# Patient Record
Sex: MALE | Race: WHITE | NOT HISPANIC OR LATINO | Employment: OTHER | URBAN - METROPOLITAN AREA
[De-identification: names, ages, dates, MRNs, and addresses within clinical notes are randomized per-mention and may not be internally consistent; named-entity substitution may affect disease eponyms.]

---

## 2019-01-15 ENCOUNTER — TELEPHONE (OUTPATIENT)
Dept: INTERNAL MEDICINE | Facility: CLINIC | Age: 79
End: 2019-01-15

## 2019-01-15 RX ORDER — FENOFIBRATE 145 MG/1
145 TABLET, COATED ORAL DAILY
Qty: 90 TABLET | Refills: 1 | Status: SHIPPED | OUTPATIENT
Start: 2019-01-15 | End: 2019-01-16 | Stop reason: SDUPTHER

## 2019-01-15 RX ORDER — CARVEDILOL 6.25 MG/1
6.25 TABLET ORAL 2 TIMES DAILY WITH MEALS
Qty: 90 TABLET | Refills: 1 | Status: SHIPPED | OUTPATIENT
Start: 2019-01-15 | End: 2019-01-16 | Stop reason: SDUPTHER

## 2019-01-15 RX ORDER — ATORVASTATIN CALCIUM 40 MG/1
40 TABLET, FILM COATED ORAL DAILY
Qty: 90 TABLET | Refills: 1 | Status: SHIPPED | OUTPATIENT
Start: 2019-01-15 | End: 2019-01-16 | Stop reason: SDUPTHER

## 2019-01-15 NOTE — TELEPHONE ENCOUNTER
[facility] only sent 1 of his medications.Dia 160/12 tablet. Only information that was in the email.  (Will need new Rx for others.

## 2019-01-16 RX ORDER — CARVEDILOL 6.25 MG/1
6.25 TABLET ORAL 2 TIMES DAILY WITH MEALS
Qty: 90 TABLET | Refills: 1 | Status: SHIPPED | OUTPATIENT
Start: 2019-01-16 | End: 2019-05-08 | Stop reason: SDUPTHER

## 2019-01-16 RX ORDER — ATORVASTATIN CALCIUM 40 MG/1
TABLET, FILM COATED ORAL
Qty: 90 TABLET | Refills: 1 | OUTPATIENT
Start: 2019-01-16

## 2019-01-16 RX ORDER — FENOFIBRATE 145 MG/1
TABLET, COATED ORAL
Qty: 90 TABLET | Refills: 1 | OUTPATIENT
Start: 2019-01-16

## 2019-01-16 RX ORDER — FENOFIBRATE 145 MG/1
145 TABLET, COATED ORAL DAILY
Qty: 90 TABLET | Refills: 1 | Status: SHIPPED | OUTPATIENT
Start: 2019-01-16 | End: 2019-07-07 | Stop reason: SDUPTHER

## 2019-01-16 RX ORDER — ATORVASTATIN CALCIUM 40 MG/1
40 TABLET, FILM COATED ORAL DAILY
Qty: 90 TABLET | Refills: 1 | Status: SHIPPED | OUTPATIENT
Start: 2019-01-16 | End: 2019-07-07 | Stop reason: SDUPTHER

## 2019-01-16 RX ORDER — CARVEDILOL 6.25 MG/1
TABLET ORAL
Qty: 180 TABLET | Refills: 1 | OUTPATIENT
Start: 2019-01-16

## 2019-01-16 NOTE — TELEPHONE ENCOUNTER
Called and spoke to Mallory, daughter. She said he has had [facility] for a year. Re-submitted new rx's to [facility]. Advised daughter to check with them tomorrow and call me if there are any problems

## 2019-01-16 NOTE — TELEPHONE ENCOUNTER
PTS DAUGHTER REQUESTS ANOTHER RX FOR ATORVASTATIN, CARVEDILOL, AND FENOFIBRATE. SHE STATES THAT THEY NEED TO BE SENT TO [facility] AND NOT [facility]. DAUGHTER WOULD LIKE A CALL BACK WHEN THIS IS TAKEN CARE OF. [phone].    AFTER LOOKING INTO THE CHART, [facility] DENIED THEM DUE TO BEING PICKED UP BY ANOTHER PHARMACY, WHICH WAS [facility].

## 2019-05-01 PROBLEM — R73.09 ABNORMAL GLUCOSE LEVEL: Status: ACTIVE | Noted: 2019-05-01

## 2019-05-01 PROBLEM — E66.01 MORBID OBESITY: Status: ACTIVE | Noted: 2019-05-01

## 2019-05-01 PROBLEM — G47.33 OBSTRUCTIVE SLEEP APNEA: Status: ACTIVE | Noted: 2019-05-01

## 2019-05-01 PROBLEM — E78.2 MIXED HYPERLIPIDEMIA: Status: ACTIVE | Noted: 2019-05-01

## 2019-05-01 PROBLEM — I25.10 ASHD (ARTERIOSCLEROTIC HEART DISEASE): Status: ACTIVE | Noted: 2019-05-01

## 2019-05-01 PROBLEM — T14.8XXA ABRASION: Status: ACTIVE | Noted: 2019-05-01

## 2019-05-01 PROBLEM — I25.2 OLD MYOCARDIAL INFARCTION: Status: ACTIVE | Noted: 2019-05-01

## 2019-05-01 PROBLEM — Z00.00 ANNUAL PHYSICAL EXAM: Status: ACTIVE | Noted: 2019-05-01

## 2019-05-01 PROBLEM — Z00.00 MEDICARE ANNUAL WELLNESS VISIT, SUBSEQUENT: Status: ACTIVE | Noted: 2019-05-01

## 2019-05-01 PROBLEM — N18.30 CHRONIC KIDNEY DISEASE, STAGE 3 (HCC): Status: ACTIVE | Noted: 2019-05-01

## 2019-05-01 PROBLEM — I10 BENIGN ESSENTIAL HYPERTENSION: Status: ACTIVE | Noted: 2019-05-01

## 2019-05-08 RX ORDER — CARVEDILOL 6.25 MG/1
TABLET ORAL
Qty: 90 TABLET | Refills: 0 | Status: SHIPPED | OUTPATIENT
Start: 2019-05-08 | End: 2019-06-11 | Stop reason: SDUPTHER

## 2019-05-17 ENCOUNTER — LAB (OUTPATIENT)
Dept: LAB | Facility: HOSPITAL | Age: 79
End: 2019-05-17

## 2019-05-17 ENCOUNTER — OFFICE VISIT (OUTPATIENT)
Dept: INTERNAL MEDICINE | Facility: CLINIC | Age: 79
End: 2019-05-17

## 2019-05-17 VITALS
SYSTOLIC BLOOD PRESSURE: 140 MMHG | HEART RATE: 76 BPM | WEIGHT: 236 LBS | DIASTOLIC BLOOD PRESSURE: 80 MMHG | HEIGHT: 66 IN | BODY MASS INDEX: 37.93 KG/M2

## 2019-05-17 DIAGNOSIS — N18.30 CHRONIC KIDNEY DISEASE, STAGE 3 (HCC): ICD-10-CM

## 2019-05-17 DIAGNOSIS — R73.09 ABNORMAL GLUCOSE LEVEL: ICD-10-CM

## 2019-05-17 DIAGNOSIS — R35.0 URINARY FREQUENCY: ICD-10-CM

## 2019-05-17 DIAGNOSIS — E78.2 MIXED HYPERLIPIDEMIA: ICD-10-CM

## 2019-05-17 DIAGNOSIS — E66.01 MORBID OBESITY (HCC): ICD-10-CM

## 2019-05-17 DIAGNOSIS — Z00.00 MEDICARE ANNUAL WELLNESS VISIT, SUBSEQUENT: Primary | ICD-10-CM

## 2019-05-17 LAB
25(OH)D3 SERPL-MCNC: 11.5 NG/ML (ref 30–100)
ALBUMIN SERPL-MCNC: 3.3 G/DL (ref 3.5–5.2)
ALBUMIN UR-MCNC: <1.2 MG/L
ALBUMIN/GLOB SERPL: 1 G/DL
ALP SERPL-CCNC: 45 U/L (ref 39–117)
ALT SERPL W P-5'-P-CCNC: 39 U/L (ref 1–41)
ANION GAP SERPL CALCULATED.3IONS-SCNC: 13.2 MMOL/L
AST SERPL-CCNC: 42 U/L (ref 1–40)
BASOPHILS # BLD AUTO: 0.03 10*3/MM3 (ref 0–0.2)
BASOPHILS NFR BLD AUTO: 0.4 % (ref 0–1.5)
BILIRUB SERPL-MCNC: 0.6 MG/DL (ref 0.2–1.2)
BILIRUB UR QL STRIP: NEGATIVE
BUN BLD-MCNC: 25 MG/DL (ref 8–23)
BUN/CREAT SERPL: 21.6 (ref 7–25)
CALCIUM SPEC-SCNC: 9.3 MG/DL (ref 8.6–10.5)
CHLORIDE SERPL-SCNC: 103 MMOL/L (ref 98–107)
CHOLEST SERPL-MCNC: 108 MG/DL (ref 0–200)
CLARITY UR: CLEAR
CO2 SERPL-SCNC: 22.8 MMOL/L (ref 22–29)
COLOR UR: YELLOW
CREAT BLD-MCNC: 1.16 MG/DL (ref 0.76–1.27)
CREAT UR-MCNC: 123.3 MG/DL
DEPRECATED RDW RBC AUTO: 55.2 FL (ref 37–54)
EOSINOPHIL # BLD AUTO: 0.17 10*3/MM3 (ref 0–0.4)
EOSINOPHIL NFR BLD AUTO: 2.2 % (ref 0.3–6.2)
ERYTHROCYTE [DISTWIDTH] IN BLOOD BY AUTOMATED COUNT: 14.7 % (ref 12.3–15.4)
GFR SERPL CREATININE-BSD FRML MDRD: 61 ML/MIN/1.73
GLOBULIN UR ELPH-MCNC: 3.2 GM/DL
GLUCOSE BLD-MCNC: 100 MG/DL (ref 65–99)
GLUCOSE UR STRIP-MCNC: NEGATIVE MG/DL
HBA1C MFR BLD: 5.78 % (ref 4.8–5.6)
HCT VFR BLD AUTO: 45 % (ref 37.5–51)
HDLC SERPL-MCNC: 26 MG/DL (ref 40–60)
HGB BLD-MCNC: 14 G/DL (ref 13–17.7)
HGB UR QL STRIP.AUTO: NEGATIVE
IMM GRANULOCYTES # BLD AUTO: 0.04 10*3/MM3 (ref 0–0.05)
IMM GRANULOCYTES NFR BLD AUTO: 0.5 % (ref 0–0.5)
KETONES UR QL STRIP: NEGATIVE
LDLC SERPL CALC-MCNC: 59 MG/DL (ref 0–100)
LDLC/HDLC SERPL: 2.28 {RATIO}
LEUKOCYTE ESTERASE UR QL STRIP.AUTO: NEGATIVE
LYMPHOCYTES # BLD AUTO: 1.22 10*3/MM3 (ref 0.7–3.1)
LYMPHOCYTES NFR BLD AUTO: 15.7 % (ref 19.6–45.3)
MCH RBC QN AUTO: 31.5 PG (ref 26.6–33)
MCHC RBC AUTO-ENTMCNC: 31.1 G/DL (ref 31.5–35.7)
MCV RBC AUTO: 101.1 FL (ref 79–97)
MICROALBUMIN/CREAT UR: NORMAL MG/G
MONOCYTES # BLD AUTO: 0.93 10*3/MM3 (ref 0.1–0.9)
MONOCYTES NFR BLD AUTO: 11.9 % (ref 5–12)
NEUTROPHILS # BLD AUTO: 5.4 10*3/MM3 (ref 1.7–7)
NEUTROPHILS NFR BLD AUTO: 69.3 % (ref 42.7–76)
NITRITE UR QL STRIP: NEGATIVE
NRBC BLD AUTO-RTO: 0 /100 WBC (ref 0–0.2)
PH UR STRIP.AUTO: 6 [PH] (ref 5–8)
PLATELET # BLD AUTO: 186 10*3/MM3 (ref 140–450)
PMV BLD AUTO: 12.4 FL (ref 6–12)
POTASSIUM BLD-SCNC: 4.3 MMOL/L (ref 3.5–5.2)
PROT SERPL-MCNC: 6.5 G/DL (ref 6–8.5)
PROT UR QL STRIP: NEGATIVE
RBC # BLD AUTO: 4.45 10*6/MM3 (ref 4.14–5.8)
SODIUM BLD-SCNC: 139 MMOL/L (ref 136–145)
SP GR UR STRIP: 1.02 (ref 1–1.03)
TRIGL SERPL-MCNC: 114 MG/DL (ref 0–150)
TSH SERPL DL<=0.05 MIU/L-ACNC: 1.59 MIU/ML (ref 0.27–4.2)
UROBILINOGEN UR QL STRIP: NORMAL
VLDLC SERPL-MCNC: 22.8 MG/DL (ref 5–40)
WBC NRBC COR # BLD: 7.79 10*3/MM3 (ref 3.4–10.8)

## 2019-05-17 PROCEDURE — 84443 ASSAY THYROID STIM HORMONE: CPT

## 2019-05-17 PROCEDURE — 36415 COLL VENOUS BLD VENIPUNCTURE: CPT

## 2019-05-17 PROCEDURE — 82570 ASSAY OF URINE CREATININE: CPT

## 2019-05-17 PROCEDURE — 83036 HEMOGLOBIN GLYCOSYLATED A1C: CPT

## 2019-05-17 PROCEDURE — 99213 OFFICE O/P EST LOW 20 MIN: CPT | Performed by: NURSE PRACTITIONER

## 2019-05-17 PROCEDURE — 82043 UR ALBUMIN QUANTITATIVE: CPT

## 2019-05-17 PROCEDURE — 81003 URINALYSIS AUTO W/O SCOPE: CPT

## 2019-05-17 PROCEDURE — 85025 COMPLETE CBC W/AUTO DIFF WBC: CPT

## 2019-05-17 PROCEDURE — 80061 LIPID PANEL: CPT

## 2019-05-17 PROCEDURE — 80053 COMPREHEN METABOLIC PANEL: CPT

## 2019-05-17 PROCEDURE — 82306 VITAMIN D 25 HYDROXY: CPT

## 2019-05-17 RX ORDER — PNV NO.95/FERROUS FUM/FOLIC AC 28MG-0.8MG
1 TABLET ORAL DAILY
COMMUNITY

## 2019-05-17 RX ORDER — VALSARTAN AND HYDROCHLOROTHIAZIDE 160; 12.5 MG/1; MG/1
1 TABLET, FILM COATED ORAL DAILY
COMMUNITY
Start: 2018-11-09 | End: 2019-07-07 | Stop reason: SDUPTHER

## 2019-05-17 RX ORDER — FAMOTIDINE 20 MG/1
1 TABLET, FILM COATED ORAL EVERY 12 HOURS SCHEDULED
COMMUNITY
Start: 2016-11-03

## 2019-05-17 NOTE — PROGRESS NOTES
"Tho Vasquez  1940  [id number]  Patient Care Team:  Ja Baldwin MD as PCP - General (Internal Medicine)  Danny Rdz Jr., MD as Consulting Physician (Ophthalmology)    Tho Vasquez is a pleasant 79 y.o. male who presents for evaluation of Annual Exam      Chief Complaint   Patient presents with   • Annual Exam       HPI:   Urinary frequency for several months.  No dysuria, abd pain, fever, n/v.  Will add UA to labs.    Past Medical History:   Diagnosis Date   • Myocardial infarction (CMS/HCC)    • Positive PPD    • Respiratory failure (CMS/HCC) 2005    requiring tracheostomy       No past surgical history on file.    Family History   Problem Relation Age of Onset   • Arthritis Mother    • Heart attack Father    • Stroke Father    • Diabetes Father    • Heart attack Brother    • Cancer Paternal Uncle    • Obesity Other        Social History     Tobacco Use   Smoking Status Former Smoker   • Types: Cigars       Allergies   Allergen Reactions   • Prednisone Rash       Review of Systems    Vitals:    05/17/19 0747   BP: 140/80   BP Location: Left arm   Patient Position: Sitting   Cuff Size: Adult   Pulse: 76   Weight: 107 kg (236 lb)   Height: 167.6 cm (66\")         Current Outpatient Medications:   •  aspirin 81 MG tablet, Take 1 tablet by mouth Daily., Disp: , Rfl:   •  atorvastatin (LIPITOR) 40 MG tablet, Take 1 tablet by mouth Daily., Disp: 90 tablet, Rfl: 1  •  carvedilol (COREG) 6.25 MG tablet, TAKE 1 TABLET TWICE DAILY  WITH MEALS, Disp: 90 tablet, Rfl: 0  •  famotidine (PEPCID) 20 MG tablet, Take 1 tablet by mouth Every 12 (Twelve) Hours., Disp: , Rfl:   •  fenofibrate (TRICOR) 145 MG tablet, Take 1 tablet by mouth Daily., Disp: 90 tablet, Rfl: 1  •  Ferrous Sulfate (IRON) 325 (65 Fe) MG tablet, Take 1 tablet by mouth Daily., Disp: , Rfl:   •  valsartan-hydrochlorothiazide (DIOVAN-HCT) 160-12.5 MG per tablet, Take 1 tablet by mouth Daily., Disp: , Rfl:     Physical Exam "   Constitutional: He appears well-developed and well-nourished.   Pulmonary/Chest: Effort normal.   Skin: Skin is warm and dry.   Psychiatric: He has a normal mood and affect. His behavior is normal.   Vitals reviewed.      Results Review:    None    Assessment/Plan:    Problem List Items Addressed This Visit        Cardiovascular and Mediastinum    Mixed hyperlipidemia    Relevant Medications    atorvastatin (LIPITOR) 40 MG tablet    fenofibrate (TRICOR) 145 MG tablet    Other Relevant Orders    Lipid Panel    TSH Rfx On Abnormal To Free T4       Digestive    Morbid obesity (CMS/HCC)       Genitourinary    Chronic kidney disease, stage 3 (CMS/HCC)    Relevant Orders    Comprehensive Metabolic Panel    CBC & Differential    Microalbumin / Creatinine Urine Ratio - Urine, Clean Catch    Vitamin D 25 Hydroxy       Other    Abnormal glucose level    Relevant Orders    Hemoglobin A1c    Medicare annual wellness visit, subsequent - Primary      Other Visit Diagnoses     Urinary frequency        Relevant Orders    Urinalysis With Culture If Indicated - Urine, Clean Catch          Plan of care reviewed with patient at the conclusion of today's visit. Education was provided regarding diagnosis, management and any prescribed or recommended OTC medications.  Patient verbalizes understanding of and agreement with management plan.    No Follow-up on file.    *Note that portions of this note were completed with a voice recognition program.  Efforts were made to edit the dictation but occasionally words are transcribed.    ADALBERTO August

## 2019-05-17 NOTE — PATIENT INSTRUCTIONS
Medicare Wellness  Personal Prevention Plan of Service     Date of Office Visit:  2019  Encounter Provider:  ADALBERTO August  Place of Service:  [facility]  Patient Name: Tho Vasquez  :  1940    As part of the Medicare Wellness portion of your visit today, we are providing you with this personalized preventive plan of services (PPPS). This plan is based upon recommendations of the [place] Preventive Services Task Force (USPSTF) and the Advisory Committee on Immunization Practices (ACIP).    This lists the preventive care services that should be considered, and provides dates of when you are due. Items listed as completed are up-to-date and do not require any further intervention.    Health Maintenance   Topic Date Due   • ZOSTER VACCINE (2 of 2) 2018   • MEDICARE ANNUAL WELLNESS  01/15/2019   • LIPID PANEL  2019   • INFLUENZA VACCINE  2019   • TDAP/TD VACCINES (2 - Td) 2027   • PNEUMOCOCCAL VACCINES (65+ LOW/MEDIUM RISK)  Completed       Orders Placed This Encounter   Procedures   • Comprehensive Metabolic Panel     Standing Status:   Future     Standing Expiration Date:   2020   • Lipid Panel     Standing Status:   Future     Standing Expiration Date:   2020   • TSH Rfx On Abnormal To Free T4     Standing Status:   Future     Standing Expiration Date:   2020   • Microalbumin / Creatinine Urine Ratio - Urine, Clean Catch     Standing Status:   Future     Standing Expiration Date:   2020   • Vitamin D 25 Hydroxy     Standing Status:   Future     Standing Expiration Date:   2020   • Hemoglobin A1c     Standing Status:   Future     Standing Expiration Date:   2020   • Urinalysis With Culture If Indicated - Urine, Clean Catch     Standing Status:   Future     Standing Expiration Date:   2020   • CBC & Differential     Standing Status:   Future     Standing Expiration Date:   2020      Order Specific Question:   Manual Differential     Answer:   No       Return for Next scheduled follow up, Labs this visit, Annual physical.

## 2019-05-17 NOTE — PROGRESS NOTES
QUICK REFERENCE INFORMATION:  The ABCs of the Annual Wellness Visit    Subsequent Medicare Wellness Visit     HEALTH RISK ASSESSMENT    : 1940    Recent Hospitalizations:  No hospitalization(s) within the last year..        Current Medical Providers:  Patient Care Team:  Ja Baldwin MD as PCP - General (Internal Medicine)  Danny Rdz Jr., MD as Consulting Physician (Ophthalmology)        Smoking Status:  Social History     Tobacco Use   Smoking Status Former Smoker   • Types: Cigars       Alcohol Consumption:  Social History     Substance and Sexual Activity   Alcohol Use Not on file       Depression Screen:   PHQ-2/PHQ-9 Depression Screening 2019   Little interest or pleasure in doing things 0   Feeling down, depressed, or hopeless 0   Total Score 0       Health Habits and Functional and Cognitive Screening:  No flowsheet data found.        Does the patient have evidence of cognitive impairment? Yes    Asiprin use counseling: Taking ASA appropriately as indicated      Recent Lab Results:                   Age-appropriate Screening Schedule:  Refer to the list below for future screening recommendations based on patient's age, sex and/or medical conditions. Orders for these recommended tests are listed in the plan section. The patient has been provided with a written plan.    Health Maintenance   Topic Date Due   • ZOSTER VACCINE (2 of 2) 2018   • LIPID PANEL  2019   • INFLUENZA VACCINE  2019   • TDAP/TD VACCINES (2 - Td) 2027   • PNEUMOCOCCAL VACCINES (65+ LOW/MEDIUM RISK)  Completed        Subjective   History of Present Illness    Tho Vasquez is a 79 y.o. male who presents for an Annual Wellness Visit.    The following portions of the patient's history were reviewed and updated as appropriate: allergies, current medications, past family history, past medical history, past social history, past surgical history and problem list.    Outpatient Medications Prior  to Visit   Medication Sig Dispense Refill   • aspirin 81 MG tablet Take 1 tablet by mouth Daily.     • atorvastatin (LIPITOR) 40 MG tablet Take 1 tablet by mouth Daily. 90 tablet 1   • carvedilol (COREG) 6.25 MG tablet TAKE 1 TABLET TWICE DAILY  WITH MEALS 90 tablet 0   • famotidine (PEPCID) 20 MG tablet Take 1 tablet by mouth Every 12 (Twelve) Hours.     • fenofibrate (TRICOR) 145 MG tablet Take 1 tablet by mouth Daily. 90 tablet 1   • Ferrous Sulfate (IRON) 325 (65 Fe) MG tablet Take 1 tablet by mouth Daily.     • valsartan-hydrochlorothiazide (DIOVAN-HCT) 160-12.5 MG per tablet Take 1 tablet by mouth Daily.       No facility-administered medications prior to visit.        Patient Active Problem List   Diagnosis   • Mixed hyperlipidemia   • Abnormal glucose level   • Abrasion   • Adult BMI 37.0-37.9 kg/sq m   • Annual physical exam   • ASHD (arteriosclerotic heart disease)   • Benign essential hypertension   • Chronic kidney disease, stage 3 (CMS/[facility])   • Medicare annual wellness visit, subsequent   • Morbid obesity (CMS/[facility])   • Obstructive sleep apnea   • Old myocardial infarction       Advance Care Planning:  Patient does not have an advance directive - not interested in additional information    Identification of Risk Factors:  Risk factors include: weight , cardiovascular risk and inactivity.    Review of Systems   Constitutional: Negative for chills, fatigue and fever.   HENT: Negative for congestion, ear pain and sinus pressure.    Respiratory: Negative for cough, chest tightness, shortness of breath and wheezing.    Cardiovascular: Negative for chest pain and palpitations.   Gastrointestinal: Negative for abdominal pain, blood in stool and constipation.   Skin: Negative for color change.   Allergic/Immunologic: Negative for environmental allergies.   Neurological: Negative for dizziness, speech difficulty and headaches.   Psychiatric/Behavioral: Negative for confusion. The patient is not nervous/anxious.   "      Compared to one year ago, the patient feels his physical health is the same.  Compared to one year ago, the patient feels his mental health is the same.    Objective         Vitals:    05/17/19 0747   BP: 140/80   BP Location: Left arm   Patient Position: Sitting   Cuff Size: Adult   Pulse: 76   Weight: 107 kg (236 lb)   Height: 167.6 cm (66\")       Patient's Body mass index is 38.09 kg/m². BMI is above normal parameters. Recommendations include: exercise counseling and nutrition counseling.      Assessment/Plan   Patient Self-Management and Personalized Health Advice  The patient has been provided with information about: diet, exercise, weight management and prevention of cardiac or vascular disease and preventive services including:   · Shingrix.    Visit Diagnoses:    ICD-10-CM ICD-9-CM   1. Medicare annual wellness visit, subsequent Z00.00 V70.0   2. Mixed hyperlipidemia E78.2 272.2   3. Chronic kidney disease, stage 3 (CMS/[facility]) N18.3 585.3   4. Abnormal glucose level R73.09 790.29   5. Morbid obesity (CMS/[facility]) E66.01 278.01   6. Urinary frequency R35.0 788.41       Orders Placed This Encounter   Procedures   • Comprehensive Metabolic Panel     Standing Status:   Future     Standing Expiration Date:   5/17/2020   • Lipid Panel     Standing Status:   Future     Standing Expiration Date:   5/17/2020   • TSH Rfx On Abnormal To Free T4     Standing Status:   Future     Standing Expiration Date:   5/17/2020   • Microalbumin / Creatinine Urine Ratio - Urine, Clean Catch     Standing Status:   Future     Standing Expiration Date:   5/17/2020   • Vitamin D 25 Hydroxy     Standing Status:   Future     Standing Expiration Date:   5/17/2020   • Hemoglobin A1c     Standing Status:   Future     Standing Expiration Date:   5/17/2020   • Urinalysis With Culture If Indicated - Urine, Clean Catch     Standing Status:   Future     Standing Expiration Date:   5/17/2020   • CBC & Differential     Standing Status:   Future     " Standing Expiration Date:   5/17/2020     Order Specific Question:   Manual Differential     Answer:   No       Outpatient Encounter Medications as of 5/17/2019   Medication Sig Dispense Refill   • aspirin 81 MG tablet Take 1 tablet by mouth Daily.     • atorvastatin (LIPITOR) 40 MG tablet Take 1 tablet by mouth Daily. 90 tablet 1   • carvedilol (COREG) 6.25 MG tablet TAKE 1 TABLET TWICE DAILY  WITH MEALS 90 tablet 0   • famotidine (PEPCID) 20 MG tablet Take 1 tablet by mouth Every 12 (Twelve) Hours.     • fenofibrate (TRICOR) 145 MG tablet Take 1 tablet by mouth Daily. 90 tablet 1   • Ferrous Sulfate (IRON) 325 (65 Fe) MG tablet Take 1 tablet by mouth Daily.     • valsartan-hydrochlorothiazide (DIOVAN-HCT) 160-12.5 MG per tablet Take 1 tablet by mouth Daily.       No facility-administered encounter medications on file as of 5/17/2019.        Reviewed use of high risk medication in the elderly: not applicable  Reviewed for potential of harmful drug interactions in the elderly: not applicable    Follow Up:  Return for Next scheduled follow up, Labs this visit, Annual physical.     An After Visit Summary and PPPS with all of these plans were given to the patient.

## 2019-05-23 ENCOUNTER — OFFICE VISIT (OUTPATIENT)
Dept: INTERNAL MEDICINE | Facility: CLINIC | Age: 79
End: 2019-05-23

## 2019-05-23 VITALS
WEIGHT: 237 LBS | HEART RATE: 64 BPM | SYSTOLIC BLOOD PRESSURE: 128 MMHG | BODY MASS INDEX: 38.09 KG/M2 | DIASTOLIC BLOOD PRESSURE: 80 MMHG | HEIGHT: 66 IN

## 2019-05-23 DIAGNOSIS — E66.01 MORBID OBESITY (HCC): ICD-10-CM

## 2019-05-23 DIAGNOSIS — I87.2 VENOUS INSUFFICIENCY (CHRONIC) (PERIPHERAL): ICD-10-CM

## 2019-05-23 DIAGNOSIS — E78.2 MIXED HYPERLIPIDEMIA: ICD-10-CM

## 2019-05-23 DIAGNOSIS — R73.09 ABNORMAL GLUCOSE LEVEL: ICD-10-CM

## 2019-05-23 DIAGNOSIS — N18.30 CHRONIC KIDNEY DISEASE, STAGE 3 (HCC): ICD-10-CM

## 2019-05-23 DIAGNOSIS — I10 BENIGN ESSENTIAL HYPERTENSION: Primary | ICD-10-CM

## 2019-05-23 DIAGNOSIS — G47.33 OBSTRUCTIVE SLEEP APNEA: ICD-10-CM

## 2019-05-23 DIAGNOSIS — I25.10 ASHD (ARTERIOSCLEROTIC HEART DISEASE): ICD-10-CM

## 2019-05-23 DIAGNOSIS — E55.9 VITAMIN D DEFICIENCY: ICD-10-CM

## 2019-05-23 PROCEDURE — 99214 OFFICE O/P EST MOD 30 MIN: CPT | Performed by: INTERNAL MEDICINE

## 2019-05-23 NOTE — PROGRESS NOTES
[facility]     Tho Vasquez  1940   [id number]      Patient Care Team:  Ja Baldwin MD as PCP - General (Internal Medicine)  Danny Rdz Jr., MD as Consulting Physician (Ophthalmology)    Chief Complaint::   Chief Complaint   Patient presents with   • Hyperlipidemia   • Hypertension   • Obesity   • Chronic Kidney Disease        HPI  Mr. Vasquez is now 79.  He comes in for follow-up of his hypertension, prediabetes, chronic kidney disease, hyperlipidemia, obstructive sleep apnea, morbid obesity and for part 2 of his annual wellness visit.  Overall he feels well.  He sees Dr. Vergara for his lower extremity edema.  At one point Dr. Vergara was wrapping his legs.  He wore compression briefly but this apparently caused an abrasion on his leg.  He has no chest pain or dyspnea.  He admits he is not very active, and eats out most of the time.  He continues to work 3-11 shift as a , which is mostly sitting.      Chronic Conditions:      Patient Active Problem List   Diagnosis   • Mixed hyperlipidemia   • Abnormal glucose level   • Abrasion   • Adult BMI 37.0-37.9 kg/sq m   • Annual physical exam   • ASHD (arteriosclerotic heart disease)   • Benign essential hypertension   • Chronic kidney disease, stage 3 (CMS/HCC)   • Medicare annual wellness visit, subsequent   • Morbid obesity (CMS/HCC)   • Obstructive sleep apnea   • Old myocardial infarction   • Vitamin D deficiency   • Venous insufficiency (chronic) (peripheral)        Past Medical History:   Diagnosis Date   • Myocardial infarction (CMS/HCC)    • Positive PPD    • Respiratory failure (CMS/HCC) 2005    requiring tracheostomy       No past surgical history on file.    Family History   Problem Relation Age of Onset   • Arthritis Mother    • Heart attack Father    • Stroke Father    • Diabetes Father    • Heart attack Brother    • Cancer Paternal Uncle    • Obesity Other        Social History     Socioeconomic History   •  Marital status:      Spouse name: Not on file   • Number of children: Not on file   • Years of education: Not on file   • Highest education level: Not on file   Tobacco Use   • Smoking status: Former Smoker     Types: Cigars       Allergies   Allergen Reactions   • Prednisone Rash         Current Outpatient Medications:   •  aspirin 81 MG tablet, Take 1 tablet by mouth Daily., Disp: , Rfl:   •  atorvastatin (LIPITOR) 40 MG tablet, Take 1 tablet by mouth Daily., Disp: 90 tablet, Rfl: 1  •  carvedilol (COREG) 6.25 MG tablet, TAKE 1 TABLET TWICE DAILY  WITH MEALS, Disp: 90 tablet, Rfl: 0  •  famotidine (PEPCID) 20 MG tablet, Take 1 tablet by mouth Every 12 (Twelve) Hours., Disp: , Rfl:   •  fenofibrate (TRICOR) 145 MG tablet, Take 1 tablet by mouth Daily., Disp: 90 tablet, Rfl: 1  •  Ferrous Sulfate (IRON) 325 (65 Fe) MG tablet, Take 1 tablet by mouth Daily., Disp: , Rfl:   •  valsartan-hydrochlorothiazide (DIOVAN-HCT) 160-12.5 MG per tablet, Take 1 tablet by mouth Daily., Disp: , Rfl:   •  cholecalciferol (VITAMIN D3) 52283 units capsule, Take 1 capsule by mouth 1 (One) Time Per Week., Disp: 4 capsule, Rfl: 5    Review of Systems   Constitutional: Negative for chills, fatigue and fever.   HENT: Negative for congestion, ear pain and sinus pressure.    Respiratory: Negative for cough, chest tightness, shortness of breath and wheezing.    Cardiovascular: Negative for chest pain and palpitations.   Gastrointestinal: Negative for abdominal pain, blood in stool and constipation.   Skin: Negative for color change.   Allergic/Immunologic: Negative for environmental allergies.   Neurological: Negative for dizziness, speech difficulty and headache.   Psychiatric/Behavioral: Negative for decreased concentration. The patient is not nervous/anxious.         Vital Signs  Vitals:    05/23/19 0835   BP: 128/80   BP Location: Left arm   Patient Position: Sitting   Cuff Size: Adult   Pulse: 64   Weight: 108 kg (237 lb)   Height:  "167.6 cm (65.98\")       Physical Exam   Constitutional: He is oriented to person, place, and time. He appears well-developed and well-nourished.   HENT:   Head: Normocephalic and atraumatic.   Right Ear: External ear normal.   Left Ear: External ear normal.   Nose: Nose normal.   Mouth/Throat: Oropharynx is clear and moist. No oropharyngeal exudate.   Eyes: Conjunctivae and EOM are normal. Pupils are equal, round, and reactive to light.   Neck: Normal range of motion. Neck supple. No JVD present. No thyromegaly present.   Cardiovascular: Normal rate, regular rhythm, normal heart sounds and intact distal pulses. Exam reveals no gallop and no friction rub.   No murmur heard.  Pulmonary/Chest: Effort normal and breath sounds normal. No respiratory distress. He has no wheezes. He has no rales. He exhibits no tenderness.   Abdominal: Soft. Bowel sounds are normal. He exhibits no distension and no mass. There is no tenderness. There is no rebound and no guarding. No hernia.   Musculoskeletal: Normal range of motion. He exhibits edema. He exhibits no tenderness.   2-3+ pitting edema below the knees bilaterally   Lymphadenopathy:     He has no cervical adenopathy.   Neurological: He is alert and oriented to person, place, and time. He displays normal reflexes. No cranial nerve deficit or sensory deficit. He exhibits normal muscle tone. Coordination normal.   Skin: Skin is warm and dry. No rash noted. No erythema.   In the right pretibial area there is a large bullae   Psychiatric: He has a normal mood and affect. His behavior is normal. Judgment and thought content normal.   Nursing note and vitals reviewed.     Procedures    ACE III MINI             Assessment/Plan:    Tho was seen today for hyperlipidemia, hypertension, obesity and chronic kidney disease.    Diagnoses and all orders for this visit:    Benign essential hypertension  -     Comprehensive Metabolic Panel; Future    ASHD (arteriosclerotic heart " disease)    Mixed hyperlipidemia    Obstructive sleep apnea    Morbid obesity (CMS/HCC)    Chronic kidney disease, stage 3 (CMS/HCC)    Abnormal glucose level    Vitamin D deficiency  -     Vitamin D 25 Hydroxy; Future    Venous insufficiency (chronic) (peripheral)    Other orders  -     cholecalciferol (VITAMIN D3) 59967 units capsule; Take 1 capsule by mouth 1 (One) Time Per Week.    Plan    Blood pressure is controlled on carvedilol and valsartan hydrochlorothiazide.    Coronary artery disease remains asymptomatic, continue aspirin, atorvastatin beta-blocker and ARB.    Lipids are well controlled on atorvastatin.    He remains on CPAP for his sleep apnea.    BMI today is 38.27.  Once again I discussed with him the importance of reducing the fat and carbohydrate in his diet to lose weight.    GFR is stable at 61.  Continue avoidance of NSAIDs and control of blood pressure.    A1c is controlled at 5.7.  Treatment remains diet.    He will continue to follow-up with Dr. Vergara for his venous insufficiency.  Discussed the treatment is elevation, salt restriction and compression.    Vitamin D is extremely low at 11.  Begin high dose weekly vitamin D and repeat vitamin D and calcium levels at 3 months.      Plan of care reviewed with patient at the conclusion of today's visit. Education was provided regarding diagnosis, management, and any prescribed or recommended OTC medications.Patient verbalizes understanding of and agreement with management plan.         Ja Baldwin MD

## 2019-06-11 RX ORDER — CARVEDILOL 6.25 MG/1
TABLET ORAL
Qty: 90 TABLET | Refills: 1 | Status: SHIPPED | OUTPATIENT
Start: 2019-06-11 | End: 2019-08-21 | Stop reason: SDUPTHER

## 2019-07-01 ENCOUNTER — TELEPHONE (OUTPATIENT)
Dept: INTERNAL MEDICINE | Facility: CLINIC | Age: 79
End: 2019-07-01

## 2019-07-01 DIAGNOSIS — I10 BENIGN ESSENTIAL HYPERTENSION: Primary | ICD-10-CM

## 2019-07-01 RX ORDER — FUROSEMIDE 20 MG/1
20 TABLET ORAL DAILY
Qty: 30 TABLET | Refills: 2 | Status: SHIPPED | OUTPATIENT
Start: 2019-07-01 | End: 2019-10-11 | Stop reason: SDUPTHER

## 2019-07-01 NOTE — TELEPHONE ENCOUNTER
Called and spoke to Jennifer (patients wife0  Advised of Dr Baldwin instructions.   She verbalized understanding.

## 2019-07-01 NOTE — TELEPHONE ENCOUNTER
Called and talked to Mallory. She said he is now having legs wrapped and in steffany boots. May need to increase dose of losartan-HCTZ

## 2019-07-01 NOTE — TELEPHONE ENCOUNTER
He is already on a diuretic, losartan-hydrochlorothiazide, has a diuretic in it.  We could increase the dose.  Did Dr dhillon recommend compression or anything else?

## 2019-07-01 NOTE — TELEPHONE ENCOUNTER
PATIENT'S DAUGHTER ONUR CALLED ([phone]) AND SAID THEY JUST SAW THE FOOT AND WOUND CARE DOCTOR TEODORO CRANE.    DR. CRANE RECOMMENDED PUTTING HIM ON A DIURETIC.      WOULD YOU NEED TO SEE HIM OR CAN YOU CHECK WITH DR. CRANE'S OFFICE ABOUT THIS.

## 2019-07-08 RX ORDER — VALSARTAN AND HYDROCHLOROTHIAZIDE 160; 12.5 MG/1; MG/1
TABLET, FILM COATED ORAL
Qty: 90 TABLET | Refills: 1 | Status: SHIPPED | OUTPATIENT
Start: 2019-07-08 | End: 2019-10-06 | Stop reason: HOSPADM

## 2019-07-08 RX ORDER — ATORVASTATIN CALCIUM 40 MG/1
TABLET, FILM COATED ORAL
Qty: 90 TABLET | Refills: 1 | Status: SHIPPED | OUTPATIENT
Start: 2019-07-08 | End: 2019-12-24

## 2019-07-08 RX ORDER — FENOFIBRATE 145 MG/1
TABLET, COATED ORAL
Qty: 90 TABLET | Refills: 1 | Status: SHIPPED | OUTPATIENT
Start: 2019-07-08 | End: 2019-12-24

## 2019-07-15 ENCOUNTER — LAB (OUTPATIENT)
Dept: LAB | Facility: HOSPITAL | Age: 79
End: 2019-07-15

## 2019-07-15 DIAGNOSIS — E55.9 VITAMIN D DEFICIENCY: ICD-10-CM

## 2019-07-15 DIAGNOSIS — I10 BENIGN ESSENTIAL HYPERTENSION: ICD-10-CM

## 2019-07-15 LAB
25(OH)D3 SERPL-MCNC: 48.7 NG/ML (ref 30–100)
ALBUMIN SERPL-MCNC: 3.7 G/DL (ref 3.5–5.2)
ALBUMIN/GLOB SERPL: 1.4 G/DL
ALP SERPL-CCNC: 49 U/L (ref 39–117)
ALT SERPL W P-5'-P-CCNC: 28 U/L (ref 1–41)
ANION GAP SERPL CALCULATED.3IONS-SCNC: 11.2 MMOL/L (ref 5–15)
AST SERPL-CCNC: 32 U/L (ref 1–40)
BILIRUB SERPL-MCNC: 0.4 MG/DL (ref 0.2–1.2)
BUN BLD-MCNC: 30 MG/DL (ref 8–23)
BUN/CREAT SERPL: 20.5 (ref 7–25)
CALCIUM SPEC-SCNC: 9.7 MG/DL (ref 8.6–10.5)
CHLORIDE SERPL-SCNC: 101 MMOL/L (ref 98–107)
CO2 SERPL-SCNC: 29.8 MMOL/L (ref 22–29)
CREAT BLD-MCNC: 1.46 MG/DL (ref 0.76–1.27)
GFR SERPL CREATININE-BSD FRML MDRD: 47 ML/MIN/1.73
GLOBULIN UR ELPH-MCNC: 2.6 GM/DL
GLUCOSE BLD-MCNC: 100 MG/DL (ref 65–99)
POTASSIUM BLD-SCNC: 4.2 MMOL/L (ref 3.5–5.2)
PROT SERPL-MCNC: 6.3 G/DL (ref 6–8.5)
SODIUM BLD-SCNC: 142 MMOL/L (ref 136–145)

## 2019-07-15 PROCEDURE — 82306 VITAMIN D 25 HYDROXY: CPT

## 2019-07-15 PROCEDURE — 80053 COMPREHEN METABOLIC PANEL: CPT

## 2019-08-21 RX ORDER — CARVEDILOL 6.25 MG/1
TABLET ORAL
Qty: 90 TABLET | Refills: 1 | Status: SHIPPED | OUTPATIENT
Start: 2019-08-21 | End: 2020-02-14

## 2019-09-24 ENCOUNTER — OFFICE VISIT (OUTPATIENT)
Dept: INTERNAL MEDICINE | Facility: CLINIC | Age: 79
End: 2019-09-24

## 2019-09-24 VITALS
HEART RATE: 63 BPM | SYSTOLIC BLOOD PRESSURE: 128 MMHG | HEIGHT: 66 IN | TEMPERATURE: 98.2 F | WEIGHT: 227 LBS | BODY MASS INDEX: 36.48 KG/M2 | DIASTOLIC BLOOD PRESSURE: 80 MMHG

## 2019-09-24 DIAGNOSIS — J30.1 SEASONAL ALLERGIC RHINITIS DUE TO POLLEN: Primary | ICD-10-CM

## 2019-09-24 PROCEDURE — 99213 OFFICE O/P EST LOW 20 MIN: CPT | Performed by: INTERNAL MEDICINE

## 2019-09-24 NOTE — PROGRESS NOTES
[facility]     Tho Vasquez  1940   [id number]      Patient Care Team:  Ja Baldwin MD as PCP - General (Internal Medicine)  Danny Rdz Jr., MD as Consulting Physician (Ophthalmology)    Chief Complaint::   Chief Complaint   Patient presents with   • Cough     non productive cough x 3 weeks; runny nose        HPI  Mr. Vasquez comes in complaining of a 3-week history of a dry cough.  There is no fevers, chills or shortness of breath.  He has significant postnasal drainage and allergy symptoms, but his daughters insisted that he come in.    Chronic Conditions:      Patient Active Problem List   Diagnosis   • Mixed hyperlipidemia   • Abnormal glucose level   • Abrasion   • Adult BMI 37.0-37.9 kg/sq m   • Annual physical exam   • ASHD (arteriosclerotic heart disease)   • Benign essential hypertension   • Chronic kidney disease, stage 3 (CMS/[facility])   • Medicare annual wellness visit, subsequent   • Morbid obesity (CMS/[facility])   • Obstructive sleep apnea   • Old myocardial infarction   • Vitamin D deficiency   • Venous insufficiency (chronic) (peripheral)        Past Medical History:   Diagnosis Date   • Myocardial infarction (CMS/[facility])    • Positive PPD    • Respiratory failure (CMS/[facility]) 2005    requiring tracheostomy       No past surgical history on file.    Family History   Problem Relation Age of Onset   • Arthritis Mother    • Heart attack Father    • Stroke Father    • Diabetes Father    • Heart attack Brother    • Cancer Paternal Uncle    • Obesity Other        Social History     Socioeconomic History   • Marital status:      Spouse name: Not on file   • Number of children: Not on file   • Years of education: Not on file   • Highest education level: Not on file   Tobacco Use   • Smoking status: Former Smoker     Types: Cigars   • Smokeless tobacco: Never Used   Substance and Sexual Activity   • Alcohol use: No     Frequency: Never   • Drug use: Defer   • Sexual activity:  "Defer       Allergies   Allergen Reactions   • Prednisone Rash         Current Outpatient Medications:   •  aspirin 81 MG tablet, Take 1 tablet by mouth Daily., Disp: , Rfl:   •  atorvastatin (LIPITOR) 40 MG tablet, TAKE 1 TABLET DAILY, Disp: 90 tablet, Rfl: 1  •  carvedilol (COREG) 6.25 MG tablet, TAKE 1 TABLET TWICE DAILY  WITH MEALS, Disp: 90 tablet, Rfl: 1  •  cholecalciferol (VITAMIN D3) 42555 units capsule, Take 1 capsule by mouth 1 (One) Time Per Week., Disp: 4 capsule, Rfl: 5  •  famotidine (PEPCID) 20 MG tablet, Take 1 tablet by mouth Every 12 (Twelve) Hours., Disp: , Rfl:   •  fenofibrate (TRICOR) 145 MG tablet, TAKE 1 TABLET DAILY, Disp: 90 tablet, Rfl: 1  •  Ferrous Sulfate (IRON) 325 (65 Fe) MG tablet, Take 1 tablet by mouth Daily., Disp: , Rfl:   •  furosemide (LASIX) 20 MG tablet, Take 1 tablet by mouth Daily., Disp: 30 tablet, Rfl: 2  •  valsartan-hydrochlorothiazide (DIOVAN-HCT) 160-12.5 MG per tablet, TAKE 1 TABLET DAILY, Disp: 90 tablet, Rfl: 1    Review of Systems   Constitutional: Negative for chills, fatigue and fever.   HENT: Negative for congestion, ear pain and sinus pressure.    Respiratory: Negative for cough, chest tightness, shortness of breath and wheezing.    Cardiovascular: Negative for chest pain and palpitations.   Gastrointestinal: Negative for abdominal pain, blood in stool and constipation.   Skin: Negative for color change.   Allergic/Immunologic: Negative for environmental allergies.   Neurological: Negative for dizziness, speech difficulty and headache.   Psychiatric/Behavioral: Negative for decreased concentration. The patient is not nervous/anxious.         Vital Signs  Vitals:    09/24/19 1039   BP: 128/80   BP Location: Left arm   Patient Position: Sitting   Cuff Size: Adult   Pulse: 63   Temp: 98.2 °F (36.8 °C)   TempSrc: Temporal   Weight: 103 kg (227 lb)   Height: 167.6 cm (65.98\")   PainSc: 0-No pain       Physical Exam   Constitutional: He is oriented to person, " place, and time. He appears well-developed and well-nourished. He is obese.  HENT:   Head: Normocephalic and atraumatic.   Mouth/Throat: Posterior oropharyngeal erythema present.   Post nasal drainage is present   Cardiovascular: Normal rate, regular rhythm and normal heart sounds.   No murmur heard.  Pulmonary/Chest: Effort normal and breath sounds normal.   Mild wheezing on forced expiration.   Neurological: He is alert and oriented to person, place, and time.   Psychiatric: He has a normal mood and affect.   Vitals reviewed.     Procedures    ACE III MINI             Assessment/Plan:    Tho was seen today for cough.    Diagnoses and all orders for this visit:    Seasonal allergic rhinitis due to pollen    Plan    His cough is due to postnasal drainage and slight bronchospasm.  Offered inhaler to reduce inflammation his lungs but he feels as long as he does not have bronchitis or pneumonia, he does not need treatment as his symptoms are minimal.      Plan of care reviewed with patient at the conclusion of today's visit. Education was provided regarding diagnosis, management, and any prescribed or recommended OTC medications.Patient verbalizes understanding of and agreement with management plan.         Ja Baldwin MD

## 2019-10-03 ENCOUNTER — HOSPITAL ENCOUNTER (INPATIENT)
Facility: HOSPITAL | Age: 79
LOS: 3 days | Discharge: HOME | End: 2019-10-06
Attending: EMERGENCY MEDICINE | Admitting: HOSPITALIST

## 2019-10-03 ENCOUNTER — APPOINTMENT (OUTPATIENT)
Dept: CT IMAGING | Facility: HOSPITAL | Age: 79
End: 2019-10-03

## 2019-10-03 ENCOUNTER — APPOINTMENT (OUTPATIENT)
Dept: MRI IMAGING | Facility: HOSPITAL | Age: 79
End: 2019-10-03

## 2019-10-03 ENCOUNTER — APPOINTMENT (OUTPATIENT)
Dept: GENERAL RADIOLOGY | Facility: HOSPITAL | Age: 79
End: 2019-10-03

## 2019-10-03 DIAGNOSIS — Z78.9 IMPAIRED MOBILITY AND ADLS: ICD-10-CM

## 2019-10-03 DIAGNOSIS — Z74.09 IMPAIRED MOBILITY AND ADLS: ICD-10-CM

## 2019-10-03 DIAGNOSIS — R09.02 HYPOXIA: ICD-10-CM

## 2019-10-03 DIAGNOSIS — J18.9 PNEUMONIA OF BOTH LOWER LOBES DUE TO INFECTIOUS ORGANISM: Primary | ICD-10-CM

## 2019-10-03 DIAGNOSIS — R53.1 GENERALIZED WEAKNESS: ICD-10-CM

## 2019-10-03 PROBLEM — R74.01 TRANSAMINITIS: Status: ACTIVE | Noted: 2019-10-03

## 2019-10-03 LAB
ALBUMIN SERPL-MCNC: 2.4 G/DL (ref 3.5–5.2)
ALBUMIN/GLOB SERPL: 0.6 G/DL
ALP SERPL-CCNC: 74 U/L (ref 39–117)
ALT SERPL W P-5'-P-CCNC: 42 U/L (ref 1–41)
ANION GAP SERPL CALCULATED.3IONS-SCNC: 9 MMOL/L (ref 5–15)
AST SERPL-CCNC: 65 U/L (ref 1–40)
BASOPHILS # BLD AUTO: 0.01 10*3/MM3 (ref 0–0.2)
BASOPHILS NFR BLD AUTO: 0.1 % (ref 0–1.5)
BILIRUB SERPL-MCNC: 1.5 MG/DL (ref 0.2–1.2)
BILIRUB UR QL STRIP: NEGATIVE
BUN BLD-MCNC: 46 MG/DL (ref 8–23)
BUN/CREAT SERPL: 42.6 (ref 7–25)
CALCIUM SPEC-SCNC: 9.3 MG/DL (ref 8.6–10.5)
CHLORIDE SERPL-SCNC: 100 MMOL/L (ref 98–107)
CLARITY UR: CLEAR
CO2 SERPL-SCNC: 30 MMOL/L (ref 22–29)
COLOR UR: ABNORMAL
CREAT BLD-MCNC: 1.08 MG/DL (ref 0.76–1.27)
D-LACTATE SERPL-SCNC: 1.1 MMOL/L (ref 0.5–2)
DEPRECATED RDW RBC AUTO: 48.2 FL (ref 37–54)
EOSINOPHIL # BLD AUTO: 0 10*3/MM3 (ref 0–0.4)
EOSINOPHIL NFR BLD AUTO: 0 % (ref 0.3–6.2)
ERYTHROCYTE [DISTWIDTH] IN BLOOD BY AUTOMATED COUNT: 13.8 % (ref 12.3–15.4)
FLUAV SUBTYP SPEC NAA+PROBE: NOT DETECTED
FLUBV RNA ISLT QL NAA+PROBE: NOT DETECTED
GFR SERPL CREATININE-BSD FRML MDRD: 66 ML/MIN/1.73
GLOBULIN UR ELPH-MCNC: 3.8 GM/DL
GLUCOSE BLD-MCNC: 104 MG/DL (ref 65–99)
GLUCOSE UR STRIP-MCNC: NEGATIVE MG/DL
HCT VFR BLD AUTO: 40.7 % (ref 37.5–51)
HGB BLD-MCNC: 13.2 G/DL (ref 13–17.7)
HGB UR QL STRIP.AUTO: NEGATIVE
HOLD SPECIMEN: NORMAL
HOLD SPECIMEN: NORMAL
IMM GRANULOCYTES # BLD AUTO: 0.09 10*3/MM3 (ref 0–0.05)
IMM GRANULOCYTES NFR BLD AUTO: 0.7 % (ref 0–0.5)
KETONES UR QL STRIP: NEGATIVE
LEUKOCYTE ESTERASE UR QL STRIP.AUTO: NEGATIVE
LYMPHOCYTES # BLD AUTO: 0.54 10*3/MM3 (ref 0.7–3.1)
LYMPHOCYTES NFR BLD AUTO: 3.9 % (ref 19.6–45.3)
MCH RBC QN AUTO: 30.6 PG (ref 26.6–33)
MCHC RBC AUTO-ENTMCNC: 32.4 G/DL (ref 31.5–35.7)
MCV RBC AUTO: 94.4 FL (ref 79–97)
MONOCYTES # BLD AUTO: 1.77 10*3/MM3 (ref 0.1–0.9)
MONOCYTES NFR BLD AUTO: 12.9 % (ref 5–12)
NEUTROPHILS # BLD AUTO: 11.27 10*3/MM3 (ref 1.7–7)
NEUTROPHILS NFR BLD AUTO: 82.4 % (ref 42.7–76)
NITRITE UR QL STRIP: NEGATIVE
NRBC BLD AUTO-RTO: 0 /100 WBC (ref 0–0.2)
NT-PROBNP SERPL-MCNC: 442.9 PG/ML (ref 5–1800)
PH UR STRIP.AUTO: 5.5 [PH] (ref 5–8)
PLATELET # BLD AUTO: 264 10*3/MM3 (ref 140–450)
PMV BLD AUTO: 11.5 FL (ref 6–12)
POTASSIUM BLD-SCNC: 3.7 MMOL/L (ref 3.5–5.2)
PROCALCITONIN SERPL-MCNC: 0.25 NG/ML (ref 0.1–0.25)
PROT SERPL-MCNC: 6.2 G/DL (ref 6–8.5)
PROT UR QL STRIP: NEGATIVE
RBC # BLD AUTO: 4.31 10*6/MM3 (ref 4.14–5.8)
SODIUM BLD-SCNC: 139 MMOL/L (ref 136–145)
SP GR UR STRIP: 1.02 (ref 1–1.03)
TROPONIN T SERPL-MCNC: <0.01 NG/ML (ref 0–0.03)
TROPONIN T SERPL-MCNC: <0.01 NG/ML (ref 0–0.03)
UROBILINOGEN UR QL STRIP: ABNORMAL
WBC NRBC COR # BLD: 13.68 10*3/MM3 (ref 3.4–10.8)
WHOLE BLOOD HOLD SPECIMEN: NORMAL
WHOLE BLOOD HOLD SPECIMEN: NORMAL

## 2019-10-03 PROCEDURE — 25010000002 ENOXAPARIN PER 10 MG: Performed by: INTERNAL MEDICINE

## 2019-10-03 PROCEDURE — 70450 CT HEAD/BRAIN W/O DYE: CPT

## 2019-10-03 PROCEDURE — 80053 COMPREHEN METABOLIC PANEL: CPT | Performed by: EMERGENCY MEDICINE

## 2019-10-03 PROCEDURE — 70553 MRI BRAIN STEM W/O & W/DYE: CPT

## 2019-10-03 PROCEDURE — 99284 EMERGENCY DEPT VISIT MOD MDM: CPT

## 2019-10-03 PROCEDURE — 25010000002 CEFTRIAXONE PER 250 MG: Performed by: PHYSICIAN ASSISTANT

## 2019-10-03 PROCEDURE — 84484 ASSAY OF TROPONIN QUANT: CPT | Performed by: EMERGENCY MEDICINE

## 2019-10-03 PROCEDURE — 84145 PROCALCITONIN (PCT): CPT | Performed by: PHYSICIAN ASSISTANT

## 2019-10-03 PROCEDURE — 87040 BLOOD CULTURE FOR BACTERIA: CPT | Performed by: PHYSICIAN ASSISTANT

## 2019-10-03 PROCEDURE — 87502 INFLUENZA DNA AMP PROBE: CPT | Performed by: PHYSICIAN ASSISTANT

## 2019-10-03 PROCEDURE — 94799 UNLISTED PULMONARY SVC/PX: CPT

## 2019-10-03 PROCEDURE — 83605 ASSAY OF LACTIC ACID: CPT | Performed by: PHYSICIAN ASSISTANT

## 2019-10-03 PROCEDURE — 99223 1ST HOSP IP/OBS HIGH 75: CPT | Performed by: INTERNAL MEDICINE

## 2019-10-03 PROCEDURE — A9577 INJ MULTIHANCE: HCPCS | Performed by: EMERGENCY MEDICINE

## 2019-10-03 PROCEDURE — 94640 AIRWAY INHALATION TREATMENT: CPT

## 2019-10-03 PROCEDURE — 85025 COMPLETE CBC W/AUTO DIFF WBC: CPT | Performed by: EMERGENCY MEDICINE

## 2019-10-03 PROCEDURE — 93005 ELECTROCARDIOGRAM TRACING: CPT | Performed by: EMERGENCY MEDICINE

## 2019-10-03 PROCEDURE — 71045 X-RAY EXAM CHEST 1 VIEW: CPT

## 2019-10-03 PROCEDURE — 83880 ASSAY OF NATRIURETIC PEPTIDE: CPT | Performed by: EMERGENCY MEDICINE

## 2019-10-03 PROCEDURE — 81003 URINALYSIS AUTO W/O SCOPE: CPT | Performed by: PHYSICIAN ASSISTANT

## 2019-10-03 PROCEDURE — 25010000002 AZITHROMYCIN PER 500 MG: Performed by: PHYSICIAN ASSISTANT

## 2019-10-03 PROCEDURE — 0 GADOBENATE DIMEGLUMINE 529 MG/ML SOLUTION: Performed by: EMERGENCY MEDICINE

## 2019-10-03 RX ORDER — SODIUM CHLORIDE 0.9 % (FLUSH) 0.9 %
10 SYRINGE (ML) INJECTION AS NEEDED
Status: DISCONTINUED | OUTPATIENT
Start: 2019-10-03 | End: 2019-10-06 | Stop reason: HOSPADM

## 2019-10-03 RX ORDER — FAMOTIDINE 20 MG/1
20 TABLET, FILM COATED ORAL EVERY 12 HOURS SCHEDULED
Status: DISCONTINUED | OUTPATIENT
Start: 2019-10-03 | End: 2019-10-06 | Stop reason: HOSPADM

## 2019-10-03 RX ORDER — ACETAMINOPHEN 160 MG/5ML
650 SOLUTION ORAL EVERY 4 HOURS PRN
Status: DISCONTINUED | OUTPATIENT
Start: 2019-10-03 | End: 2019-10-06 | Stop reason: HOSPADM

## 2019-10-03 RX ORDER — IPRATROPIUM BROMIDE AND ALBUTEROL SULFATE 2.5; .5 MG/3ML; MG/3ML
3 SOLUTION RESPIRATORY (INHALATION)
Status: DISCONTINUED | OUTPATIENT
Start: 2019-10-03 | End: 2019-10-06

## 2019-10-03 RX ORDER — SODIUM CHLORIDE 0.9 % (FLUSH) 0.9 %
10 SYRINGE (ML) INJECTION EVERY 12 HOURS SCHEDULED
Status: DISCONTINUED | OUTPATIENT
Start: 2019-10-03 | End: 2019-10-06 | Stop reason: HOSPADM

## 2019-10-03 RX ORDER — ASPIRIN 81 MG/1
81 TABLET ORAL DAILY
Status: DISCONTINUED | OUTPATIENT
Start: 2019-10-04 | End: 2019-10-06 | Stop reason: HOSPADM

## 2019-10-03 RX ORDER — IPRATROPIUM BROMIDE AND ALBUTEROL SULFATE 2.5; .5 MG/3ML; MG/3ML
3 SOLUTION RESPIRATORY (INHALATION) ONCE
Status: COMPLETED | OUTPATIENT
Start: 2019-10-03 | End: 2019-10-03

## 2019-10-03 RX ORDER — FUROSEMIDE 20 MG/1
20 TABLET ORAL DAILY
Status: DISCONTINUED | OUTPATIENT
Start: 2019-10-04 | End: 2019-10-06 | Stop reason: HOSPADM

## 2019-10-03 RX ORDER — ACETAMINOPHEN 650 MG/1
650 SUPPOSITORY RECTAL EVERY 4 HOURS PRN
Status: DISCONTINUED | OUTPATIENT
Start: 2019-10-03 | End: 2019-10-06 | Stop reason: HOSPADM

## 2019-10-03 RX ORDER — CARVEDILOL 6.25 MG/1
6.25 TABLET ORAL 2 TIMES DAILY WITH MEALS
Status: DISCONTINUED | OUTPATIENT
Start: 2019-10-03 | End: 2019-10-06 | Stop reason: HOSPADM

## 2019-10-03 RX ORDER — FERROUS SULFATE 325(65) MG
325 TABLET ORAL DAILY
Status: DISCONTINUED | OUTPATIENT
Start: 2019-10-04 | End: 2019-10-06 | Stop reason: HOSPADM

## 2019-10-03 RX ORDER — ACETAMINOPHEN 325 MG/1
650 TABLET ORAL EVERY 4 HOURS PRN
Status: DISCONTINUED | OUTPATIENT
Start: 2019-10-03 | End: 2019-10-06 | Stop reason: HOSPADM

## 2019-10-03 RX ORDER — ONDANSETRON 4 MG/1
4 TABLET, FILM COATED ORAL EVERY 6 HOURS PRN
Status: DISCONTINUED | OUTPATIENT
Start: 2019-10-03 | End: 2019-10-06 | Stop reason: HOSPADM

## 2019-10-03 RX ORDER — FENOFIBRATE 48 MG/1
48 TABLET, COATED ORAL DAILY
Status: DISCONTINUED | OUTPATIENT
Start: 2019-10-04 | End: 2019-10-06 | Stop reason: HOSPADM

## 2019-10-03 RX ORDER — IPRATROPIUM BROMIDE AND ALBUTEROL SULFATE 2.5; .5 MG/3ML; MG/3ML
3 SOLUTION RESPIRATORY (INHALATION)
Status: DISCONTINUED | OUTPATIENT
Start: 2019-10-03 | End: 2019-10-03

## 2019-10-03 RX ORDER — ATORVASTATIN CALCIUM 40 MG/1
40 TABLET, FILM COATED ORAL NIGHTLY
Status: DISCONTINUED | OUTPATIENT
Start: 2019-10-03 | End: 2019-10-06 | Stop reason: HOSPADM

## 2019-10-03 RX ADMIN — IPRATROPIUM BROMIDE AND ALBUTEROL SULFATE 3 ML: 2.5; .5 SOLUTION RESPIRATORY (INHALATION) at 16:15

## 2019-10-03 RX ADMIN — IPRATROPIUM BROMIDE AND ALBUTEROL SULFATE 3 ML: 2.5; .5 SOLUTION RESPIRATORY (INHALATION) at 12:42

## 2019-10-03 RX ADMIN — ATORVASTATIN CALCIUM 40 MG: 40 TABLET, FILM COATED ORAL at 21:01

## 2019-10-03 RX ADMIN — SODIUM CHLORIDE, PRESERVATIVE FREE 10 ML: 5 INJECTION INTRAVENOUS at 21:02

## 2019-10-03 RX ADMIN — GADOBENATE DIMEGLUMINE 20 ML: 529 INJECTION, SOLUTION INTRAVENOUS at 15:59

## 2019-10-03 RX ADMIN — SODIUM CHLORIDE 1000 ML: 9 INJECTION, SOLUTION INTRAVENOUS at 11:49

## 2019-10-03 RX ADMIN — CARVEDILOL 6.25 MG: 6.25 TABLET, FILM COATED ORAL at 18:35

## 2019-10-03 RX ADMIN — ENOXAPARIN SODIUM 40 MG: 40 INJECTION SUBCUTANEOUS at 21:01

## 2019-10-03 RX ADMIN — AZITHROMYCIN MONOHYDRATE 500 MG: 500 INJECTION, POWDER, LYOPHILIZED, FOR SOLUTION INTRAVENOUS at 13:01

## 2019-10-03 RX ADMIN — CEFTRIAXONE 2 G: 2 INJECTION, POWDER, FOR SOLUTION INTRAMUSCULAR; INTRAVENOUS at 12:05

## 2019-10-03 RX ADMIN — FAMOTIDINE 20 MG: 20 TABLET ORAL at 21:01

## 2019-10-03 NOTE — H&P
"    [facility] Medicine Services  HISTORY AND PHYSICAL    Patient Name: Tho Vasquez  : 1940  MRN: [id number]  Primary Care Physician: Ja Baldwin MD  Date of admission: 10/3/2019      Subjective   Subjective     Chief Complaint:  Dyspnea     HPI:  Tho Vasquez is a 79yoM with history of CAD s/p remote PCI, remote respiratory failure s/p intubation related to PNA, obesity, HTN who presents to ED today with nonproductive cough and dyspnea x 3 weeks. Acute worsening over last few days. Denies fevers, chills at home. Denies chest pain or other symptoms. Presented to PCP last week who felt s/s c/w allergies, no abx given at that time. Worsening symptoms since that time. Daughter is present with patient today whom he lives with, states her father does not always elaborate how badly he is feeling at the time. Noted co-workers did note that he was \"slurring speech and unsteady on feet\" yesterday, though she has not noted this at all and feels neurologically he is at baseline. 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 Due to continued dyspnea, patient was brought to ED for further evaluation. Found on ED workup to have bibasilar infiltrates, concerning for PNA. Also noted leukocytosis and new hypoxic respiratory failure.    Review of Systems   Gen- No fevers, chills  CV- No chest pain, palpitations  Resp- No cough, + dyspnea  GI- No N/V/D, abd pain      All other systems reviewed and are negative.     Personal History     Past Medical History:   Diagnosis Date   • Myocardial infarction (CMS/HCC)    • Positive PPD    • Respiratory failure (CMS/HCC)     requiring tracheostomy       Past Surgical History:   Procedure Laterality Date   • CARDIAC SURGERY     • HERNIA REPAIR         Family History: family history includes Arthritis in his mother; Cancer in his paternal uncle; Diabetes in his father; Heart attack in his " brother and father; Obesity in an other family member; Stroke in his father. Otherwise pertinent FHx was reviewed and unremarkable.     Social History:  reports that he has quit smoking. His smoking use included cigars. He has never used smokeless tobacco. Drug use questions deferred to the physician. He reports that he does not drink alcohol.  Social History     Social History Narrative   • Not on file       Medications:    Available home medication information reviewed.    (Not in a hospital admission)    Allergies   Allergen Reactions   • Prednisone Rash       Objective   Objective     Vital Signs:   Temp:  [99.4 °F (37.4 °C)] 99.4 °F (37.4 °C)  Heart Rate:  [] 78  Resp:  [18-22] 22  BP: (130-144)/(73-80) 130/80        Physical Exam   Constitutional: Awake, alert, chronically ill appearing   Eyes: PERRLA, sclerae anicteric, no conjunctival injection  HENT: NCAT, mucous membranes moist  Neck: Supple, no thyromegaly, no lymphadenopathy, trachea midline  Respiratory: mildly labored respirations, on 2 L NC , coarse rhonchi heard throughout   Cardiovascular: RRR, no murmurs, rubs, or gallops, palpable pedal pulses bilaterally  Gastrointestinal: Positive bowel sounds, soft, nontender, nondistended  Musculoskeletal: 1+ b/l LE edema  Psychiatric: Appropriate affect, cooperative  Neurologic: Oriented x 3, strength symmetric in all extremities, Cranial Nerves grossly intact to confrontation, speech clear  Skin: No rashes      Results Reviewed:  I have personally reviewed current lab and radiology data.    Results from last 7 days   Lab Units 10/03/19  1147   WBC 10*3/mm3 13.68*   HEMOGLOBIN g/dL 13.2   HEMATOCRIT % 40.7   PLATELETS 10*3/mm3 264     Results from last 7 days   Lab Units 10/03/19  1147   SODIUM mmol/L 139   POTASSIUM mmol/L 3.7   CHLORIDE mmol/L 100   CO2 mmol/L 30.0*   BUN mg/dL 46*   CREATININE mg/dL 1.08   GLUCOSE mg/dL 104*   CALCIUM mg/dL 9.3   ALT (SGPT) U/L 42*   AST (SGOT) U/L 65*   TROPONIN T  ng/mL <0.010   PROBNP pg/mL 442.9   LACTATE mmol/L 1.1   PROCALCITONIN ng/mL 0.25     Estimated Creatinine Clearance: 67.2 mL/min (by C-G formula based on SCr of 1.08 mg/dL).  Brief Urine Lab Results  (Last result in the past 365 days)      Color   Clarity   Blood   Leuk Est   Nitrite   Protein   CREAT   Urine HCG        05/17/19 0821             123.3       05/17/19 0821 Yellow Clear Negative Negative Negative Negative             Imaging Results (last 24 hours)     Procedure Component Value Units Date/Time    CT Head Without Contrast [[id number]] Collected:  10/03/19 1259     Updated:  10/03/19 1325    Narrative:       EXAMINATION: CT HEAD WO CONTRAST-10/03/2019:      INDICATION: Confusion/delirium, altered LOC, unexplained.     TECHNIQUE: Unenhanced CT imaging of the brain was performed.     The radiation dose reduction device was turned on for each scan per the  ALARA (As Low as Reasonably Achievable) protocol.     COMPARISON: NONE.     FINDINGS: Unenhanced CT imaging of the brain was performed which is  mildly motion artifact limited. No evidence of midline shift. No  evidence of hydrocephalus. There is diffuse subcortical and  periventricular hypoattenuation, likely relating to chronic  microvascular ischemic change, however, more superimposed  hypoattenuation is noted within the right frontal lobe with loss of the  normal gray-white matter differentiation which could be chronic,  although underlying ischemic changes are difficult to exclude. Diffuse  cerebral atrophy is identified.  No intraventricular or subarachnoid  hemorrhage appreciated. The calvarium is intact. Mild ethmoidal mucosal  thickening is identified. Postsurgical changes to the globes are noted.  The retro-orbital soft tissues are unremarkable.       Impression:       Diffuse hypoattenuation of the brain parenchyma most  consistent with heavy burden of chronic microvascular ischemic change,  however, there is subjectively more than expected  hypoattenuation within  the right frontal lobe, particularly involving the gray-white matter  junction in which underlying infarct is not excluded. MRI of the brain  with and without intravenous Gadolinium contrast would help clarify  these findings.     These findings were discussed with the ordering provider Daivda Rivas at  approximately 1:05 PM on 10/03/2019.     D:  10/03/2019  E:  10/03/2019          XR Chest 1 View [[id number]] Collected:  10/03/19 1129     Updated:  10/03/19 1159    Narrative:       EXAMINATION: XR CHEST 1 VW-      INDICATION: Shortness of air triage protocol.      COMPARISON: Chest radiograph 11/06/2013.     FINDINGS: Single portable chest radiograph was submitted for review.  Boehler's low lung volumes which accentuate the cardiomediastinal  silhouette. Atherosclerotic calcifications of the thoracic aorta are  noted, similar to prior. Right lower lobe and left lower lobe airspace  changes are identified with a small right pleural effusion.       Impression:       Bibasilar airspace disease (right greater than left),  concerning for pneumonia with a small right pleural effusion. Advise  imaging to resolution.     D:  10/03/2019  E:  10/03/2019                Assessment/Plan   Assessment / Plan     Active Hospital Problems    Diagnosis POA   • Pneumonia of both lower lobes due to infectious organism (CMS/[facility]) [J18.1] Yes         Tho Vasquez is a 79yoM with history of CAD s/p remote PCI, remote respiratory failure s/p intubation related to PNA, obesity, HTN who presents to ED today with nonproductive cough and dyspnea x 3 weeks. Acute worsening over last few days. Denies fevers, chills at home. Presented to PCP last week who felt s/s c/w allergies, no abx given at that time. Found on ED workup to have bibasilar infiltrates, concerning for PNA. Also noted leukocytosis and new hypoxic respiratory failure.    Acute respiratory failure  Bilateral PNA  Leukocytosis  Sepsis related to  "above  ---continue rocephin/azithro  ---follow all cultures-blood, legionella/strep antigens, flu swab noted to be negative, no sputum production at this time to send sputum  ---labs in AM  ---duonebs/pulmonary toilet  ---SLP to eval  ---wean oxygen as able     Abnormal CT  ---noted imaging CT in ED with more attenuation than expected in right frontal lobe, underlying infarction cannot be excluded. ED PA, Davida Rivas, d/w Dr Lantigua of radiology who recommended MRI imaging to r/o stroke, this was d/w patient and daughter  ---no stroke like symptoms at this time, continue neuro checks    Elevated LFTs, mild hyperbilirubinemia  ---etiology unclear, very mild, will trend in AM with CMP- could be just related to sepsis alone    CAD s/p remote PCI  --continue home medications  HTN  ---continue home medications    DVT prophylaxis:  lovenox    CODE STATUS:    Code Status and Medical Interventions:   Ordered at: 10/03/19 1422     Level Of Support Discussed With:    Patient     Code Status:    CPR     Medical Interventions (Level of Support Prior to Arrest):    Full     Comments:    patient was previously \"coded\" and \"on life support\" would like all measures in setting of decompensation       Admission Status:  I believe this patient meets INPATIENT status due to IV abx, close monitoring.  I feel patient’s risk for adverse outcomes and need for care warrant INPATIENT evaluation and I predict the patient’s care encounter to likely last beyond 2 midnights.        Electronically signed by Sheri Banda MD, 10/03/19, 2:23 PM.        "

## 2019-10-03 NOTE — ED PROVIDER NOTES
Subjective   Tho Vasquez is a 79 y.o.male who presents to the ED with complaints of a nonproductive cough. The patient reports his non productive cough has been worsening over the past couple weeks. He was seen by his primary care provider last week for his cough and congestion and told his symptoms were due to allergies, no antibiotics prescribed. He also complains of rhinorrhea, fatigue, and shortness of breath, but he denies any headache, dizziness, abdominal pain, chest pain, vomiting, diarrhea, dysuria, change in frequency of urination, urinary incontinence, fever, or confusion. Temp on arrival to ED 99.4 and O2 88% on RA while at rest. He does not wear chronic O2 and no hx of COPD or tobacco use. Daughter reports coworkers noted he was shuffling his feet while walking yesterday and had difficulty raising his head up fully up. Daughter didn't notice any confusion today or difficulty with ambulation today. He works as a . PMHx significant for CAD, HLD, CKD, GERD, HTN, TERRIE, chronic LE edema and prior respiratory failure d/t pneumonia 1 years ago. There are no other complaints at this time.         History provided by:  Patient  Cough   Cough characteristics:  Non-productive  Sputum characteristics:  Nondescript  Severity:  Moderate  Onset quality:  Gradual  Duration: couple weeks.  Timing:  Constant  Progression:  Worsening  Chronicity:  New  Relieved by:  None tried  Worsened by:  Nothing  Ineffective treatments:  None tried  Associated symptoms: rhinorrhea and shortness of breath    Associated symptoms: no chest pain, no chills, no ear pain, no fever and no headaches        Review of Systems   Constitutional: Positive for fatigue. Negative for appetite change, chills and fever.   HENT: Positive for congestion and rhinorrhea. Negative for ear pain and trouble swallowing.    Respiratory: Positive for cough and shortness of breath.    Cardiovascular: Positive for leg swelling (chronic, denies  new). Negative for chest pain.   Gastrointestinal: Negative for abdominal pain, diarrhea and vomiting.   Genitourinary: Negative for dysuria and frequency.        No urinary incontinence.    Musculoskeletal: Negative for arthralgias and back pain.   Skin: Negative for wound.   Neurological: Positive for weakness (generalized ). Negative for dizziness, syncope, speech difficulty, numbness and headaches.   Psychiatric/Behavioral: Negative for confusion.   All other systems reviewed and are negative.      Past Medical History:   Diagnosis Date   • Myocardial infarction (CMS/[facility])    • Positive PPD    • Respiratory failure (CMS/[facility]) 2005    requiring tracheostomy       Allergies   Allergen Reactions   • Prednisone Rash       Past Surgical History:   Procedure Laterality Date   • CARDIAC SURGERY     • HERNIA REPAIR         Family History   Problem Relation Age of Onset   • Arthritis Mother    • Heart attack Father    • Stroke Father    • Diabetes Father    • Heart attack Brother    • Cancer Paternal Uncle    • Obesity Other        Social History     Socioeconomic History   • Marital status:      Spouse name: Not on file   • Number of children: Not on file   • Years of education: Not on file   • Highest education level: Not on file   Tobacco Use   • Smoking status: Former Smoker     Types: Cigars   • Smokeless tobacco: Never Used   Substance and Sexual Activity   • Alcohol use: No     Frequency: Never   • Drug use: Defer   • Sexual activity: Defer         Objective   Physical Exam   Constitutional: He is oriented to person, place, and time. He appears well-developed and well-nourished.   HENT:   Head: Normocephalic and atraumatic.   Eyes: Conjunctivae are normal. No scleral icterus.   Neck: Normal range of motion. Neck supple.   Cardiovascular: Normal rate, regular rhythm, normal heart sounds and intact distal pulses.   No murmur heard.  Pulmonary/Chest: Effort normal. No respiratory distress. He has decreased  breath sounds in the right upper field, the right middle field, the right lower field, the left upper field, the left middle field and the left lower field.   Abdominal: Soft. Bowel sounds are normal. There is no tenderness.   Musculoskeletal: Normal range of motion. He exhibits edema.   1+ pitting edema to bilateral lower extremities.    Neurological: He is alert and oriented to person, place, and time. He has normal strength. No cranial nerve deficit or sensory deficit.   Skin: Skin is warm and dry.   Psychiatric: He has a normal mood and affect. His behavior is normal.   Nursing note and vitals reviewed.      Procedures         ED Course  ED Course as of Oct 03 2215   Thu Oct 03, 2019   1310 Discussed CT result head results with radiologist Dr. Thomas. He recommended MRI with/without for further evaluation.   [RT]      ED Course User Index  [RT] Davida Rivas PA     Discussed admission with hospitalist Dr. Banda.     Re-examined patient and discussed results. Pt and family agreeable with plan for admission. O2 maintaining above 90% on 2 L. Rocephin and Azithromycin initiated with fluid hydration for pneumonia. Discussed CT head results and need for MRI.     Recent Results (from the past 24 hour(s))   Comprehensive Metabolic Panel    Collection Time: 10/03/19 11:47 AM   Result Value Ref Range    Glucose 104 (H) 65 - 99 mg/dL    BUN 46 (H) 8 - 23 mg/dL    Creatinine 1.08 0.76 - 1.27 mg/dL    Sodium 139 136 - 145 mmol/L    Potassium 3.7 3.5 - 5.2 mmol/L    Chloride 100 98 - 107 mmol/L    CO2 30.0 (H) 22.0 - 29.0 mmol/L    Calcium 9.3 8.6 - 10.5 mg/dL    Total Protein 6.2 6.0 - 8.5 g/dL    Albumin 2.40 (L) 3.50 - 5.20 g/dL    ALT (SGPT) 42 (H) 1 - 41 U/L    AST (SGOT) 65 (H) 1 - 40 U/L    Alkaline Phosphatase 74 39 - 117 U/L    Total Bilirubin 1.5 (H) 0.2 - 1.2 mg/dL    eGFR Non African Amer 66 >60 mL/min/1.73    Globulin 3.8 gm/dL    A/G Ratio 0.6 g/dL    BUN/Creatinine Ratio 42.6 (H) 7.0 - 25.0    Anion Gap  9.0 5.0 - 15.0 mmol/L   BNP    Collection Time: 10/03/19 11:47 AM   Result Value Ref Range    proBNP 442.9 5.0-1,800.0 pg/mL   Troponin    Collection Time: 10/03/19 11:47 AM   Result Value Ref Range    Troponin T <0.010 0.000 - 0.030 ng/mL   Light Blue Top    Collection Time: 10/03/19 11:47 AM   Result Value Ref Range    Extra Tube hold for add-on    Green Top (Gel)    Collection Time: 10/03/19 11:47 AM   Result Value Ref Range    Extra Tube Hold for add-ons.    Lavender Top    Collection Time: 10/03/19 11:47 AM   Result Value Ref Range    Extra Tube hold for add-on    Gold Top - SST    Collection Time: 10/03/19 11:47 AM   Result Value Ref Range    Extra Tube Hold for add-ons.    CBC Auto Differential    Collection Time: 10/03/19 11:47 AM   Result Value Ref Range    WBC 13.68 (H) 3.40 - 10.80 10*3/mm3    RBC 4.31 4.14 - 5.80 10*6/mm3    Hemoglobin 13.2 13.0 - 17.7 g/dL    Hematocrit 40.7 37.5 - 51.0 %    MCV 94.4 79.0 - 97.0 fL    MCH 30.6 26.6 - 33.0 pg    MCHC 32.4 31.5 - 35.7 g/dL    RDW 13.8 12.3 - 15.4 %    RDW-SD 48.2 37.0 - 54.0 fl    MPV 11.5 6.0 - 12.0 fL    Platelets 264 140 - 450 10*3/mm3    Neutrophil % 82.4 (H) 42.7 - 76.0 %    Lymphocyte % 3.9 (L) 19.6 - 45.3 %    Monocyte % 12.9 (H) 5.0 - 12.0 %    Eosinophil % 0.0 (L) 0.3 - 6.2 %    Basophil % 0.1 0.0 - 1.5 %    Immature Grans % 0.7 (H) 0.0 - 0.5 %    Neutrophils, Absolute 11.27 (H) 1.70 - 7.00 10*3/mm3    Lymphocytes, Absolute 0.54 (L) 0.70 - 3.10 10*3/mm3    Monocytes, Absolute 1.77 (H) 0.10 - 0.90 10*3/mm3    Eosinophils, Absolute 0.00 0.00 - 0.40 10*3/mm3    Basophils, Absolute 0.01 0.00 - 0.20 10*3/mm3    Immature Grans, Absolute 0.09 (H) 0.00 - 0.05 10*3/mm3    nRBC 0.0 0.0 - 0.2 /100 WBC   Lactic Acid, Plasma    Collection Time: 10/03/19 11:47 AM   Result Value Ref Range    Lactate 1.1 0.5 - 2.0 mmol/L   Procalcitonin    Collection Time: 10/03/19 11:47 AM   Result Value Ref Range    Procalcitonin 0.25 0.10 - 0.25 ng/mL   Influenza A & B, RT  PCR - Swab, Nasopharynx    Collection Time: 10/03/19 11:50 AM   Result Value Ref Range    Influenza A PCR Not Detected Not Detected    Influenza B PCR Not Detected Not Detected   Urinalysis With Culture If Indicated - Urine, Clean Catch    Collection Time: 10/03/19  1:55 PM   Result Value Ref Range    Color, UA Dark Yellow (A) Yellow, Straw    Appearance, UA Clear Clear    pH, UA 5.5 5.0 - 8.0    Specific Gravity, UA 1.021 1.001 - 1.030    Glucose, UA Negative Negative    Ketones, UA Negative Negative    Bilirubin, UA Negative Negative    Blood, UA Negative Negative    Protein, UA Negative Negative    Leuk Esterase, UA Negative Negative    Nitrite, UA Negative Negative    Urobilinogen, UA 2.0 E.U./dL (A) 0.2 - 1.0 E.U./dL   Troponin    Collection Time: 10/03/19  3:14 PM   Result Value Ref Range    Troponin T <0.010 0.000 - 0.030 ng/mL     Note: In addition to lab results from this visit, the labs listed above may include labs taken at another facility or during a different encounter within the last 24 hours. Please correlate lab times with ED admission and discharge times for further clarification of the services performed during this visit.    MRI Brain With & Without Contrast   Preliminary Result   1.  Motion artifact persists throughout many of the sequences secondary   to patient confusion. Despite the limitations of the exam.       2.  No acute infarct or other appreciable acute intracranial   abnormality.       3.  Heavy burden of confluent periventricular and subcortical white   matter disease favored to represent chronic microvascular ischemic   change with possible bilateral punctate basal ganglial lacunar infarcts.       D:  10/03/2019   E:  10/03/2019                   XR Chest 1 View   Final Result   Bibasilar airspace disease (right greater than left),   concerning for pneumonia with a small right pleural effusion. Advise   imaging to resolution.       D:  10/03/2019   E:  10/03/2019       This report  "was finalized on 10/3/2019 3:46 PM by Dr. Giancarlo Thomas MD.          CT Head Without Contrast   Preliminary Result   Diffuse hypoattenuation of the brain parenchyma most   consistent with heavy burden of chronic microvascular ischemic change,   however, there is subjectively more than expected hypoattenuation within   the right frontal lobe, particularly involving the gray-white matter   junction in which underlying infarct is not excluded. MRI of the brain   with and without intravenous Gadolinium contrast would help clarify   these findings.       These findings were discussed with the ordering provider Davida Rivas at   approximately 1:05 PM on 10/03/2019.       D:  10/03/2019   E:  10/03/2019                Vitals:    10/03/19 1422 10/03/19 1702 10/03/19 1742 10/03/19 1811   BP:  126/76  130/71   BP Location:    Right arm   Patient Position:    Lying   Pulse: 88  79 87   Resp:    18   Temp:    97.8 °F (36.6 °C)   TempSrc:    Oral   SpO2: 94%  95%    Weight:       Height:    180.3 cm (71\")     Medications   sodium chloride 0.9 % flush 10 mL (not administered)   aspirin EC tablet 81 mg (not administered)   atorvastatin (LIPITOR) tablet 40 mg (40 mg Oral Given 10/3/19 2101)   carvedilol (COREG) tablet 6.25 mg (6.25 mg Oral Given 10/3/19 1835)   famotidine (PEPCID) tablet 20 mg (20 mg Oral Given 10/3/19 2101)   fenofibrate (TRICOR) tablet 48 mg (not administered)   ferrous sulfate tablet 325 mg (not administered)   furosemide (LASIX) tablet 20 mg (not administered)   valsartan (DIOVAN) 160 mg, hydroCHLOROthiazide (MICROZIDE) 12.5 mg (not administered)   sodium chloride 0.9 % flush 10 mL (10 mL Intravenous Given 10/3/19 2102)   sodium chloride 0.9 % flush 10 mL (not administered)   enoxaparin (LOVENOX) syringe 40 mg (40 mg Subcutaneous Given 10/3/19 2101)   acetaminophen (TYLENOL) tablet 650 mg (not administered)     Or   acetaminophen (TYLENOL) 160 MG/5ML solution 650 mg (not administered)     Or "   acetaminophen (TYLENOL) suppository 650 mg (not administered)   ondansetron (ZOFRAN) tablet 4 mg (not administered)   azithromycin (ZITHROMAX) 500 mg in 250mL NS IVPB (not administered)   cefTRIAXone (ROCEPHIN) 1 g/100 mL 0.9% NS (MBP) (not administered)   ipratropium-albuterol (DUO-NEB) nebulizer solution 3 mL (3 mL Nebulization Given 10/3/19 1615)   sodium chloride 0.9 % bolus 1,000 mL (0 mL Intravenous Stopped 10/3/19 1250)   ipratropium-albuterol (DUO-NEB) nebulizer solution 3 mL (3 mL Nebulization Given 10/3/19 1242)   cefTRIAXone (ROCEPHIN) 2 g/100 mL 0.9% NS VTB (WILLOW) (0 g Intravenous Stopped 10/3/19 1252)   azithromycin (ZITHROMAX) 500 mg in 250mL NS IVPB (0 mg Intravenous Stopped 10/3/19 1418)   gadobenate dimeglumine (MULTIHANCE) injection 20 mL (20 mL Intravenous Given 10/3/19 1559)     ECG/EMG Results (last 24 hours)     Procedure Component Value Units Date/Time    ECG 12 Lead [[id number]] Collected:  10/03/19 1059     Updated:  10/03/19 1114        ECG 12 Lead   Final Result   Test Reason : 2ND SET   Blood Pressure : **/** mmHG   Vent. Rate : 066 BPM     Atrial Rate : 066 BPM      P-R Int : 220 ms          QRS Dur : 076 ms       QT Int : 360 ms       P-R-T Axes : 014 -08 012 degrees      QTc Int : 377 ms      Sinus rhythm with marked sinus arrhythmia with 1st degree AV block   Inferior infarct (cited on or before 03-OCT-2019)   Abnormal ECG   When compared with ECG of 03-OCT-2019 10:59,   No significant change was found   Confirmed by HODAN CROOK MD ([id number]) on 10/3/2019 3:35:17 PM      Referred By:  ARMAAN           Confirmed By:HODAN CROOK MD      ECG 12 Lead   Final Result   Test Reason : sob   Blood Pressure : **/** mmHG   Vent. Rate : 080 BPM     Atrial Rate : 080 BPM      P-R Int : 218 ms          QRS Dur : 078 ms       QT Int : 356 ms       P-R-T Axes : 058 -07 019 degrees      QTc Int : 410 ms      Sinus rhythm with marked sinus arrhythmia with 1st degree AV block   Minimal voltage criteria  for LVH, may be normal variant   Inferior infarct , age undetermined   Cannot rule out Anterior infarct , age undetermined   Abnormal ECG   No previous ECGs available   Confirmed by HODAN CROOK MD ([id number]) on 10/3/2019 1:01:52 PM      Referred By:  ed md           Confirmed By:HODAN CROOK MD                          [facility]    Final diagnoses:   Pneumonia of both lower lobes due to infectious organism (CMS/HCC)   Generalized weakness   Hypoxia       Documentation assistance provided by leyla Doe.  Information recorded by the scribe was done at my direction and has been verified and validated by me.     Andrew Doe  10/03/19 1126       Andrew Doe  10/03/19 1132       Davida Rivas, PA  10/03/19 1954

## 2019-10-04 ENCOUNTER — ANCILLARY PROCEDURE (OUTPATIENT)
Dept: SPEECH THERAPY | Facility: HOSPITAL | Age: 79
End: 2019-10-04

## 2019-10-04 LAB
ALBUMIN SERPL-MCNC: 2 G/DL (ref 3.5–5.2)
ALBUMIN/GLOB SERPL: 0.5 G/DL
ALP SERPL-CCNC: 76 U/L (ref 39–117)
ALT SERPL W P-5'-P-CCNC: 44 U/L (ref 1–41)
ANION GAP SERPL CALCULATED.3IONS-SCNC: 10 MMOL/L (ref 5–15)
AST SERPL-CCNC: 81 U/L (ref 1–40)
BASOPHILS # BLD AUTO: 0.03 10*3/MM3 (ref 0–0.2)
BASOPHILS NFR BLD AUTO: 0.3 % (ref 0–1.5)
BILIRUB SERPL-MCNC: 1.2 MG/DL (ref 0.2–1.2)
BUN BLD-MCNC: 40 MG/DL (ref 8–23)
BUN/CREAT SERPL: 44.9 (ref 7–25)
CALCIUM SPEC-SCNC: 9.1 MG/DL (ref 8.6–10.5)
CHLORIDE SERPL-SCNC: 103 MMOL/L (ref 98–107)
CO2 SERPL-SCNC: 28 MMOL/L (ref 22–29)
CREAT BLD-MCNC: 0.89 MG/DL (ref 0.76–1.27)
DEPRECATED RDW RBC AUTO: 47.8 FL (ref 37–54)
EOSINOPHIL # BLD AUTO: 0.01 10*3/MM3 (ref 0–0.4)
EOSINOPHIL NFR BLD AUTO: 0.1 % (ref 0.3–6.2)
ERYTHROCYTE [DISTWIDTH] IN BLOOD BY AUTOMATED COUNT: 13.9 % (ref 12.3–15.4)
GFR SERPL CREATININE-BSD FRML MDRD: 82 ML/MIN/1.73
GLOBULIN UR ELPH-MCNC: 3.9 GM/DL
GLUCOSE BLD-MCNC: 103 MG/DL (ref 65–99)
HBA1C MFR BLD: 5.6 % (ref 4.8–5.6)
HCT VFR BLD AUTO: 38.6 % (ref 37.5–51)
HGB BLD-MCNC: 12.9 G/DL (ref 13–17.7)
IMM GRANULOCYTES # BLD AUTO: 0.09 10*3/MM3 (ref 0–0.05)
IMM GRANULOCYTES NFR BLD AUTO: 0.8 % (ref 0–0.5)
LYMPHOCYTES # BLD AUTO: 0.62 10*3/MM3 (ref 0.7–3.1)
LYMPHOCYTES NFR BLD AUTO: 5.2 % (ref 19.6–45.3)
MCH RBC QN AUTO: 31.7 PG (ref 26.6–33)
MCHC RBC AUTO-ENTMCNC: 33.4 G/DL (ref 31.5–35.7)
MCV RBC AUTO: 94.8 FL (ref 79–97)
MONOCYTES # BLD AUTO: 1.8 10*3/MM3 (ref 0.1–0.9)
MONOCYTES NFR BLD AUTO: 15.1 % (ref 5–12)
NEUTROPHILS # BLD AUTO: 9.39 10*3/MM3 (ref 1.7–7)
NEUTROPHILS NFR BLD AUTO: 78.5 % (ref 42.7–76)
NRBC BLD AUTO-RTO: 0 /100 WBC (ref 0–0.2)
PLATELET # BLD AUTO: 262 10*3/MM3 (ref 140–450)
PMV BLD AUTO: 11.5 FL (ref 6–12)
POTASSIUM BLD-SCNC: 3.8 MMOL/L (ref 3.5–5.2)
PROT SERPL-MCNC: 5.9 G/DL (ref 6–8.5)
RBC # BLD AUTO: 4.07 10*6/MM3 (ref 4.14–5.8)
SODIUM BLD-SCNC: 141 MMOL/L (ref 136–145)
WBC NRBC COR # BLD: 11.94 10*3/MM3 (ref 3.4–10.8)

## 2019-10-04 PROCEDURE — 97535 SELF CARE MNGMENT TRAINING: CPT

## 2019-10-04 PROCEDURE — 85025 COMPLETE CBC W/AUTO DIFF WBC: CPT | Performed by: INTERNAL MEDICINE

## 2019-10-04 PROCEDURE — 25010000002 AZITHROMYCIN PER 500 MG: Performed by: INTERNAL MEDICINE

## 2019-10-04 PROCEDURE — 83036 HEMOGLOBIN GLYCOSYLATED A1C: CPT | Performed by: INTERNAL MEDICINE

## 2019-10-04 PROCEDURE — 92612 ENDOSCOPY SWALLOW (FEES) VID: CPT

## 2019-10-04 PROCEDURE — 25010000002 CEFTRIAXONE PER 250 MG: Performed by: INTERNAL MEDICINE

## 2019-10-04 PROCEDURE — 99232 SBSQ HOSP IP/OBS MODERATE 35: CPT | Performed by: HOSPITALIST

## 2019-10-04 PROCEDURE — 94799 UNLISTED PULMONARY SVC/PX: CPT

## 2019-10-04 PROCEDURE — 25010000002 ENOXAPARIN PER 10 MG: Performed by: INTERNAL MEDICINE

## 2019-10-04 PROCEDURE — 97166 OT EVAL MOD COMPLEX 45 MIN: CPT

## 2019-10-04 PROCEDURE — 92610 EVALUATE SWALLOWING FUNCTION: CPT

## 2019-10-04 PROCEDURE — 97162 PT EVAL MOD COMPLEX 30 MIN: CPT

## 2019-10-04 PROCEDURE — 80053 COMPREHEN METABOLIC PANEL: CPT | Performed by: INTERNAL MEDICINE

## 2019-10-04 RX ORDER — HYDROCHLOROTHIAZIDE 12.5 MG/1
12.5 TABLET ORAL DAILY
Status: DISCONTINUED | OUTPATIENT
Start: 2019-10-04 | End: 2019-10-05

## 2019-10-04 RX ORDER — VALSARTAN 160 MG/1
160 TABLET ORAL
Status: DISCONTINUED | OUTPATIENT
Start: 2019-10-04 | End: 2019-10-06 | Stop reason: HOSPADM

## 2019-10-04 RX ORDER — NYSTATIN 100000 [USP'U]/G
POWDER TOPICAL EVERY 12 HOURS SCHEDULED
Status: DISCONTINUED | OUTPATIENT
Start: 2019-10-04 | End: 2019-10-06 | Stop reason: HOSPADM

## 2019-10-04 RX ADMIN — NYSTATIN: 100000 POWDER TOPICAL at 15:22

## 2019-10-04 RX ADMIN — CEFTRIAXONE 1 G: 1 INJECTION, POWDER, FOR SOLUTION INTRAMUSCULAR; INTRAVENOUS at 15:21

## 2019-10-04 RX ADMIN — IPRATROPIUM BROMIDE AND ALBUTEROL SULFATE 3 ML: 2.5; .5 SOLUTION RESPIRATORY (INHALATION) at 12:34

## 2019-10-04 RX ADMIN — IPRATROPIUM BROMIDE AND ALBUTEROL SULFATE 3 ML: 2.5; .5 SOLUTION RESPIRATORY (INHALATION) at 19:31

## 2019-10-04 RX ADMIN — FAMOTIDINE 20 MG: 20 TABLET ORAL at 10:01

## 2019-10-04 RX ADMIN — HYDROCHLOROTHIAZIDE 12.5 MG: 12.5 TABLET ORAL at 15:21

## 2019-10-04 RX ADMIN — SODIUM CHLORIDE, PRESERVATIVE FREE 10 ML: 5 INJECTION INTRAVENOUS at 10:02

## 2019-10-04 RX ADMIN — FUROSEMIDE 20 MG: 20 TABLET ORAL at 10:01

## 2019-10-04 RX ADMIN — AZITHROMYCIN MONOHYDRATE 500 MG: 500 INJECTION, POWDER, LYOPHILIZED, FOR SOLUTION INTRAVENOUS at 16:11

## 2019-10-04 RX ADMIN — CARVEDILOL 6.25 MG: 6.25 TABLET, FILM COATED ORAL at 10:01

## 2019-10-04 RX ADMIN — NYSTATIN: 100000 POWDER TOPICAL at 21:40

## 2019-10-04 RX ADMIN — CARVEDILOL 6.25 MG: 6.25 TABLET, FILM COATED ORAL at 17:18

## 2019-10-04 RX ADMIN — IPRATROPIUM BROMIDE AND ALBUTEROL SULFATE 3 ML: 2.5; .5 SOLUTION RESPIRATORY (INHALATION) at 07:12

## 2019-10-04 RX ADMIN — FAMOTIDINE 20 MG: 20 TABLET ORAL at 21:29

## 2019-10-04 RX ADMIN — ENOXAPARIN SODIUM 40 MG: 40 INJECTION SUBCUTANEOUS at 21:29

## 2019-10-04 RX ADMIN — VALSARTAN 160 MG: 160 TABLET, FILM COATED ORAL at 15:21

## 2019-10-04 RX ADMIN — FENOFIBRATE 48 MG: 48 TABLET ORAL at 10:01

## 2019-10-04 RX ADMIN — ASPIRIN 81 MG: 81 TABLET, COATED ORAL at 10:01

## 2019-10-04 RX ADMIN — IPRATROPIUM BROMIDE AND ALBUTEROL SULFATE 3 ML: 2.5; .5 SOLUTION RESPIRATORY (INHALATION) at 15:57

## 2019-10-04 RX ADMIN — ATORVASTATIN CALCIUM 40 MG: 40 TABLET, FILM COATED ORAL at 21:29

## 2019-10-04 RX ADMIN — FERROUS SULFATE TAB 325 MG (65 MG ELEMENTAL FE) 325 MG: 325 (65 FE) TAB at 10:01

## 2019-10-04 NOTE — THERAPY EVALUATION
Acute Care - Speech Language Pathology   Swallow Initial Evaluation [facility]   Clinical Swallow Evaluation     Patient Name: Tho Vasquez  : 1940  MRN: [id number]  Today's Date: 10/4/2019               Admit Date: 10/3/2019    Visit Dx:     ICD-10-CM ICD-9-CM   1. Pneumonia of both lower lobes due to infectious organism (CMS/HCC) J18.1 483.8   2. Generalized weakness R53.1 780.79   3. Hypoxia R09.02 799.02     Patient Active Problem List   Diagnosis   • Mixed hyperlipidemia   • Abnormal glucose level   • Abrasion   • Adult BMI 37.0-37.9 kg/sq m   • Annual physical exam   • ASHD (arteriosclerotic heart disease)   • Benign essential hypertension   • Chronic kidney disease, stage 3 (CMS/HCC)   • Medicare annual wellness visit, subsequent   • Morbid obesity (CMS/HCC)   • Obstructive sleep apnea   • Old myocardial infarction   • Vitamin D deficiency   • Venous insufficiency (chronic) (peripheral)   • Pneumonia of both lower lobes due to infectious organism (CMS/HCC)   • Transaminitis     Past Medical History:   Diagnosis Date   • Myocardial infarction (CMS/HCC)    • Positive PPD    • Respiratory failure (CMS/HCC)     requiring tracheostomy     Past Surgical History:   Procedure Laterality Date   • CARDIAC SURGERY     • HERNIA REPAIR          SWALLOW EVALUATION (last 72 hours)      [facility] Adult Swallow Evaluation     Row Name 10/04/19 0915                   Rehab Evaluation    Document Type  evaluation  -MP        Subjective Information  no complaints  -MP        Patient Observations  alert;cooperative  -MP        Patient/Family Observations  Dtr present  -MP        Patient Effort  good  -MP           General Information    Patient Profile Reviewed  yes  -MP        Pertinent History Of Current Problem  Adm 10/3 w/ PNA of both lower lobes. Hx MI, mixed HLD, HTN, CKD. MRI motion degraded but no acute infarct or other appreciable acute intracranial abnormality.   -MP        Current Method of Nutrition   regular textures;thin liquids  -MP        Precautions/Limitations, Vision  WFL;for purposes of eval  -MP        Precautions/Limitations, Hearing  WFL;for purposes of eval  -MP        Prior Level of Function-Communication  other (see comments) baseline unknown  -MP        Prior Level of Function-Swallowing  no diet consistency restrictions;safe, efficient swallowing in all situations  -MP        Plans/Goals Discussed with  patient;family;agreed upon  -MP        Barriers to Rehab  none identified  -MP        Patient's Goals for Discharge  patient did not state  -MP        Family Goals for Discharge  family did not state  -MP           Pain Assessment    Additional Documentation  Pain Scale: FACES Pre/Post-Treatment (Group)  -MP           Pain Scale: FACES Pre/Post-Treatment    Pain: FACES Scale, Pretreatment  0-->no hurt  -MP        Pain: FACES Scale, Post-Treatment  0-->no hurt  -MP           Oral Motor and Function    Dentition Assessment  natural, present and adequate  -MP        Secretion Management  WNL/WFL  -MP        Mucosal Quality  moist, healthy  -MP           Oral Musculature and Cranial Nerve Assessment    Oral Motor General Assessment  WFL  -MP           General Eating/Swallowing Observations    Respiratory Support Currently in Use  nasal cannula  -MP        Eating/Swallowing Skills  self-fed;fed by SLP;appropriate self-feeding skills observed  -MP        Positioning During Eating  upright in chair  -MP        Utensils Used  spoon;cup;straw  -MP        Consistencies Trialed  thin liquids;pureed;regular textures  -MP           Clinical Swallow Eval    Oral Prep Phase  WFL  -MP        Oral Transit  WFL  -MP        Oral Residue  WFL  -MP        Pharyngeal Phase  suspected pharyngeal impairment  -MP        Clinical Swallow Evaluation Summary  Clinical swallow evaluation completed. Pt given trials of thin via tsp/cup/straw, puree, and regular solid. Intermittent throat clear t/o eval. ?WVQ following  consecutive straw sips of thin. Discussed w/ pt completing FEES given s/sxs possible aspiration & current PNA. Pt/dtr in agreement. Will plan for FEES today to further assess swallow function.  -MP           Pharyngeal Phase Concerns    Pharyngeal Phase Concerns  throat clear;wet vocal quality  -MP        Wet Vocal Quality  thin;other (see comments) w/ consecutive straw sips  -MP        Throat Clear  other (see comments) intermittent t/o eval  -MP           Clinical Impression    SLP Swallowing Diagnosis  suspected pharyngeal dysfunction;other (see comments) r/o aspiration  -MP        Functional Impact  risk of aspiration/pneumonia  -MP        Rehab Potential/Prognosis, Swallowing  good, to achieve stated therapy goals  -MP        Swallow Criteria for Skilled Therapeutic Interventions Met  demonstrates skilled criteria  -MP           Recommendations    SLP Diet Recommendation  other (see comments) OK to continue MD ordered diet until FEES  -MP        Recommended Diagnostics  FEES  -MP        Recommended Precautions and Strategies  upright posture during/after eating;small bites of food and sips of liquid  -MP        SLP Rec. for Method of Medication Administration  meds whole;meds crushed;with pudding or applesauce  -MP        Monitor for Signs of Aspiration  yes;notify SLP if any concerns  -MP        Anticipated Dischage Disposition  unknown  -MP          User Key  (r) = Recorded By, (t) = Taken By, (c) = Cosigned By    Initials Name Effective Dates    Jordin Berg MS CCC-SLP 06/19/19 -           EDUCATION  The patient has been educated in the following areas:   Dysphagia (Swallowing Impairment).    SLP Recommendation and Plan  SLP Swallowing Diagnosis: suspected pharyngeal dysfunction, other (see comments)(r/o aspiration)  SLP Diet Recommendation: other (see comments)(OK to continue MD ordered diet until FEES)  Recommended Precautions and Strategies: upright posture during/after eating, small bites of food  and sips of liquid  SLP Rec. for Method of Medication Administration: meds whole, meds crushed, with pudding or applesauce     Monitor for Signs of Aspiration: yes, notify SLP if any concerns  Recommended Diagnostics: FEES  Swallow Criteria for Skilled Therapeutic Interventions Met: demonstrates skilled criteria  Anticipated Dischage Disposition: unknown  Rehab Potential/Prognosis, Swallowing: good, to achieve stated therapy goals             Plan of Care Reviewed With: patient, family           Time Calculation:   Time Calculation- SLP     Row Name 10/04/19 1049             Time Calculation- SLP    SLP Start Time  0915  -MP      SLP Received On  10/04/19  -        User Key  (r) = Recorded By, (t) = Taken By, (c) = Cosigned By    Initials Name Provider Type    Jordin Berg MS CCC-SLP Speech and Language Pathologist          Therapy Charges for Today     Code Description Service Date Service Provider Modifiers Qty    [id number] HC ST EVAL ORAL PHARYNG SWALLOW 3 10/4/2019 Jordin Palma MS CCC-SLP GN 1               Jordin Palma MS CCC-CHERIE  10/4/2019

## 2019-10-04 NOTE — PLAN OF CARE
Problem: Patient Care Overview  Goal: Plan of Care Review  Outcome: Ongoing (interventions implemented as appropriate)   10/04/19 9154   Coping/Psychosocial   Plan of Care Reviewed With patient;family   SLP evaluation completed. Will plan for FEES today to further assess swallow function. Please see note for further details and recommendations.

## 2019-10-04 NOTE — MBS/VFSS/FEES
Acute Care - Speech Language Pathology   Swallow Initial Evaluation [facility]   Fiberoptic Endoscopic Evaluation of Swallowing (FEES)     Patient Name: Tho Vasquez  : 1940  MRN: [id number]  Today's Date: 10/4/2019  Onset of Illness/Injury or Date of Surgery: 10/03/19     Referring Physician: MD Solo      Admit Date: 10/3/2019    Visit Dx:     ICD-10-CM ICD-9-CM   1. Pneumonia of both lower lobes due to infectious organism (CMS/HCC) J18.1 483.8   2. Generalized weakness R53.1 780.79   3. Hypoxia R09.02 799.02   4. Impaired mobility and ADLs Z74.09 799.89     Patient Active Problem List   Diagnosis   • Mixed hyperlipidemia   • Abnormal glucose level   • Abrasion   • Adult BMI 37.0-37.9 kg/sq m   • Annual physical exam   • ASHD (arteriosclerotic heart disease)   • Benign essential hypertension   • Chronic kidney disease, stage 3 (CMS/HCC)   • Medicare annual wellness visit, subsequent   • Morbid obesity (CMS/HCC)   • Obstructive sleep apnea   • Old myocardial infarction   • Vitamin D deficiency   • Venous insufficiency (chronic) (peripheral)   • Pneumonia of both lower lobes due to infectious organism (CMS/HCC)   • Transaminitis     Past Medical History:   Diagnosis Date   • Myocardial infarction (CMS/HCC)    • Positive PPD    • Respiratory failure (CMS/HCC)     requiring tracheostomy     Past Surgical History:   Procedure Laterality Date   • CARDIAC SURGERY     • HERNIA REPAIR          SWALLOW EVALUATION (last 72 hours)      [facility] Adult Swallow Evaluation     Row Name 10/04/19 1440 10/04/19 0915                Rehab Evaluation    Document Type  evaluation  -MP  evaluation  -MP       Subjective Information  no complaints  -MP  no complaints  -MP       Patient Observations  alert;cooperative  -MP  alert;cooperative  -MP       Patient/Family Observations  dtr present  -MP  Dtr present  -MP       Patient Effort  good  -MP  good  -MP          General Information    Patient Profile Reviewed  yes  -MP   yes  -MP       Pertinent History Of Current Problem  See AM eval.  -MP  Adm 10/3 w/ PNA of both lower lobes. Hx MI, mixed HLD, HTN, CKD. MRI motion degraded but no acute infarct or other appreciable acute intracranial abnormality.   -MP       Current Method of Nutrition  regular textures;thin liquids  -MP  regular textures;thin liquids  -MP       Precautions/Limitations, Vision  --  WFL;for purposes of eval  -MP       Precautions/Limitations, Hearing  --  WFL;for purposes of eval  -MP       Prior Level of Function-Communication  --  other (see comments) baseline unknown  -MP       Prior Level of Function-Swallowing  --  no diet consistency restrictions;safe, efficient swallowing in all situations  -MP       Plans/Goals Discussed with  --  patient;family;agreed upon  -MP       Barriers to Rehab  --  none identified  -MP       Patient's Goals for Discharge  --  patient did not state  -MP       Family Goals for Discharge  --  family did not state  -MP          Pain Assessment    Additional Documentation  Pain Scale: FACES Pre/Post-Treatment (Group)  -MP  Pain Scale: FACES Pre/Post-Treatment (Group)  -MP          Pain Scale: FACES Pre/Post-Treatment    Pain: FACES Scale, Pretreatment  0-->no hurt  -MP  0-->no hurt  -MP       Pain: FACES Scale, Post-Treatment  0-->no hurt  -MP  0-->no hurt  -MP          Oral Motor and Function    Dentition Assessment  --  natural, present and adequate  -MP       Secretion Management  --  WNL/WFL  -MP       Mucosal Quality  --  moist, healthy  -MP          Oral Musculature and Cranial Nerve Assessment    Oral Motor General Assessment  --  WFL  -MP          General Eating/Swallowing Observations    Respiratory Support Currently in Use  --  nasal cannula  -MP       Eating/Swallowing Skills  --  self-fed;fed by SLP;appropriate self-feeding skills observed  -MP       Positioning During Eating  --  upright in chair  -MP       Utensils Used  --  spoon;cup;straw  -MP       Consistencies Trialed   --  thin liquids;pureed;regular textures  -MP          Clinical Swallow Eval    Oral Prep Phase  --  WFL  -MP       Oral Transit  --  WFL  -MP       Oral Residue  --  WFL  -MP       Pharyngeal Phase  --  suspected pharyngeal impairment  -MP       Clinical Swallow Evaluation Summary  --  Clinical swallow evaluation completed. Pt given trials of thin via tsp/cup/straw, puree, and regular solid. Intermittent throat clear t/o eval. ?WVQ following consecutive straw sips of thin. Discussed w/ pt completing FEES given s/sxs possible aspiration & current PNA. Pt/dtr in agreement. Will plan for FEES today to further assess swallow function.  -MP          Pharyngeal Phase Concerns    Pharyngeal Phase Concerns  --  throat clear;wet vocal quality  -MP       Wet Vocal Quality  --  thin;other (see comments) w/ consecutive straw sips  -MP       Throat Clear  --  other (see comments) intermittent t/o eval  -MP          Fiberoptic Endoscopic Evaluation of Swallowing (FEES)    Risks/Benefits Reviewed  risks/benefits explained;patient;family;agreed to eval  -MP  --       Nasal Entry  left:  -MP  --       Special Considerations  Scope #837  -MP  --          Anatomy and Physiology    Anatomic Considerations  no anatomic structural deviation  -MP  --       Velopharyngeal  WFL  -MP  --       Base of Tongue  symmetrical  -MP  --       Epiglottis  other (see comments) misshapen but WFL  -MP  --       Laryngeal Function Breathing  symmetrical  -MP  --       Laryngeal Function Phonation  symmetrical  -MP  --       Laryngeal Function to Breath Hold  other (see comments) DNA  -MP  --       Secretion Rating Scale (Musa et al. 1996)  1- secretions present around the laryngeal vestibule  -MP  --       Secretion Description  thick;white;other (comment) had pt cough/swallow & cleared secretions  -MP  --       Ice Chips  DNA  -MP  --       Spontaneous Swallow  frequency adequate  -MP  --       Sensory  sensed scope  -MP  --       Utensils Used   Spoon;Cup;Straw  -MP  --       Consistencies Trialed  thin liquids;pudding/puree;Regular textures  -MP  --          FEES Interpretation    Oral Phase  WFL  -MP  --          Initiation of Pharyngeal Swallow    Initiation of Pharyngeal Swallow  bolus in pyriform sinuses  -MP  --       Pharyngeal Phase  impaired pharyngeal phase of swallowing  -MP  --       Penetration During the Swallow  thin liquids;secondary to delayed swallow initiation or mistiming;secondary to reduced vestibular closure;other (see comments) deep w/ large straw sips  -MP  --       Rosenbek's Scale  thin:;4-->Level 4 w/ cue  -MP  --       Attempted Compensatory Maneuvers  bolus size;bolus presentation style  -MP  --       Response to Attempted Compensatory Maneuvers  prevented penetration  -MP  --       FEES Summary  SLP FEES evaluation completed. Pt presents w/ mild pharyngeal dysphagia. Deep penetration during the swallow w/ large straw sips of thin liquid. No response, but able to clear w/ cued throat clear/cough. No aspiration observed on exam. No penetration/aspiration w/ small controlled sips of thin via cup/straw, pudding, or solid. No significant pharyngeal residue. Recommend continue regular diet w/ liquids via small controlled sips. Meds whole or crushed in puree. Standard aspiration precautions. SLP will f/u for tx.  -MP  --          Clinical Impression    SLP Swallowing Diagnosis  mild;pharyngeal dysfunction  -MP  suspected pharyngeal dysfunction;other (see comments) r/o aspiration  -MP       Functional Impact  risk of aspiration/pneumonia  -MP  risk of aspiration/pneumonia  -MP       Rehab Potential/Prognosis, Swallowing  good, to achieve stated therapy goals  -MP  good, to achieve stated therapy goals  -MP       Swallow Criteria for Skilled Therapeutic Interventions Met  demonstrates skilled criteria  -MP  demonstrates skilled criteria  -MP          Recommendations    Therapy Frequency (Swallow)  3 days per week  -MP  --        Predicted Duration Therapy Intervention (Days)  until discharge  -MP  --       SLP Diet Recommendation  regular textures;thin liquids;other (see comments) via small controlled cup or straw sips  -MP  other (see comments) OK to continue MD ordered diet until FEES  -MP       Recommended Diagnostics  --  FEES  -MP       Recommended Precautions and Strategies  upright posture during/after eating;small bites of food and sips of liquid  -MP  upright posture during/after eating;small bites of food and sips of liquid  -MP       SLP Rec. for Method of Medication Administration  meds whole;meds crushed;with pudding or applesauce  -MP  meds whole;meds crushed;with pudding or applesauce  -MP       Monitor for Signs of Aspiration  yes;notify SLP if any concerns  -MP  yes;notify SLP if any concerns  -MP       Anticipated Dischage Disposition  unknown  -MP  unknown  -MP         User Key  (r) = Recorded By, (t) = Taken By, (c) = Cosigned By    Initials Name Effective Dates    Jordin Berg MS CCC-SLP 06/19/19 -           EDUCATION  The patient has been educated in the following areas:   Dysphagia (Swallowing Impairment).    SLP Recommendation and Plan  SLP Swallowing Diagnosis: mild, pharyngeal dysfunction  SLP Diet Recommendation: regular textures, thin liquids, other (see comments)(via small controlled cup or straw sips)  Recommended Precautions and Strategies: upright posture during/after eating, small bites of food and sips of liquid  SLP Rec. for Method of Medication Administration: meds whole, meds crushed, with pudding or applesauce     Monitor for Signs of Aspiration: yes, notify SLP if any concerns  Recommended Diagnostics: FEES  Swallow Criteria for Skilled Therapeutic Interventions Met: demonstrates skilled criteria  Anticipated Dischage Disposition: unknown  Rehab Potential/Prognosis, Swallowing: good, to achieve stated therapy goals  Therapy Frequency (Swallow): 3 days per week  Predicted Duration Therapy  Intervention (Days): until discharge       Plan of Care Reviewed With: patient, daughter    SLP GOALS     Row Name 10/04/19 2810             Oral Nutrition/Hydration Goal 1 (SLP)    Oral Nutrition/Hydration Goal 1, SLP  LTG: Pt will tolerate regular diet, thin liquids w/ no overt s/sxs aspiration or distress w/ 100% acc and no cues  -MP      Time Frame (Oral Nutrition/Hydration Goal 1, SLP)  by discharge  -MP         Oral Nutrition/Hydration Goal 2 (SLP)    Oral Nutrition/Hydration Goal 2, SLP  Pt will tolerate regular solids & thin liquids via small single controlled cup or straw sips w/ 100% acc and no cues  -MP      Time Frame (Oral Nutrition/Hydration Goal 2, SLP)  short term goal (STG);by discharge  -MP         Pharyngeal Strengthening Exercise Goal 1 (SLP)    Activity (Pharyngeal Strengthening Goal 1, SLP)  increase closure at entrance to airway/closure of airway at glottis;increase timing  -MP      Increase Timing  prepping - 3 second prep or suck swallow or 3-step swallow  -MP      Increase Closure at Entrance to Airway/Closure of Airway at Glottis  super-supraglottic swallow  -MP      [place]/Accuracy (Pharyngeal Strengthening Goal 1, SLP)  with minimal cues (75-90% accuracy)  -MP      Time Frame (Pharyngeal Strengthening Goal 1, SLP)  short term goal (STG);by discharge  -MP         Swallow Management Recall Goal 1 (SLP)    Activity (Swallow Management Recall Goal 1, SLP)  recall of;compensatory swallow strategies/techniques  -MP      [place]/Accuracy (Swallow Management Recall Goal 1, SLP)  with minimal cues (75-90% accuracy)  -MP      Time Frame (Swallow Management Recall Goal 1, SLP)  short term goal (STG);by discharge  -MP         Swallow Compensatory Strategies Goal 1 (SLP)    Activity (Swallow Compensatory Strategies/Techniques Goal 1, SLP)  compensatory strategies;small cup sips;small straw sips;during p.o. trials;during meal intake  -MP      [place]/Accuracy (Swallow Compensatory  Strategies/Techniques Goal 1, SLP)  with minimal cues (75-90% accuracy)  -MP      Time Frame (Swallow Compensatory Strategies/Techniques Goal 1, SLP)  short term goal (STG);by discharge  -MP        User Key  (r) = Recorded By, (t) = Taken By, (c) = Cosigned By    Initials Name Provider Type    Jordin Berg MS CCC-SLP Speech and Language Pathologist             Time Calculation:   Time Calculation- SLP     Row Name 10/04/19 1542 10/04/19 1049          Time Calculation- SLP    SLP Start Time  1445  -MP  0915  -MP     SLP Received On  10/04/19  -MP  10/04/19  -       User Key  (r) = Recorded By, (t) = Taken By, (c) = Cosigned By    Initials Name Provider Type    Jordin Berg MS CCC-SLP Speech and Language Pathologist          Therapy Charges for Today     Code Description Service Date Service Provider Modifiers Qty    54744590275 HC ST EVAL ORAL PHARYNG SWALLOW 3 10/4/2019 Jordin Palma MS CCC-SLP GN 1    30199294523 HC ST FIBEROPTIC ENDO EVAL SWALL 6 10/4/2019 Jordin Palma MS CCC-SLP GN 1               MS KALIE Persaud  10/4/2019

## 2019-10-04 NOTE — PROGRESS NOTES
[facility] Medicine Services  PROGRESS NOTE    Patient Name: Tho Vasquez  : 1940  MRN: [id number]    Date of Admission: 10/3/2019  Primary Care Physician: Ja Baldwin MD    Subjective   Subjective     CC: Cough    HPI: Patient admitted through the ER with nonproductive cough.  He was seen by his primary care provider did not improve.  He has long-standing untreated sleep apnea and chronic lower extremity edema.  He has a history of respiratory failure and intubation.    Review of Systems    Gen- No fevers, chills  CV- No chest pain, palpitations  Resp- No cough, dyspnea  GI- No N/V/D, abd pain    All other systems reviewed and negative.     Objective   Objective     Vital Signs:   Temp:  [98.1 °F (36.7 °C)-98.8 °F (37.1 °C)] 98.2 °F (36.8 °C)  Heart Rate:  [69-78] 76  Resp:  [16-18] 18  BP: (112-124)/(73-87) 124/87        Physical Exam:    Constitutional: No acute distress, awake, alert  HENT: NCAT, dry tongue  Respiratory: poor inspiratory effort, no wheezes, distant breath sounds, clear  Cardiovascular: RRR, s1 and s2  Gastrointestinal: Positive bowel sounds, soft, nontender, nondistended  Musculoskeletal: No bilateral ankle edema  Psychiatric: Appropriate affect, cooperative  Neurologic: Oriented x 3, strength symmetric in all extremities, Cranial Nerves grossly intact to confrontation, speech clear  Skin: dry skin    Results Reviewed:    Results from last 7 days   Lab Units 10/04/19  0453 10/03/19  1147   WBC 10*3/mm3 11.94* 13.68*   HEMOGLOBIN g/dL 12.9* 13.2   HEMATOCRIT % 38.6 40.7   PLATELETS 10*3/mm3 262 264   PROCALCITONIN ng/mL  --  0.25     Results from last 7 days   Lab Units 10/04/19  0453 10/03/19  1514 10/03/19  1147   SODIUM mmol/L 141  --  139   POTASSIUM mmol/L 3.8  --  3.7   CHLORIDE mmol/L 103  --  100   CO2 mmol/L 28.0  --  30.0*   BUN mg/dL 40*  --  46*   CREATININE mg/dL 0.89  --  1.08   GLUCOSE mg/dL 103*  --  104*   CALCIUM mg/dL 9.1  --   9.3   ALT (SGPT) U/L 44*  --  42*   AST (SGOT) U/L 81*  --  65*   TROPONIN T ng/mL  --  <0.010 <0.010   PROBNP pg/mL  --   --  442.9     Estimated Creatinine Clearance: 81.5 mL/min (by C-G formula based on SCr of 0.89 mg/dL).    Microbiology Results Abnormal     Procedure Component Value - Date/Time    Blood Culture - Blood, Arm, Right [[id number]] Collected:  10/03/19 1140    Lab Status:  Preliminary result Specimen:  Blood from Arm, Right Updated:  10/04/19 1231     Blood Culture No growth at 24 hours    Blood Culture - Blood, Hand, Right [[id number]] Collected:  10/03/19 1150    Lab Status:  Preliminary result Specimen:  Blood from Hand, Right Updated:  10/04/19 1216     Blood Culture No growth at 24 hours    Narrative:       Aerobic bottle    Influenza A & B, RT PCR - Swab, Nasopharynx [[id number]]  (Normal) Collected:  10/03/19 1150    Lab Status:  Final result Specimen:  Swab from Nasopharynx Updated:  10/03/19 1304     Influenza A PCR Not Detected     Influenza B PCR Not Detected          Imaging Results (last 24 hours)     Procedure Component Value Units Date/Time    MRI Brain With & Without Contrast [[id number]] Collected:  10/03/19 1610     Updated:  10/04/19 1511    Narrative:       EXAMINATION: MRI BRAIN W WO CONTRAST-10/03/2019:     INDICATION: Confusion/delirium, altered LOC, unexplained; J18.1-Lobar  pneumonia, unspecified organism; R53.1-Weakness; R09.02-Hypoxemia.     TECHNIQUE: Multiplanar, multisequence MR imaging of the brain was  performed with and without intravenous Gadolinium contrast. Many of the  sequences are motion limited secondary to patient confusion.     COMPARISON: CT of the head performed 10/03/2019.     FINDINGS: Diffusion-weighted imaging of the brain demonstrates no  evidence of focal diffusion restricted signal to suggest acute infarct.  Particularly within the right frontal lobe, no convincing  diffusion-restricted signal identified to suggest acute infarct.  T2-weighted imaging of  the brain demonstrates intact intracranial flow  voids. No evidence of hydrocephalus. Diffuse cerebral atrophy is  identified. No extra-axial fluid collection identified. Heavy burden of  confluent white matter disease is identified throughout the  periventricular and subcortical white matter consistent with heavy  burden of chronic microvascular ischemic change. This does not  demonstrate evidence of enhancement on postcontrast imaging. The globes  appear intact. The retro-orbital soft tissues are unremarkable. Trace  nonspecific mastoid fluid is identified. FLAIR imaging of the brain  confirms these periventricular T-2 FLAIR signal abnormalities without  evidence for sulcal FLAIR signal abnormality. T1-weighted imaging  demonstrates no evidence of increased T1 intracranial signal to suggest  acute hemorrhage. Questionable punctate remote lacunar infarcts are  identified within the bilateral basal ganglia. Postcontrast imaging  demonstrates no evidence of abnormal intracranial enhancement. Sagittal  imaging of the brain demonstrate a normal appearing pituitary gland. The  skull base and sella turcica appear intact. The craniocervical junction  is unremarkable. Heterogenous bone marrow signal is identified without  acute osseous abnormality.       Impression:       1.  Motion artifact persists throughout many of the sequences secondary  to patient confusion. Despite the limitations of the exam.     2.  No acute infarct or other appreciable acute intracranial  abnormality.     3.  Heavy burden of confluent periventricular and subcortical white  matter disease favored to represent chronic microvascular ischemic  change with possible bilateral punctate basal ganglial lacunar infarcts.     D:  10/03/2019  E:  10/03/2019            This report was finalized on 10/4/2019 3:07 PM by Dr. Giancarlo Thomas MD.       Fiberoptic Endo (fees) [[id number]] Resulted:  10/04/19 1507     Updated:  10/04/19 1507    Narrative:        This procedure was auto-finalized with no dictation required.    CT Head Without Contrast [[id number]] Collected:  10/03/19 1259     Updated:  10/04/19 145    Narrative:       EXAMINATION: CT HEAD WO CONTRAST-10/03/2019:      INDICATION: Confusion/delirium, altered LOC, unexplained.     TECHNIQUE: Unenhanced CT imaging of the brain was performed.     The radiation dose reduction device was turned on for each scan per the  ALARA (As Low as Reasonably Achievable) protocol.     COMPARISON: NONE.     FINDINGS: Unenhanced CT imaging of the brain was performed which is  mildly motion artifact limited. No evidence of midline shift. No  evidence of hydrocephalus. There is diffuse subcortical and  periventricular hypoattenuation, likely relating to chronic  microvascular ischemic change, however, more superimposed  hypoattenuation is noted within the right frontal lobe with loss of the  normal gray-white matter differentiation which could be chronic,  although underlying ischemic changes are difficult to exclude. Diffuse  cerebral atrophy is identified.  No intraventricular or subarachnoid  hemorrhage appreciated. The calvarium is intact. Mild ethmoidal mucosal  thickening is identified. Postsurgical changes to the globes are noted.  The retro-orbital soft tissues are unremarkable.       Impression:       Diffuse hypoattenuation of the brain parenchyma most  consistent with heavy burden of chronic microvascular ischemic change,  however, there is subjectively more than expected hypoattenuation within  the right frontal lobe, particularly involving the gray-white matter  junction in which underlying infarct is not excluded. MRI of the brain  with and without intravenous Gadolinium contrast would help clarify  these findings.     These findings were discussed with the ordering provider Davida Rivas at  approximately 1:05 PM on 10/03/2019.     D:  10/03/2019  E:  10/03/2019     This report was finalized on 10/4/2019 2:54 PM by  Dr. Giancarlo Thomas MD.                  I have reviewed the medications:  Scheduled Meds:  aspirin 81 mg Oral Daily   atorvastatin 40 mg Oral Nightly   azithromycin 500 mg Intravenous Q24H   carvedilol 6.25 mg Oral BID With Meals   ceftriaxone 1 g Intravenous Q24H   enoxaparin 40 mg Subcutaneous Q24H   famotidine 20 mg Oral Q12H   fenofibrate 48 mg Oral Daily   ferrous sulfate 325 mg Oral Daily   furosemide 20 mg Oral Daily   valsartan 160 mg Oral Q24H   And      Hydrochlorothiazide Oral 12.5 mg Oral Daily   ipratropium-albuterol 3 mL Nebulization 4x Daily - RT   nystatin  Topical Q12H   sodium chloride 10 mL Intravenous Q12H     Continuous Infusions:   PRN Meds:.•  acetaminophen **OR** acetaminophen **OR** acetaminophen  •  ondansetron  •  sodium chloride  •  sodium chloride      Assessment/Plan   Assessment / Plan     Active Hospital Problems    Diagnosis  POA   • Pneumonia of both lower lobes due to infectious organism (CMS/HCC) [J18.1]  Yes   • Transaminitis [R74.0]  Unknown   • Vitamin D deficiency [E55.9]  Yes   • Venous insufficiency (chronic) (peripheral) [I87.2]  Yes   • Old myocardial infarction [I25.2]  Not Applicable      Resolved Hospital Problems   No resolved problems to display.        Brief Hospital Course to date:  Tho Vasquez is a 79 y.o. male admitted with dyspnea.  It appears he has had respiratory failure with intubation in the past.      Multifactorial respiratory failure  -He has untreated sleep apnea  -He has morbid obesity  -Basilar airspace disease possibly due to pneumonia  -Currently on IV antibiotics for pneumonia  Altered mental status  -CT head on admission -chronic microvascular ischemic changes  -MRI of the brain was obtained  -Brain with and without -motion artifact-no acute stroke-chronic max microvascular disease  Coronary artery disease  -Patient has cardiac stent  Severe sleep apnea  -He has untreated sleep apnea due to claustrophobia    DVT Prophylaxis:  "Enoxaparin    Disposition: I expect the patient to be discharged TBD    CODE STATUS:   Code Status and Medical Interventions:   Ordered at: 10/03/19 1422     Level Of Support Discussed With:    Patient     Code Status:    CPR     Medical Interventions (Level of Support Prior to Arrest):    Full     Comments:    patient was previously \"coded\" and \"on life support\" would like all measures in setting of decompensation         Electronically signed by Rudy Luke MD, 10/04/19, 7:18 PM.    "

## 2019-10-04 NOTE — PLAN OF CARE
Problem: Patient Care Overview  Goal: Plan of Care Review  Outcome: Ongoing (interventions implemented as appropriate)   10/04/19 1140   Coping/Psychosocial   Plan of Care Reviewed With patient   OTHER   Outcome Summary Pt tolerated PT eval well. He presents with mild deficits in strength and function. He transferred sit>stand with SBA and ambulated 150ft with Min A and no AD. He has mild unsteadiness with gait, with a wide CAHRLY and increased B lateral trunk lean. Pt may benefit from use of a cane for ambulation to assist with unsteadiness. Skilled PT warranted to improve functional mobility and safety. Recommend outpatient PT at discharge.

## 2019-10-04 NOTE — DISCHARGE INSTR - DIET
FEES   10/4/19  Reason for Referral  Patient was referred for a FEES to assess the efficiency of his/her swallow function, rule out aspiration and make recommendations regarding safe dietary consistencies, effective compensatory strategies, and safe eating environment.         Recommendations/Treatment  SLP Swallowing Diagnosis: mild, pharyngeal dysfunction  Functional Impact: risk of aspiration/pneumonia  Rehab Potential/Prognosis, Swallowing: good, to achieve stated therapy goals  Swallow Criteria for Skilled Therapeutic Interventions Met: demonstrates skilled criteria  Therapy Frequency (Swallow): 3 days per week  Predicted Duration Therapy Intervention (Days): until discharge  SLP Diet Recommendation: regular textures, thin liquids, other (see comments)(via small controlled cup or straw sips)  Recommended Diagnostics: FEES  Recommended Precautions and Strategies: upright posture during/after eating, small bites of food and sips of liquid  SLP Rec. for Method of Medication Administration: meds whole, meds crushed, with pudding or applesauce  Monitor for Signs of Aspiration: yes, notify SLP if any concerns  Anticipated Dischage Disposition: unknown    Instrumental Set-up  Risks/Benefits Reviewed: risks/benefits explained, patient, family, agreed to eval  Nasal Entry: left:  Anatomic Considerations: no anatomic structural deviation  Utensils Used: Spoon, Cup, Straw  Consistencies Trialed: thin liquids, pudding/puree, Regular textures    Oral Preparation/ Oral Phase  Oral Phase: WFL    Pharyngeal Phase  Initiation of Pharyngeal Swallow: bolus in pyriform sinuses  Pharyngeal Phase: impaired pharyngeal phase of swallowing  Penetration During the Swallow: thin liquids, secondary to delayed swallow initiation or mistiming, secondary to reduced vestibular closure, other (see comments)(deep w/ large straw sips)  Rosenbek's Scale: thin:, 4-->Level 4(w/ cue)  Attempted Compensatory Maneuvers: bolus size, bolus presentation  style  Response to Attempted Compensatory Maneuvers: prevented penetration  FEES Summary: SLP FEES evaluation completed. Pt presents w/ mild pharyngeal dysphagia. Deep penetration during the swallow w/ large straw sips of thin liquid. No response, but able to clear w/ cued throat clear/cough. No aspiration observed on exam. No penetration/aspiration w/ small controlled sips of thin via cup/straw, pudding, or solid. No significant pharyngeal residue. Recommend continue regular diet w/ liquids via small controlled sips. Meds whole or crushed in puree. Standard aspiration precautions. SLP will f/u for tx.

## 2019-10-04 NOTE — PLAN OF CARE
Problem: Patient Care Overview  Goal: Plan of Care Review  Outcome: Ongoing (interventions implemented as appropriate)   10/04/19 9664   Coping/Psychosocial   Plan of Care Reviewed With patient   Plan of Care Review   Progress improving   OTHER   Outcome Summary VSS. Denies pain or SOB. Slept well up in the chair. Iv ABX.

## 2019-10-04 NOTE — PLAN OF CARE
Problem: Patient Care Overview  Goal: Plan of Care Review  Outcome: Ongoing (interventions implemented as appropriate)   10/04/19 0996   Coping/Psychosocial   Plan of Care Reviewed With patient   Plan of Care Review   Progress no change   OTHER   Outcome Summary Patient presents with chronic venous insufficiency and BLE pitting edema. Will consult PT wound care for compression wraps. Patient also presents with mild perineal MASD. Just needs good general skin care and barrier cream. See skin care order for care needs. Patient also presents with bilateral groin and abdominal fold mild fungal rash. Will order Nystatin powder for topical care. Thanks

## 2019-10-04 NOTE — THERAPY EVALUATION
Acute Care - Occupational Therapy Initial Evaluation  [facility]     Patient Name: Tho Vasquez  : 1940  MRN: [id number]  Today's Date: 10/4/2019  Onset of Illness/Injury or Date of Surgery: 10/03/19  Date of Referral to OT: 10/03/19  Referring Physician: MD Solo    Admit Date: 10/3/2019       ICD-10-CM ICD-9-CM   1. Pneumonia of both lower lobes due to infectious organism (CMS/HCC) J18.1 483.8   2. Generalized weakness R53.1 780.79   3. Hypoxia R09.02 799.02   4. Impaired mobility and ADLs Z74.09 799.89     Patient Active Problem List   Diagnosis   • Mixed hyperlipidemia   • Abnormal glucose level   • Abrasion   • Adult BMI 37.0-37.9 kg/sq m   • Annual physical exam   • ASHD (arteriosclerotic heart disease)   • Benign essential hypertension   • Chronic kidney disease, stage 3 (CMS/HCC)   • Medicare annual wellness visit, subsequent   • Morbid obesity (CMS/HCC)   • Obstructive sleep apnea   • Old myocardial infarction   • Vitamin D deficiency   • Venous insufficiency (chronic) (peripheral)   • Pneumonia of both lower lobes due to infectious organism (CMS/HCC)   • Transaminitis     Past Medical History:   Diagnosis Date   • Myocardial infarction (CMS/HCC)    • Positive PPD    • Respiratory failure (CMS/HCC)     requiring tracheostomy     Past Surgical History:   Procedure Laterality Date   • CARDIAC SURGERY     • HERNIA REPAIR            OT ASSESSMENT FLOWSHEET (last 12 hours)      Occupational Therapy Evaluation     Row Name 10/04/19 1120                   OT Evaluation Time/Intention    Subjective Information  complains of;weakness;fatigue;dyspnea  -KF        Document Type  evaluation  -KF        Mode of Treatment  occupational therapy  -KF        Patient Effort  good  -KF        Symptoms Noted During/After Treatment  fatigue;shortness of breath  -KF           General Information    Patient Profile Reviewed?  yes  -KF        Onset of Illness/Injury or Date of Surgery  10/03/19  -KF         Referring Physician  MD Solo  -KF        Patient Observations  alert;cooperative;agree to therapy  -KF        Patient/Family Observations  dtr present  -KF        Prior Level of Function  independent:;transfer;bed mobility;ADL's;work  -KF        Equipment Currently Used at Home  none  -KF        Pertinent History of Current Functional Problem  Pt hx of CVA, CAD with remote PCI, HTN, c/o cough, dyspnea  -KF        Existing Precautions/Restrictions  oxygen therapy device and L/min;fall  -KF        Risks Reviewed  patient and family:;LOB;nausea/vomiting;dizziness;increased discomfort;change in vital signs  -KF        Benefits Reviewed  patient and family:;improve function;increase independence;increase strength;increase balance;increase knowledge  -KF        Barriers to Rehab  none identified  -KF           Relationship/Environment    Primary Source of Support/Comfort  child(juan a)  -KF        Lives With  child(juan a), adult  -KF        Family Caregiver if Needed  child(juan a), adult  -KF        Concerns About Impact on Relationships  lives with dtr but dtr works during day and Pt works FT   -KF           Resource/Environmental Concerns    Current Living Arrangements  home/apartment/condo  -KF           Cognitive Assessment/Interventions    Additional Documentation  Cognitive Assessment/Intervention (Group)  -KF           Cognitive Assessment/Intervention- PT/OT    Affect/Mental Status (Cognitive)  WFL  -KF        Orientation Status (Cognition)  oriented x 4  -KF        Follows Commands (Cognition)  WNL  -KF        Cognitive Function (Cognitive)  WFL;safety deficit  -KF        Safety Deficit (Cognitive)  mild deficit;judgment;insight into deficits/self awareness  -KF        Cognitive Interventions (Cognitive)  occupation/activity based interventions;process/task specific training  -KF           Safety Issues, Functional Mobility    Safety Issues Affecting Function (Mobility)  judgment;insight into deficits/self awareness   -KF        Impairments Affecting Function (Mobility)  balance;strength  -KF        Comment, Safety Issues/Impairments (Mobility)  did have incontinence with urine during session, brief with mobility  -KF           Bed Mobility Assessment/Treatment    Comment (Bed Mobility)  UIC, sleeps in recliner at home  -KF           Functional Mobility    Functional Mobility- Ind. Level  standby assist  -KF        Functional Mobility-Distance (Feet)  6  -KF        Functional Mobility- Safety Issues  supplemental O2  -KF        Functional Mobility- Comment  within in room steps to sink and back   -KF           Transfer Assessment/Treatment    Transfer Assessment/Treatment  stand-sit transfer;sit-stand transfer  -KF        Comment (Transfers)  good safety with transfers, no LOB   -KF           Sit-Stand Transfer    Sit-Stand [place] (Transfers)  stand by assist  -KF           Stand-Sit Transfer    Stand-Sit [place] (Transfers)  stand by assist  -KF           ADL Assessment/Intervention    15236 - OT Self Care/Mgmt Minutes  10  -KF        BADL Assessment/Intervention  lower body dressing;upper body dressing;grooming  -KF           Upper Body Dressing Assessment/Training    Upper Body Dressing [place] Level  don;front opening garment;verbal cues;set up;supervision  -KF        Upper Body Dressing Position  unsupported sitting  -KF           Lower Body Dressing Assessment/Training    Lower Body Dressing [place] Level  doff;socks;conditional independence;don;moderate assist (50% patient effort);undergarment;minimum assist (75% patient effort);pants/bottoms  -KF        Lower Body Dressing Position  unsupported sitting;unsupported standing  -KF        Comment (Lower Body Dressing)  min A to start pants around feet has reacher and sock aid baseline, mod A to don socks, cond I to doff socks; good balance standing to pull pants up and brief   -KF           Grooming Assessment/Training    [place] Level (Grooming)   oral care regimen;set up;supervision;verbal cues  -KF        Grooming Position  sink side  -KF        Comment (Grooming)  with set up and SBA only no LOB   -KF           BADL Safety/Performance    Impairments, BADL Safety/Performance  balance;endurance/activity tolerance;shortness of breath;strength  -KF        Skilled BADL Treatment/Intervention  BADL process/adaptation training;cognitive/safety deficit modifications  -KF        Progress in BADL Status  improvement noted  -KF           General ROM    GENERAL ROM COMMENTS  LUE limited AROM to 90, RUE WFL  -KF           MMT (Manual Muscle Testing)    General MMT Comments  RUE shoulder 4/5, LUE shoulder 3-/5, B elbow flex 4+/5, B elbow extension 4/5  -KF           Motor Assessment/Interventions    Additional Documentation  Balance (Group);Balance Interventions (Group);Gross Motor Coordination (Group)  -KF           Gross Motor Coordination    Gross Motor Impairments  AROM (active range of motion);finger to nose  -KF        Gross Motor Skill, Impairments Detail  WFL  -KF           Balance    Balance  static sitting balance;static standing balance;dynamic sitting balance;dynamic standing balance  -KF           Static Sitting Balance    Level of [place] (Unsupported Sitting, Static Balance)  independent  -KF        Sitting Position (Unsupported Sitting, Static Balance)  sitting in chair  -KF        Time Able to Maintain Position (Unsupported Sitting, Static Balance)  more than 5 minutes  -KF           Dynamic Sitting Balance    Level of [place], Reaches Outside Midline (Sitting, Dynamic Balance)  independent  -KF        Sitting Position, Reaches Outside Midline (Sitting, Dynamic Balance)  sitting in chair  -KF        Comment, Reaches Outside Midline (Sitting, Dynamic Balance)  no LOB in sitting  -KF           Static Standing Balance    Level of [place] (Supported Standing, Static Balance)  supervision  -KF        Time Able to Maintain Position (Supported  Standing, Static Balance)  2 to 3 minutes  -KF        Assistive Device Utilized (Supported Standing, Static Balance)  other (see comments) gait belt  -KF        Level of [place] (Unsupported Standing, Static Balance)  supervision  -KF           Dynamic Standing Balance    Level of [place], Reaches Outside Midline (Standing, Dynamic Balance)  standby assist  -KF        Time Able to Maintain Position, Reaches Outside Midline (Standing, Dynamic Balance)  2 to 3 minutes  -KF        Comment, Reaches Outside Midline (Standing, Dynamic Balance)  ADLs  -KF           Sensory Assessment/Intervention    Sensory General Assessment  no sensation deficits identified  -KF           Positioning and Restraints    Pre-Treatment Position  sitting in chair/recliner  -KF        Post Treatment Position  chair  -KF        In Chair  sitting;notified nsg;call light within reach;with PT  -KF           Pain Assessment    Additional Documentation  Pain Scale: Numbers Pre/Post-Treatment (Group)  -KF           Pain Scale: Numbers Pre/Post-Treatment    Pain Scale: Numbers, Pretreatment  0/10 - no pain  -KF        Pain Scale: Numbers, Post-Treatment  0/10 - no pain  -KF        Pain Intervention(s)  Repositioned;Ambulation/increased activity  -KF           Plan of Care Review    Plan of Care Reviewed With  patient;family  -KF           Clinical Impression (OT)    Date of Referral to OT  10/03/19  -KF        OT Diagnosis  ADL decline  -KF        Patient/Family Goals Statement (OT Eval)  PLOF  -KF        Criteria for Skilled Therapeutic Interventions Met (OT Eval)  yes;treatment indicated  -KF        Rehab Potential (OT Eval)  good, to achieve stated therapy goals  -KF        Therapy Frequency (OT Eval)  daily  -KF        Care Plan Review (OT)  evaluation/treatment results reviewed;care plan/treatment goals reviewed;risks/benefits reviewed;current/potential barriers reviewed;patient/other agree to care plan  -KF        Care Plan Review,  Other Participant (OT Eval)  daughter  -KF        Anticipated Discharge Disposition (OT)  home with assist  -KF           Vital Signs    Pre Systolic BP Rehab  -- RN cleared vitals stable   -KF        Pre SpO2 (%)  94  -KF        O2 Delivery Pre Treatment  supplemental O2  -KF        Post SpO2 (%)  96  -KF        O2 Delivery Post Treatment  supplemental O2  -KF        Pre Patient Position  Sitting  -KF        Intra Patient Position  Standing  -KF        Post Patient Position  Sitting  -KF           Planned OT Interventions    Planned Therapy Interventions (OT Eval)  activity tolerance training;adaptive equipment training;BADL retraining;edema control/reduction;functional balance retraining;IADL retraining;neuromuscular control/coordination retraining;occupation/activity based interventions;patient/caregiver education/training;ROM/therapeutic exercise;strengthening exercise;transfer/mobility retraining  -KF           OT Goals    Toileting Goal Selection (OT)  toileting, OT goal 1  -KF        Strength Goal Selection (OT)  strength, OT goal 1  -KF        Activity Tolerance Goal Selection (OT)  activity tolerance, OT goal 1  -KF        Additional Documentation  Activity Tolerance Goal Selection (OT) (Row);Strength Goal Selection (OT) (Row)  -KF           Toileting Goal 1 (OT)    Activity/Device (Toileting Goal 1, OT)  commode;adjust/manage clothing;perform perineal hygiene  -KF        [place] Level/Cues Needed (Toileting Goal 1, OT)  conditional independence  -KF        Time Frame (Toileting Goal 1, OT)  long term goal (LTG);by discharge  -KF        Progress/Outcome (Toileting Goal 1, OT)  goal ongoing  -KF           Strength Goal 1 (OT)    Strength Goal 1 (OT)  Demonstrates I with HEP for BUE with moderate resistance 10 reps with handout  -KF        Time Frame (Strength Goal 1, OT)  long term goal (LTG);by discharge  -KF        Progress/Outcome (Strength Goal 1, OT)  goal ongoing  -KF            Activity Tolerance  Goal 1 (OT)    Activity Tolerance Goal 1 (OT)  Tolerate 20 reps BUE HEP and standing ADL tasks with no rest breaks  -KF        Activity Level (Endurance Goal 1, OT)  10 min activity;O2 sat >/ equal to 88%  -KF        Time Frame (Activity Tolerance Goal 1, OT)  long term goal (LTG);by discharge  -KF        Progress/Outcome (Activity Tolerance Goal 1, OT)  goal ongoing  -KF           Living Environment    Home Accessibility  other (see comments);tub/shower is not walk in ramp  -KF          User Key  (r) = Recorded By, (t) = Taken By, (c) = Cosigned By    Initials Name Effective Dates    KF Aga Tess M, OT 04/03/18 -          Occupational Therapy Education     Title: PT OT SLP Therapies (In Progress)     Topic: Occupational Therapy (In Progress)     Point: ADL training (Done)     Description: Instruct learner(s) on proper safety adaptation and remediation techniques during self care or transfers.   Instruct in proper use of assistive devices.    Learning Progress Summary           Patient Acceptance, E,TB,D,H, VU,DU by  at 10/4/2019 11:20 AM    Comment:  Purpose of OT, use of tub transfer bench with handout, comp dressing strategies   Family Acceptance, E,TB,D,H, VU,DU by KF at 10/4/2019 11:20 AM    Comment:  Purpose of OT, use of tub transfer bench with handout, comp dressing strategies                   Point: Precautions (Done)     Description: Instruct learner(s) on prescribed precautions during self-care and functional transfers.    Learning Progress Summary           Patient Acceptance, E,TB,D,H, VU,DU by  at 10/4/2019 11:20 AM    Comment:  Purpose of OT, use of tub transfer bench with handout, comp dressing strategies   Family Acceptance, E,TB,D,H, VU,DU by KF at 10/4/2019 11:20 AM    Comment:  Purpose of OT, use of tub transfer bench with handout, comp dressing strategies                   Point: Body mechanics (Done)     Description: Instruct learner(s) on proper positioning and spine alignment  during self-care, functional mobility activities and/or exercises.    Learning Progress Summary           Patient Acceptance, E,TB,D,H, VU,DU by  at 10/4/2019 11:20 AM    Comment:  Purpose of OT, use of tub transfer bench with handout, comp dressing strategies   Family Acceptance, E,TB,D,H, VU,DU by KF at 10/4/2019 11:20 AM    Comment:  Purpose of OT, use of tub transfer bench with handout, comp dressing strategies                               User Key     Initials Effective Dates Name Provider Type [facility] 04/03/18 -  Tses Yung, OT Occupational Therapist OT                  OT Recommendation and Plan  Outcome Summary/Treatment Plan (OT)  Anticipated Discharge Disposition (OT): home with assist  Planned Therapy Interventions (OT Eval): activity tolerance training, adaptive equipment training, BADL retraining, edema control/reduction, functional balance retraining, IADL retraining, neuromuscular control/coordination retraining, occupation/activity based interventions, patient/caregiver education/training, ROM/therapeutic exercise, strengthening exercise, transfer/mobility retraining  Therapy Frequency (OT Eval): daily  Plan of Care Review  Plan of Care Reviewed With: patient, daughter  Plan of Care Reviewed With: patient, daughter  Outcome Summary: OT eval completed Pt presents with deficits in balance, activity tolerance with req O2 compared to baseline for ADLs; recom IPOT d/c home with assist         Time Calculation:   Time Calculation- OT     Row Name 10/04/19 1120             Time Calculation- OT    OT Start Time  1120  -      Total Timed Code Minutes- OT  10 minute(s)  -KF      OT Received On  10/04/19  -      OT Goal Re-Cert Due Date  10/14/19  -         Timed Charges    92500 - OT Self Care/Mgmt Minutes  10  -KF        User Key  (r) = Recorded By, (t) = Taken By, (c) = Cosigned By    Initials Name Provider Type     Tess Yung OT Occupational Therapist        Therapy  Charges for Today     Code Description Service Date Service Provider Modifiers Qty    35914431103 HC OT SELF CARE/MGMT/TRAIN EA 15 MIN 10/4/2019 Tess Yung, OT GO 1    41686779542 HC OT EVAL MOD COMPLEXITY 3 10/4/2019 Tess Yung, OT GO 1               Tess Yung OT  10/4/2019

## 2019-10-04 NOTE — PLAN OF CARE
Problem: Patient Care Overview  Goal: Plan of Care Review  Outcome: Ongoing (interventions implemented as appropriate)   10/04/19 5566   Coping/Psychosocial   Plan of Care Reviewed With patient;daughter   Plan of Care Review   Progress improving   OTHER   Outcome Summary OT eval completed Pt presents with deficits in balance, activity tolerance with req O2 compared to baseline for ADLs; SBA STS no AD, min A donning brief and pants, has AE at home at baseline, SBA oral hygiene standing at sink; recom IPOT d/c home with assist

## 2019-10-04 NOTE — PLAN OF CARE
Problem: Patient Care Overview  Goal: Plan of Care Review  Outcome: Ongoing (interventions implemented as appropriate)   10/04/19 4005   Coping/Psychosocial   Plan of Care Reviewed With patient;daughter   SLP FEES evaluation completed. Will address dysphagia. Please see note for further details and recommendations.

## 2019-10-04 NOTE — PROGRESS NOTES
Continued Stay Note  [facility]     Patient Name: Tho Vasquez  MRN: [id number]  Today's Date: 10/4/2019    Admit Date: 10/3/2019    Discharge Plan     Row Name 10/04/19 1215       Plan    Plan  Home    Patient/Family in Agreement with Plan  yes    Plan Comments  Met with patient and his daughter, Mariah, in the room to discuss the discharge plan. Spoke with PT and OT in the hallway. PT has recommended outpatient PT but patient has declined. He states he usually works 5 days a week and feels he will be fine without it. Patient's daughter lives with him and will be available to assist. Patient is currently on supplemental oxygen but does not wear home O2. CM explained that if he requires temporary home oxygen it will be a self-pay item (approx. $130/mo) since patient does not have a qualifying diagnosis. Patient verbalizes understanding and states he does not have a preference for an oxygen supplier. Patient is hoping to be able to wean off O2 prior to discharge. He denies other discharge needs at this time. CM will continue to follow.     Final Discharge Disposition Code  01 - home or self-care        Discharge Codes    No documentation.       Expected Discharge Date and Time     Expected Discharge Date Expected Discharge Time    Oct 7, 2019             Nabila Romero RN

## 2019-10-04 NOTE — THERAPY EVALUATION
Patient Name: Tho Vasquez  : 1940    MRN: [id number]                              Today's Date: 10/4/2019       Admit Date: 10/3/2019    Visit Dx:     ICD-10-CM ICD-9-CM   1. Pneumonia of both lower lobes due to infectious organism (CMS/HCC) J18.1 483.8   2. Generalized weakness R53.1 780.79   3. Hypoxia R09.02 799.02   4. Impaired mobility and ADLs Z74.09 799.89     Patient Active Problem List   Diagnosis   • Mixed hyperlipidemia   • Abnormal glucose level   • Abrasion   • Adult BMI 37.0-37.9 kg/sq m   • Annual physical exam   • ASHD (arteriosclerotic heart disease)   • Benign essential hypertension   • Chronic kidney disease, stage 3 (CMS/HCC)   • Medicare annual wellness visit, subsequent   • Morbid obesity (CMS/HCC)   • Obstructive sleep apnea   • Old myocardial infarction   • Vitamin D deficiency   • Venous insufficiency (chronic) (peripheral)   • Pneumonia of both lower lobes due to infectious organism (CMS/HCC)   • Transaminitis     Past Medical History:   Diagnosis Date   • Myocardial infarction (CMS/HCC)    • Positive PPD    • Respiratory failure (CMS/HCC)     requiring tracheostomy     Past Surgical History:   Procedure Laterality Date   • CARDIAC SURGERY     • HERNIA REPAIR       General Information     Row Name 10/04/19 1140          PT Evaluation Time/Intention    Document Type  evaluation  -NS     Mode of Treatment  physical therapy  -NS     Row Name 10/04/19 1140          General Information    Patient Profile Reviewed?  yes  -NS     Prior Level of Function  independent:;all household mobility;gait;transfer;ADL's Pt fairly sedentary prior to admission  -NS     Existing Precautions/Restrictions  fall;oxygen therapy device and L/min 2L O2  -NS     Barriers to Rehab  none identified  -NS     Row Name 10/04/19 1140          Relationship/Environment    Lives With  child(juan a), adult  -NS     Row Name 10/04/19 1140          Resource/Environmental Concerns    Current Living Arrangements   home/apartment/condo  -NS     [facility] Name 10/04/19 1140          Home Main Entrance    Number of Stairs, Main Entrance  none Patient states that he has a ramp to enter the home.  -NS     [facility] Name 10/04/19 1140          Stairs Within Home, Primary    Number of Stairs, Within Home, Primary  none  -NS     Stairs Comment, Within Home, Primary  Patient's home is 3 stories but patient lives on first floor.  -NS     [facility] Name 10/04/19 1140          Cognitive Assessment/Intervention- PT/OT    Orientation Status (Cognition)  oriented x 4  -NS     Row Name 10/04/19 1140          Safety Issues, Functional Mobility    Safety Issues Affecting Function (Mobility)  safety precaution awareness;safety precautions follow-through/compliance  -NS     Impairments Affecting Function (Mobility)  balance;strength;coordination  -NS       User Key  (r) = Recorded By, (t) = Taken By, (c) = Cosigned By    Initials Name Provider Type    Parisa Saenz, PT Physical Therapist        Mobility     Row Name 10/04/19 1140          Bed Mobility Assessment/Treatment    Comment (Bed Mobility)  Patient up in chair at start and end of PT session, not assessed on this date.  -NS     [facility] Name 10/04/19 1140          Transfer Assessment/Treatment    Comment (Transfers)  VCing regarding completing transfer slowly prior to transferring sit>stand to ensure safety. Pt demonstrated appropriate mechanics.   -NS     [facility] Name 10/04/19 1140          Sit-Stand Transfer    Sit-Stand [place] (Transfers)  stand by assist  -NS     Assistive Device (Sit-Stand Transfers)  other (see comments) No AD used  -NS     Row Name 10/04/19 1140          Gait/Stairs Assessment/Training    Gait/Stairs Assessment/Training  gait/ambulation independence  -NS     [place] Level (Gait)  minimum assist (75% patient effort)  -NS     Assistive Device (Gait)  other (see comments) No AD used  -NS     Distance in Feet (Gait)  150ft  -NS     Pattern (Gait)  step-through  -NS      Deviations/Abnormal Patterns (Gait)  davon decreased;base of support, wide;stride length decreased  -NS     Bilateral Gait Deviations  -- Increased trunk lean bilaterally  -NS     Left Sided Gait Deviations  forward flexed posture  -NS     Comment (Gait/Stairs)  VCing regarding upright posture. Pt had a few moments of unsteadiness but no episodes of LOB.   -NS       User Key  (r) = Recorded By, (t) = Taken By, (c) = Cosigned By    Initials Name Provider Type    Parisa Saenz PT Physical Therapist        Obj/Interventions     Row Name 10/04/19 1140          General ROM    GENERAL ROM COMMENTS  BLEs: no ROM deficits noted  -NS     Row Name 10/04/19 1140          MMT (Manual Muscle Testing)    General MMT Comments  L hip flexion: 4/5, R hip flexion: 3+/5, B knee flex/ext: 5/5, B ankle DF: 4+/5  -NS     Row Name 10/04/19 1140          Static Sitting Balance    Level of [place] (Unsupported Sitting, Static Balance)  standby assist  -NS     Sitting Position (Unsupported Sitting, Static Balance)  sitting in chair  -NS     Time Able to Maintain Position (Unsupported Sitting, Static Balance)  1 to 2 minutes  -NS     Row Name 10/04/19 1140          Dynamic Sitting Balance    Level of [place], Reaches Outside Midline (Sitting, Dynamic Balance)  standby assist  -NS     Sitting Position, Reaches Outside Midline (Sitting, Dynamic Balance)  sitting in chair  -NS     Row Name 10/04/19 1140          Static Standing Balance    Level of [place] (Supported Standing, Static Balance)  contact guard assist  -NS     Time Able to Maintain Position (Supported Standing, Static Balance)  30 to 45 seconds  -NS     Row Name 10/04/19 1140          Dynamic Standing Balance    Level of [place], Reaches Outside Midline (Standing, Dynamic Balance)  minimal assist, 75% patient effort  -NS     Time Able to Maintain Position, Reaches Outside Midline (Standing, Dynamic Balance)  15 to 30 seconds  -NS     Row Name 10/04/19 1140           Sensory Assessment/Intervention    Sensory General Assessment  no sensation deficits identified  -NS       User Key  (r) = Recorded By, (t) = Taken By, (c) = Cosigned By    Initials Name Provider Type    Parisa Saenz PT Physical Therapist        Goals/Plan     Row Name 10/04/19 1140          Bed Mobility Goal 1 (PT)    Activity/Assistive Device (Bed Mobility Goal 1, PT)  sit to supine/supine to sit  -NS     Echo Level/Cues Needed (Bed Mobility Goal 1, PT)  conditional independence  -NS     Time Frame (Bed Mobility Goal 1, PT)  long term goal (LTG);2 weeks  -NS     Row Name 10/04/19 1140          Transfer Goal 1 (PT)    Activity/Assistive Device (Transfer Goal 1, PT)  sit-to-stand/stand-to-sit  -NS     Echo Level/Cues Needed (Transfer Goal 1, PT)  independent  -NS     Time Frame (Transfer Goal 1, PT)  long term goal (LTG);2 weeks  -NS     Row Name 10/04/19 1140          Gait Training Goal 1 (PT)    Activity/Assistive Device (Gait Training Goal 1, PT)  gait (walking locomotion)  -NS     Echo Level (Gait Training Goal 1, PT)  standby assist  -NS     Distance (Gait Goal 1, PT)  500ft  -NS     Time Frame (Gait Training Goal 1, PT)  long term goal (LTG);2 weeks  -NS       User Key  (r) = Recorded By, (t) = Taken By, (c) = Cosigned By    Initials Name Provider Type    Parisa Saenz, PT Physical Therapist        Clinical Impression     Row Name 10/04/19 1140          Pain Assessment    Additional Documentation  Pain Scale: Numbers Pre/Post-Treatment (Group)  -NS     Row Name 10/04/19 1140          Pain Scale: Numbers Pre/Post-Treatment    Pain Scale: Numbers, Pretreatment  0/10 - no pain  -NS     Pain Scale: Numbers, Post-Treatment  0/10 - no pain  -NS     Row Name 10/04/19 1140          Plan of Care Review    Plan of Care Reviewed With  patient  -NS     [facility] Name 10/04/19 1140          Physical Therapy Clinical Impression    Patient/Family Goals Statement (PT Clinical Impression)  To  go home  -NS     Criteria for Skilled Interventions Met (PT Clinical Impression)  yes;treatment indicated  -NS     Rehab Potential (PT Clinical Summary)  good, to achieve stated therapy goals  -NS     Predicted Duration of Therapy (PT)  2 weeks  -NS     Row Name 10/04/19 1140          Vital Signs    Pre Systolic BP Rehab  118  -NS     Pre Treatment Diastolic BP  83  -NS     Pretreatment Heart Rate (beats/min)  83  -NS     Posttreatment Heart Rate (beats/min)  87  -NS     Pre SpO2 (%)  92  -NS     O2 Delivery Pre Treatment  supplemental O2  -NS     Post SpO2 (%)  94  -NS     O2 Delivery Post Treatment  supplemental O2  -NS     Pre Patient Position  Sitting  -NS     Intra Patient Position  Standing  -NS     Post Patient Position  Sitting  -NS     Row Name 10/04/19 1140          Positioning and Restraints    Pre-Treatment Position  sitting in chair/recliner  -NS     Post Treatment Position  chair  -NS     In Chair  sitting;call light within reach;encouraged to call for assist;with family/caregiver;legs elevated  -NS       User Key  (r) = Recorded By, (t) = Taken By, (c) = Cosigned By    Initials Name Provider Type    Parisa Saenz, PT Physical Therapist        Outcome Measures     Row Name 10/04/19 1140          How much help from another person do you currently need...    Turning from your back to your side while in flat bed without using bedrails?  3  -NS     Moving from lying on back to sitting on the side of a flat bed without bedrails?  3  -NS     Moving to and from a bed to a chair (including a wheelchair)?  3  -NS     Standing up from a chair using your arms (e.g., wheelchair, bedside chair)?  3  -NS     Climbing 3-5 steps with a railing?  3  -NS     To walk in hospital room?  3  -NS     AM-PAC 6 Clicks Score (PT)  18  -NS     Row Name 10/04/19 1140          Functional Assessment    Outcome Measure Options  AM-PAC 6 Clicks Basic Mobility (PT)  -NS       User Key  (r) = Recorded By, (t) = Taken By, (c) =  Cosigned By    Initials Name Provider Type    Parisa Saenz PT Physical Therapist        Physical Therapy Education     Title: PT OT SLP Therapies (In Progress)     Topic: Physical Therapy (In Progress)     Point: Mobility training (Done)     Learning Progress Summary           Patient Acceptance, E, VU by NS at 10/4/2019  1:32 PM                   Point: Body mechanics (Done)     Learning Progress Summary           Patient Acceptance, E, VU by NS at 10/4/2019  1:32 PM                   Point: Precautions (Done)     Learning Progress Summary           Patient Acceptance, E, VU by NS at 10/4/2019  1:32 PM                               User Key     Initials Effective Dates Name Provider Type Discipline    NS 09/10/19 -  Parisa Fenton PT Physical Therapist PT              PT Recommendation and Plan  Planned Therapy Interventions (PT Eval): balance training, gait training, bed mobility training, home exercise program, neuromuscular re-education, patient/family education, strengthening, stretching, transfer training  Outcome Summary/Treatment Plan (PT)  Anticipated Discharge Disposition (PT): home with assist, home with OP services  Plan of Care Reviewed With: patient  Outcome Summary: Pt tolerated PT eval well. He presents with mild deficits in strength and function. He transferred sit>stand with SBA and ambulated 150ft with Min A and no AD. He has mild unsteadiness with gait, with a wide CHARLY and increased B lateral trunk lean. Pt may benefit from use a cane for ambulation to assist with unsteadiness. Skilled PT warranted to improve functioal mobility and safety. Recommend outpatient PT at discharge.     Time Calculation:   PT Charges     Row Name 10/04/19 1140             Time Calculation    Start Time  1140  -NS      PT Received On  10/04/19  -NS      PT Goal Re-Cert Due Date  10/14/19  -NS        User Key  (r) = Recorded By, (t) = Taken By, (c) = Cosigned By    Initials Name Provider Type    Parisa Saenz PT  Physical Therapist        Therapy Charges for Today     Code Description Service Date Service Provider Modifiers Qty    20373997871 HC PT EVAL MOD COMPLEXITY 4 10/4/2019 Parisa Fenton, PT GP 1          PT G-Codes  Outcome Measure Options: AM-PAC 6 Clicks Basic Mobility (PT)  AM-PAC 6 Clicks Score (PT): 18    Parisa Fenton, PT  10/4/2019

## 2019-10-05 LAB
L PNEUMO1 AG UR QL IA: NEGATIVE
S PNEUM AG SPEC QL LA: NEGATIVE

## 2019-10-05 PROCEDURE — 99233 SBSQ HOSP IP/OBS HIGH 50: CPT | Performed by: INTERNAL MEDICINE

## 2019-10-05 PROCEDURE — 94799 UNLISTED PULMONARY SVC/PX: CPT

## 2019-10-05 PROCEDURE — 87899 AGENT NOS ASSAY W/OPTIC: CPT | Performed by: INTERNAL MEDICINE

## 2019-10-05 PROCEDURE — 25010000002 ENOXAPARIN PER 10 MG: Performed by: INTERNAL MEDICINE

## 2019-10-05 RX ORDER — AZITHROMYCIN 250 MG/1
250 TABLET, FILM COATED ORAL
Status: DISCONTINUED | OUTPATIENT
Start: 2019-10-05 | End: 2019-10-06 | Stop reason: HOSPADM

## 2019-10-05 RX ORDER — CEFUROXIME AXETIL 250 MG/1
250 TABLET ORAL EVERY 12 HOURS SCHEDULED
Status: DISCONTINUED | OUTPATIENT
Start: 2019-10-05 | End: 2019-10-06 | Stop reason: HOSPADM

## 2019-10-05 RX ADMIN — AZITHROMYCIN 250 MG: 250 TABLET, FILM COATED ORAL at 14:07

## 2019-10-05 RX ADMIN — CEFUROXIME AXETIL 250 MG: 250 TABLET ORAL at 20:45

## 2019-10-05 RX ADMIN — ENOXAPARIN SODIUM 40 MG: 40 INJECTION SUBCUTANEOUS at 20:45

## 2019-10-05 RX ADMIN — HYDROCHLOROTHIAZIDE 12.5 MG: 12.5 TABLET ORAL at 08:37

## 2019-10-05 RX ADMIN — FAMOTIDINE 20 MG: 20 TABLET ORAL at 08:37

## 2019-10-05 RX ADMIN — ATORVASTATIN CALCIUM 40 MG: 40 TABLET, FILM COATED ORAL at 20:45

## 2019-10-05 RX ADMIN — VALSARTAN 160 MG: 160 TABLET, FILM COATED ORAL at 08:37

## 2019-10-05 RX ADMIN — CARVEDILOL 6.25 MG: 6.25 TABLET, FILM COATED ORAL at 18:34

## 2019-10-05 RX ADMIN — FERROUS SULFATE TAB 325 MG (65 MG ELEMENTAL FE) 325 MG: 325 (65 FE) TAB at 08:37

## 2019-10-05 RX ADMIN — IPRATROPIUM BROMIDE AND ALBUTEROL SULFATE 3 ML: 2.5; .5 SOLUTION RESPIRATORY (INHALATION) at 07:58

## 2019-10-05 RX ADMIN — IPRATROPIUM BROMIDE AND ALBUTEROL SULFATE 3 ML: 2.5; .5 SOLUTION RESPIRATORY (INHALATION) at 15:44

## 2019-10-05 RX ADMIN — CARVEDILOL 6.25 MG: 6.25 TABLET, FILM COATED ORAL at 08:37

## 2019-10-05 RX ADMIN — SODIUM CHLORIDE, PRESERVATIVE FREE 10 ML: 5 INJECTION INTRAVENOUS at 08:38

## 2019-10-05 RX ADMIN — NYSTATIN: 100000 POWDER TOPICAL at 08:39

## 2019-10-05 RX ADMIN — ASPIRIN 81 MG: 81 TABLET, COATED ORAL at 08:37

## 2019-10-05 RX ADMIN — CEFUROXIME AXETIL 250 MG: 250 TABLET ORAL at 16:04

## 2019-10-05 RX ADMIN — IPRATROPIUM BROMIDE AND ALBUTEROL SULFATE 3 ML: 2.5; .5 SOLUTION RESPIRATORY (INHALATION) at 21:03

## 2019-10-05 RX ADMIN — FAMOTIDINE 20 MG: 20 TABLET ORAL at 20:45

## 2019-10-05 RX ADMIN — FENOFIBRATE 48 MG: 48 TABLET ORAL at 08:37

## 2019-10-05 RX ADMIN — IPRATROPIUM BROMIDE AND ALBUTEROL SULFATE 3 ML: 2.5; .5 SOLUTION RESPIRATORY (INHALATION) at 11:34

## 2019-10-05 RX ADMIN — NYSTATIN: 100000 POWDER TOPICAL at 20:47

## 2019-10-05 RX ADMIN — FUROSEMIDE 20 MG: 20 TABLET ORAL at 08:37

## 2019-10-05 NOTE — PLAN OF CARE
Problem: Patient Care Overview  Goal: Plan of Care Review  Outcome: Ongoing (interventions implemented as appropriate)   10/05/19 3792   Coping/Psychosocial   Plan of Care Reviewed With patient   Plan of Care Review   Progress improving   OTHER   Outcome Summary VSS. IV ABX. Slept off and on.Denies pain.

## 2019-10-05 NOTE — PROGRESS NOTES
[facility] Medicine Services  PROGRESS NOTE    Patient Name: Tho Vasquez  : 1940  MRN: [id number]    Date of Admission: 10/3/2019  Primary Care Physician: Ja Baldwin MD    Subjective   Subjective     CC: f/u pna    HPI: Up in chair with daughter in room. Feels great. Has been walking well. Hoping to go home.    Review of Systems  Gen- No fevers, chills  CV- No chest pain, palpitations  Resp- No cough, dyspnea  GI- No N/V/D, abd pain    All other systems reviewed and negative.     Objective   Objective     Vital Signs:   Temp:  [97.4 °F (36.3 °C)-97.6 °F (36.4 °C)] 97.6 °F (36.4 °C)  Heart Rate:  [75-80] 75  Resp:  [18] 18  BP: (116-131)/(78-88) 131/88        Physical Exam:  Constitutional: No acute distress, awake, alert, up in chair, comfortable  HENT: NCAT, mucous membranes moist  Respiratory: Clear to auscultation bilaterally, respiratory effort normal   Cardiovascular: RRR, no murmurs, rubs, or gallops, palpable pedal pulses bilaterally  Gastrointestinal: Positive bowel sounds, soft, nontender, nondistended, obese  Musculoskeletal: No bilateral ankle edema  Psychiatric: Appropriate affect, cooperative  Neurologic: Oriented x 3, strength symmetric in all extremities, Cranial Nerves grossly intact to confrontation, speech clear  Skin: No rashes    Results Reviewed:    Results from last 7 days   Lab Units 10/04/19  0453 10/03/19  1147   WBC 10*3/mm3 11.94* 13.68*   HEMOGLOBIN g/dL 12.9* 13.2   HEMATOCRIT % 38.6 40.7   PLATELETS 10*3/mm3 262 264   PROCALCITONIN ng/mL  --  0.25     Results from last 7 days   Lab Units 10/04/19  0453 10/03/19  1514 10/03/19  1147   SODIUM mmol/L 141  --  139   POTASSIUM mmol/L 3.8  --  3.7   CHLORIDE mmol/L 103  --  100   CO2 mmol/L 28.0  --  30.0*   BUN mg/dL 40*  --  46*   CREATININE mg/dL 0.89  --  1.08   GLUCOSE mg/dL 103*  --  104*   CALCIUM mg/dL 9.1  --  9.3   ALT (SGPT) U/L 44*  --  42*   AST (SGOT) U/L 81*  --  65*   TROPONIN  T ng/mL  --  <0.010 <0.010   PROBNP pg/mL  --   --  442.9     Estimated Creatinine Clearance: 81.5 mL/min (by C-G formula based on SCr of 0.89 mg/dL).    Microbiology Results Abnormal     Procedure Component Value - Date/Time    Blood Culture - Blood, Arm, Right [[id number]] Collected:  10/03/19 1140    Lab Status:  Preliminary result Specimen:  Blood from Arm, Right Updated:  10/05/19 1230     Blood Culture No growth at 2 days    S. Pneumo Ag Urine or CSF - Urine, Urine, Clean Catch [[id number]]  (Normal) Collected:  10/05/19 0243    Lab Status:  Final result Specimen:  Urine, Clean Catch Updated:  10/05/19 1223     Strep Pneumo Ag Negative    Legionella Antigen, Urine - Urine, Urine, Clean Catch [[id number]]  (Normal) Collected:  10/05/19 0243    Lab Status:  Final result Specimen:  Urine, Clean Catch Updated:  10/05/19 1223     LEGIONELLA ANTIGEN, URINE Negative    Blood Culture - Blood, Hand, Right [[id number]] Collected:  10/03/19 1150    Lab Status:  Preliminary result Specimen:  Blood from Hand, Right Updated:  10/05/19 1215     Blood Culture No growth at 2 days    Narrative:       Aerobic bottle    Influenza A & B, RT PCR - Swab, Nasopharynx [[id number]]  (Normal) Collected:  10/03/19 1150    Lab Status:  Final result Specimen:  Swab from Nasopharynx Updated:  10/03/19 1304     Influenza A PCR Not Detected     Influenza B PCR Not Detected        Personally reviewed CXR with bilateral chest disease. Agree with interpretation.  Imaging Results (last 24 hours)     Procedure Component Value Units Date/Time    MRI Brain With & Without Contrast [[id number]] Collected:  10/03/19 1610     Updated:  10/04/19 1511    Narrative:       EXAMINATION: MRI BRAIN W WO CONTRAST-10/03/2019:     INDICATION: Confusion/delirium, altered LOC, unexplained; J18.1-Lobar  pneumonia, unspecified organism; R53.1-Weakness; R09.02-Hypoxemia.     TECHNIQUE: Multiplanar, multisequence MR imaging of the brain was  performed with and without  intravenous Gadolinium contrast. Many of the  sequences are motion limited secondary to patient confusion.     COMPARISON: CT of the head performed 10/03/2019.     FINDINGS: Diffusion-weighted imaging of the brain demonstrates no  evidence of focal diffusion restricted signal to suggest acute infarct.  Particularly within the right frontal lobe, no convincing  diffusion-restricted signal identified to suggest acute infarct.  T2-weighted imaging of the brain demonstrates intact intracranial flow  voids. No evidence of hydrocephalus. Diffuse cerebral atrophy is  identified. No extra-axial fluid collection identified. Heavy burden of  confluent white matter disease is identified throughout the  periventricular and subcortical white matter consistent with heavy  burden of chronic microvascular ischemic change. This does not  demonstrate evidence of enhancement on postcontrast imaging. The globes  appear intact. The retro-orbital soft tissues are unremarkable. Trace  nonspecific mastoid fluid is identified. FLAIR imaging of the brain  confirms these periventricular T-2 FLAIR signal abnormalities without  evidence for sulcal FLAIR signal abnormality. T1-weighted imaging  demonstrates no evidence of increased T1 intracranial signal to suggest  acute hemorrhage. Questionable punctate remote lacunar infarcts are  identified within the bilateral basal ganglia. Postcontrast imaging  demonstrates no evidence of abnormal intracranial enhancement. Sagittal  imaging of the brain demonstrate a normal appearing pituitary gland. The  skull base and sella turcica appear intact. The craniocervical junction  is unremarkable. Heterogenous bone marrow signal is identified without  acute osseous abnormality.       Impression:       1.  Motion artifact persists throughout many of the sequences secondary  to patient confusion. Despite the limitations of the exam.     2.  No acute infarct or other appreciable acute  intracranial  abnormality.     3.  Heavy burden of confluent periventricular and subcortical white  matter disease favored to represent chronic microvascular ischemic  change with possible bilateral punctate basal ganglial lacunar infarcts.     D:  10/03/2019  E:  10/03/2019            This report was finalized on 10/4/2019 3:07 PM by Dr. Giancarlo Thomas MD.       Fiberoptic Endo (fees) [[id number]] Resulted:  10/04/19 1507     Updated:  10/04/19 1507    Narrative:       This procedure was auto-finalized with no dictation required.    CT Head Without Contrast [[id number]] Collected:  10/03/19 1259     Updated:  10/04/19 1457    Narrative:       EXAMINATION: CT HEAD WO CONTRAST-10/03/2019:      INDICATION: Confusion/delirium, altered LOC, unexplained.     TECHNIQUE: Unenhanced CT imaging of the brain was performed.     The radiation dose reduction device was turned on for each scan per the  ALARA (As Low as Reasonably Achievable) protocol.     COMPARISON: NONE.     FINDINGS: Unenhanced CT imaging of the brain was performed which is  mildly motion artifact limited. No evidence of midline shift. No  evidence of hydrocephalus. There is diffuse subcortical and  periventricular hypoattenuation, likely relating to chronic  microvascular ischemic change, however, more superimposed  hypoattenuation is noted within the right frontal lobe with loss of the  normal gray-white matter differentiation which could be chronic,  although underlying ischemic changes are difficult to exclude. Diffuse  cerebral atrophy is identified.  No intraventricular or subarachnoid  hemorrhage appreciated. The calvarium is intact. Mild ethmoidal mucosal  thickening is identified. Postsurgical changes to the globes are noted.  The retro-orbital soft tissues are unremarkable.       Impression:       Diffuse hypoattenuation of the brain parenchyma most  consistent with heavy burden of chronic microvascular ischemic change,  however, there is  subjectively more than expected hypoattenuation within  the right frontal lobe, particularly involving the gray-white matter  junction in which underlying infarct is not excluded. MRI of the brain  with and without intravenous Gadolinium contrast would help clarify  these findings.     These findings were discussed with the ordering provider Davida Rivas at  approximately 1:05 PM on 10/03/2019.     D:  10/03/2019  E:  10/03/2019     This report was finalized on 10/4/2019 2:54 PM by Dr. Giancarlo Thomas MD.                  I have reviewed the medications:  Scheduled Meds:  aspirin 81 mg Oral Daily   atorvastatin 40 mg Oral Nightly   azithromycin 250 mg Oral Q24H   carvedilol 6.25 mg Oral BID With Meals   cefuroxime 250 mg Oral Q12H   enoxaparin 40 mg Subcutaneous Q24H   famotidine 20 mg Oral Q12H   fenofibrate 48 mg Oral Daily   ferrous sulfate 325 mg Oral Daily   furosemide 20 mg Oral Daily   valsartan 160 mg Oral Q24H   And      Hydrochlorothiazide Oral 12.5 mg Oral Daily   ipratropium-albuterol 3 mL Nebulization 4x Daily - RT   nystatin  Topical Q12H   sodium chloride 10 mL Intravenous Q12H     Continuous Infusions:   PRN Meds:.•  acetaminophen **OR** acetaminophen **OR** acetaminophen  •  ondansetron  •  sodium chloride  •  sodium chloride      Assessment/Plan   Assessment / Plan     Active Hospital Problems    Diagnosis  POA   • Pneumonia of both lower lobes due to infectious organism (CMS/HCC) [J18.1]  Yes   • Transaminitis [R74.0]  Unknown   • Vitamin D deficiency [E55.9]  Yes   • Venous insufficiency (chronic) (peripheral) [I87.2]  Yes   • Old myocardial infarction [I25.2]  Not Applicable      Resolved Hospital Problems   No resolved problems to display.        Brief Hospital Course to date:  Tho Vasquez is a 79 y.o. male with history of CAD s/p remote PCI, remote respiratory failure s/p intubation related to PNA, obesity, HTN who presents to ED today with nonproductive cough and dyspnea x 3 weeks.  "Acute worsening over last few days. Denies fevers, chills at home. Presented to PCP last week who felt s/s c/w allergies, no abx given at that time. Found on ED workup to have bibasilar infiltrates, concerning for PNA. Also noted leukocytosis and new hypoxic respiratory failure.    A/P:    Acute hypoxic respiratory failure due to PNA, resolved  Bilateral bacterial PNA  Leukocytosis  Sepsis related to above  ---Much better. On room air now. His continue blood cx, urinary ag, flu swab neg. Switch to PO abx and allow him to be more ambulatory today. If does well, potentially home in am.  ---Continue duonebs/pulmonary toilet     Abnormal CT  ---Underwent CT head in ED with more attenuation than expected in right frontal lobe, underlying infarction cannot be excluded. MRI brain then performed without acute disease but extensive burden of white matter disease. Seems at baseline.     Elevated LFTs, mild hyperbilirubinemia  ---Mild and stable. Probably related to sepsis vs fatty liver dz. Repeat as outpt w/ further workup if needed once recovered from above.     CAD s/p remote PCI  --Denies CP. Continue home medications    HTN, controlled  ---Stop HCTZ. Otherwise continue current regimen.     DVT prophylaxis:  Lovenox    Disposition: I expect the patient to be discharged home in am.     CODE STATUS:   Code Status and Medical Interventions:   Ordered at: 10/03/19 1422     Level Of Support Discussed With:    Patient     Code Status:    CPR     Medical Interventions (Level of Support Prior to Arrest):    Full     Comments:    patient was previously \"coded\" and \"on life support\" would like all measures in setting of decompensation         Electronically signed by Kaye Le II, DO, 10/05/19, 1:36 PM.    "

## 2019-10-06 VITALS
SYSTOLIC BLOOD PRESSURE: 114 MMHG | WEIGHT: 222.6 LBS | RESPIRATION RATE: 16 BRPM | HEIGHT: 71 IN | BODY MASS INDEX: 31.16 KG/M2 | DIASTOLIC BLOOD PRESSURE: 84 MMHG | HEART RATE: 75 BPM | TEMPERATURE: 98 F | OXYGEN SATURATION: 91 %

## 2019-10-06 PROCEDURE — 99239 HOSP IP/OBS DSCHRG MGMT >30: CPT | Performed by: INTERNAL MEDICINE

## 2019-10-06 PROCEDURE — 29580 STRAPPING UNNA BOOT: CPT | Performed by: PHYSICAL THERAPIST

## 2019-10-06 PROCEDURE — 97164 PT RE-EVAL EST PLAN CARE: CPT | Performed by: PHYSICAL THERAPIST

## 2019-10-06 PROCEDURE — 94799 UNLISTED PULMONARY SVC/PX: CPT

## 2019-10-06 PROCEDURE — 25010000002 FUROSEMIDE PER 20 MG: Performed by: INTERNAL MEDICINE

## 2019-10-06 RX ORDER — IPRATROPIUM BROMIDE AND ALBUTEROL SULFATE 2.5; .5 MG/3ML; MG/3ML
3 SOLUTION RESPIRATORY (INHALATION) 4 TIMES DAILY PRN
Status: DISCONTINUED | OUTPATIENT
Start: 2019-10-06 | End: 2019-10-06 | Stop reason: HOSPADM

## 2019-10-06 RX ORDER — FUROSEMIDE 10 MG/ML
40 INJECTION INTRAMUSCULAR; INTRAVENOUS ONCE
Status: COMPLETED | OUTPATIENT
Start: 2019-10-06 | End: 2019-10-06

## 2019-10-06 RX ORDER — ALBUTEROL SULFATE 2.5 MG/3ML
2.5 SOLUTION RESPIRATORY (INHALATION) EVERY 4 HOURS PRN
Qty: 30 VIAL | Refills: 0 | Status: SHIPPED | OUTPATIENT
Start: 2019-10-06 | End: 2019-10-21 | Stop reason: SDUPTHER

## 2019-10-06 RX ORDER — VALSARTAN 160 MG/1
160 TABLET ORAL
Qty: 30 TABLET | Refills: 0 | Status: SHIPPED | OUTPATIENT
Start: 2019-10-07 | End: 2019-10-11 | Stop reason: SDUPTHER

## 2019-10-06 RX ORDER — CEFUROXIME AXETIL 250 MG/1
250 TABLET ORAL EVERY 12 HOURS SCHEDULED
Qty: 7 TABLET | Refills: 0 | Status: SHIPPED | OUTPATIENT
Start: 2019-10-06 | End: 2019-10-11

## 2019-10-06 RX ORDER — AZITHROMYCIN 250 MG/1
250 TABLET, FILM COATED ORAL DAILY
Qty: 4 TABLET | Refills: 0 | Status: SHIPPED | OUTPATIENT
Start: 2019-10-06 | End: 2019-10-11

## 2019-10-06 RX ORDER — IPRATROPIUM BROMIDE AND ALBUTEROL SULFATE 2.5; .5 MG/3ML; MG/3ML
3 SOLUTION RESPIRATORY (INHALATION)
Qty: 360 ML | Refills: 0 | Status: SHIPPED | OUTPATIENT
Start: 2019-10-06 | End: 2019-10-25

## 2019-10-06 RX ADMIN — CEFUROXIME AXETIL 250 MG: 250 TABLET ORAL at 08:39

## 2019-10-06 RX ADMIN — FUROSEMIDE 40 MG: 10 INJECTION, SOLUTION INTRAMUSCULAR; INTRAVENOUS at 11:08

## 2019-10-06 RX ADMIN — NYSTATIN: 100000 POWDER TOPICAL at 08:40

## 2019-10-06 RX ADMIN — FAMOTIDINE 20 MG: 20 TABLET ORAL at 08:40

## 2019-10-06 RX ADMIN — SODIUM CHLORIDE, PRESERVATIVE FREE 10 ML: 5 INJECTION INTRAVENOUS at 11:07

## 2019-10-06 RX ADMIN — ASPIRIN 81 MG: 81 TABLET, COATED ORAL at 08:40

## 2019-10-06 RX ADMIN — FUROSEMIDE 20 MG: 20 TABLET ORAL at 08:39

## 2019-10-06 RX ADMIN — FENOFIBRATE 48 MG: 48 TABLET ORAL at 08:39

## 2019-10-06 RX ADMIN — CARVEDILOL 6.25 MG: 6.25 TABLET, FILM COATED ORAL at 08:39

## 2019-10-06 RX ADMIN — IPRATROPIUM BROMIDE AND ALBUTEROL SULFATE 3 ML: 2.5; .5 SOLUTION RESPIRATORY (INHALATION) at 07:08

## 2019-10-06 RX ADMIN — IPRATROPIUM BROMIDE AND ALBUTEROL SULFATE 3 ML: 2.5; .5 SOLUTION RESPIRATORY (INHALATION) at 12:46

## 2019-10-06 RX ADMIN — AZITHROMYCIN 250 MG: 250 TABLET, FILM COATED ORAL at 08:40

## 2019-10-06 RX ADMIN — FERROUS SULFATE TAB 325 MG (65 MG ELEMENTAL FE) 325 MG: 325 (65 FE) TAB at 08:40

## 2019-10-06 RX ADMIN — VALSARTAN 160 MG: 160 TABLET, FILM COATED ORAL at 08:39

## 2019-10-06 NOTE — PROGRESS NOTES
Case Management Discharge Note    Final Note: Plan is home with family and nebulizer arranged through [employer].  Spoke with Shannan at [place] and she will deliver the nebulizer to patient's home today.  Shannan is aware pt is being discharged today and will retrieve clinical information from [facility].  Pt/family is aware.  No further discharge needs.     Destination      No service has been selected for the patient.      Durable Medical Equipment - Selection Complete      Service Provider Request Status Selected Services Address Phone Number Fax Number    [facility] Selected Durable Medical Equipment [address] [place] [id number] [phone]      Dialysis/Infusion      No service has been selected for the patient.      Home Medical Care      No service has been selected for the patient.      Therapy      No service has been selected for the patient.      Community Resources      No service has been selected for the patient.             Final Discharge Disposition Code: 01 - home or self-care

## 2019-10-06 NOTE — PLAN OF CARE
Problem: Patient Care Overview  Goal: Plan of Care Review   10/06/19 0429   Coping/Psychosocial   Plan of Care Reviewed With patient   Plan of Care Review   Progress improving   OTHER   Outcome Summary VSS. pt desats on room air while sleeping; placed on 2 L NC. Up in chair all night. Denies pain. D/c today.

## 2019-10-06 NOTE — THERAPY WOUND CARE TREATMENT
Acute Care - Wound/Debridement Initial Evaluation  [facility]     Patient Name: Tho Vasquez  : 1940  MRN: [id number]  Today's Date: 10/6/2019  Onset of Illness/Injury or Date of Surgery: 10/03/19  Date of Referral to PT: 10/04/19   Referring Physician: Dr Chin       Admit Date: 10/3/2019    Visit Dx:    ICD-10-CM ICD-9-CM   1. Pneumonia of both lower lobes due to infectious organism (CMS/HCC) J18.1 483.8   2. Generalized weakness R53.1 780.79   3. Hypoxia R09.02 799.02   4. Impaired mobility and ADLs Z74.09 799.89       Patient Active Problem List   Diagnosis   • Mixed hyperlipidemia   • Abnormal glucose level   • Abrasion   • Adult BMI 37.0-37.9 kg/sq m   • Annual physical exam   • ASHD (arteriosclerotic heart disease)   • Benign essential hypertension   • Chronic kidney disease, stage 3 (CMS/HCC)   • Medicare annual wellness visit, subsequent   • Morbid obesity (CMS/HCC)   • Obstructive sleep apnea   • Old myocardial infarction   • Vitamin D deficiency   • Venous insufficiency (chronic) (peripheral)   • Pneumonia of both lower lobes due to infectious organism (CMS/HCC)   • Transaminitis        Past Medical History:   Diagnosis Date   • Myocardial infarction (CMS/HCC)    • Positive PPD    • Respiratory failure (CMS/HCC)     requiring tracheostomy        Past Surgical History:   Procedure Laterality Date   • CARDIAC SURGERY     • HERNIA REPAIR             Rash 10/03/19 1830 groin (Active)   Distribution regional 10/6/2019  8:00 AM   Borders irregular 10/6/2019  8:00 AM   Color red 10/6/2019  8:00 AM   Care, Rash antimicrobial agent applied 10/5/2019  8:00 PM       Rash 10/03/19 1830 scrotum (Active)   Distribution regional 10/6/2019  8:00 AM   Borders irregular 10/6/2019  8:00 AM   Color red 10/6/2019  8:00 AM   Care, Rash antimicrobial agent applied 10/5/2019  8:00 PM     Lymphedema     Row Name 10/06/19 1145             Lymphedema Edema Assessment    Ptting Edema Category  By severity  -       Pitting Edema  Moderate;Severe  -MW         Skin Changes/Observations    Location/Assessment  Lower Extremity  -MW      Lower Extremity Conditions  bilateral:;intact;clean;shiny;hairless;fragile;right:;scab(s)  -MW      Lower Extremity Color/Pigment  bilateral:;hyperpigmented hyperpigmented areas on shins / sites of previous wounds   -MW         Lymphedema Sensation    Lymphedema Sensation Comments  WFL   -MW         Lymphedema Pulses/Capillary Refill    Lymphedema Pulses/Capillary Refill  capillary refill  -MW      Capillary Refill  lower extremity capillary refill  -MW      Lower Extremity Capillary Refill  right:;left:;less than 3 seconds  -MW         Lymphedema Measurements    Measurement Type(s)  Quick Girth  -MW      Quick Girth Areas  Lower extremities  -MW         LLE Quick Girth (cm)    Mid foot  26.5 cm  -MW      Smallest ankle  26.5 cm  -MW      Largest calf  38.7 cm  -MW      Tib tuberosity  37.3 cm  -MW         RLE Quick Girth (cm)    Mid foot  27.5 cm  -MW      Smallest ankle  27.5 cm  -MW      Largest calf  42 cm  -MW      Tib tuberosity  39.5 cm  -MW      RLE Quick Girth Total  136.5  -MW         Compression/Skin Care    Compression/Skin Care  skin care;wrapping location;bandaging  -MW      Wrapping Location  lower extremity  -MW      Wrapping Location LE  bilateral:;foot to knee  -MW      Wrapping Comments  unnaboots, coban and elastic net   -MW      Bandaging Technique  figure-eight;circumferential/spiral;light compression unnaboot figure eight; coban spiral   -MW        User Key  (r) = Recorded By, (t) = Taken By, (c) = Cosigned By    Initials Name Provider Type    Erika Montgomery, PT Physical Therapist          WOUND DEBRIDEMENT                        PT ASSESSMENT (last 12 hours)      Physical Therapy Evaluation     Row Name 10/06/19 1973          PT Evaluation Time/Intention    Subjective Information  complains of;swelling pt reports gets legs wrapped at [facility] every Monday  -MW      Document Type  discharge evaluation/summary;wound care  -     Mode of Treatment  individual therapy;physical therapy  -     Comment  Pt and family report trying compression stockings but then blisters come up during the night and burst. Not wearing stockings at night. Provided info for velcro closure systems for compression that decrease stress to fragile skin during don/doffing which pt may tolerate better.   -     Row Name 10/06/19 1145          General Information    Referring Physician  Dr Chin   -     Patient Observations  alert;cooperative;agree to therapy  -     General Observations of Patient  Sitting up in chair; clothing on bed to dress for D/c. Just had a bath   -     Row Name 10/06/19 1145          Cognitive Assessment/Intervention- PT/OT    Affect/Mental Status (Cognitive)  WFL  -     Orientation Status (Cognition)  oriented x 3  -     Row Name 10/06/19 1145          Pain Scale: Numbers Pre/Post-Treatment    Pain Scale: Numbers, Pretreatment  0/10 - no pain  -     Pain Scale: Numbers, Post-Treatment  0/10 - no pain  -     Row Name 10/06/19 1145          Coping    Observed Emotional State  calm;cooperative  -     Verbalized Emotional State  acceptance  -     Row Name 10/06/19 1145          Plan of Care Review    Plan of Care Reviewed With  patient;daughter  -     Row Name 10/06/19 1145          Physical Therapy Clinical Impression    Date of Referral to PT  10/04/19  -     Criteria for Skilled Interventions Met (PT Clinical Impression)  yes;treatment indicated  -     Patient/Family Concerns, Anticipated Discharge Disposition (PT)  Follow up with wound care center for leg wrap change as scheduled   -     Row Name 10/06/19 1145          Positioning and Restraints    Pre-Treatment Position  sitting in chair/recliner  -     Post Treatment Position  chair  -MW     In Chair  sitting;call light within reach;with family/caregiver  -     Row Name 10/06/19 1147           Physical Therapy Discharge Summary    Additional Documentation  Discharge Summary, PT Eval (Group)  -MW     Row Name 10/06/19 1145          Discharge Summary, PT Eval    Reason for Discharge (PT Discharge Summary)  patient discharged from this facility  -MW     Outcomes Achieved Upon Discharge (PT Discharge Summary)  discharge from facility occurred on same date as evaluation  -MW     Transfer to Another Level of Care or Facility (PT Discharge Summary)  other (see comments) OP wound care at [facility] as previously scheduled   -       User Key  (r) = Recorded By, (t) = Taken By, (c) = Cosigned By    Initials Name Provider Type    Erika Montgomery, PT Physical Therapist        Physical Therapy Education     Title: PT OT SLP Therapies (In Progress)     Topic: Physical Therapy (In Progress)     Point: Mobility training (Done)     Learning Progress Summary           Patient Acceptance, E, VU by NS at 10/4/2019  1:32 PM                   Point: Body mechanics (Done)     Learning Progress Summary           Patient Acceptance, E, VU by NS at 10/4/2019  1:32 PM                   Point: Precautions (Done)     Learning Progress Summary           Patient Acceptance, E, VU by NS at 10/4/2019  1:32 PM                               User Key     Initials Effective Dates Name Provider Type Discipline    NS 09/10/19 -  Parisa Fenton, PT Physical Therapist PT                Recommendation and Plan  Anticipated Discharge Disposition (PT): home with assist, home with OP services  Planned Therapy Interventions (PT Eval): wound care  Therapy Frequency (PT Clinical Impression): daily  Plan of Care Reviewed With: patient, daughter       Outcome Summary/Treatment Plan (PT)  Anticipated Discharge Disposition (PT): home with assist, home with OP services  Patient/Family Concerns, Anticipated Discharge Disposition (PT): Follow up with wound care center for leg wrap change as scheduled   Reason for Discharge (PT Discharge Summary): patient  discharged from this facility   Outcome Summary: PT wound care consulted to apply unnaboot prior to d/c as pt has been having his legs wrapped weekly by [facility] wound care [place]. Wraps have been off several days. SKin is clean and intact; shiny and tight. Applied unnaboots with coban and elastic net to secure. Pt to follow up for weekly changes per MD POC. D/C PT as pt d/c home.   Plan of Care Reviewed With: patient, daughter            Time Calculation  PT Charges     Row Name 10/06/19 1234             Time Calculation    Start Time  1145  -MW        User Key  (r) = Recorded By, (t) = Taken By, (c) = Cosigned By    Initials Name Provider Type    MW Erika Whitney, PT Physical Therapist            Therapy Charges for Today     Code Description Service Date Service Provider Modifiers Qty    16912915666 HC PT STAPPING UNNA BOOT 10/6/2019 Erika Whitney, PT GP 1    90183960286 HC PT RE-EVAL ESTABLISHED PLAN 2 10/6/2019 Erika Whitney, PT GP 1            PT G-Codes  Outcome Measure Options: AM-PAC 6 Clicks Basic Mobility (PT)  AM-PAC 6 Clicks Score (PT): 18       Erika Whitney PT  10/6/2019

## 2019-10-06 NOTE — DISCHARGE SUMMARY
[facility] Medicine Services  DISCHARGE SUMMARY    Patient Name: Tho Vasquez  : 1940  MRN: [id number]    Date of Admission: 10/3/2019  Date of Discharge:  10/6/2019  Primary Care Physician: Ja Baldwin MD    Consults     No orders found from 2019 to 10/4/2019.          Hospital Course     Presenting Problem:   Pneumonia of both lower lobes due to infectious organism (CMS/HCC) [J18.1]    Active Hospital Problems    Diagnosis  POA   • Pneumonia of both lower lobes due to infectious organism (CMS/HCC) [J18.1]  Yes   • Transaminitis [R74.0]  Unknown   • Vitamin D deficiency [E55.9]  Yes   • Venous insufficiency (chronic) (peripheral) [I87.2]  Yes   • Old myocardial infarction [I25.2]  Not Applicable      Resolved Hospital Problems   No resolved problems to display.          Hospital Course:  Tho Vasquez is a 79 y.o. male with history of CAD s/p remote PCI, remote respiratory failure s/p intubation related to PNA, obesity, HTN who presents to ED today with nonproductive cough and dyspnea x 3 weeks. Acute worsening over last few days. Denies fevers, chills at home. Presented to PCP last week who felt s/s c/w allergies, no abx given at that time. Found on ED workup to have bibasilar infiltrates, concerning for PNA. Also noted leukocytosis and new hypoxic respiratory failure.     A/P:     Acute hypoxic respiratory failure due to PNA, resolved  Bilateral bacterial PNA w/ leukocytosis  Sepsis related to above  ---Upon arrival patient noted to have CXR findings suspicous for pneumonia and with WBC of 14. He was treated initially with IV rocephin and azithromycin to which he responded well. On day of d/c had normal WBC and on room air. His continue blood cx, urinary ag, flu swab neg. He will continue PO ceftin and azithromycin for 4 more days upon d/c.  ---Have ordered duonebs at d/c.    Abnormal CT Head  ---Underwent CT head in ED with more attenuation than expected in  right frontal lobe, underlying infarction cannot be excluded. MRI brain then performed without acute disease but extensive burden of white matter disease. Seems at baseline.     Elevated LFTs, mild hyperbilirubinemia  ---Very mild, asymptomatic, and stable. Probably related to sepsis vs fatty liver dz. Repeat as outpt w/ further workup if needed once recovered from above.    Discharge Follow Up Recommendations for labs/diagnostics:   PCP in 3-5 days, Recommend CMP    Day of Discharge     HPI: Up in chair with daughter in room. Feels better today than yesterday. Wants to go home. Daughter in agreement.    Review of Systems  Gen- No fevers, chills  CV- No chest pain, palpitations  Resp- No cough, dyspnea  GI- No N/V/D, abd pain    Otherwise ROS is negative except as mentioned in the HPI.    Vital Signs:   Temp:  [98 °F (36.7 °C)-99.5 °F (37.5 °C)] 98 °F (36.7 °C)  Heart Rate:  [73-92] 75  Resp:  [16-18] 16  BP: (114-131)/(79-86) 114/84     Physical Exam:  Constitutional: No acute distress, awake, alert, up in chair  HENT: NCAT, mucous membranes moist  Respiratory: Clear to auscultation bilaterally, respiratory effort normal   Cardiovascular: Normal WOB, faint bibasilar crackles at bases.  Gastrointestinal: Positive bowel sounds, soft, nontender, nondistended  Musculoskeletal: No bilateral ankle edema  Psychiatric: Appropriate affect, cooperative  Neurologic: Oriented x 3, strength symmetric in all extremities, Cranial Nerves grossly intact to confrontation, speech clear  Skin: No rashes    Pertinent  and/or Most Recent Results     Results from last 7 days   Lab Units 10/04/19  0453 10/03/19  1147   WBC 10*3/mm3 11.94* 13.68*   HEMOGLOBIN g/dL 12.9* 13.2   HEMATOCRIT % 38.6 40.7   PLATELETS 10*3/mm3 262 264   SODIUM mmol/L 141 139   POTASSIUM mmol/L 3.8 3.7   CHLORIDE mmol/L 103 100   CO2 mmol/L 28.0 30.0*   BUN mg/dL 40* 46*   CREATININE mg/dL 0.89 1.08   GLUCOSE mg/dL 103* 104*   CALCIUM mg/dL 9.1 9.3     Results  from last 7 days   Lab Units 10/04/19  0453 10/03/19  1147   BILIRUBIN mg/dL 1.2 1.5*   ALK PHOS U/L 76 74   ALT (SGPT) U/L 44* 42*   AST (SGOT) U/L 81* 65*           Invalid input(s): TG, LDLCALC, LDLREALC  Results from last 7 days   Lab Units 10/04/19  0453 10/03/19  1514 10/03/19  1147   HEMOGLOBIN A1C % 5.60  --   --    PROBNP pg/mL  --   --  442.9   TROPONIN T ng/mL  --  <0.010 <0.010   PROCALCITONIN ng/mL  --   --  0.25   LACTATE mmol/L  --   --  1.1       Brief Urine Lab Results  (Last result in the past 365 days)      Color   Clarity   Blood   Leuk Est   Nitrite   Protein   CREAT   Urine HCG        10/03/19 1355 Dark Yellow Clear Negative Negative Negative Negative               Microbiology Results Abnormal     Procedure Component Value - Date/Time    Blood Culture - Blood, Arm, Right [[id number]] Collected:  10/03/19 1140    Lab Status:  Preliminary result Specimen:  Blood from Arm, Right Updated:  10/05/19 1230     Blood Culture No growth at 2 days    S. Pneumo Ag Urine or CSF - Urine, Urine, Clean Catch [[id number]]  (Normal) Collected:  10/05/19 0243    Lab Status:  Final result Specimen:  Urine, Clean Catch Updated:  10/05/19 1223     Strep Pneumo Ag Negative    Legionella Antigen, Urine - Urine, Urine, Clean Catch [[id number]]  (Normal) Collected:  10/05/19 0243    Lab Status:  Final result Specimen:  Urine, Clean Catch Updated:  10/05/19 1223     LEGIONELLA ANTIGEN, URINE Negative    Blood Culture - Blood, Hand, Right [[id number]] Collected:  10/03/19 1150    Lab Status:  Preliminary result Specimen:  Blood from Hand, Right Updated:  10/05/19 1215     Blood Culture No growth at 2 days    Narrative:       Aerobic bottle    Influenza A & B, RT PCR - Swab, Nasopharynx [[id number]]  (Normal) Collected:  10/03/19 1150    Lab Status:  Final result Specimen:  Swab from Nasopharynx Updated:  10/03/19 1304     Influenza A PCR Not Detected     Influenza B PCR Not Detected          Imaging Results (all)      Procedure Component Value Units Date/Time    MRI Brain With & Without Contrast [[id number]] Collected:  10/03/19 1610     Updated:  10/04/19 1511    Narrative:       EXAMINATION: MRI BRAIN W WO CONTRAST-10/03/2019:     INDICATION: Confusion/delirium, altered LOC, unexplained; J18.1-Lobar  pneumonia, unspecified organism; R53.1-Weakness; R09.02-Hypoxemia.     TECHNIQUE: Multiplanar, multisequence MR imaging of the brain was  performed with and without intravenous Gadolinium contrast. Many of the  sequences are motion limited secondary to patient confusion.     COMPARISON: CT of the head performed 10/03/2019.     FINDINGS: Diffusion-weighted imaging of the brain demonstrates no  evidence of focal diffusion restricted signal to suggest acute infarct.  Particularly within the right frontal lobe, no convincing  diffusion-restricted signal identified to suggest acute infarct.  T2-weighted imaging of the brain demonstrates intact intracranial flow  voids. No evidence of hydrocephalus. Diffuse cerebral atrophy is  identified. No extra-axial fluid collection identified. Heavy burden of  confluent white matter disease is identified throughout the  periventricular and subcortical white matter consistent with heavy  burden of chronic microvascular ischemic change. This does not  demonstrate evidence of enhancement on postcontrast imaging. The globes  appear intact. The retro-orbital soft tissues are unremarkable. Trace  nonspecific mastoid fluid is identified. FLAIR imaging of the brain  confirms these periventricular T-2 FLAIR signal abnormalities without  evidence for sulcal FLAIR signal abnormality. T1-weighted imaging  demonstrates no evidence of increased T1 intracranial signal to suggest  acute hemorrhage. Questionable punctate remote lacunar infarcts are  identified within the bilateral basal ganglia. Postcontrast imaging  demonstrates no evidence of abnormal intracranial enhancement. Sagittal  imaging of the brain  demonstrate a normal appearing pituitary gland. The  skull base and sella turcica appear intact. The craniocervical junction  is unremarkable. Heterogenous bone marrow signal is identified without  acute osseous abnormality.       Impression:       1.  Motion artifact persists throughout many of the sequences secondary  to patient confusion. Despite the limitations of the exam.     2.  No acute infarct or other appreciable acute intracranial  abnormality.     3.  Heavy burden of confluent periventricular and subcortical white  matter disease favored to represent chronic microvascular ischemic  change with possible bilateral punctate basal ganglial lacunar infarcts.     D:  10/03/2019  E:  10/03/2019            This report was finalized on 10/4/2019 3:07 PM by Dr. Giancarlo Thomas MD.       Fiberoptic Endo (fees) [[id number]] Resulted:  10/04/19 1507     Updated:  10/04/19 1507    Narrative:       This procedure was auto-finalized with no dictation required.    CT Head Without Contrast [[id number]] Collected:  10/03/19 1259     Updated:  10/04/19 1457    Narrative:       EXAMINATION: CT HEAD WO CONTRAST-10/03/2019:      INDICATION: Confusion/delirium, altered LOC, unexplained.     TECHNIQUE: Unenhanced CT imaging of the brain was performed.     The radiation dose reduction device was turned on for each scan per the  ALARA (As Low as Reasonably Achievable) protocol.     COMPARISON: NONE.     FINDINGS: Unenhanced CT imaging of the brain was performed which is  mildly motion artifact limited. No evidence of midline shift. No  evidence of hydrocephalus. There is diffuse subcortical and  periventricular hypoattenuation, likely relating to chronic  microvascular ischemic change, however, more superimposed  hypoattenuation is noted within the right frontal lobe with loss of the  normal gray-white matter differentiation which could be chronic,  although underlying ischemic changes are difficult to exclude. Diffuse  cerebral  atrophy is identified.  No intraventricular or subarachnoid  hemorrhage appreciated. The calvarium is intact. Mild ethmoidal mucosal  thickening is identified. Postsurgical changes to the globes are noted.  The retro-orbital soft tissues are unremarkable.       Impression:       Diffuse hypoattenuation of the brain parenchyma most  consistent with heavy burden of chronic microvascular ischemic change,  however, there is subjectively more than expected hypoattenuation within  the right frontal lobe, particularly involving the gray-white matter  junction in which underlying infarct is not excluded. MRI of the brain  with and without intravenous Gadolinium contrast would help clarify  these findings.     These findings were discussed with the ordering provider Davida Rivas at  approximately 1:05 PM on 10/03/2019.     D:  10/03/2019  E:  10/03/2019     This report was finalized on 10/4/2019 2:54 PM by Dr. Giancarlo Thomas MD.       XR Chest 1 View [[id number]] Collected:  10/03/19 1129     Updated:  10/03/19 1549    Narrative:       EXAMINATION: XR CHEST 1 VW-      INDICATION: Shortness of air triage protocol.      COMPARISON: Chest radiograph 11/06/2013.     FINDINGS: Single portable chest radiograph was submitted for review.  Boehler's low lung volumes which accentuate the cardiomediastinal  silhouette. Atherosclerotic calcifications of the thoracic aorta are  noted, similar to prior. Right lower lobe and left lower lobe airspace  changes are identified with a small right pleural effusion.       Impression:       Bibasilar airspace disease (right greater than left),  concerning for pneumonia with a small right pleural effusion. Advise  imaging to resolution.     D:  10/03/2019  E:  10/03/2019     This report was finalized on 10/3/2019 3:46 PM by Dr. Giancarlo Thomas MD.                             Order Current Status    Blood Culture - Blood, Arm, Right Preliminary result    Blood Culture - Blood, Hand, Right  Preliminary result        Discharge Details        Discharge Medications      New Medications      Instructions Start Date   azithromycin 250 MG tablet  Commonly known as:  ZITHROMAX   250 mg, Oral, Daily, Take 2 tablets the first day, then 1 tablet daily for 4 days.      cefuroxime 250 MG tablet  Commonly known as:  CEFTIN   250 mg, Oral, Every 12 Hours Scheduled      ipratropium-albuterol 0.5-2.5 mg/3 ml nebulizer  Commonly known as:  DUO-NEB   3 mL, Nebulization, 4 Times Daily - RT, J96.90      valsartan 160 MG tablet  Commonly known as:  DIOVAN   160 mg, Oral, Every 24 Hours Scheduled   Start Date:  10/7/2019        Continue These Medications      Instructions Start Date   aspirin 81 MG tablet   1 tablet, Oral, Daily      atorvastatin 40 MG tablet  Commonly known as:  LIPITOR   TAKE 1 TABLET DAILY      carvedilol 6.25 MG tablet  Commonly known as:  COREG   TAKE 1 TABLET TWICE DAILY  WITH MEALS      famotidine 20 MG tablet  Commonly known as:  PEPCID   1 tablet, Oral, Every 12 Hours Scheduled      fenofibrate 145 MG tablet  Commonly known as:  TRICOR   TAKE 1 TABLET DAILY      furosemide 20 MG tablet  Commonly known as:  LASIX   20 mg, Oral, Daily      Iron 325 (65 Fe) MG tablet   1 tablet, Oral, Daily         Stop These Medications    valsartan-hydrochlorothiazide 160-12.5 MG per tablet  Commonly known as:  DIOVAN-HCT            Allergies   Allergen Reactions   • Prednisone Rash         Discharge Disposition:  Home or Self Care    Diet:  Hospital:  Diet Order   Procedures   • Diet Regular; Cardiac     Discharge:   Diet Instructions     FEES   10/4/19  Reason for Referral  Patient was referred for a FEES to assess the efficiency of his/her swallow function, rule out aspiration and make recommendations regarding safe dietary consistencies, effective compensatory strategies, and safe eating environment.         Recommendations/Treatment  SLP Swallowing Diagnosis: mild, pharyngeal dysfunction  Functional Impact:  risk of aspiration/pneumonia  Rehab Potential/Prognosis, Swallowing: good, to achieve stated therapy goals  Swallow Criteria for Skilled Therapeutic Interventions Met: demonstrates skilled criteria  Therapy Frequency (Swallow): 3 days per week  Predicted Duration Therapy Intervention (Days): until discharge  SLP Diet Recommendation: regular textures, thin liquids, other (see comments)(via small controlled cup or straw sips)  Recommended Diagnostics: FEES  Recommended Precautions and Strategies: upright posture during/after eating, small bites of food and sips of liquid  SLP Rec. for Method of Medication Administration: meds whole, meds crushed, with pudding or applesauce  Monitor for Signs of Aspiration: yes, notify SLP if any concerns  Anticipated Dischage Disposition: unknown    Instrumental Set-up  Risks/Benefits Reviewed: risks/benefits explained, patient, family, agreed to eval  Nasal Entry: left:  Anatomic Considerations: no anatomic structural deviation  Utensils Used: Spoon, Cup, Straw  Consistencies Trialed: thin liquids, pudding/puree, Regular textures    Oral Preparation/ Oral Phase  Oral Phase: WFL    Pharyngeal Phase  Initiation of Pharyngeal Swallow: bolus in pyriform sinuses  Pharyngeal Phase: impaired pharyngeal phase of swallowing  Penetration During the Swallow: thin liquids, secondary to delayed swallow initiation or mistiming, secondary to reduced vestibular closure, other (see comments)(deep w/ large straw sips)  Rosenbek's Scale: thin:, 4-->Level 4(w/ cue)  Attempted Compensatory Maneuvers: bolus size, bolus presentation style  Response to Attempted Compensatory Maneuvers: prevented penetration  FEES Summary: SLP FEES evaluation completed. Pt presents w/ mild pharyngeal dysphagia. Deep penetration during the swallow w/ large straw sips of thin liquid. No response, but able to clear w/ cued throat clear/cough. No aspiration observed on exam. No penetration/aspiration w/ small controlled sips of  "thin via cup/straw, pudding, or solid. No significant pharyngeal residue. Recommend continue regular diet w/ liquids via small controlled sips. Meds whole or crushed in puree. Standard aspiration precautions. SLP will f/u for tx.                            Discharge Activity: May return to work 10/14/2019.      CODE STATUS:    Code Status and Medical Interventions:   Ordered at: 10/03/19 1422     Level Of Support Discussed With:    Patient     Code Status:    CPR     Medical Interventions (Level of Support Prior to Arrest):    Full     Comments:    patient was previously \"coded\" and \"on life support\" would like all measures in setting of decompensation         Future Appointments   Date Time Provider Department Center   11/25/2019  8:30 AM Ja Baldwin MD [facility]           Time Spent on Discharge:  35 minutes    Electronically signed by Kaye Le II, DO, 10/06/19, 10:05 AM.    "

## 2019-10-06 NOTE — PLAN OF CARE
Problem: Patient Care Overview  Goal: Plan of Care Review  Outcome: Outcome(s) achieved Date Met: 10/06/19   10/06/19 6310   Coping/Psychosocial   Plan of Care Reviewed With patient;daughter   OTHER   Outcome Summary PT wound care consulted to apply unnaboot prior to d/c as pt has been having his legs wrapped weekly by [facility] wound care center. Wraps have been off several days. SKin is clean and intact; shiny and tight. Applied unnaboots with coban and elastic net to secure. Pt to follow up for weekly changes per MD POC. D/C PT as pt d/c home.

## 2019-10-07 ENCOUNTER — TRANSITIONAL CARE MANAGEMENT TELEPHONE ENCOUNTER (OUTPATIENT)
Dept: INTERNAL MEDICINE | Facility: CLINIC | Age: 79
End: 2019-10-07

## 2019-10-07 ENCOUNTER — READMISSION MANAGEMENT (OUTPATIENT)
Dept: CALL CENTER | Facility: HOSPITAL | Age: 79
End: 2019-10-07

## 2019-10-07 NOTE — OUTREACH NOTE
TCM call completed.  See TCM flowsheet for details.  Does have upcoming hospital follow up appt with Denny 10/11/19.  Wife answered phone and states she is POA.  I could not find POA in chart, however she told me he was sleeping a lot and weak.  He is staying with daughter.  She states he is eating some and drinking water.  Picked up his antibiotics.  States he is SOB upon exertion, but the nebulizer does help some.  She confirmed his hospital follow up appointment with Dr. Baldwin was this Friday.  She appreciated the call.

## 2019-10-07 NOTE — OUTREACH NOTE
Prep Survey      Responses   Facility patient discharged from?  [facility]   Is patient eligible?  Yes   Discharge diagnosis  Pneumonia of BLL, transaminitis, Vit. D deficiency, venous insufficiency, old MI   Does the patient have one of the following disease processes/diagnoses(primary or secondary)?  COPD/Pneumonia   Does the patient have Home health ordered?  No   Is there a DME ordered?  Yes   What DME was ordered?  [facility] for nebulizer   Comments regarding appointments  Pt to schedule follow up with PCP   Prep survey completed?  Yes          Apoorva Alas RN

## 2019-10-08 ENCOUNTER — READMISSION MANAGEMENT (OUTPATIENT)
Dept: CALL CENTER | Facility: HOSPITAL | Age: 79
End: 2019-10-08

## 2019-10-08 LAB
BACTERIA SPEC AEROBE CULT: NORMAL
BACTERIA SPEC AEROBE CULT: NORMAL

## 2019-10-08 NOTE — OUTREACH NOTE
COPD/PN Week 1 Survey      Responses   Facility patient discharged from?  [facility]   Does the patient have one of the following disease processes/diagnoses(primary or secondary)?  COPD/Pneumonia   Is there a successful TCM telephone encounter documented?  Yes [TCM completed 10/7/19. ]          Apoorva Dent RN

## 2019-10-11 ENCOUNTER — OFFICE VISIT (OUTPATIENT)
Dept: INTERNAL MEDICINE | Facility: CLINIC | Age: 79
End: 2019-10-11

## 2019-10-11 VITALS
SYSTOLIC BLOOD PRESSURE: 102 MMHG | HEART RATE: 80 BPM | HEIGHT: 71 IN | DIASTOLIC BLOOD PRESSURE: 56 MMHG | WEIGHT: 225 LBS | BODY MASS INDEX: 31.5 KG/M2 | TEMPERATURE: 97.9 F

## 2019-10-11 DIAGNOSIS — J18.9 PNEUMONIA OF BOTH LOWER LOBES DUE TO INFECTIOUS ORGANISM: Primary | ICD-10-CM

## 2019-10-11 DIAGNOSIS — I87.2 VENOUS INSUFFICIENCY OF BOTH LOWER EXTREMITIES: ICD-10-CM

## 2019-10-11 PROCEDURE — 99495 TRANSJ CARE MGMT MOD F2F 14D: CPT | Performed by: INTERNAL MEDICINE

## 2019-10-11 RX ORDER — VALSARTAN 160 MG/1
160 TABLET ORAL
Qty: 90 TABLET | Refills: 0 | Status: SHIPPED | OUTPATIENT
Start: 2019-10-11 | End: 2019-10-22 | Stop reason: SDUPTHER

## 2019-10-11 RX ORDER — FUROSEMIDE 20 MG/1
20 TABLET ORAL DAILY
Qty: 90 TABLET | Refills: 0 | Status: SHIPPED | OUTPATIENT
Start: 2019-10-11 | End: 2019-10-22 | Stop reason: SDUPTHER

## 2019-10-11 NOTE — PROGRESS NOTES
Transitional Care Follow Up Visit  Subjective     Tho Vasquez is a 79 y.o. male who presents for a transitional care management visit.    Within 48 business hours after discharge our office contacted him via telephone to coordinate his care and needs.      I reviewed and discussed the details of that call along with the discharge summary, hospital problems, inpatient lab results, inpatient diagnostic studies, and consultation reports with Tho.     Current outpatient and discharge medications have been reconciled for the patient.  Reviewed by: Ja Baldwin MD      Date of TCM Phone Call 10/7/2019   [facility]   Date of Admission 10/3/2019   Date of Discharge 10/6/2019   Discharge Disposition Home or Self Care     Risk for Readmission (LACE) Score: 9 (10/6/2019  6:00 AM)      History of Present Illness   Course During Hospital Stay: Mr. Vasquez was seen here on September 24 with what was felt to be seasonal allergies.  However from that point his symptoms progressed and he presented to the emergency department on October 3 with shortness of breath and confusion.  He was found to have bibasilar infiltrates consistent with pneumonia on chest x-ray and hypoxia.  Because of the confusion MRI and CT scan of the head were obtained which showed motion artifact.  He was treated aggressively with IV Rocephin and azithromycin and improved fairly quickly.  He was discharged on the sixth on duo nebs, cefuroxime and azithromycin.  He completed them today.  Since being home he is fatigued, but has no shortness of breath, chest pain or worsening cough.  There has been no further confusion.  His appetite is good and he is eating and drinking normally.  He is still using the nebulizer apparatus.  He has been unable to see his podiatrist for compression wraps.  This was done for him in the hospital on the right leg to control his edema.  He will be unable because of this recent illness to see his  podiatrist for at least another couple of weeks.     The following portions of the patient's history were reviewed and updated as appropriate: allergies, current medications, past family history, past medical history, past social history, past surgical history and problem list.    Review of Systems   Constitutional: Negative for chills, fatigue and fever.   HENT: Negative for congestion, ear pain and sinus pressure.    Respiratory: Positive for cough. Negative for chest tightness, shortness of breath and wheezing.    Cardiovascular: Negative for chest pain and palpitations.   Gastrointestinal: Negative for abdominal pain, blood in stool and constipation.   Skin: Negative for color change.   Allergic/Immunologic: Negative for environmental allergies.   Neurological: Negative for dizziness, speech difficulty and headaches.   Psychiatric/Behavioral: Negative for confusion. The patient is not nervous/anxious.        Objective   Physical Exam   Constitutional: He appears well-developed and well-nourished.   Neck: Normal range of motion. Neck supple.   Cardiovascular: Normal rate, regular rhythm, normal heart sounds and intact distal pulses. Exam reveals no gallop and no friction rub.   No murmur heard.  Pulmonary/Chest: Effort normal and breath sounds normal.   Musculoskeletal:   2+ edema.  He has what appears to be an Unna boot on the right lower extremity.       Assessment/Plan   Tho was seen today for transitional care management.    Diagnoses and all orders for this visit:    Pneumonia of both lower lobes due to infectious organism (CMS/[facility])    Venous insufficiency of both lower extremities    Other orders  -     valsartan (DIOVAN) 160 MG tablet; Take 1 tablet by mouth Daily.  -     furosemide (LASIX) 20 MG tablet; Take 1 tablet by mouth Daily.    Plan    He has completed full course of antibiotics, O2 sat today is 96% on room air and he is afebrile.  There is no further confusion.  I recommended that he remain  off work for the next 2 weeks so that his body can recover.  I have explained to the patient and his daughter that it will take 4 to 6 weeks to recover his strength fully.  Plan repeat chest x-ray at about 4 weeks to ensure clearing.    He will need to have his wrap changed on his lower extremity.  We will ask home health to see and see if they can provide that service while he recovers.

## 2019-10-14 ENCOUNTER — READMISSION MANAGEMENT (OUTPATIENT)
Dept: CALL CENTER | Facility: HOSPITAL | Age: 79
End: 2019-10-14

## 2019-10-14 ENCOUNTER — TELEPHONE (OUTPATIENT)
Dept: INTERNAL MEDICINE | Facility: CLINIC | Age: 79
End: 2019-10-14

## 2019-10-14 NOTE — OUTREACH NOTE
COPD/PN Week 2 Survey      Responses   Facility patient discharged from?  [place]   Does the patient have one of the following disease processes/diagnoses(primary or secondary)?  COPD/Pneumonia   Was the primary reason for admission:  Pneumonia   Week 2 attempt successful?  Yes   Call start time  1349   Call end time  1353   Discharge diagnosis  Pneumonia of BLL, transaminitis, Vit. D deficiency, venous insufficiency, old MI   Is patient permission given to speak with other caregiver?  Yes   List who call center can speak with  Wife   Person spoke with today (if not patient) and relationship  Wife   Meds reviewed with patient/caregiver?  Yes   Is the patient taking all medications as directed (includes completed medication regime)?  Yes   Has the patient kept scheduled appointments due by today?  Yes   What is the patient's perception of their health status since discharge?  Returned to baseline/stable   Are the patient's immunizations up to date?   No   Nursing interventions  Educated on importance of maintaining up to date immunizations as advised by provider   Is the patient/caregiver able to teach back the hierarchy of who to call/visit for symptoms/problems? PCP, Specialist, Home health nurse, Urgent Care, ED, 911  Yes   Is the patient/caregiver able to teach back signs and symptoms of worsening condition:  Fever/chills, Shortness of breath, Chest pain   Is the patient/caregiver able to teach back importance of completing antibiotic course of treatment?  Yes   Week 2 call completed?  Yes   Wrap up additional comments  Patient has not had any swallowing difficulties at home. Wife reminds him to slow down, he likes to eat fast. Has had one follow up visit. Wife states he is no better but no worse.  Has meds and taking as directed. He has not had flu vaccine this season yet but will have to wait until recovers to receive.           Kaye Sutherland, RN

## 2019-10-14 NOTE — TELEPHONE ENCOUNTER
Called [facility]  [phone] and spoke to Kimberly. Referral was received. Advised to call Mallory Susanna, daughter, to set up visit

## 2019-10-21 ENCOUNTER — LAB (OUTPATIENT)
Dept: LAB | Facility: HOSPITAL | Age: 79
End: 2019-10-21

## 2019-10-21 ENCOUNTER — OFFICE VISIT (OUTPATIENT)
Dept: INTERNAL MEDICINE | Facility: CLINIC | Age: 79
End: 2019-10-21

## 2019-10-21 DIAGNOSIS — I10 BENIGN ESSENTIAL HYPERTENSION: ICD-10-CM

## 2019-10-21 DIAGNOSIS — M25.512 ACUTE PAIN OF LEFT SHOULDER: ICD-10-CM

## 2019-10-21 DIAGNOSIS — M79.89 LEFT UPPER EXTREMITY SWELLING: ICD-10-CM

## 2019-10-21 DIAGNOSIS — J18.9 PNEUMONIA OF BOTH LOWER LOBES DUE TO INFECTIOUS ORGANISM: ICD-10-CM

## 2019-10-21 DIAGNOSIS — I10 BENIGN ESSENTIAL HYPERTENSION: Primary | ICD-10-CM

## 2019-10-21 LAB
ALBUMIN SERPL-MCNC: 2.5 G/DL (ref 3.5–5.2)
ALBUMIN/GLOB SERPL: 0.7 G/DL
ALP SERPL-CCNC: 63 U/L (ref 39–117)
ALT SERPL W P-5'-P-CCNC: 48 U/L (ref 1–41)
ANION GAP SERPL CALCULATED.3IONS-SCNC: 8.6 MMOL/L (ref 5–15)
AST SERPL-CCNC: 51 U/L (ref 1–40)
BASOPHILS # BLD AUTO: 0.03 10*3/MM3 (ref 0–0.2)
BASOPHILS NFR BLD AUTO: 0.3 % (ref 0–1.5)
BILIRUB SERPL-MCNC: 1.2 MG/DL (ref 0.2–1.2)
BUN BLD-MCNC: 19 MG/DL (ref 8–23)
BUN/CREAT SERPL: 20.9 (ref 7–25)
CALCIUM SPEC-SCNC: 8.7 MG/DL (ref 8.6–10.5)
CHLORIDE SERPL-SCNC: 102 MMOL/L (ref 98–107)
CO2 SERPL-SCNC: 27.4 MMOL/L (ref 22–29)
CREAT BLD-MCNC: 0.91 MG/DL (ref 0.76–1.27)
DEPRECATED RDW RBC AUTO: 42.2 FL (ref 37–54)
EOSINOPHIL # BLD AUTO: 0.02 10*3/MM3 (ref 0–0.4)
EOSINOPHIL NFR BLD AUTO: 0.2 % (ref 0.3–6.2)
ERYTHROCYTE [DISTWIDTH] IN BLOOD BY AUTOMATED COUNT: 12.4 % (ref 12.3–15.4)
GFR SERPL CREATININE-BSD FRML MDRD: 80 ML/MIN/1.73
GLOBULIN UR ELPH-MCNC: 3.8 GM/DL
GLUCOSE BLD-MCNC: 93 MG/DL (ref 65–99)
HCT VFR BLD AUTO: 39.3 % (ref 37.5–51)
HGB BLD-MCNC: 13.1 G/DL (ref 13–17.7)
IMM GRANULOCYTES # BLD AUTO: 0.06 10*3/MM3 (ref 0–0.05)
IMM GRANULOCYTES NFR BLD AUTO: 0.6 % (ref 0–0.5)
LYMPHOCYTES # BLD AUTO: 2.06 10*3/MM3 (ref 0.7–3.1)
LYMPHOCYTES NFR BLD AUTO: 19.8 % (ref 19.6–45.3)
MCH RBC QN AUTO: 31.4 PG (ref 26.6–33)
MCHC RBC AUTO-ENTMCNC: 33.3 G/DL (ref 31.5–35.7)
MCV RBC AUTO: 94.2 FL (ref 79–97)
MONOCYTES # BLD AUTO: 1.35 10*3/MM3 (ref 0.1–0.9)
MONOCYTES NFR BLD AUTO: 13 % (ref 5–12)
NEUTROPHILS # BLD AUTO: 6.88 10*3/MM3 (ref 1.7–7)
NEUTROPHILS NFR BLD AUTO: 66.1 % (ref 42.7–76)
NRBC BLD AUTO-RTO: 0 /100 WBC (ref 0–0.2)
PLATELET # BLD AUTO: 152 10*3/MM3 (ref 140–450)
PMV BLD AUTO: 13.4 FL (ref 6–12)
POTASSIUM BLD-SCNC: 4 MMOL/L (ref 3.5–5.2)
PROT SERPL-MCNC: 6.3 G/DL (ref 6–8.5)
RBC # BLD AUTO: 4.17 10*6/MM3 (ref 4.14–5.8)
SODIUM BLD-SCNC: 138 MMOL/L (ref 136–145)
WBC NRBC COR # BLD: 10.4 10*3/MM3 (ref 3.4–10.8)

## 2019-10-21 PROCEDURE — 80053 COMPREHEN METABOLIC PANEL: CPT

## 2019-10-21 PROCEDURE — 99214 OFFICE O/P EST MOD 30 MIN: CPT | Performed by: NURSE PRACTITIONER

## 2019-10-21 PROCEDURE — 85025 COMPLETE CBC W/AUTO DIFF WBC: CPT

## 2019-10-21 RX ORDER — ALBUTEROL SULFATE 2.5 MG/3ML
2.5 SOLUTION RESPIRATORY (INHALATION) EVERY 4 HOURS PRN
Qty: 100 VIAL | Refills: 1 | Status: SHIPPED | OUTPATIENT
Start: 2019-10-21 | End: 2019-10-25 | Stop reason: SDUPTHER

## 2019-10-21 NOTE — PROGRESS NOTES
Tho Vasquez  1940  [id number]  Patient Care Team:  Ja Baldwin MD as PCP - General (Internal Medicine)  Danny Rdz Jr., MD as Consulting Physician (Ophthalmology)    Tho Vasquez is a pleasant 79 y.o. male who presents for evaluation of Edema (LUE has started tingling) and REFILL (if he is to stay on the neb treatments he needs a RF)    This patient is accompanied by their daughter who contributes to the history of their care.  Chief Complaint   Patient presents with   • Edema     LUE has started tingling   • REFILL     if he is to stay on the neb treatments he needs a RF       HPI:   2 day hx of LUE swelling, left shoulder discomfort.    Recent [place] inpatient admission for bilat PNA (10/3/2019 to 10/6/19).  since home he has been sleeping in a recliner, laying on left side.  Couple venipunctures both arms while inpatient, bruising noted.  No IV infiltrations during admission.    Pneumonia: He denies shortness of breath, home health is seeing the patient.  Repeat chest x-ray due to around November 11.  Antibiotics.  His room air oxygen level at home above 90%.    Lower extremity edema: Home health is wrapping his legs    Off work now considering group home  Past Medical History:   Diagnosis Date   • Myocardial infarction (CMS/HCC)    • Positive PPD    • Respiratory failure (CMS/HCC) 2005    requiring tracheostomy     Past Surgical History:   Procedure Laterality Date   • CARDIAC SURGERY     • HERNIA REPAIR       Family History   Problem Relation Age of Onset   • Arthritis Mother    • Heart attack Father    • Stroke Father    • Diabetes Father    • Heart attack Brother    • Cancer Paternal Uncle    • Obesity Other      Social History     Tobacco Use   Smoking Status Former Smoker   • Types: Cigars   Smokeless Tobacco Never Used     Allergies   Allergen Reactions   • Prednisone Rash       Current Outpatient Medications:   •  albuterol (PROVENTIL) (2.5 MG/3ML) 0.083% nebulizer solution, Take  "2.5 mg by nebulization Every 4 (Four) Hours As Needed for Wheezing., Disp: 100 vial, Rfl: 1  •  aspirin 81 MG tablet, Take 1 tablet by mouth Daily., Disp: , Rfl:   •  atorvastatin (LIPITOR) 40 MG tablet, TAKE 1 TABLET DAILY, Disp: 90 tablet, Rfl: 1  •  carvedilol (COREG) 6.25 MG tablet, TAKE 1 TABLET TWICE DAILY  WITH MEALS, Disp: 90 tablet, Rfl: 1  •  famotidine (PEPCID) 20 MG tablet, Take 1 tablet by mouth Every 12 (Twelve) Hours., Disp: , Rfl:   •  fenofibrate (TRICOR) 145 MG tablet, TAKE 1 TABLET DAILY, Disp: 90 tablet, Rfl: 1  •  Ferrous Sulfate (IRON) 325 (65 Fe) MG tablet, Take 1 tablet by mouth Daily., Disp: , Rfl:   •  ipratropium-albuterol (DUO-NEB) 0.5-2.5 mg/3 ml nebulizer, Take 3 mL by nebulization 4 (Four) Times a Day. J96.90, Disp: 360 mL, Rfl: 0  •  Probiotic capsule, Take 1 capsule by mouth Daily., Disp: , Rfl:   •  furosemide (LASIX) 20 MG tablet, Take 1 tablet by mouth Daily., Disp: 90 tablet, Rfl: 0  •  valsartan (DIOVAN) 160 MG tablet, Take 1 tablet by mouth Daily., Disp: 90 tablet, Rfl: 0    Review of Systems   Constitutional: Negative for chills, fatigue and fever.   Respiratory: Negative for cough, shortness of breath and wheezing.    Cardiovascular: Negative for chest pain and palpitations.   Gastrointestinal: Negative for abdominal pain, blood in stool, constipation and diarrhea.     /84 (BP Location: Right arm, Patient Position: Sitting, Cuff Size: Large Adult)   Pulse 70   Ht 180.3 cm (71\")   Wt 101 kg (223 lb)   SpO2 97% Comment: on RA at rest and after ambulation  BMI 31.10 kg/m²     Physical Exam   Constitutional: He appears well-developed and well-nourished.   HENT:   Head: Normocephalic and atraumatic.   Right Ear: External ear normal.   Left Ear: External ear normal.   Mouth/Throat: Oropharynx is clear and moist.   Eyes: Conjunctivae and EOM are normal.   Neck: Normal range of motion. Neck supple.   Cardiovascular: Normal rate, regular rhythm and normal heart sounds. "   Pulmonary/Chest: Effort normal. He has decreased breath sounds in the right lower field and the left lower field. He has no wheezes. He has no rhonchi.   Abdominal: Soft. Bowel sounds are normal.   Musculoskeletal: Normal range of motion.        Right upper arm: He exhibits swelling.   2+ pitting edema left upper extremity from fingers to shoulder.  Tenderness on palpation of left shoulder.  Palpable radial pulse, skin pink warm dry.  Range of motion limited secondary to swelling.     Vascular Status -  His right foot exhibits abnormal foot edema. His left foot exhibits abnormal foot edema.  Neurological: He is alert.   Skin: Skin is warm and dry.   Psychiatric: He has a normal mood and affect. His behavior is normal. Thought content normal.       Results Review:  None    Assessment/Plan:  Tho was seen today for edema and refill.    Diagnoses and all orders for this visit:    Benign essential hypertension  -     CBC & Differential; Future  -     Comprehensive Metabolic Panel; Future    Left upper extremity swelling  -     Duplex Venous Upper Extremity - Left CAR; Future    Pneumonia of both lower lobes due to infectious organism (CMS/[facility])  -     albuterol (PROVENTIL) (2.5 MG/3ML) 0.083% nebulizer solution; Take 2.5 mg by nebulization Every 4 (Four) Hours As Needed for Wheezing.    Acute pain of left shoulder       Patient Instructions   Labs today.  Will order Doppler left upper extremity to rule out blood clot.  Elevate arm as much as you can.  Avoid dependent position on the left side as discussed.  Heating pad to left shoulder.    Plan of care reviewed with patient at the conclusion of today's visit. Education was provided regarding diagnosis, management and any prescribed or recommended OTC medications.  Patient verbalizes understanding of and agreement with management plan.    Return for Next scheduled follow up.    *Note that portions of this note were completed with a voice recognition program.  Efforts  were made to edit the dictation but occasionally words are transcribed.    Alyssa Bunch, APRN

## 2019-10-22 VITALS
BODY MASS INDEX: 31.22 KG/M2 | SYSTOLIC BLOOD PRESSURE: 122 MMHG | OXYGEN SATURATION: 97 % | DIASTOLIC BLOOD PRESSURE: 84 MMHG | HEART RATE: 70 BPM | WEIGHT: 223 LBS | HEIGHT: 71 IN

## 2019-10-22 RX ORDER — FUROSEMIDE 20 MG/1
20 TABLET ORAL DAILY
Qty: 90 TABLET | Refills: 0 | Status: SHIPPED | OUTPATIENT
Start: 2019-10-22 | End: 2020-02-14

## 2019-10-22 RX ORDER — VALSARTAN 160 MG/1
160 TABLET ORAL
Qty: 90 TABLET | Refills: 0 | Status: SHIPPED | OUTPATIENT
Start: 2019-10-22 | End: 2020-05-07

## 2019-10-22 NOTE — PATIENT INSTRUCTIONS
Labs today.  Will order Doppler left upper extremity to rule out blood clot.  Elevate arm as much as you can.  Avoid dependent position on the left side as discussed.  Heating pad to left shoulder.

## 2019-10-22 NOTE — TELEPHONE ENCOUNTER
Patient's daughter Mallory ([phone]) wanted her father's meds (Lasix, Valsartan and Albuterol sent to [facility])    Cancelled orders to local pharmacy.

## 2019-10-23 ENCOUNTER — READMISSION MANAGEMENT (OUTPATIENT)
Dept: CALL CENTER | Facility: HOSPITAL | Age: 79
End: 2019-10-23

## 2019-10-23 NOTE — OUTREACH NOTE
COPD/PN Week 3 Survey      Responses   Facility patient discharged from?  [place]   Does the patient have one of the following disease processes/diagnoses(primary or secondary)?  COPD/Pneumonia   Was the primary reason for admission:  Pneumonia   Week 3 attempt successful?  Yes   Call start time  1119   Call end time  1131   Is patient permission given to speak with other caregiver?  Yes   List who call center can speak with  Daughter   Person spoke with today (if not patient) and relationship  Mallory   Meds reviewed with patient/caregiver?  Yes   Is the patient taking all medications as directed (includes completed medication regime)?  Yes   Has the patient kept scheduled appointments due by today?  Yes   What DME was ordered?  [facility] for nebulizer   Has all DME been delivered?  Yes   Psychosocial issues?  No   What is the patient's perception of their health status since discharge?  Improving   Is the patient/caregiver able to teach back signs and symptoms of worsening condition:  Fever/chills, Shortness of breath, Chest pain   Is the patient/caregiver able to teach back importance of completing antibiotic course of treatment?  Yes   Week 3 call completed?  Yes          Ruby Brown RN

## 2019-10-24 DIAGNOSIS — J18.9 PNEUMONIA OF BOTH LOWER LOBES DUE TO INFECTIOUS ORGANISM: ICD-10-CM

## 2019-10-24 RX ORDER — ALBUTEROL SULFATE 2.5 MG/3ML
2.5 SOLUTION RESPIRATORY (INHALATION) EVERY 4 HOURS PRN
Qty: 100 VIAL | Refills: 1 | OUTPATIENT
Start: 2019-10-24

## 2019-10-25 ENCOUNTER — HOSPITAL ENCOUNTER (OUTPATIENT)
Dept: GENERAL RADIOLOGY | Facility: HOSPITAL | Age: 79
Discharge: HOME | End: 2019-10-25
Admitting: INTERNAL MEDICINE

## 2019-10-25 ENCOUNTER — TELEPHONE (OUTPATIENT)
Dept: INTERNAL MEDICINE | Facility: CLINIC | Age: 79
End: 2019-10-25

## 2019-10-25 ENCOUNTER — OFFICE VISIT (OUTPATIENT)
Dept: INTERNAL MEDICINE | Facility: CLINIC | Age: 79
End: 2019-10-25

## 2019-10-25 ENCOUNTER — DOCUMENTATION (OUTPATIENT)
Dept: INTERNAL MEDICINE | Facility: CLINIC | Age: 79
End: 2019-10-25

## 2019-10-25 VITALS
BODY MASS INDEX: 31.08 KG/M2 | DIASTOLIC BLOOD PRESSURE: 100 MMHG | HEIGHT: 71 IN | WEIGHT: 222 LBS | SYSTOLIC BLOOD PRESSURE: 126 MMHG | HEART RATE: 100 BPM

## 2019-10-25 DIAGNOSIS — J18.9 PNEUMONIA OF BOTH LOWER LOBES DUE TO INFECTIOUS ORGANISM: Primary | ICD-10-CM

## 2019-10-25 DIAGNOSIS — M79.89 LEFT UPPER EXTREMITY SWELLING: ICD-10-CM

## 2019-10-25 DIAGNOSIS — J18.9 PNEUMONIA OF BOTH LOWER LOBES DUE TO INFECTIOUS ORGANISM: ICD-10-CM

## 2019-10-25 PROCEDURE — 71046 X-RAY EXAM CHEST 2 VIEWS: CPT

## 2019-10-25 PROCEDURE — 99213 OFFICE O/P EST LOW 20 MIN: CPT | Performed by: INTERNAL MEDICINE

## 2019-10-25 RX ORDER — ALBUTEROL SULFATE 2.5 MG/3ML
2.5 SOLUTION RESPIRATORY (INHALATION) EVERY 4 HOURS PRN
Qty: 100 VIAL | Refills: 1 | Status: SHIPPED | OUTPATIENT
Start: 2019-10-25 | End: 2022-11-29

## 2019-10-25 NOTE — TELEPHONE ENCOUNTER
[facility] called to report she could only pickup one box of 25 vials for neb due to the cost.  She paid 16.00 for the one box.    [facility] states the pharmacy was faxing something to our office to override the cost but we have not received any faxes.    I called [facility] and spoke to Sandie [facility].  She needs a diagnosis that will cover medication.   I gave her several listed in the patient chart but all denied.      She tried a good rx coupon that covered the medication all 100 vials for $21.60.     She will have to reimburse the dgtr and do an new transaction.    I called [facility] back and advised her of the cost for 100 vials.  She will go to pharmacy now to .

## 2019-10-25 NOTE — PROGRESS NOTES
[facility]     Tho Vasquez  1940   [id number]      Patient Care Team:  Ja Baldwin MD as PCP - General (Internal Medicine)  Danny Rdz Jr., MD as Consulting Physician (Ophthalmology)    Chief Complaint::   Chief Complaint   Patient presents with   • left hand swelling     follow up. No improvement   • Med Refill     albuterol   • Letter for School/Work     daughter unsure of when he can return        HPI  Mr. Vasquez comes in for follow-up of his pneumonia.  He is slowly regaining strength, but still sleeps in a recliner because it is difficult to sleep lying down, and still is far from regaining his normal strength.  He has difficulty going up stairs because of shortness of breath.  Simply walking down the [place]gers today was taxing.  Having said that he is better.  He was seen earlier this week with swelling in the left upper extremity.  Venous Doppler of the left upper extremity was normal.  His symptoms persist.  There is been no trauma, no pain.  He has no shoulder pain.    Chronic Conditions:      Patient Active Problem List   Diagnosis   • Mixed hyperlipidemia   • Abnormal glucose level   • Abrasion   • Adult BMI 37.0-37.9 kg/sq m   • Annual physical exam   • ASHD (arteriosclerotic heart disease)   • Benign essential hypertension   • Chronic kidney disease, stage 3 (CMS/HCC)   • Medicare annual wellness visit, subsequent   • Morbid obesity (CMS/HCC)   • Obstructive sleep apnea   • Old myocardial infarction   • Vitamin D deficiency   • Venous insufficiency of both lower extremities   • Pneumonia of both lower lobes due to infectious organism (CMS/HCC)   • Transaminitis        Past Medical History:   Diagnosis Date   • Myocardial infarction (CMS/HCC)    • Positive PPD    • Respiratory failure (CMS/HCC) 2005    requiring tracheostomy       Past Surgical History:   Procedure Laterality Date   • CARDIAC SURGERY     • HERNIA REPAIR         Family History   Problem Relation Age of  Onset   • Arthritis Mother    • Heart attack Father    • Stroke Father    • Diabetes Father    • Heart attack Brother    • Cancer Paternal Uncle    • Obesity Other        Social History     Socioeconomic History   • Marital status:      Spouse name: Not on file   • Number of children: Not on file   • Years of education: Not on file   • Highest education level: Not on file   Tobacco Use   • Smoking status: Former Smoker     Types: Cigars   • Smokeless tobacco: Never Used   Substance and Sexual Activity   • Alcohol use: No     Frequency: Never   • Drug use: Defer   • Sexual activity: Defer       Allergies   Allergen Reactions   • Prednisone Rash         Current Outpatient Medications:   •  aspirin 81 MG tablet, Take 1 tablet by mouth Daily., Disp: , Rfl:   •  atorvastatin (LIPITOR) 40 MG tablet, TAKE 1 TABLET DAILY, Disp: 90 tablet, Rfl: 1  •  carvedilol (COREG) 6.25 MG tablet, TAKE 1 TABLET TWICE DAILY  WITH MEALS, Disp: 90 tablet, Rfl: 1  •  famotidine (PEPCID) 20 MG tablet, Take 1 tablet by mouth Every 12 (Twelve) Hours., Disp: , Rfl:   •  fenofibrate (TRICOR) 145 MG tablet, TAKE 1 TABLET DAILY, Disp: 90 tablet, Rfl: 1  •  Ferrous Sulfate (IRON) 325 (65 Fe) MG tablet, Take 1 tablet by mouth Daily., Disp: , Rfl:   •  furosemide (LASIX) 20 MG tablet, Take 1 tablet by mouth Daily., Disp: 90 tablet, Rfl: 0  •  Probiotic capsule, Take 1 capsule by mouth Daily., Disp: , Rfl:   •  valsartan (DIOVAN) 160 MG tablet, Take 1 tablet by mouth Daily., Disp: 90 tablet, Rfl: 0  •  albuterol (PROVENTIL) (2.5 MG/3ML) 0.083% nebulizer solution, Take 2.5 mg by nebulization Every 4 (Four) Hours As Needed for Wheezing., Disp: 100 vial, Rfl: 1    Review of Systems   Constitutional: Positive for fatigue. Negative for chills and fever.   Respiratory: Positive for cough and shortness of breath. Negative for wheezing.    Cardiovascular: Negative for chest pain.   Gastrointestinal: Negative for abdominal pain.        Vital  "Signs  Vitals:    10/25/19 1131   BP: 126/100   BP Location: Right arm   Patient Position: Sitting   Cuff Size: Adult   Pulse: 100   Weight: 101 kg (222 lb)   Height: 180.3 cm (71\")   PainSc:   2       Physical Exam   Constitutional: He is oriented to person, place, and time. He appears well-developed and well-nourished.   HENT:   Head: Normocephalic and atraumatic.   Cardiovascular: Normal rate, regular rhythm and normal heart sounds.   No murmur heard.  Pulmonary/Chest: Effort normal and breath sounds normal.   Musculoskeletal:   His left hand has 3+ edema which is pitting, his forearm to the elbow is edematous but 1+.  Skin integrity is intact there is no erythema and only mild tenderness in the hand.   Neurological: He is alert and oriented to person, place, and time.   Psychiatric: He has a normal mood and affect.   Vitals reviewed.     Procedures    ACE III MINI             Assessment/Plan:    Tho was seen today for left hand swelling, med refill and letter for school/work.    Diagnoses and all orders for this visit:    Pneumonia of both lower lobes due to infectious organism (CMS/[facility])  -     XR Chest PA & Lateral; Future  -     albuterol (PROVENTIL) (2.5 MG/3ML) 0.083% nebulizer solution; Take 2.5 mg by nebulization Every 4 (Four) Hours As Needed for Wheezing.    Left upper extremity swelling    Plan    He is making slow progress with his pneumonia.  I think he will need another 4 weeks to recover his strength and he is given an excuse for work.    Edema in the left upper extremity is worrisome for something retarding return of venous flow.   DVT has been ruled out.  Will obtain chest x-ray today, if that is negative we will proceed next week with CT scan of the chest.      Plan of care reviewed with patient at the conclusion of today's visit. Education was provided regarding diagnosis, management, and any prescribed or recommended OTC medications.Patient verbalizes understanding of and agreement with " management plan.         Ja Baldwin MD

## 2019-10-28 ENCOUNTER — TELEPHONE (OUTPATIENT)
Dept: INTERNAL MEDICINE | Facility: CLINIC | Age: 79
End: 2019-10-28

## 2019-10-28 DIAGNOSIS — M79.89 LEFT UPPER EXTREMITY SWELLING: ICD-10-CM

## 2019-10-28 PROCEDURE — 93971 EXTREMITY STUDY: CPT | Performed by: NURSE PRACTITIONER

## 2019-10-31 ENCOUNTER — READMISSION MANAGEMENT (OUTPATIENT)
Dept: CALL CENTER | Facility: HOSPITAL | Age: 79
End: 2019-10-31

## 2019-10-31 NOTE — OUTREACH NOTE
COPD/PN Week 4 Survey      Responses   Facility patient discharged from?  [place]   Does the patient have one of the following disease processes/diagnoses(primary or secondary)?  COPD/Pneumonia   Was the primary reason for admission:  Pneumonia   Week 4 attempt successful?  Yes   Call start time  1739   Call end time  1742   Discharge diagnosis  Pneumonia of BLL, transaminitis, Vit. D deficiency, venous insufficiency, old MI   Is patient permission given to speak with other caregiver?  Yes   Person spoke with today (if not patient) and relationship  Mallory   Meds reviewed with patient/caregiver?  Yes   Is the patient having any side effects they believe may be caused by any medication additions or changes?  Yes   Is the patient taking all medications as directed (includes completed medication regime)?  Yes   Has the patient kept scheduled appointments due by today?  Yes   Is the patient still receiving Home Health Services?  N/A   Psychosocial issues?  No   What is the patient's perception of their health status since discharge?  Improving   Nursing Interventions  Nurse provided patient education   Are the patient's immunizations up to date?   Yes   Nursing interventions  Educated on importance of maintaining up to date immunizations as advised by provider   Is the patient/caregiver able to teach back the hierarchy of who to call/visit for symptoms/problems? PCP, Specialist, Home health nurse, Urgent Care, ED, 911  Yes   Additional teach back comments  Doing some better, eating better, sleeping is up in the air.     Is the patient able to teach back COPD zones?  Yes   Is the patient/caregiver able to teach back signs and symptoms of worsening condition:  Fever/chills, Shortness of breath, Chest pain   Is the patient/caregiver able to teach back importance of completing antibiotic course of treatment?  Yes   Week 4 call completed?  Yes   Would the patient like one additional call?  No   Graduated  Yes   Did the  patient feel the follow up calls were helpful during their recovery period?  Yes   Was the number of calls appropriate?  Yes          Coby Larios RN

## 2019-11-18 RX ORDER — CARVEDILOL 6.25 MG/1
TABLET ORAL
Qty: 90 TABLET | Refills: 0 | OUTPATIENT
Start: 2019-11-18

## 2019-11-22 ENCOUNTER — OFFICE VISIT (OUTPATIENT)
Dept: INTERNAL MEDICINE | Facility: CLINIC | Age: 79
End: 2019-11-22

## 2019-11-22 VITALS
HEIGHT: 71 IN | DIASTOLIC BLOOD PRESSURE: 82 MMHG | WEIGHT: 223 LBS | SYSTOLIC BLOOD PRESSURE: 140 MMHG | HEART RATE: 72 BPM | BODY MASS INDEX: 31.22 KG/M2

## 2019-11-22 DIAGNOSIS — J18.9 PNEUMONIA OF BOTH LOWER LOBES DUE TO INFECTIOUS ORGANISM: Primary | ICD-10-CM

## 2019-11-22 PROCEDURE — 99213 OFFICE O/P EST LOW 20 MIN: CPT | Performed by: INTERNAL MEDICINE

## 2019-11-22 PROCEDURE — 90653 IIV ADJUVANT VACCINE IM: CPT | Performed by: INTERNAL MEDICINE

## 2019-11-22 PROCEDURE — G0008 ADMIN INFLUENZA VIRUS VAC: HCPCS | Performed by: INTERNAL MEDICINE

## 2019-11-22 NOTE — PROGRESS NOTES
[facility]     Tho Vasquez  1940   [id number]      Patient Care Team:  Ja Baldwin MD as PCP - General (Internal Medicine)  Danny Rdz Jr., MD as Consulting Physician (Ophthalmology)    Chief Complaint::   Chief Complaint   Patient presents with   • Hyperlipidemia   • Hypertension        HPI  Mr Vasquez is here for follow up of his pneumonia and left upper extremity edema.  He has steadily improved.  He wants to return to work.  The edema of his left hand has improved, but not resolved.  He had a normal upper extremity venous doppler. Chest xray showed improvement.     Chronic Conditions:      Patient Active Problem List   Diagnosis   • Mixed hyperlipidemia   • Abnormal glucose level   • Abrasion   • Adult BMI 37.0-37.9 kg/sq m   • Annual physical exam   • ASHD (arteriosclerotic heart disease)   • Benign essential hypertension   • Chronic kidney disease, stage 3 (CMS/[facility])   • Medicare annual wellness visit, subsequent   • Morbid obesity (CMS/[facility])   • Obstructive sleep apnea   • Old myocardial infarction   • Vitamin D deficiency   • Venous insufficiency of both lower extremities   • Pneumonia of both lower lobes due to infectious organism (CMS/[facility])   • Transaminitis        Past Medical History:   Diagnosis Date   • Myocardial infarction (CMS/[facility])    • Positive PPD    • Respiratory failure (CMS/[facility]) 2005    requiring tracheostomy       Past Surgical History:   Procedure Laterality Date   • CARDIAC SURGERY     • HERNIA REPAIR         Family History   Problem Relation Age of Onset   • Arthritis Mother    • Heart attack Father    • Stroke Father    • Diabetes Father    • Heart attack Brother    • Cancer Paternal Uncle    • Obesity Other        Social History     Socioeconomic History   • Marital status:      Spouse name: Not on file   • Number of children: Not on file   • Years of education: Not on file   • Highest education level: Not on file   Tobacco Use   • Smoking  status: Former Smoker     Types: Cigars   • Smokeless tobacco: Never Used   Substance and Sexual Activity   • Alcohol use: No     Frequency: Never   • Drug use: Defer   • Sexual activity: Defer       Allergies   Allergen Reactions   • Prednisone Rash         Current Outpatient Medications:   •  albuterol (PROVENTIL) (2.5 MG/3ML) 0.083% nebulizer solution, Take 2.5 mg by nebulization Every 4 (Four) Hours As Needed for Wheezing., Disp: 100 vial, Rfl: 1  •  aspirin 81 MG tablet, Take 1 tablet by mouth Daily., Disp: , Rfl:   •  atorvastatin (LIPITOR) 40 MG tablet, TAKE 1 TABLET DAILY, Disp: 90 tablet, Rfl: 1  •  carvedilol (COREG) 6.25 MG tablet, TAKE 1 TABLET TWICE DAILY  WITH MEALS, Disp: 90 tablet, Rfl: 1  •  famotidine (PEPCID) 20 MG tablet, Take 1 tablet by mouth Every 12 (Twelve) Hours., Disp: , Rfl:   •  fenofibrate (TRICOR) 145 MG tablet, TAKE 1 TABLET DAILY, Disp: 90 tablet, Rfl: 1  •  Ferrous Sulfate (IRON) 325 (65 Fe) MG tablet, Take 1 tablet by mouth Daily., Disp: , Rfl:   •  furosemide (LASIX) 20 MG tablet, Take 1 tablet by mouth Daily., Disp: 90 tablet, Rfl: 0  •  Probiotic capsule, Take 1 capsule by mouth Daily., Disp: , Rfl:   •  valsartan (DIOVAN) 160 MG tablet, Take 1 tablet by mouth Daily., Disp: 90 tablet, Rfl: 0    Review of Systems   Constitutional: Negative for chills, fatigue and fever.   HENT: Negative for congestion, ear pain and sinus pressure.    Respiratory: Negative for cough, chest tightness, shortness of breath and wheezing.    Cardiovascular: Negative for chest pain and palpitations.   Gastrointestinal: Negative for abdominal pain, blood in stool and constipation.   Skin: Negative for color change.   Allergic/Immunologic: Negative for environmental allergies.   Neurological: Negative for dizziness, speech difficulty and headache.   Psychiatric/Behavioral: Negative for decreased concentration. The patient is not nervous/anxious.         Vital Signs  Vitals:    11/22/19 1647   BP: 140/82  "  BP Location: Left arm   Patient Position: Sitting   Cuff Size: Adult   Pulse: 72   Weight: 101 kg (223 lb)   Height: 180.3 cm (70.98\")   PainSc: 0-No pain       Physical Exam   Constitutional: He is oriented to person, place, and time. He appears well-developed and well-nourished.   HENT:   Head: Normocephalic and atraumatic.   Cardiovascular: Normal rate, regular rhythm and normal heart sounds.   No murmur heard.  Pulmonary/Chest: Effort normal and breath sounds normal.   Neurological: He is alert and oriented to person, place, and time.   Psychiatric: He has a normal mood and affect.   Vitals reviewed.     Procedures    ACE III MINI             Assessment/Plan:    Tho was seen today for hyperlipidemia and hypertension.    Diagnoses and all orders for this visit:    Pneumonia of both lower lobes due to infectious organism (CMS/[facility])    Other orders  -     Fluad Tri 65yr+    Plan    Pneumonia has clinically resolved.  Recent chest xray showed significant improvement.  O2 sat is 97% both before and after walking around the office twice.  He may return to work.     There is no evidence of venous obstruction causing  RUE edema, and it is improving. No further workup.       Plan of care reviewed with patient at the conclusion of today's visit. Education was provided regarding diagnosis, management, and any prescribed or recommended OTC medications.Patient verbalizes understanding of and agreement with management plan.         Ja Baldwin MD           "

## 2019-12-24 RX ORDER — ATORVASTATIN CALCIUM 40 MG/1
TABLET, FILM COATED ORAL
Qty: 90 TABLET | Refills: 1 | Status: SHIPPED | OUTPATIENT
Start: 2019-12-24 | End: 2020-06-22

## 2019-12-24 RX ORDER — VALSARTAN AND HYDROCHLOROTHIAZIDE 160; 12.5 MG/1; MG/1
TABLET, FILM COATED ORAL
Qty: 90 TABLET | Refills: 1 | Status: SHIPPED | OUTPATIENT
Start: 2019-12-24 | End: 2020-06-22

## 2019-12-24 RX ORDER — FENOFIBRATE 145 MG/1
TABLET, COATED ORAL
Qty: 90 TABLET | Refills: 1 | Status: SHIPPED | OUTPATIENT
Start: 2019-12-24 | End: 2020-06-22

## 2020-02-14 RX ORDER — FUROSEMIDE 20 MG/1
20 TABLET ORAL DAILY
Qty: 14 TABLET | Refills: 0 | Status: SHIPPED | OUTPATIENT
Start: 2020-02-14 | End: 2020-07-20

## 2020-02-14 RX ORDER — CARVEDILOL 6.25 MG/1
6.25 TABLET ORAL 2 TIMES DAILY WITH MEALS
Qty: 28 TABLET | Refills: 0 | Status: SHIPPED | OUTPATIENT
Start: 2020-02-14 | End: 2020-04-22

## 2020-02-14 RX ORDER — FUROSEMIDE 20 MG/1
TABLET ORAL
Qty: 90 TABLET | Refills: 1 | Status: SHIPPED | OUTPATIENT
Start: 2020-02-14 | End: 2020-02-14 | Stop reason: SDUPTHER

## 2020-02-14 RX ORDER — CARVEDILOL 6.25 MG/1
TABLET ORAL
Qty: 90 TABLET | Refills: 1 | Status: SHIPPED | OUTPATIENT
Start: 2020-02-14 | End: 2020-02-14 | Stop reason: SDUPTHER

## 2020-02-14 NOTE — TELEPHONE ENCOUNTER
Patient needs 2 weeks supply of Coreg and lasix sent to [facility] and [address]  to last until mail order arrives

## 2020-03-02 ENCOUNTER — TELEPHONE (OUTPATIENT)
Dept: INTERNAL MEDICINE | Facility: CLINIC | Age: 80
End: 2020-03-02

## 2020-03-02 RX ORDER — OSELTAMIVIR PHOSPHATE 75 MG/1
75 CAPSULE ORAL DAILY
Qty: 10 CAPSULE | Refills: 0 | Status: SHIPPED | OUTPATIENT
Start: 2020-03-02 | End: 2020-05-07

## 2020-03-02 NOTE — TELEPHONE ENCOUNTER
PATIENT WIFE IS REQUESTING TAMIFLU . PATIENT GRANDSON WAS DIAGNOSED WITH FLU AND PATIENT WATCHED HIM OVER THE WEEKEND.    PLEASE CALL AND ADVISE [phone]      [id number] [address] AT [address] [facility] [phone] [facility] [phone]

## 2020-04-10 ENCOUNTER — TELEPHONE (OUTPATIENT)
Dept: INTERNAL MEDICINE | Facility: CLINIC | Age: 80
End: 2020-04-10

## 2020-04-10 DIAGNOSIS — J18.9 PNEUMONIA OF BOTH LOWER LOBES DUE TO INFECTIOUS ORGANISM: Primary | ICD-10-CM

## 2020-04-10 DIAGNOSIS — R26.81 GAIT INSTABILITY: ICD-10-CM

## 2020-04-10 NOTE — TELEPHONE ENCOUNTER
Copied from patient's daughter's portal message, sent in her chart:    So Dad, Tho Vasquez ( [birth year]) has moved in with my sister Mary now and basically, other than going to and from the bathroom, hasn't moved since last Thursday.  One week of not moving and he can't make it to the bathroom without sitting down twice on the way.  Is there any way to get a PT referral?  We know a PT at [facility], (London Murillo) that we like that says he can help him if we can get a referral.       Thanks,   Mallory

## 2020-04-13 ENCOUNTER — TELEPHONE (OUTPATIENT)
Dept: INTERNAL MEDICINE | Facility: CLINIC | Age: 80
End: 2020-04-13

## 2020-04-13 RX ORDER — NYSTATIN 100000 [USP'U]/G
POWDER TOPICAL 3 TIMES DAILY
Qty: 60 G | Refills: 1 | Status: SHIPPED | OUTPATIENT
Start: 2020-04-13 | End: 2022-07-05 | Stop reason: SDUPTHER

## 2020-04-13 NOTE — TELEPHONE ENCOUNTER
The message below was sent in patient's daughter's chart:    Hello,   This message is again in reference to my father, Tho Marte,  [birth year].  He had a bed sore when he left the hospital in the fall after having had pneumonia.  After not moving around last week, as mentioned in my previous message asking for a home health PT referral for him, that bed sore has come back.  He had a prescription for something called NYSTOP that helped with that bed sore last time and was hoping he might be able to get a refill for it to help this time.  I have attached a picture of the bottle to this message.  He would need it called in to the [facility] at the corner of [address] and [address].  If you have any questions please let me know.  My number is [phone].     Thank you,   Mallory Mullins

## 2020-04-13 NOTE — TELEPHONE ENCOUNTER
Dgtr sent this message via [employer].   I have copied the info into his chart and let her know to please call our office or use her fathers [employer] for communication.            Hello,   This message is again in reference to my father, Tho Marte,  [birth year].  He had a bed sore when he left the hospital in the fall after having had pneumonia.  After not moving around last week, as mentioned in my previous message asking for a home health PT referral for him, that bed sore has come back.  He had a prescription for something called NYSTOP that helped with that bed sore last time and was hoping he might be able to get a refill for it to help this time.  I have attached a picture of the bottle to this message.  He would need it called in to the [facility] at the corner of [address] and [address].  If you have any questions please let me know.  My number is [phone].

## 2020-04-22 RX ORDER — CARVEDILOL 6.25 MG/1
TABLET ORAL
Qty: 180 TABLET | Refills: 3 | Status: SHIPPED | OUTPATIENT
Start: 2020-04-22 | End: 2021-04-26

## 2020-05-07 ENCOUNTER — TELEMEDICINE (OUTPATIENT)
Dept: INTERNAL MEDICINE | Facility: CLINIC | Age: 80
End: 2020-05-07

## 2020-05-07 DIAGNOSIS — I87.2 VENOUS INSUFFICIENCY OF BOTH LOWER EXTREMITIES: ICD-10-CM

## 2020-05-07 DIAGNOSIS — I10 BENIGN ESSENTIAL HYPERTENSION: Primary | ICD-10-CM

## 2020-05-07 DIAGNOSIS — I25.10 ASHD (ARTERIOSCLEROTIC HEART DISEASE): ICD-10-CM

## 2020-05-07 DIAGNOSIS — E66.01 MORBID OBESITY (HCC): ICD-10-CM

## 2020-05-07 PROBLEM — J18.9 PNEUMONIA OF BOTH LOWER LOBES DUE TO INFECTIOUS ORGANISM: Status: RESOLVED | Noted: 2019-10-03 | Resolved: 2020-05-07

## 2020-05-07 PROCEDURE — 99214 OFFICE O/P EST MOD 30 MIN: CPT | Performed by: INTERNAL MEDICINE

## 2020-05-07 NOTE — PROGRESS NOTES
[facility]     Tho Vasquez  1940   [id number]      Patient Care Team:  Ja Baldwin MD as PCP - General (Internal Medicine)  Abhijit Vergara DPM as PCP - Claims Attributed  Danny Rdz Jr., MD as Consulting Physician (Ophthalmology)    Chief Complaint::   Chief Complaint   Patient presents with   • Hypertension      You have chosen to receive care through a telehealth visit.  Do you consent to use a video/audio connection for your medical care today? Yes      HPI  Mr. Romero is seen today via video visit for follow-up of his hypertension, venous insufficiency, coronary artery disease and obesity.  Overall he is doing very well.  He is walking better.  He is now walking without a cane.  He admits he sitting more with his feet up.  This has alleviated his swelling but he has had some skin breakdown on his buttocks.  His daughters are helping him work on this and that is getting better.  He last fell in March without serious injury.  There is no fever, cough, shortness of breath or chest pain.  His weight is 220.    Chronic Conditions:      Patient Active Problem List   Diagnosis   • Mixed hyperlipidemia   • Abnormal glucose level   • Abrasion   • Adult BMI 37.0-37.9 kg/sq m   • Annual physical exam   • ASHD (arteriosclerotic heart disease)   • Benign essential hypertension   • Chronic kidney disease, stage 3 (CMS/HCC)   • Medicare annual wellness visit, subsequent   • Morbid obesity (CMS/HCC)   • Obstructive sleep apnea   • Old myocardial infarction   • Vitamin D deficiency   • Venous insufficiency of both lower extremities   • Transaminitis        Past Medical History:   Diagnosis Date   • Allergic ?   • Myocardial infarction (CMS/HCC)    • Pneumonia of both lower lobes due to infectious organism (CMS/HCC) 10/3/2019   • Positive PPD    • Respiratory failure (CMS/HCC) 2005    requiring tracheostomy       Past Surgical History:   Procedure Laterality Date   • CARDIAC SURGERY      • HERNIA REPAIR         Family History   Problem Relation Age of Onset   • Arthritis Mother    • Heart attack Father    • Stroke Father    • Diabetes Father    • Heart attack Brother    • Cancer Paternal Uncle    • Obesity Other        Social History     Socioeconomic History   • Marital status:      Spouse name: Not on file   • Number of children: Not on file   • Years of education: Not on file   • Highest education level: Not on file   Tobacco Use   • Smoking status: Former Smoker     Packs/day: 0.00     Years: 0.00     Pack years: 0.00     Types: Cigars   • Smokeless tobacco: Never Used   Substance and Sexual Activity   • Alcohol use: No     Frequency: Never   • Drug use: Never   • Sexual activity: Not Currently       Allergies   Allergen Reactions   • Prednisone Rash         Current Outpatient Medications:   •  albuterol (PROVENTIL) (2.5 MG/3ML) 0.083% nebulizer solution, Take 2.5 mg by nebulization Every 4 (Four) Hours As Needed for Wheezing., Disp: 100 vial, Rfl: 1  •  aspirin 81 MG tablet, Take 1 tablet by mouth Daily., Disp: , Rfl:   •  atorvastatin (LIPITOR) 40 MG tablet, TAKE 1 TABLET DAILY, Disp: 90 tablet, Rfl: 1  •  carvedilol (COREG) 6.25 MG tablet, TAKE 1 TABLET TWICE DAILY  WITH MEALS, Disp: 180 tablet, Rfl: 3  •  famotidine (PEPCID) 20 MG tablet, Take 1 tablet by mouth Every 12 (Twelve) Hours., Disp: , Rfl:   •  fenofibrate (TRICOR) 145 MG tablet, TAKE 1 TABLET DAILY, Disp: 90 tablet, Rfl: 1  •  Ferrous Sulfate (IRON) 325 (65 Fe) MG tablet, Take 1 tablet by mouth Daily., Disp: , Rfl:   •  furosemide (LASIX) 20 MG tablet, Take 1 tablet by mouth Daily., Disp: 14 tablet, Rfl: 0  •  nystatin (MYCOSTATIN) 314987 UNIT/GM powder, Apply  topically to the appropriate area as directed 3 (Three) Times a Day., Disp: 60 g, Rfl: 1  •  valsartan-hydrochlorothiazide (DIOVAN-HCT) 160-12.5 MG per tablet, TAKE 1 TABLET DAILY, Disp: 90 tablet, Rfl: 1    Review of Systems   Constitutional: Negative.  Negative  for fever.   Respiratory: Negative.  Negative for cough, chest tightness and shortness of breath.    Cardiovascular: Negative.  Negative for chest pain.   Gastrointestinal: Negative for abdominal pain, blood in stool, constipation and diarrhea.        Vital Signs  There were no vitals filed for this visit.    Physical Exam   Constitutional: He is oriented to person, place, and time. He appears well-developed and well-nourished. No distress. He is obese.  HENT:   Head: Normocephalic and atraumatic.   Eyes: Pupils are equal, round, and reactive to light. EOM are normal.   Neck: Normal range of motion. No tracheal deviation present.   Pulmonary/Chest: Effort normal. No respiratory distress.   Neurological: He is alert and oriented to person, place, and time. No cranial nerve deficit.   Psychiatric: He has a normal mood and affect. His behavior is normal. Judgment and thought content normal.      Procedures    ACE III MINI             Assessment/Plan:    Tho was seen today for hypertension.    Diagnoses and all orders for this visit:    Benign essential hypertension    Venous insufficiency of both lower extremities    ASHD (arteriosclerotic heart disease)    Morbid obesity (CMS/[facility])    Plan    Blood pressure appears to be controlled on carvedilol and valsartan hydrochlorothiazide.    Lower extremity edema is controlled with elevation.    Coronary disease is asymptomatic on aspirin, atorvastatin and carvedilol.    He will continue working on healthy diet and exercise for obesity.  I encouraged him to walk 5 to 10 minutes in his house continuously twice a day and then with the help of his daughters try to walk outside.      Plan of care reviewed with patient at the conclusion of today's visit. Education was provided regarding diagnosis, management, and any prescribed or recommended OTC medications.Patient verbalizes understanding of and agreement with management plan.         Ja Baldwin MD

## 2020-06-22 RX ORDER — ATORVASTATIN CALCIUM 40 MG/1
TABLET, FILM COATED ORAL
Qty: 90 TABLET | Refills: 1 | Status: SHIPPED | OUTPATIENT
Start: 2020-06-22 | End: 2020-12-16

## 2020-06-22 RX ORDER — VALSARTAN AND HYDROCHLOROTHIAZIDE 160; 12.5 MG/1; MG/1
TABLET, FILM COATED ORAL
Qty: 90 TABLET | Refills: 1 | Status: SHIPPED | OUTPATIENT
Start: 2020-06-22 | End: 2020-07-09 | Stop reason: SDUPTHER

## 2020-06-22 RX ORDER — FENOFIBRATE 145 MG/1
TABLET, COATED ORAL
Qty: 90 TABLET | Refills: 1 | Status: SHIPPED | OUTPATIENT
Start: 2020-06-22 | End: 2020-12-16

## 2020-07-09 ENCOUNTER — TELEPHONE (OUTPATIENT)
Dept: INTERNAL MEDICINE | Facility: CLINIC | Age: 80
End: 2020-07-09

## 2020-07-09 RX ORDER — VALSARTAN AND HYDROCHLOROTHIAZIDE 160; 12.5 MG/1; MG/1
1 TABLET, FILM COATED ORAL DAILY
Qty: 90 TABLET | Refills: 1 | Status: SHIPPED | OUTPATIENT
Start: 2020-07-09 | End: 2020-10-05

## 2020-07-09 RX ORDER — VALSARTAN 160 MG/1
TABLET ORAL
Qty: 90 TABLET | Refills: 0 | OUTPATIENT
Start: 2020-07-09

## 2020-07-09 NOTE — TELEPHONE ENCOUNTER
release has only the wife listed as contact we can speak with regarding patient.   Dgtr Mallory Mullins request release mailed to the patient home address for patient to sign an update her as a contact.  Jennifer ( wife) is now in a long term care facility.        Can you mail release for patient to update?  He does not have an appt until November

## 2020-07-20 RX ORDER — FUROSEMIDE 20 MG/1
TABLET ORAL
Qty: 90 TABLET | Refills: 1 | Status: SHIPPED | OUTPATIENT
Start: 2020-07-20 | End: 2020-12-21 | Stop reason: SDUPTHER

## 2020-09-23 RX ORDER — VALSARTAN 160 MG/1
TABLET ORAL
Qty: 90 TABLET | Refills: 1 | Status: SHIPPED | OUTPATIENT
Start: 2020-09-23 | End: 2020-10-05 | Stop reason: SDUPTHER

## 2020-09-23 NOTE — TELEPHONE ENCOUNTER
The following alert was on the prescription request    The original prescription was discontinued on 5/7/2020 by Ja Baldwin MD. Renewing this prescription may not be appropriate.

## 2020-09-25 ENCOUNTER — TELEPHONE (OUTPATIENT)
Dept: INTERNAL MEDICINE | Facility: CLINIC | Age: 80
End: 2020-09-25

## 2020-09-25 NOTE — TELEPHONE ENCOUNTER
[facility]  TWO PRESCRIPTIONS .   valsartan (DIOVAN) 160 MG tablet  valsartan-hydrochlorothiazide (DIOVAN-HCT) 160-12.5 MG per tablet    IS PATIENT TAKING COMBINATION MEDICATION OR PLAIN MEDICATION?    PLEASE CALL AT : 1- [phone] OPTION. 2

## 2020-09-28 NOTE — TELEPHONE ENCOUNTER
There is a discrepancy in the chart.  Please discuss with his daughter to figure out what he is taking.

## 2020-09-30 ENCOUNTER — TELEPHONE (OUTPATIENT)
Dept: INTERNAL MEDICINE | Facility: CLINIC | Age: 80
End: 2020-09-30

## 2020-09-30 NOTE — TELEPHONE ENCOUNTER
CY RETURNED CALL. SHE SAID SHE WOULD PUT A MESSAGE ON THE PORTAL REGARDING HIS VALSARTIN AND WHAT HE IS TAKING OR YOU CAN CALL

## 2020-10-05 RX ORDER — VALSARTAN 160 MG/1
160 TABLET ORAL DAILY
Qty: 90 TABLET | Refills: 1 | Status: SHIPPED | OUTPATIENT
Start: 2020-10-05 | End: 2021-03-15

## 2020-10-06 ENCOUNTER — TELEPHONE (OUTPATIENT)
Dept: INTERNAL MEDICINE | Facility: CLINIC | Age: 80
End: 2020-10-06

## 2020-10-06 NOTE — TELEPHONE ENCOUNTER
Yes it is ok.  Our only question was whether he was taking valsartan or valsartan-HCT.  He is on valsartan.

## 2020-10-06 NOTE — TELEPHONE ENCOUNTER
Called and spoke with Ariana mcmillan at [facility]. She verbalized understanding.

## 2020-11-09 ENCOUNTER — TELEPHONE (OUTPATIENT)
Dept: INTERNAL MEDICINE | Facility: CLINIC | Age: 80
End: 2020-11-09

## 2020-11-12 ENCOUNTER — OFFICE VISIT (OUTPATIENT)
Dept: INTERNAL MEDICINE | Facility: CLINIC | Age: 80
End: 2020-11-12

## 2020-11-12 DIAGNOSIS — G47.33 OBSTRUCTIVE SLEEP APNEA: ICD-10-CM

## 2020-11-12 DIAGNOSIS — E78.2 MIXED HYPERLIPIDEMIA: ICD-10-CM

## 2020-11-12 DIAGNOSIS — I25.10 ASHD (ARTERIOSCLEROTIC HEART DISEASE): ICD-10-CM

## 2020-11-12 DIAGNOSIS — E66.01 MORBID OBESITY (HCC): ICD-10-CM

## 2020-11-12 DIAGNOSIS — Z00.00 MEDICARE ANNUAL WELLNESS VISIT, SUBSEQUENT: Primary | ICD-10-CM

## 2020-11-12 DIAGNOSIS — I10 BENIGN ESSENTIAL HYPERTENSION: ICD-10-CM

## 2020-11-12 DIAGNOSIS — R73.09 ABNORMAL GLUCOSE LEVEL: ICD-10-CM

## 2020-11-12 DIAGNOSIS — N18.31 STAGE 3A CHRONIC KIDNEY DISEASE (HCC): ICD-10-CM

## 2020-11-12 DIAGNOSIS — E55.9 VITAMIN D DEFICIENCY: ICD-10-CM

## 2020-11-12 PROCEDURE — G0439 PPPS, SUBSEQ VISIT: HCPCS | Performed by: INTERNAL MEDICINE

## 2020-11-12 NOTE — PROGRESS NOTES
The ABCs of the Annual Wellness Visit  Subsequent Medicare Wellness Visit    Chief Complaint   Patient presents with   • Medicare Wellness-Initial Visit     You have chosen to receive care through a telephone visit. Do you consent to use a telephone visit for your medical care today? Yes      Subjective   History of Present Illness:  Tho Vasquez is a 80 y.o. male who presents for a Subsequent Medicare Wellness Visit.  This visit was performed via video visit.    HEALTH RISK ASSESSMENT    Recent Hospitalizations:  No hospitalization(s) within the last year.    Current Medical Providers:  Patient Care Team:  Ja Baldwin MD as PCP - General (Internal Medicine)  Danny Rdz Jr., MD as Consulting Physician (Ophthalmology)    Smoking Status:  Social History     Tobacco Use   Smoking Status Former Smoker   • Packs/day: 0.00   • Years: 0.00   • Pack years: 0.00   • Types: Cigars   Smokeless Tobacco Never Used       Alcohol Consumption:  Social History     Substance and Sexual Activity   Alcohol Use No   • Frequency: Never       Depression Screen:   PHQ-2/PHQ-9 Depression Screening 11/12/2020   Little interest or pleasure in doing things 0   Feeling down, depressed, or hopeless 0   Total Score 0       Fall Risk Screen:  STEADI Fall Risk Assessment has not been completed.    Health Habits and Functional and Cognitive Screening:  Functional & Cognitive Status 11/12/2020   Do you have difficulty preparing food and eating? No   Do you have difficulty bathing yourself, getting dressed or grooming yourself? No   Do you have difficulty using the toilet? No   Do you have difficulty moving around from place to place? No   Do you have trouble with steps or getting out of a bed or a chair? No   Current Diet Well Balanced Diet   Dental Exam Up to date   Eye Exam Up to date   Exercise (times per week) 0 times per week   Current Exercise Activities Include None   Do you need help using the phone?  No   Are you deaf or  do you have serious difficulty hearing?  No   Do you need help with transportation? No   Do you need help shopping? No   Do you need help preparing meals?  No   Do you need help with housework?  No   Do you need help with laundry? No   Do you need help taking your medications? No   Do you need help managing money? No   Do you ever drive or ride in a car without wearing a seat belt? No   Have you felt unusual stress, anger or loneliness in the last month? No   Who do you live with? Child   If you need help, do you have trouble finding someone available to you? No   Have you been bothered in the last four weeks by sexual problems? No   Do you have difficulty concentrating, remembering or making decisions? No         Does the patient have evidence of cognitive impairment? Yes    Asprin use counseling:Taking ASA appropriately as indicated    Age-appropriate Screening Schedule:  Refer to the list below for future screening recommendations based on patient's age, sex and/or medical conditions. Orders for these recommended tests are listed in the plan section. The patient has been provided with a written plan.    Health Maintenance   Topic Date Due   • ZOSTER VACCINE (2 of 2) 07/11/2018   • LIPID PANEL  05/17/2020   • INFLUENZA VACCINE  08/01/2020   • TDAP/TD VACCINES (2 - Td) 11/30/2027          The following portions of the patient's history were reviewed and updated as appropriate: allergies, current medications, past family history, past medical history, past social history, past surgical history and problem list.    Outpatient Medications Prior to Visit   Medication Sig Dispense Refill   • albuterol (PROVENTIL) (2.5 MG/3ML) 0.083% nebulizer solution Take 2.5 mg by nebulization Every 4 (Four) Hours As Needed for Wheezing. 100 vial 1   • aspirin 81 MG tablet Take 1 tablet by mouth Daily.     • atorvastatin (LIPITOR) 40 MG tablet TAKE 1 TABLET DAILY 90 tablet 1   • carvedilol (COREG) 6.25 MG tablet TAKE 1 TABLET TWICE  DAILY  WITH MEALS 180 tablet 3   • famotidine (PEPCID) 20 MG tablet Take 1 tablet by mouth Every 12 (Twelve) Hours.     • fenofibrate (TRICOR) 145 MG tablet TAKE 1 TABLET DAILY 90 tablet 1   • Ferrous Sulfate (IRON) 325 (65 Fe) MG tablet Take 1 tablet by mouth Daily.     • furosemide (LASIX) 20 MG tablet TAKE 1 TABLET DAILY 90 tablet 1   • nystatin (MYCOSTATIN) 187204 UNIT/GM powder Apply  topically to the appropriate area as directed 3 (Three) Times a Day. 60 g 1   • valsartan (DIOVAN) 160 MG tablet Take 1 tablet by mouth Daily. 90 tablet 1     No facility-administered medications prior to visit.        Patient Active Problem List   Diagnosis   • Mixed hyperlipidemia   • Abnormal glucose level   • Abrasion   • Adult BMI 37.0-37.9 kg/sq m   • Annual physical exam   • ASHD (arteriosclerotic heart disease)   • Benign essential hypertension   • Chronic kidney disease, stage 3   • Medicare annual wellness visit, subsequent   • Morbid obesity (CMS/[facility])   • Obstructive sleep apnea   • Old myocardial infarction   • Vitamin D deficiency   • Venous insufficiency of both lower extremities   • Transaminitis       Advanced Care Planning:  ACP discussion was held with the patient during this visit. Patient has an advance directive (not in EMR), copy requested.    Review of Systems   Constitutional: Negative for chills, fatigue and fever.   HENT: Negative for congestion, ear pain and sinus pressure.    Respiratory: Negative for cough, chest tightness, shortness of breath and wheezing.    Cardiovascular: Negative for chest pain and palpitations.   Gastrointestinal: Negative for abdominal pain, blood in stool and constipation.   Skin: Negative for color change.   Allergic/Immunologic: Negative for environmental allergies.   Neurological: Negative for dizziness, speech difficulty and headaches.   Psychiatric/Behavioral: Negative for confusion. The patient is not nervous/anxious.        Compared to one year ago, the patient feels his  physical health is the same.  Compared to one year ago, the patient feels his mental health is the same.    Reviewed chart for potential of high risk medication in the elderly: yes  Reviewed chart for potential of harmful drug interactions in the elderly:yes    Objective       There were no vitals filed for this visit.    There is no height or weight on file to calculate BMI.  Discussed the patient's BMI with him. The BMI is above average; BMI management plan is completed.    Physical Exam  Constitutional:       General: He is not in acute distress.     Appearance: He is obese. He is not ill-appearing.   HENT:      Head: Normocephalic and atraumatic.   Eyes:      Extraocular Movements: Extraocular movements intact.      Pupils: Pupils are equal, round, and reactive to light.   Neck:      Musculoskeletal: Normal range of motion. No neck rigidity.   Pulmonary:      Effort: No respiratory distress.   Neurological:      General: No focal deficit present.      Mental Status: He is alert and oriented to person, place, and time.      Cranial Nerves: No cranial nerve deficit.   Psychiatric:         Mood and Affect: Mood normal.         Thought Content: Thought content normal.               Assessment/Plan   Medicare Risks and Personalized Health Plan  CMS Preventative Services Quick Reference  Advance Directive Discussion    The above risks/problems have been discussed with the patient.  Pertinent information has been shared with the patient in the After Visit Summary.  Follow up plans and orders are seen below in the Assessment/Plan Section.    Diagnoses and all orders for this visit:    1. Medicare annual wellness visit, subsequent (Primary)    2. ASHD (arteriosclerotic heart disease)    3. Benign essential hypertension    4. Mixed hyperlipidemia    5. Obstructive sleep apnea    6. Morbid obesity (CMS/[facility])    7. Vitamin D deficiency    8. Stage 3a chronic kidney disease    9. Abnormal glucose level      Plan    Overall he  appears to be doing very well.  He now lives with his daughter, Mariah.  His functional ability to get up and around has improved since he first moved there, he no longer needs a cane.  He has had his flu shot.    Coronary artery disease is asymptomatic on valsartan, atorvastatin, carvedilol and aspirin.    Blood pressure is elevated today at 162/98 but he has not yet taken his medication.  He will continue valsartan and carvedilol, his daughter, Mallory, will repeat his blood pressure later today after he is taken his blood pressure medication and will adjust accordingly.    He will continue CPAP for sleep apnea.    There is no recent weight.  However, on the video, judging by his appearance, his remains significantly overweight.  He will continue working on healthy diet and weight loss.    Hopefully we can obtain labs in the spring if he feels safe to come to the office.  He is due to have lipids, A1c and renal function done.  For now the treatment remains healthy diet, avoidance of weight gain, atorvastatin and fenofibrate, and for his kidneys, avoidance of NSAIDs.      Follow Up:  No follow-ups on file.     An After Visit Summary and PPPS were given to the patient.

## 2020-12-16 RX ORDER — ATORVASTATIN CALCIUM 40 MG/1
TABLET, FILM COATED ORAL
Qty: 90 TABLET | Refills: 1 | Status: SHIPPED | OUTPATIENT
Start: 2020-12-16 | End: 2021-06-11

## 2020-12-16 RX ORDER — FENOFIBRATE 145 MG/1
TABLET, COATED ORAL
Qty: 90 TABLET | Refills: 1 | Status: SHIPPED | OUTPATIENT
Start: 2020-12-16 | End: 2021-06-11

## 2020-12-19 ENCOUNTER — PATIENT MESSAGE (OUTPATIENT)
Dept: INTERNAL MEDICINE | Facility: CLINIC | Age: 80
End: 2020-12-19

## 2020-12-21 RX ORDER — FUROSEMIDE 20 MG/1
20 TABLET ORAL DAILY
Qty: 14 TABLET | Refills: 0 | Status: SHIPPED | OUTPATIENT
Start: 2020-12-21 | End: 2021-07-12 | Stop reason: SDUPTHER

## 2020-12-21 RX ORDER — FUROSEMIDE 20 MG/1
20 TABLET ORAL DAILY
Qty: 90 TABLET | Refills: 1 | Status: SHIPPED | OUTPATIENT
Start: 2020-12-21 | End: 2021-07-15 | Stop reason: SDUPTHER

## 2020-12-21 NOTE — TELEPHONE ENCOUNTER
From: Tho Vasquez  To: Ja Baldwin MD  Sent: 12/19/2020 9:56 AM EST  Subject: Prescription Question    Hello,    Somehow we have missed a refill of the water pill (FUROSEMIDE 20MG TAB). It was requested through [facility] on 12/8 but for some reason won't process until 12/24. He is completely out and has been for a couple of days. Is there any way to get a partial refill called into [facility] [address]?     Thanks!

## 2020-12-21 NOTE — TELEPHONE ENCOUNTER
From: Tho Vasquez  To: Ja Baldwin MD  Sent: 12/19/2020 9:56 AM EST  Subject: Prescription Question    Hello,    Somehow we have missed a refill of the water pill (FUROSEMIDE 20MG TAB). It was requested through [facility] on 12/8 but for some reason won't process until 12/24. He is completely out and has been for a couple of days. Is there any way to get a partial refill called into [facility] [address]?     Thanks!

## 2020-12-22 ENCOUNTER — TELEPHONE (OUTPATIENT)
Dept: INTERNAL MEDICINE | Facility: CLINIC | Age: 80
End: 2020-12-22

## 2020-12-22 NOTE — TELEPHONE ENCOUNTER
Caller: tanya lord    Relationship: Emergency Contact    Best call back number: [phone]    Medication needed: furosemide (LASIX) 20 MG tablet      When do you need the refill by: 12/21/2020    What details did the patient provide when requesting the medication: PATIENTS DAUGHTER REQUESTED THE REFILL THROUGH MY CHART. PATIENTS DAUGHTER SPOKE TO [facility] THE MEDICATION WILL BE PROCESSED ON 12/24/2020 HOWEVER THE PATIENT IS COMPLETELY OUT OF THE MEDICATION AND NEEDS TO FILL THE MEDICATION FOR A WEEK SUPPLY UNTIL HE CAN GET THE MAIL ORDER SCRIPT.     Does the patient have less than a 3 day supply:  [x] Yes  [] No    What is the patient's preferred pharmacy: [id number] [address] AT [facility] [phone] [facility] [phone]

## 2020-12-22 NOTE — TELEPHONE ENCOUNTER
Daughter verbalized understanding that the local supply was sent in yesterday along with mail order

## 2021-03-15 RX ORDER — VALSARTAN 160 MG/1
TABLET ORAL
Qty: 90 TABLET | Refills: 1 | Status: SHIPPED | OUTPATIENT
Start: 2021-03-15 | End: 2021-09-20

## 2021-04-26 RX ORDER — CARVEDILOL 6.25 MG/1
TABLET ORAL
Qty: 180 TABLET | Refills: 3 | Status: SHIPPED | OUTPATIENT
Start: 2021-04-26 | End: 2021-11-24 | Stop reason: SDUPTHER

## 2021-06-11 RX ORDER — FENOFIBRATE 145 MG/1
TABLET, COATED ORAL
Qty: 90 TABLET | Refills: 1 | Status: SHIPPED | OUTPATIENT
Start: 2021-06-11 | End: 2021-11-24 | Stop reason: SDUPTHER

## 2021-06-11 RX ORDER — ATORVASTATIN CALCIUM 40 MG/1
TABLET, FILM COATED ORAL
Qty: 90 TABLET | Refills: 1 | Status: SHIPPED | OUTPATIENT
Start: 2021-06-11 | End: 2021-11-24 | Stop reason: SDUPTHER

## 2021-07-12 RX ORDER — FUROSEMIDE 20 MG/1
20 TABLET ORAL DAILY
Qty: 14 TABLET | Refills: 0 | Status: SHIPPED | OUTPATIENT
Start: 2021-07-12 | End: 2021-07-14 | Stop reason: SDUPTHER

## 2021-07-14 RX ORDER — FUROSEMIDE 20 MG/1
20 TABLET ORAL DAILY
Qty: 14 TABLET | Refills: 0 | Status: SHIPPED | OUTPATIENT
Start: 2021-07-14 | End: 2021-11-24

## 2021-07-15 RX ORDER — FUROSEMIDE 20 MG/1
20 TABLET ORAL DAILY
Qty: 90 TABLET | Refills: 1 | Status: SHIPPED | OUTPATIENT
Start: 2021-07-15 | End: 2021-11-24 | Stop reason: SDUPTHER

## 2021-09-20 RX ORDER — VALSARTAN 160 MG/1
TABLET ORAL
Qty: 90 TABLET | Refills: 0 | Status: SHIPPED | OUTPATIENT
Start: 2021-09-20 | End: 2021-11-24 | Stop reason: SDUPTHER

## 2021-11-24 ENCOUNTER — LAB (OUTPATIENT)
Dept: LAB | Facility: HOSPITAL | Age: 81
End: 2021-11-24

## 2021-11-24 ENCOUNTER — OFFICE VISIT (OUTPATIENT)
Dept: INTERNAL MEDICINE | Facility: CLINIC | Age: 81
End: 2021-11-24

## 2021-11-24 VITALS
TEMPERATURE: 98.1 F | SYSTOLIC BLOOD PRESSURE: 154 MMHG | RESPIRATION RATE: 16 BRPM | DIASTOLIC BLOOD PRESSURE: 90 MMHG | BODY MASS INDEX: 30.57 KG/M2 | OXYGEN SATURATION: 94 % | WEIGHT: 218.4 LBS | HEART RATE: 82 BPM | HEIGHT: 71 IN

## 2021-11-24 DIAGNOSIS — R73.09 ABNORMAL GLUCOSE LEVEL: ICD-10-CM

## 2021-11-24 DIAGNOSIS — I10 BENIGN ESSENTIAL HYPERTENSION: ICD-10-CM

## 2021-11-24 DIAGNOSIS — E78.2 MIXED HYPERLIPIDEMIA: ICD-10-CM

## 2021-11-24 DIAGNOSIS — N18.31 STAGE 3A CHRONIC KIDNEY DISEASE (HCC): ICD-10-CM

## 2021-11-24 DIAGNOSIS — I10 BENIGN ESSENTIAL HYPERTENSION: Primary | ICD-10-CM

## 2021-11-24 LAB
ALBUMIN SERPL-MCNC: 3.6 G/DL (ref 3.5–5.2)
ALBUMIN/GLOB SERPL: 1.2 G/DL
ALP SERPL-CCNC: 54 U/L (ref 39–117)
ALT SERPL W P-5'-P-CCNC: 21 U/L (ref 1–41)
ANION GAP SERPL CALCULATED.3IONS-SCNC: 9.9 MMOL/L (ref 5–15)
AST SERPL-CCNC: 34 U/L (ref 1–40)
BASOPHILS # BLD AUTO: 0.05 10*3/MM3 (ref 0–0.2)
BASOPHILS NFR BLD AUTO: 0.4 % (ref 0–1.5)
BILIRUB SERPL-MCNC: 0.9 MG/DL (ref 0–1.2)
BUN SERPL-MCNC: 20 MG/DL (ref 8–23)
BUN/CREAT SERPL: 17.5 (ref 7–25)
CALCIUM SPEC-SCNC: 9.5 MG/DL (ref 8.6–10.5)
CHLORIDE SERPL-SCNC: 103 MMOL/L (ref 98–107)
CHOLEST SERPL-MCNC: 122 MG/DL (ref 0–200)
CO2 SERPL-SCNC: 27.1 MMOL/L (ref 22–29)
CREAT SERPL-MCNC: 1.14 MG/DL (ref 0.76–1.27)
DEPRECATED RDW RBC AUTO: 45.5 FL (ref 37–54)
EOSINOPHIL # BLD AUTO: 0.23 10*3/MM3 (ref 0–0.4)
EOSINOPHIL NFR BLD AUTO: 2 % (ref 0.3–6.2)
ERYTHROCYTE [DISTWIDTH] IN BLOOD BY AUTOMATED COUNT: 13.4 % (ref 12.3–15.4)
GFR SERPL CREATININE-BSD FRML MDRD: 62 ML/MIN/1.73
GLOBULIN UR ELPH-MCNC: 3 GM/DL
GLUCOSE SERPL-MCNC: 98 MG/DL (ref 65–99)
HCT VFR BLD AUTO: 44.1 % (ref 37.5–51)
HDLC SERPL-MCNC: 28 MG/DL (ref 40–60)
HGB BLD-MCNC: 15 G/DL (ref 13–17.7)
IMM GRANULOCYTES # BLD AUTO: 0.05 10*3/MM3 (ref 0–0.05)
IMM GRANULOCYTES NFR BLD AUTO: 0.4 % (ref 0–0.5)
LDLC SERPL CALC-MCNC: 70 MG/DL (ref 0–100)
LDLC/HDLC SERPL: 2.41 {RATIO}
LYMPHOCYTES # BLD AUTO: 1.99 10*3/MM3 (ref 0.7–3.1)
LYMPHOCYTES NFR BLD AUTO: 17.7 % (ref 19.6–45.3)
MCH RBC QN AUTO: 32 PG (ref 26.6–33)
MCHC RBC AUTO-ENTMCNC: 34 G/DL (ref 31.5–35.7)
MCV RBC AUTO: 94 FL (ref 79–97)
MONOCYTES # BLD AUTO: 1.11 10*3/MM3 (ref 0.1–0.9)
MONOCYTES NFR BLD AUTO: 9.9 % (ref 5–12)
NEUTROPHILS NFR BLD AUTO: 69.6 % (ref 42.7–76)
NEUTROPHILS NFR BLD AUTO: 7.81 10*3/MM3 (ref 1.7–7)
NRBC BLD AUTO-RTO: 0 /100 WBC (ref 0–0.2)
PLATELET # BLD AUTO: 170 10*3/MM3 (ref 140–450)
PMV BLD AUTO: 12.9 FL (ref 6–12)
POTASSIUM SERPL-SCNC: 4.4 MMOL/L (ref 3.5–5.2)
PROT SERPL-MCNC: 6.6 G/DL (ref 6–8.5)
RBC # BLD AUTO: 4.69 10*6/MM3 (ref 4.14–5.8)
SODIUM SERPL-SCNC: 140 MMOL/L (ref 136–145)
TRIGL SERPL-MCNC: 133 MG/DL (ref 0–150)
VLDLC SERPL-MCNC: 24 MG/DL (ref 5–40)
WBC NRBC COR # BLD: 11.24 10*3/MM3 (ref 3.4–10.8)

## 2021-11-24 PROCEDURE — 80061 LIPID PANEL: CPT

## 2021-11-24 PROCEDURE — G0008 ADMIN INFLUENZA VIRUS VAC: HCPCS | Performed by: INTERNAL MEDICINE

## 2021-11-24 PROCEDURE — 85025 COMPLETE CBC W/AUTO DIFF WBC: CPT

## 2021-11-24 PROCEDURE — 90662 IIV NO PRSV INCREASED AG IM: CPT | Performed by: INTERNAL MEDICINE

## 2021-11-24 PROCEDURE — 83036 HEMOGLOBIN GLYCOSYLATED A1C: CPT

## 2021-11-24 PROCEDURE — 80053 COMPREHEN METABOLIC PANEL: CPT

## 2021-11-24 PROCEDURE — 99214 OFFICE O/P EST MOD 30 MIN: CPT | Performed by: INTERNAL MEDICINE

## 2021-11-24 RX ORDER — FUROSEMIDE 20 MG/1
20 TABLET ORAL DAILY
Qty: 90 TABLET | Refills: 3 | Status: SHIPPED | OUTPATIENT
Start: 2021-11-24 | End: 2022-12-27

## 2021-11-24 RX ORDER — VALSARTAN 160 MG/1
160 TABLET ORAL DAILY
Qty: 90 TABLET | Refills: 3 | Status: SHIPPED | OUTPATIENT
Start: 2021-11-24 | End: 2022-12-05

## 2021-11-24 RX ORDER — ERYTHROMYCIN 5 MG/G
OINTMENT OPHTHALMIC
COMMUNITY
Start: 2021-11-23 | End: 2022-11-29

## 2021-11-24 RX ORDER — ATORVASTATIN CALCIUM 40 MG/1
40 TABLET, FILM COATED ORAL DAILY
Qty: 90 TABLET | Refills: 3 | Status: SHIPPED | OUTPATIENT
Start: 2021-11-24 | End: 2022-11-22

## 2021-11-24 RX ORDER — FENOFIBRATE 145 MG/1
145 TABLET, COATED ORAL DAILY
Qty: 90 TABLET | Refills: 3 | Status: SHIPPED | OUTPATIENT
Start: 2021-11-24 | End: 2022-11-22

## 2021-11-24 RX ORDER — NICOTINE 14MG/24HR
250 PATCH, TRANSDERMAL 24 HOURS TRANSDERMAL 2 TIMES DAILY
COMMUNITY
End: 2022-11-29

## 2021-11-24 RX ORDER — CARVEDILOL 6.25 MG/1
6.25 TABLET ORAL 2 TIMES DAILY WITH MEALS
Qty: 180 TABLET | Refills: 3 | Status: SHIPPED | OUTPATIENT
Start: 2021-11-24 | End: 2022-07-05

## 2021-11-25 LAB — HBA1C MFR BLD: 5.6 % (ref 4.8–5.6)

## 2021-12-17 RX ORDER — VALSARTAN 160 MG/1
TABLET ORAL
Qty: 90 TABLET | Refills: 0 | OUTPATIENT
Start: 2021-12-17

## 2021-12-17 NOTE — TELEPHONE ENCOUNTER
Rx Refill Note  Requested Prescriptions     Pending Prescriptions Disp Refills   • valsartan (DIOVAN) 160 MG tablet [Pharmacy Med Name: VALSARTAN TAB 160MG] 90 tablet 0     Sig: TAKE 1 TABLET DAILY        (DISCONTINUED              VALSARTAN/HCTZ 160-12.5MG)      Last office visit with prescribing clinician: 11/24/21    Next office visit with prescribing clinician: 11/29/22           Alejandra Wilson LPN  12/17/21, 09:11 EST

## 2022-02-05 NOTE — PROGRESS NOTES
Discharge Planning Assessment  [facility]     Patient Name: Tho Vasquez  MRN: [id number]  Today's Date: 10/3/2019    Admit Date: 10/3/2019    Discharge Needs Assessment     Row Name 10/03/19 1211       Living Environment    Lives With  child(juan a), dependent    Name(s) of Who Lives With Patient  Lives with adult daughter in [place]    Current Living Arrangements  home/apartment/condo    Primary Care Provided by  self;child(juan a)    Provides Primary Care For  no one    Caregiving Concerns  NA    Family Caregiver if Needed  child(juan a), adult    Family Caregiver Names  Adult daughters available to assist    Quality of Family Relationships  helpful;involved;supportive    Able to Return to Prior Arrangements  yes    Living Arrangement Comments  Resides in private residence with adult daughter       Resource/Environmental Concerns    Resource/Environmental Concerns  none    Transportation Concerns  car, none       Transition Planning    Patient/Family Anticipates Transition to  home with family    Transportation Anticipated  family or friend will provide       Discharge Needs Assessment    Readmission Within the Last 30 Days  no previous admission in last 30 days    Concerns to be Addressed  no discharge needs identified    Equipment Currently Used at Home  none    Anticipated Changes Related to Illness  none    Equipment Needed After Discharge  none    Offered/Gave Vendor List  no    Patient's Choice of Community Agency(s)  None    Discharge Coordination/Progress  Anticipate discharge home with family        Discharge Plan     Row Name 10/03/19 1214       Plan    Plan Home with family    Patient/Family in Agreement with Plan  -- Patient/daughter    Plan Comments Met with patient/daughter at bedside, report independent at home with no DME use or home health involvement; lives with adult daughter.  No needs identified at this time.    Final Discharge Disposition Code 01 - home or self-care        Destination      No  service coordination in this encounter.      Durable Medical Equipment      No service coordination in this encounter.      Dialysis/Infusion      No service coordination in this encounter.      Home Medical Care      No service coordination in this encounter.      Therapy      No service coordination in this encounter.      Community Resources      No service coordination in this encounter.          Demographic Summary     Row Name 10/03/19 1211       General Information    Arrived From  emergency department    Referral Source  emergency department    Reason for Consult  discharge planning    Preferred Language  English     Used During This Interaction  no        Functional Status    No documentation.       Psychosocial    No documentation.       Abuse/Neglect    No documentation.       Legal    No documentation.       Substance Abuse    No documentation.       Patient Forms    No documentation.           VIKRAM Griffin     81

## 2022-05-28 ENCOUNTER — TELEPHONE (OUTPATIENT)
Dept: INTERNAL MEDICINE | Facility: CLINIC | Age: 82
End: 2022-05-28

## 2022-05-28 NOTE — TELEPHONE ENCOUNTER
"Patient's daughter Mallory Mullins called her sister and niece had been on a trip and came back with \"allergy symptoms\" but would not get a COVID test.  2 days ago Mr. Vasquez woke up with cough and drainage and sinus pressure.  Mallory gave him a home COVID test and it was positive.  His blood pressure today is 130/81 which is what it usually runs.  She gave me his list of medications.  He is not short of breath and does not have a fever.  She would like for him to get paxlovid.  I sent it to [facility] on [address].  She will call me back if he develops shortness of breath or otherwise gets worse.  "

## 2022-06-03 ENCOUNTER — TELEPHONE (OUTPATIENT)
Dept: INTERNAL MEDICINE | Facility: CLINIC | Age: 82
End: 2022-06-03

## 2022-06-03 NOTE — TELEPHONE ENCOUNTER
Discussed with Mallory.  O2 sats today 90-91 %, lower than before. Still has a bad cough. Advised if O2 below 90 to go to ER ( per Dr. Sal's previous instructions) Mallory advised to call Mon with update

## 2022-06-03 NOTE — TELEPHONE ENCOUNTER
Caller: tanya lord    Relationship to patient: Emergency Contact    Best call back number:  [phone]  OR LEAVE MESSAGE     Date of positive COVID19 test:  AT HOME POSITIVE ON 5-27-22    COVID19 symptoms:   COUGH      Additional information or concerns:  PATIENT WAS PRESCRIBED PAXLOVID AND FINISHED IT ON 6-1-22    OXYGEN LEVEL IS AROUND 93-95   PATIENT TESTED AGAIN ON 6-3-22 AND IT STILL SHOWS POSITIVE   TANYA IS WANTING TO KNOW IF THERE IS ANYTHING ELSE THEY NEED TO DO FOR THE PATIENT    PLEASE ADVISE TANYA     What is the patients preferred pharmacy:  [facility] [address]

## 2022-06-03 NOTE — TELEPHONE ENCOUNTER
Persistent positive test occurs commonly.  No indication for further paxlovid so treatment is symptomatic.  How is he doing?

## 2022-07-05 RX ORDER — NYSTATIN 100000 [USP'U]/G
POWDER TOPICAL 3 TIMES DAILY
Qty: 60 G | Refills: 1 | Status: SHIPPED | OUTPATIENT
Start: 2022-07-05

## 2022-07-05 RX ORDER — CARVEDILOL 6.25 MG/1
TABLET ORAL
Qty: 180 TABLET | Refills: 3 | Status: SHIPPED | OUTPATIENT
Start: 2022-07-05

## 2022-07-05 NOTE — TELEPHONE ENCOUNTER
Rx Refill Note  Requested Prescriptions     Pending Prescriptions Disp Refills   • carvedilol (COREG) 6.25 MG tablet [Pharmacy Med Name: CARVEDILOL TAB 6.25MG] 180 tablet 3     Sig: TAKE 1 TABLET TWICE DAILY  WITH MEALS      Last office visit with prescribing clinician: 11/24/22     Next office visit with prescribing clinician: 11/29/22                Alejandra Wilson LPN  07/05/22, 11:57 EDT

## 2022-07-12 ENCOUNTER — TELEPHONE (OUTPATIENT)
Dept: INTERNAL MEDICINE | Facility: CLINIC | Age: 82
End: 2022-07-12

## 2022-07-12 NOTE — TELEPHONE ENCOUNTER
[facility] DENT[facility] CALLED. THEY ARE WANTING TO GET A RELEASE FOR ORAL SEDATION FOR THIS PATIENT DUE TO HIS GAG REFLEX. THEIR FAX NUMBER [phone]

## 2022-07-13 NOTE — TELEPHONE ENCOUNTER
Patients daughter called back and stated that there were no changes, he is doing fine, has been living with her, no complaints.    No health changes, no breathing problems, no decrease in strength, no sleeping problems. States that the patient has always been one to sleep during the day and be awake at night.    She also stated that she isn't sure if the patient is going to go forward with the dental work but he changes his mind so much that it's hard to tell what he will decide by the time the appointment comes. Requested that we authorize the sedative and if he decides to go to the appointment then it'll get done

## 2022-11-22 RX ORDER — ATORVASTATIN CALCIUM 40 MG/1
TABLET, FILM COATED ORAL
Qty: 90 TABLET | Refills: 3 | Status: SHIPPED | OUTPATIENT
Start: 2022-11-22

## 2022-11-22 RX ORDER — FENOFIBRATE 145 MG/1
TABLET, COATED ORAL
Qty: 90 TABLET | Refills: 3 | Status: SHIPPED | OUTPATIENT
Start: 2022-11-22

## 2022-11-29 ENCOUNTER — LAB (OUTPATIENT)
Dept: LAB | Facility: HOSPITAL | Age: 82
End: 2022-11-29

## 2022-11-29 ENCOUNTER — OFFICE VISIT (OUTPATIENT)
Dept: INTERNAL MEDICINE | Facility: CLINIC | Age: 82
End: 2022-11-29

## 2022-11-29 VITALS
TEMPERATURE: 97.8 F | BODY MASS INDEX: 35.53 KG/M2 | DIASTOLIC BLOOD PRESSURE: 74 MMHG | HEART RATE: 72 BPM | WEIGHT: 226.4 LBS | HEIGHT: 67 IN | SYSTOLIC BLOOD PRESSURE: 128 MMHG

## 2022-11-29 DIAGNOSIS — E66.01 MORBID OBESITY: ICD-10-CM

## 2022-11-29 DIAGNOSIS — Z00.00 MEDICARE ANNUAL WELLNESS VISIT, SUBSEQUENT: Primary | ICD-10-CM

## 2022-11-29 DIAGNOSIS — E78.2 MIXED HYPERLIPIDEMIA: ICD-10-CM

## 2022-11-29 DIAGNOSIS — R35.0 URINARY FREQUENCY: ICD-10-CM

## 2022-11-29 DIAGNOSIS — G47.33 OBSTRUCTIVE SLEEP APNEA: ICD-10-CM

## 2022-11-29 DIAGNOSIS — R73.09 ABNORMAL GLUCOSE LEVEL: ICD-10-CM

## 2022-11-29 DIAGNOSIS — N18.31 STAGE 3A CHRONIC KIDNEY DISEASE: ICD-10-CM

## 2022-11-29 DIAGNOSIS — I10 PRIMARY HYPERTENSION: ICD-10-CM

## 2022-11-29 DIAGNOSIS — E55.9 VITAMIN D DEFICIENCY: ICD-10-CM

## 2022-11-29 DIAGNOSIS — I87.2 VENOUS INSUFFICIENCY OF BOTH LOWER EXTREMITIES: ICD-10-CM

## 2022-11-29 DIAGNOSIS — Z23 NEEDS FLU SHOT: ICD-10-CM

## 2022-11-29 DIAGNOSIS — I25.10 ASHD (ARTERIOSCLEROTIC HEART DISEASE): ICD-10-CM

## 2022-11-29 LAB
25(OH)D3 SERPL-MCNC: 17.2 NG/ML (ref 30–100)
ALBUMIN SERPL-MCNC: 3.5 G/DL (ref 3.5–5.2)
ALBUMIN UR-MCNC: <1.2 MG/DL
ALBUMIN/GLOB SERPL: 1.4 G/DL
ALP SERPL-CCNC: 72 U/L (ref 39–117)
ALT SERPL W P-5'-P-CCNC: 28 U/L (ref 1–41)
ANION GAP SERPL CALCULATED.3IONS-SCNC: 9.9 MMOL/L (ref 5–15)
AST SERPL-CCNC: 40 U/L (ref 1–40)
BACTERIA UR QL AUTO: NORMAL /HPF
BASOPHILS # BLD AUTO: 0.05 10*3/MM3 (ref 0–0.2)
BASOPHILS NFR BLD AUTO: 0.5 % (ref 0–1.5)
BILIRUB SERPL-MCNC: 0.7 MG/DL (ref 0–1.2)
BILIRUB UR QL STRIP: NEGATIVE
BUN SERPL-MCNC: 19 MG/DL (ref 8–23)
BUN/CREAT SERPL: 18.3 (ref 7–25)
CALCIUM SPEC-SCNC: 9.2 MG/DL (ref 8.6–10.5)
CHLORIDE SERPL-SCNC: 105 MMOL/L (ref 98–107)
CHOLEST SERPL-MCNC: 112 MG/DL (ref 0–200)
CLARITY UR: CLEAR
CO2 SERPL-SCNC: 26.1 MMOL/L (ref 22–29)
COLOR UR: ABNORMAL
CREAT SERPL-MCNC: 1.04 MG/DL (ref 0.76–1.27)
CREAT UR-MCNC: 126.8 MG/DL
DEPRECATED RDW RBC AUTO: 48.4 FL (ref 37–54)
EGFRCR SERPLBLD CKD-EPI 2021: 71.7 ML/MIN/1.73
EOSINOPHIL # BLD AUTO: 0.27 10*3/MM3 (ref 0–0.4)
EOSINOPHIL NFR BLD AUTO: 2.5 % (ref 0.3–6.2)
ERYTHROCYTE [DISTWIDTH] IN BLOOD BY AUTOMATED COUNT: 14.1 % (ref 12.3–15.4)
GLOBULIN UR ELPH-MCNC: 2.5 GM/DL
GLUCOSE SERPL-MCNC: 107 MG/DL (ref 65–99)
GLUCOSE UR STRIP-MCNC: NEGATIVE MG/DL
HBA1C MFR BLD: 5.2 % (ref 4.8–5.6)
HCT VFR BLD AUTO: 42 % (ref 37.5–51)
HDLC SERPL-MCNC: 32 MG/DL (ref 40–60)
HGB BLD-MCNC: 13.7 G/DL (ref 13–17.7)
HGB UR QL STRIP.AUTO: NEGATIVE
HYALINE CASTS UR QL AUTO: NORMAL /LPF
IMM GRANULOCYTES # BLD AUTO: 0.05 10*3/MM3 (ref 0–0.05)
IMM GRANULOCYTES NFR BLD AUTO: 0.5 % (ref 0–0.5)
KETONES UR QL STRIP: NEGATIVE
LDLC SERPL CALC-MCNC: 60 MG/DL (ref 0–100)
LDLC/HDLC SERPL: 1.81 {RATIO}
LEUKOCYTE ESTERASE UR QL STRIP.AUTO: NEGATIVE
LYMPHOCYTES # BLD AUTO: 1.78 10*3/MM3 (ref 0.7–3.1)
LYMPHOCYTES NFR BLD AUTO: 16.5 % (ref 19.6–45.3)
MCH RBC QN AUTO: 30.5 PG (ref 26.6–33)
MCHC RBC AUTO-ENTMCNC: 32.6 G/DL (ref 31.5–35.7)
MCV RBC AUTO: 93.5 FL (ref 79–97)
MICROALBUMIN/CREAT UR: NORMAL MG/G{CREAT}
MONOCYTES # BLD AUTO: 1.24 10*3/MM3 (ref 0.1–0.9)
MONOCYTES NFR BLD AUTO: 11.5 % (ref 5–12)
NEUTROPHILS NFR BLD AUTO: 68.5 % (ref 42.7–76)
NEUTROPHILS NFR BLD AUTO: 7.42 10*3/MM3 (ref 1.7–7)
NITRITE UR QL STRIP: NEGATIVE
NRBC BLD AUTO-RTO: 0 /100 WBC (ref 0–0.2)
PH UR STRIP.AUTO: 8.5 [PH] (ref 5–8)
PLATELET # BLD AUTO: 191 10*3/MM3 (ref 140–450)
PMV BLD AUTO: 13 FL (ref 6–12)
POTASSIUM SERPL-SCNC: 4.2 MMOL/L (ref 3.5–5.2)
PROT SERPL-MCNC: 6 G/DL (ref 6–8.5)
PROT UR QL STRIP: ABNORMAL
RBC # BLD AUTO: 4.49 10*6/MM3 (ref 4.14–5.8)
RBC # UR STRIP: NORMAL /HPF
REF LAB TEST METHOD: NORMAL
SODIUM SERPL-SCNC: 141 MMOL/L (ref 136–145)
SP GR UR STRIP: 1.02 (ref 1–1.03)
SQUAMOUS #/AREA URNS HPF: NORMAL /HPF
TRI-PHOS CRY URNS QL MICRO: NORMAL /HPF
TRIGL SERPL-MCNC: 110 MG/DL (ref 0–150)
UROBILINOGEN UR QL STRIP: ABNORMAL
VLDLC SERPL-MCNC: 20 MG/DL (ref 5–40)
WBC # UR STRIP: NORMAL /HPF
WBC NRBC COR # BLD: 10.81 10*3/MM3 (ref 3.4–10.8)

## 2022-11-29 PROCEDURE — 82043 UR ALBUMIN QUANTITATIVE: CPT

## 2022-11-29 PROCEDURE — 85025 COMPLETE CBC W/AUTO DIFF WBC: CPT

## 2022-11-29 PROCEDURE — 91312 COVID-19 (PFIZER) BIVALENT BOOSTER 12+YRS: CPT | Performed by: INTERNAL MEDICINE

## 2022-11-29 PROCEDURE — 80053 COMPREHEN METABOLIC PANEL: CPT

## 2022-11-29 PROCEDURE — 0124A PR ADM SARSCOV2 30MCG/0.3ML BST: CPT | Performed by: INTERNAL MEDICINE

## 2022-11-29 PROCEDURE — 1159F MED LIST DOCD IN RCRD: CPT | Performed by: INTERNAL MEDICINE

## 2022-11-29 PROCEDURE — G0439 PPPS, SUBSEQ VISIT: HCPCS | Performed by: INTERNAL MEDICINE

## 2022-11-29 PROCEDURE — G0008 ADMIN INFLUENZA VIRUS VAC: HCPCS | Performed by: INTERNAL MEDICINE

## 2022-11-29 PROCEDURE — 82306 VITAMIN D 25 HYDROXY: CPT

## 2022-11-29 PROCEDURE — 1170F FXNL STATUS ASSESSED: CPT | Performed by: INTERNAL MEDICINE

## 2022-11-29 PROCEDURE — 83036 HEMOGLOBIN GLYCOSYLATED A1C: CPT

## 2022-11-29 PROCEDURE — 82570 ASSAY OF URINE CREATININE: CPT

## 2022-11-29 PROCEDURE — 90662 IIV NO PRSV INCREASED AG IM: CPT | Performed by: INTERNAL MEDICINE

## 2022-11-29 PROCEDURE — 81001 URINALYSIS AUTO W/SCOPE: CPT

## 2022-11-29 PROCEDURE — 80061 LIPID PANEL: CPT

## 2022-11-29 RX ORDER — LORATADINE 10 MG/1
1 CAPSULE, LIQUID FILLED ORAL DAILY PRN
COMMUNITY

## 2022-12-05 RX ORDER — VALSARTAN 160 MG/1
TABLET ORAL
Qty: 90 TABLET | Refills: 3 | Status: SHIPPED | OUTPATIENT
Start: 2022-12-05

## 2022-12-19 ENCOUNTER — PATIENT MESSAGE (OUTPATIENT)
Dept: INTERNAL MEDICINE | Facility: CLINIC | Age: 82
End: 2022-12-19

## 2022-12-19 NOTE — TELEPHONE ENCOUNTER
From: Tho Vasquez  To: Ja Baldwin MD  Sent: 12/19/2022 3:41 PM EST  Subject: COVID booster    Tho Storm had his COVID booster at his appointment with Dr. Baldwin a few weeks ago. We forgot to have Alondra add that info to his COVID card. Can someone send me the information needed so I can update his card?    Thank you!

## 2022-12-27 RX ORDER — FUROSEMIDE 20 MG/1
TABLET ORAL
Qty: 90 TABLET | Refills: 3 | Status: SHIPPED | OUTPATIENT
Start: 2022-12-27

## 2023-11-13 ENCOUNTER — TELEPHONE (OUTPATIENT)
Dept: INTERNAL MEDICINE | Facility: CLINIC | Age: 83
End: 2023-11-13
Payer: MEDICARE

## 2023-11-13 DIAGNOSIS — R29.898 WEAKNESS OF BOTH LOWER EXTREMITIES: ICD-10-CM

## 2023-11-13 DIAGNOSIS — R26.81 GAIT INSTABILITY: Primary | ICD-10-CM

## 2023-11-14 NOTE — TELEPHONE ENCOUNTER
GUALBERTO FROM [facility] CALLED AND STATED THE PT WILL NEED TO HAVE A FACE TO FACE WITH DR RICHARD BEFORE THEY CAN START HOME HEALTH WITH THE PT.    PLEASE CALL GUALBERTO BACK [phone].

## 2023-11-15 RX ORDER — ATORVASTATIN CALCIUM 40 MG/1
TABLET, FILM COATED ORAL
Qty: 90 TABLET | Refills: 0 | Status: SHIPPED | OUTPATIENT
Start: 2023-11-15

## 2023-11-15 RX ORDER — FENOFIBRATE 145 MG/1
TABLET, COATED ORAL
Qty: 90 TABLET | Refills: 0 | Status: SHIPPED | OUTPATIENT
Start: 2023-11-15

## 2023-11-16 NOTE — TELEPHONE ENCOUNTER
I SENT A [employer] MESSAGE 11/15/2023 TO THE FAMILY ASKING IF THE 12/07/23 APPT WILL WORK FOR THE FACE TO FACE.

## 2023-11-21 ENCOUNTER — TELEPHONE (OUTPATIENT)
Dept: INTERNAL MEDICINE | Facility: CLINIC | Age: 83
End: 2023-11-21

## 2023-11-21 ENCOUNTER — TELEMEDICINE (OUTPATIENT)
Dept: INTERNAL MEDICINE | Facility: CLINIC | Age: 83
End: 2023-11-21
Payer: MEDICARE

## 2023-11-21 DIAGNOSIS — E66.01 MORBID OBESITY: ICD-10-CM

## 2023-11-21 DIAGNOSIS — N18.31 STAGE 3A CHRONIC KIDNEY DISEASE: ICD-10-CM

## 2023-11-21 DIAGNOSIS — E78.2 MIXED HYPERLIPIDEMIA: ICD-10-CM

## 2023-11-21 DIAGNOSIS — I87.2 VENOUS INSUFFICIENCY OF BOTH LOWER EXTREMITIES: ICD-10-CM

## 2023-11-21 DIAGNOSIS — E55.9 VITAMIN D DEFICIENCY: ICD-10-CM

## 2023-11-21 DIAGNOSIS — I25.10 ASHD (ARTERIOSCLEROTIC HEART DISEASE): ICD-10-CM

## 2023-11-21 DIAGNOSIS — R73.09 ABNORMAL GLUCOSE LEVEL: ICD-10-CM

## 2023-11-21 DIAGNOSIS — I10 PRIMARY HYPERTENSION: Primary | ICD-10-CM

## 2023-11-21 NOTE — PROGRESS NOTES
[facility]     [facility]  1940   [id number]      Patient Care Team:  Ja Baldwin MD as PCP - General (Internal Medicine)  Danny Rdz Jr., MD as Consulting Physician (Ophthalmology)    Chief Complaint::   Chief Complaint   Patient presents with    Extremity Weakness      You have chosen to receive care through a telehealth visit.  Do you consent to use a video/audio connection for your medical care today? Yes    Video visit was conducted with bryon and his daughter in their home in [place], I am in my office in [place].       HPI  The patient is seen via video visit for follow-up of hypertension, coronary artery disease, venous insufficiency, morbid obesity, chronic kidney disease, and abnormal glucose. He is accompanied by his daughter, Mary.    Poor mobility  He was doing well until approximately 1 week ago when he had difficulty going up the front step of his house. His daughter, Mary, states he fell in the bathroom months ago. He states that he is using a cane to get around the house. He states that he is sitting in a chair and getting used to getting up out of the chair. He states that he can go anywhere he wants. He states that he uses the cane to go to other doctor's appointments. Mary, his daughter states that she does not agree that he gets up and walks around reasonably well. He is considering home health aide help. He states that the lower extremities are doing well. He states that he takes medication to keep the edema down in the lower extremity. He states that if he tries to get up and walks around plenty, he defeats the purpose since this causes edema in the lower extremity. He states that he sees Dr. Vergara, the podiatrist. He states that he does not wear compression socks. He states that he gets his lower extremity wrapped every Monday. He denies any trouble with his breathing. He states that every now and then he gets cold. He is still sleeping in a chair.  He states that he can still get his feet up. He states that his appetite is about the same as usual. He states that he urinates too much sometimes, but Mary, his daughter states he is christelle if he urinates three times a day. He states that he is drinking normal fluids like he always did. Mary states that he is taking 3 steps from their dining room table to the trash can and he is wobbling while he walks. Mary states he is only walking down the rogers to the bathroom 3 times a day. Mary states that he is having trouble getting up and down out of the lazy boy chair and it is taking him longer to do than usual. She states that they have one step from their walkway to their porch and then he goes a step or two forward and then he goes up one more step, but he is having difficulty. She states that he has lost a lot of strength over the last year. She states that he does not use a walker. She states that he just uses the cane, and he does not always use it right. She states that she keeps telling him if he puts it on the ground, it helps. She states he does not usually eat breakfast until 10:00 AM or 11:00 AM. She states he will be seeing Dr. Vergara, his podiatrist, on Monday, 11/27/2023. Mary states she would like to start home health aide help for him before his annual checkup on 12/07/2023, so it can be easier on him. Unfortunately, the agency will not do anything unless he had an appointment to see his primary care physician first which is why they made a telehealth visit today, 11/21/2023.    Health maintenance  Mary states that he has not had his influenza vaccine yet. She states that his annual checkup is on 12/07/2023.      Chronic Conditions:  Hypertension, coronary artery disease, venous insufficiency, morbid obesity, chronic kidney disease, and abnormal glucose.    Patient Active Problem List   Diagnosis    Mixed hyperlipidemia    Abnormal glucose level    Abrasion    Adult BMI 37.0-37.9 kg/sq m    Annual  physical exam    ASHD (arteriosclerotic heart disease)    Primary hypertension    Chronic kidney disease, stage 3    Medicare annual wellness visit, subsequent    Morbid obesity    Obstructive sleep apnea    Old myocardial infarction    Vitamin D deficiency    Venous insufficiency of both lower extremities    Transaminitis        Past Medical History:   Diagnosis Date    Allergic ?    Myocardial infarction     Pneumonia of both lower lobes due to infectious organism 10/3/2019    Positive PPD     Respiratory failure 2005    requiring tracheostomy       Past Surgical History:   Procedure Laterality Date    CARDIAC SURGERY      HERNIA REPAIR         Family History   Problem Relation Age of Onset    Arthritis Mother     Heart attack Father     Stroke Father     Diabetes Father     Heart attack Brother     Cancer Paternal Uncle     Obesity Other        Social History     Socioeconomic History    Marital status:    Tobacco Use    Smoking status: Former     Packs/day: 1.00     Years: 25.00     Additional pack years: 0.00     Total pack years: 25.00     Types: Cigars, Cigarettes     Start date:      Quit date:      Years since quittin.9    Smokeless tobacco: Never   Substance and Sexual Activity    Alcohol use: No    Drug use: Never    Sexual activity: Not Currently       Allergies   Allergen Reactions    Prednisone Rash         Current Outpatient Medications:     Acetaminophen (Tylenol) 325 MG capsule, Take 500 mg by mouth Daily As Needed., Disp: , Rfl:     aspirin 81 MG tablet, Take 1 tablet by mouth Daily., Disp: , Rfl:     atorvastatin (LIPITOR) 40 MG tablet, TAKE 1 TABLET DAILY, Disp: 90 tablet, Rfl: 0    carvedilol (COREG) 6.25 MG tablet, TAKE 1 TABLET TWICE DAILY  WITH MEALS, Disp: 180 tablet, Rfl: 3    Dextromethorphan-guaiFENesin (CORICIDIN HBP CONGESTION/COUGH PO), Take 1 tablet by mouth Daily As Needed., Disp: , Rfl:     famotidine (PEPCID) 20 MG tablet, Take 1 tablet by mouth Every 12  (Twelve) Hours., Disp: , Rfl:     fenofibrate (TRICOR) 145 MG tablet, TAKE 1 TABLET DAILY, Disp: 90 tablet, Rfl: 0    Ferrous Sulfate (IRON) 325 (65 Fe) MG tablet, Take 1 tablet by mouth Daily., Disp: , Rfl:     furosemide (LASIX) 20 MG tablet, TAKE 1 TABLET DAILY, Disp: 90 tablet, Rfl: 3    Loratadine (Claritin) 10 MG capsule, Take 1 capsule by mouth Daily As Needed., Disp: , Rfl:     NON FORMULARY, 2 (Two) Times a Day. ocusoft lid scrub - 1 pad for each eye bid, Disp: , Rfl:     nystatin (MYCOSTATIN) 370786 UNIT/GM powder, Apply  topically to the appropriate area as directed 3 (Three) Times a Day., Disp: 60 g, Rfl: 1    Polyvinyl Alcohol-Povidone (FRESHKOTE PF OP), Apply  to eye(s) as directed by provider Daily. 0.33fl oz 1 drop in each eye bid, Disp: , Rfl:     valsartan (DIOVAN) 160 MG tablet, TAKE 1 TABLET DAILY, Disp: 90 tablet, Rfl: 3    Review of Systems   Constitutional:  Negative for chills, fatigue and fever.   HENT:  Negative for congestion, ear pain and sinus pressure.    Respiratory:  Negative for cough, chest tightness, shortness of breath and wheezing.    Cardiovascular:  Negative for chest pain and palpitations.   Gastrointestinal:  Negative for abdominal pain, blood in stool and constipation.   Skin:  Negative for color change.   Allergic/Immunologic: Negative for environmental allergies.   Neurological:  Negative for dizziness, speech difficulty and headache.   Psychiatric/Behavioral:  Negative for decreased concentration. The patient is not nervous/anxious.         Vital Signs  There were no vitals filed for this visit.    Physical Exam  Constitutional:       Appearance: Normal appearance. He is obese.   HENT:      Head: Normocephalic and atraumatic.   Pulmonary:      Effort: Pulmonary effort is normal. No respiratory distress.   Musculoskeletal:      Cervical back: Normal range of motion.   Neurological:      Mental Status: He is alert and oriented to person, place, and time.      Cranial  Nerves: No cranial nerve deficit.   Psychiatric:         Mood and Affect: Mood normal.         Behavior: Behavior normal.          Procedures    ACE III MINI             Assessment/Plan:    Diagnoses and all orders for this visit:    1. Primary hypertension (Primary)  -     CBC & Differential; Future  -     Microalbumin / Creatinine Urine Ratio - Urine, Clean Catch; Future    2. ASHD (arteriosclerotic heart disease)    3. Venous insufficiency of both lower extremities    4. Morbid obesity    5. Abnormal glucose level  -     Hemoglobin A1c; Future    6. Stage 3a chronic kidney disease    7. Mixed hyperlipidemia  -     Comprehensive Metabolic Panel; Future  -     Lipid Panel; Future    8. Vitamin D deficiency  -     Vitamin D,25-Hydroxy; Future        1. Hypertension  - He will continue carvedilol and valsartan.    2. Coronary artery disease  - His coronary artery disease is stable on aspirin, atorvastatin, and carvedilol.    3. Venous insufficiency  - He has his legs wrapped weakly by Dr. Vergara. He is taking furosemide daily and continues to try to elevate his legs.    4. Abnormal glucose  - His hemoglobin A1c is pending. The treatment remains healthy diet and avoidance of weight gain.    5. Poor mobility  - He has gradually lost strength and mobility over the last few months. He is basically sedentary now. Hopefully, home health and physical therapy will help him to gain strength and become more mobile with less fall risk.    He will follow up as previously scheduled.    Plan of care reviewed with patient at the conclusion of today's visit. Education was provided regarding diagnosis, management, and any prescribed or recommended OTC medications.Patient verbalizes understanding of and agreement with management plan.         Ja Baldwin MD       Transcribed from ambient dictation for Ja Baldwin MD by Staci Stephens.  11/21/23   13:15 EST    Patient or patient representative verbalized consent  to the visit recording.  I have personally performed the services described in this document as transcribed by the above individual, and it is both accurate and complete.

## 2023-11-27 ENCOUNTER — LAB (OUTPATIENT)
Dept: LAB | Facility: HOSPITAL | Age: 83
End: 2023-11-27
Payer: MEDICARE

## 2023-11-27 DIAGNOSIS — R73.09 ABNORMAL GLUCOSE LEVEL: ICD-10-CM

## 2023-11-27 DIAGNOSIS — E55.9 VITAMIN D DEFICIENCY: ICD-10-CM

## 2023-11-27 DIAGNOSIS — E78.2 MIXED HYPERLIPIDEMIA: ICD-10-CM

## 2023-11-27 DIAGNOSIS — I10 PRIMARY HYPERTENSION: ICD-10-CM

## 2023-11-27 LAB
25(OH)D3 SERPL-MCNC: 53.3 NG/ML (ref 30–100)
ALBUMIN SERPL-MCNC: 3.5 G/DL (ref 3.5–5.2)
ALBUMIN UR-MCNC: 1.4 MG/DL
ALBUMIN/GLOB SERPL: 1.3 G/DL
ALP SERPL-CCNC: 78 U/L (ref 39–117)
ALT SERPL W P-5'-P-CCNC: 29 U/L (ref 1–41)
ANION GAP SERPL CALCULATED.3IONS-SCNC: 12 MMOL/L (ref 5–15)
AST SERPL-CCNC: 41 U/L (ref 1–40)
BASOPHILS # BLD AUTO: 0.05 10*3/MM3 (ref 0–0.2)
BASOPHILS NFR BLD AUTO: 0.4 % (ref 0–1.5)
BILIRUB SERPL-MCNC: 1 MG/DL (ref 0–1.2)
BUN SERPL-MCNC: 24 MG/DL (ref 8–23)
BUN/CREAT SERPL: 24 (ref 7–25)
CALCIUM SPEC-SCNC: 9.5 MG/DL (ref 8.6–10.5)
CHLORIDE SERPL-SCNC: 104 MMOL/L (ref 98–107)
CHOLEST SERPL-MCNC: 124 MG/DL (ref 0–200)
CO2 SERPL-SCNC: 26 MMOL/L (ref 22–29)
CREAT SERPL-MCNC: 1 MG/DL (ref 0.76–1.27)
CREAT UR-MCNC: 161.2 MG/DL
DEPRECATED RDW RBC AUTO: 45.7 FL (ref 37–54)
EGFRCR SERPLBLD CKD-EPI 2021: 74.7 ML/MIN/1.73
EOSINOPHIL # BLD AUTO: 0.21 10*3/MM3 (ref 0–0.4)
EOSINOPHIL NFR BLD AUTO: 1.8 % (ref 0.3–6.2)
ERYTHROCYTE [DISTWIDTH] IN BLOOD BY AUTOMATED COUNT: 13.8 % (ref 12.3–15.4)
GLOBULIN UR ELPH-MCNC: 2.6 GM/DL
GLUCOSE SERPL-MCNC: 105 MG/DL (ref 65–99)
HBA1C MFR BLD: 5.3 % (ref 4.8–5.6)
HCT VFR BLD AUTO: 43.1 % (ref 37.5–51)
HDLC SERPL-MCNC: 40 MG/DL (ref 40–60)
HGB BLD-MCNC: 14.5 G/DL (ref 13–17.7)
LDLC SERPL CALC-MCNC: 65 MG/DL (ref 0–100)
LDLC/HDLC SERPL: 1.58 {RATIO}
LYMPHOCYTES # BLD AUTO: 1.75 10*3/MM3 (ref 0.7–3.1)
LYMPHOCYTES NFR BLD AUTO: 15.3 % (ref 19.6–45.3)
MCH RBC QN AUTO: 31 PG (ref 26.6–33)
MCHC RBC AUTO-ENTMCNC: 33.6 G/DL (ref 31.5–35.7)
MCV RBC AUTO: 92.1 FL (ref 79–97)
MICROALBUMIN/CREAT UR: 8.7 MG/G (ref 0–29)
MONOCYTES # BLD AUTO: 1.41 10*3/MM3 (ref 0.1–0.9)
MONOCYTES NFR BLD AUTO: 12.3 % (ref 5–12)
NEUTROPHILS NFR BLD AUTO: 69.7 % (ref 42.7–76)
NEUTROPHILS NFR BLD AUTO: 7.99 10*3/MM3 (ref 1.7–7)
PLATELET # BLD AUTO: 134 10*3/MM3 (ref 140–450)
PMV BLD AUTO: 12.8 FL (ref 6–12)
POTASSIUM SERPL-SCNC: 4.1 MMOL/L (ref 3.5–5.2)
PROT SERPL-MCNC: 6.1 G/DL (ref 6–8.5)
RBC # BLD AUTO: 4.68 10*6/MM3 (ref 4.14–5.8)
SODIUM SERPL-SCNC: 142 MMOL/L (ref 136–145)
TRIGL SERPL-MCNC: 104 MG/DL (ref 0–150)
VLDLC SERPL-MCNC: 19 MG/DL (ref 5–40)
WBC NRBC COR # BLD AUTO: 11.47 10*3/MM3 (ref 3.4–10.8)

## 2023-11-27 PROCEDURE — 80061 LIPID PANEL: CPT

## 2023-11-27 PROCEDURE — 82043 UR ALBUMIN QUANTITATIVE: CPT

## 2023-11-27 PROCEDURE — 80053 COMPREHEN METABOLIC PANEL: CPT

## 2023-11-27 PROCEDURE — 83036 HEMOGLOBIN GLYCOSYLATED A1C: CPT

## 2023-11-27 PROCEDURE — 82306 VITAMIN D 25 HYDROXY: CPT

## 2023-11-27 PROCEDURE — 85025 COMPLETE CBC W/AUTO DIFF WBC: CPT

## 2023-11-27 PROCEDURE — 82570 ASSAY OF URINE CREATININE: CPT

## 2023-11-27 RX ORDER — VALSARTAN 160 MG/1
TABLET ORAL
Qty: 90 TABLET | Refills: 3 | Status: SHIPPED | OUTPATIENT
Start: 2023-11-27

## 2023-11-29 RX ORDER — CARVEDILOL 12.5 MG/1
12.5 TABLET ORAL 2 TIMES DAILY WITH MEALS
Qty: 180 TABLET | Refills: 1 | Status: SHIPPED | OUTPATIENT
Start: 2023-11-29

## 2023-12-07 ENCOUNTER — TELEPHONE (OUTPATIENT)
Dept: INTERNAL MEDICINE | Facility: CLINIC | Age: 83
End: 2023-12-07

## 2023-12-07 ENCOUNTER — OFFICE VISIT (OUTPATIENT)
Dept: INTERNAL MEDICINE | Facility: CLINIC | Age: 83
End: 2023-12-07
Payer: MEDICARE

## 2023-12-07 VITALS
HEART RATE: 72 BPM | HEIGHT: 66 IN | WEIGHT: 235 LBS | BODY MASS INDEX: 37.77 KG/M2 | TEMPERATURE: 97.8 F | SYSTOLIC BLOOD PRESSURE: 122 MMHG | DIASTOLIC BLOOD PRESSURE: 76 MMHG

## 2023-12-07 DIAGNOSIS — I25.10 ASHD (ARTERIOSCLEROTIC HEART DISEASE): ICD-10-CM

## 2023-12-07 DIAGNOSIS — G47.33 OBSTRUCTIVE SLEEP APNEA: ICD-10-CM

## 2023-12-07 DIAGNOSIS — E78.2 MIXED HYPERLIPIDEMIA: ICD-10-CM

## 2023-12-07 DIAGNOSIS — N18.31 STAGE 3A CHRONIC KIDNEY DISEASE: ICD-10-CM

## 2023-12-07 DIAGNOSIS — R73.09 ABNORMAL GLUCOSE LEVEL: ICD-10-CM

## 2023-12-07 DIAGNOSIS — I10 PRIMARY HYPERTENSION: ICD-10-CM

## 2023-12-07 DIAGNOSIS — Z00.00 MEDICARE ANNUAL WELLNESS VISIT, SUBSEQUENT: Primary | ICD-10-CM

## 2023-12-07 RX ORDER — ERYTHROMYCIN 5 MG/G
1 OINTMENT OPHTHALMIC DAILY PRN
COMMUNITY
Start: 2023-08-18

## 2023-12-07 RX ORDER — CYCLOSPORINE 0.5 MG/ML
1 EMULSION OPHTHALMIC EVERY 12 HOURS
COMMUNITY
Start: 2023-11-15

## 2023-12-07 NOTE — TELEPHONE ENCOUNTER
Caller: [facility]    Relationship: formerly [facility]    Best call back number: [phone]     What form or medical record are you requesting: OFFICE VISIT NOTES FROM PATIENTS APPOINTMENT ON 11/21/23     Who is requesting this form or medical record from you: [facility]     How would you like to receive the form or medical records (pick-up, mail, fax): FAX  If fax, what is the fax number: [phone]  If mail, what is the address:   If pick-up, provide patient with address and location details    Timeframe paperwork needed: AS SOON AS POSSIBLE     Additional notes:

## 2023-12-07 NOTE — PROGRESS NOTES
QUICK REFERENCE INFORMATION:  The ABCs of the Annual Wellness Visit    Subsequent Medicare Wellness Visit    HEALTH RISK ASSESSMENT    1940    Recent Hospitalizations:  No hospitalization(s) within the last year..        Current Medical Providers:  Patient Care Team:  Ja Baldwin MD as PCP - General (Internal Medicine)  Danny Rdz Jr., MD as Consulting Physician (Ophthalmology)        Smoking Status:  Social History     Tobacco Use   Smoking Status Former    Types: Cigars    Quit date: 2000    Years since quittin.9   Smokeless Tobacco Never       Alcohol Consumption:  Social History     Substance and Sexual Activity   Alcohol Use No       Depression Screen:       2023     9:56 AM   PHQ-2/PHQ-9 Depression Screening   Little Interest or Pleasure in Doing Things 0-->not at all   Feeling Down, Depressed or Hopeless 0-->not at all   PHQ-9: Brief Depression Severity Measure Score 0       Health Habits and Functional and Cognitive Screenin/7/2023     9:49 AM   Functional & Cognitive Status   Do you have difficulty preparing food and eating? Yes   Do you have difficulty bathing yourself, getting dressed or grooming yourself? Yes   Do you have difficulty using the toilet? No   Do you have difficulty moving around from place to place? Yes   Do you have trouble with steps or getting out of a bed or a chair? Yes   Current Diet Unhealthy Diet   Dental Exam Up to date   Eye Exam Up to date   Exercise (times per week) 0 times per week   Current Exercises Include No Regular Exercise   Do you need help using the phone?  No   Are you deaf or do you have serious difficulty hearing?  No   Do you need help to go to places out of walking distance? Yes   Do you need help shopping? Yes   Do you need help preparing meals?  Yes   Do you need help with housework?  Yes   Do you need help with laundry? Yes   Do you need help taking your medications? Yes   Do you need help managing money? Yes   Do you  ever drive or ride in a car without wearing a seat belt? No   Have you felt unusual stress, anger or loneliness in the last month? No   Who do you live with? Child   If you need help, do you have trouble finding someone available to you? No   Have you been bothered in the last four weeks by sexual problems? No   Do you have difficulty concentrating, remembering or making decisions? No       Fall Risk Screen:  SARINA Fall Risk Assessment was completed, and patient is at MODERATE risk for falls. Assessment completed on:12/7/2023    ACE III MINI        Does the patient have evidence of cognitive impairment? No    Aspirin use counseling: Taking ASA appropriately as indicated    Recent Lab Results:  CMP:  Lab Results   Component Value Date    BUN 24 (H) 11/27/2023    CREATININE 1.00 11/27/2023    EGFRIFNONA 62 11/24/2021    BCR 24.0 11/27/2023     11/27/2023    K 4.1 11/27/2023    CO2 26.0 11/27/2023    CALCIUM 9.5 11/27/2023    ALBUMIN 3.5 11/27/2023    BILITOT 1.0 11/27/2023    ALKPHOS 78 11/27/2023    AST 41 (H) 11/27/2023    ALT 29 11/27/2023     HbA1c:  Lab Results   Component Value Date    HGBA1C 5.30 11/27/2023    HGBA1C 5.20 11/29/2022     Microalbumin:  Lab Results   Component Value Date    MICROALBUR 1.4 11/27/2023     Lipid Panel  Lab Results   Component Value Date    CHOL 124 11/27/2023    TRIG 104 11/27/2023    HDL 40 11/27/2023    LDL 65 11/27/2023    AST 41 (H) 11/27/2023    ALT 29 11/27/2023       Visual Acuity:  No results found.    Age-appropriate Screening Schedule:  Refer to the list below for future screening recommendations based on patient's age, sex and/or medical conditions. Orders for these recommended tests are listed in the plan section. The patient has been provided with a written plan.    Health Maintenance   Topic Date Due    ZOSTER VACCINE (2 of 2) 07/11/2018    COVID-19 Vaccine (4 - 2023-24 season) 09/01/2023    ANNUAL WELLNESS VISIT  11/29/2023    BMI FOLLOWUP  11/29/2023    LIPID  PANEL  11/27/2024    TDAP/TD VACCINES (2 - Td or Tdap) 11/30/2027    INFLUENZA VACCINE  Completed    Pneumococcal Vaccine 65+  Completed        Subjective   History of Present Illness    Tho Vasquez is a 83 y.o. male who presents for a Subsequent Wellness Visit and for follow-up of hypertension, hyperlipidemia, coronary artery disease, abnormal glucose, chronic kidney disease, and sleep apnea.    The patient reports that home health is taking care of him and helps him walk. His breathing is okay. He denies any angina. Appetite is normal.    The patient is still going to the bathroom 4 times a day to urinate. He has incontinence overnight.    The patient states that he is losing height, which went from 5 feet 11 inches to 5 feet 6 inches currently.    The patient bumped against the rail the other day, 12/06/2023. He does not have any problems with his joints in general. He has plenty of help at home. He has people looking after him all the time.    The patient mentions that he is still having his legs wrapped. Every time he switches to compression socks, his legs worsen.    The patient is noticed that he is picking at his ear     CHRONIC CONDITIONS: Hypertension, hyperlipidemia, coronary artery disease, abnormal glucose, chronic kidney disease, and sleep apnea.    The following portions of the patient's history were reviewed and updated as appropriate: allergies, current medications, past family history, past medical history, past social history, past surgical history, and problem list.    Outpatient Medications Prior to Visit   Medication Sig Dispense Refill    Acetaminophen (Tylenol) 325 MG capsule Take 500 mg by mouth Daily As Needed.      aspirin 81 MG tablet Take 1 tablet by mouth Daily.      atorvastatin (LIPITOR) 40 MG tablet TAKE 1 TABLET DAILY 90 tablet 0    carvedilol (COREG) 12.5 MG tablet Take 1 tablet by mouth 2 (Two) Times a Day With Meals. 180 tablet 1    Dextromethorphan-guaiFENesin (CORICIDIN HBP  CONGESTION/COUGH PO) Take 1 tablet by mouth Daily.      erythromycin (ROMYCIN) 5 MG/GM ophthalmic ointment Administer 1 application  to both eyes Daily As Needed.      famotidine (PEPCID) 20 MG tablet Take 1 tablet by mouth Every 12 (Twelve) Hours.      fenofibrate (TRICOR) 145 MG tablet TAKE 1 TABLET DAILY 90 tablet 0    Ferrous Sulfate (IRON) 325 (65 Fe) MG tablet Take 1 tablet by mouth Daily.      furosemide (LASIX) 20 MG tablet TAKE 1 TABLET DAILY 90 tablet 3    Loratadine (Claritin) 10 MG capsule Take 1 capsule by mouth Every Night.      NON FORMULARY Apply 1 dose topically to the appropriate area as directed 2 (Two) Times a Day As Needed. ocusoft lid scrub - 1 pad for each eye bid      nystatin (MYCOSTATIN) 983783 UNIT/GM powder Apply  topically to the appropriate area as directed 3 (Three) Times a Day. 60 g 1    Restasis 0.05 % ophthalmic emulsion Administer 1 drop to both eyes Every 12 (Twelve) Hours.      valsartan (DIOVAN) 160 MG tablet TAKE 1 TABLET DAILY 90 tablet 3    Polyvinyl Alcohol-Povidone (FRESHKOTE PF OP) Apply  to eye(s) as directed by provider Daily. 0.33fl oz 1 drop in each eye bid (Patient not taking: Reported on 12/7/2023)       No facility-administered medications prior to visit.       Patient Active Problem List   Diagnosis    Mixed hyperlipidemia    Abnormal glucose level    Abrasion    Adult BMI 37.0-37.9 kg/sq m    Annual physical exam    ASHD (arteriosclerotic heart disease)    Primary hypertension    Chronic kidney disease, stage 3    Medicare annual wellness visit, subsequent    Morbid obesity    Obstructive sleep apnea    Old myocardial infarction    Vitamin D deficiency    Venous insufficiency of both lower extremities    Transaminitis       Advance Care Planning:  ACP discussion was held with the patient during this visit. Patient has an advance directive in EMR which is still valid.     Identification of Risk Factors:  Risk factors include: Advance Directive Discussion.    Review  of Systems  A review of systems was performed, and positive findings are noted in the HPI.    Compared to one year ago, the patient feels his physical health is the same.  Compared to one year ago, the patient feels his mental health is the same.    Objective     Physical Exam  Vitals and nursing note reviewed.   Constitutional:       Appearance: He is well-developed. He is obese.      Comments: He is obese.   HENT:      Head: Normocephalic and atraumatic.      Right Ear: External ear normal.      Left Ear: External ear normal.      Nose: Nose normal.      Mouth/Throat:      Pharynx: No oropharyngeal exudate.   Eyes:      Conjunctiva/sclera: Conjunctivae normal.      Pupils: Pupils are equal, round, and reactive to light.   Neck:      Thyroid: No thyromegaly.      Vascular: No JVD.   Cardiovascular:      Rate and Rhythm: Normal rate and regular rhythm.      Heart sounds: Normal heart sounds. No murmur heard.     No friction rub. No gallop.   Pulmonary:      Effort: Pulmonary effort is normal. No respiratory distress.      Breath sounds: Normal breath sounds. No wheezing or rales.   Chest:      Chest wall: No tenderness.   Abdominal:      General: Bowel sounds are normal. There is no distension.      Palpations: Abdomen is soft. There is no mass.      Tenderness: There is no abdominal tenderness. There is no guarding or rebound.      Hernia: No hernia is present.   Musculoskeletal:         General: No tenderness. Normal range of motion.      Cervical back: Normal range of motion and neck supple.   Lymphadenopathy:      Cervical: No cervical adenopathy.   Skin:     General: Skin is warm and dry.      Findings: No erythema or rash.   Neurological:      Mental Status: He is alert and oriented to person, place, and time.      Cranial Nerves: No cranial nerve deficit.      Sensory: No sensory deficit.      Motor: No abnormal muscle tone.      Coordination: Coordination normal.      Deep Tendon Reflexes: Reflexes normal.  "  Psychiatric:         Behavior: Behavior normal.         Thought Content: Thought content normal.         Judgment: Judgment normal.          Procedures     Vitals:    12/07/23 1001   BP: 122/76   BP Location: Left arm   Patient Position: Sitting   Cuff Size: Adult   Pulse: 72   Temp: 97.8 °F (36.6 °C)   Weight: 107 kg (235 lb)   Height: 168 cm (66.14\")   PainSc: 0-No pain       Class 2 Severe Obesity (BMI >=35 and <=39.9). Obesity-related health conditions include the following: obstructive sleep apnea, hypertension, coronary heart disease, and dyslipidemias. Obesity is unchanged. BMI is is above average; BMI management plan is completed. We discussed portion control and increasing exercise.      Assessment & Plan   Problem List Items Addressed This Visit          Cardiac and Vasculature    Mixed hyperlipidemia    Relevant Medications    atorvastatin (LIPITOR) 40 MG tablet    fenofibrate (TRICOR) 145 MG tablet    ASHD (arteriosclerotic heart disease)    Relevant Medications    carvedilol (COREG) 12.5 MG tablet    Primary hypertension    Relevant Medications    furosemide (LASIX) 20 MG tablet    valsartan (DIOVAN) 160 MG tablet    carvedilol (COREG) 12.5 MG tablet       Endocrine and Metabolic    Abnormal glucose level       Genitourinary and Reproductive     Chronic kidney disease, stage 3    Relevant Medications    furosemide (LASIX) 20 MG tablet       Health Encounters    Medicare annual wellness visit, subsequent - Primary       Sleep    Obstructive sleep apnea     1. Prevention  Other than the fact that he is basically sedentary, he is doing reasonably well. He now has home health with occupational and physical therapy, so they are making him move. Influenza vaccine is given today. We discussed COVID-19 and RSV.    2. Hypertension  Blood pressure is controlled on valsartan.    3. Hyperlipidemia  Lipids are well controlled on fenofibrate and atorvastatin.    4. Coronary artery disease  Coronary artery disease " is asymptomatic on aspirin, atorvastatin, carvedilol, and losartan.    5. Abnormal glucose  Hemoglobin A1c is normal, although fasting glucose is slightly elevated. The treatment remains carb restriction and weight loss.    6. Chronic kidney disease  GFR is normal. He will continue avoiding NSAIDs.    Follow up in 1 year.    Patient Self-Management and Personalized Health Advice  The patient has been provided with information about: diet, exercise, and fall prevention and preventive services including:   Annual Wellness Visit (AWV).    Outpatient Encounter Medications as of 12/7/2023   Medication Sig Dispense Refill    Acetaminophen (Tylenol) 325 MG capsule Take 500 mg by mouth Daily As Needed.      aspirin 81 MG tablet Take 1 tablet by mouth Daily.      atorvastatin (LIPITOR) 40 MG tablet TAKE 1 TABLET DAILY 90 tablet 0    carvedilol (COREG) 12.5 MG tablet Take 1 tablet by mouth 2 (Two) Times a Day With Meals. 180 tablet 1    Dextromethorphan-guaiFENesin (CORICIDIN HBP CONGESTION/COUGH PO) Take 1 tablet by mouth Daily.      erythromycin (ROMYCIN) 5 MG/GM ophthalmic ointment Administer 1 application  to both eyes Daily As Needed.      famotidine (PEPCID) 20 MG tablet Take 1 tablet by mouth Every 12 (Twelve) Hours.      fenofibrate (TRICOR) 145 MG tablet TAKE 1 TABLET DAILY 90 tablet 0    Ferrous Sulfate (IRON) 325 (65 Fe) MG tablet Take 1 tablet by mouth Daily.      furosemide (LASIX) 20 MG tablet TAKE 1 TABLET DAILY 90 tablet 3    Loratadine (Claritin) 10 MG capsule Take 1 capsule by mouth Every Night.      NON FORMULARY Apply 1 dose topically to the appropriate area as directed 2 (Two) Times a Day As Needed. ocusoft lid scrub - 1 pad for each eye bid      nystatin (MYCOSTATIN) [id number] UNIT/GM powder Apply  topically to the appropriate area as directed 3 (Three) Times a Day. 60 g 1    Restasis 0.05 % ophthalmic emulsion Administer 1 drop to both eyes Every 12 (Twelve) Hours.      valsartan (DIOVAN) 160 MG tablet  TAKE 1 TABLET DAILY 90 tablet 3    [DISCONTINUED] Polyvinyl Alcohol-Povidone (FRESHKOTE PF OP) Apply  to eye(s) as directed by provider Daily. 0.33fl oz 1 drop in each eye bid (Patient not taking: Reported on 12/7/2023)       No facility-administered encounter medications on file as of 12/7/2023.       Reviewed use of high risk medication in the elderly: yes  Reviewed for potential of harmful drug interactions in the elderly: yes    Follow Up:  Return in about 1 year (around 12/7/2024) for Medicare Wellness.     There are no Patient Instructions on file for this visit.    An After Visit Summary and PPPS with all of these plans were given to the patient.         Transcribed from ambient dictation for Ja Baldwin MD by Rosalva Thrasher.  12/07/23   14:52 EST    Patient or patient representative verbalized consent to the visit recording.  I have personally performed the services described in this document as transcribed by the above individual, and it is both accurate and complete.

## 2023-12-08 ENCOUNTER — OUTSIDE FACILITY SERVICE (OUTPATIENT)
Dept: INTERNAL MEDICINE | Facility: CLINIC | Age: 83
End: 2023-12-08
Payer: MEDICARE

## 2023-12-20 RX ORDER — FUROSEMIDE 20 MG/1
TABLET ORAL
Qty: 90 TABLET | Refills: 3 | Status: SHIPPED | OUTPATIENT
Start: 2023-12-20

## 2023-12-26 ENCOUNTER — TELEPHONE (OUTPATIENT)
Dept: INTERNAL MEDICINE | Facility: CLINIC | Age: 83
End: 2023-12-26
Payer: MEDICARE

## 2023-12-26 NOTE — TELEPHONE ENCOUNTER
Caller: YASMINE    Relationship: [facility]/ [facility] [place] call back number:[phone]    What orders are you requesting (i.e. lab or imaging): RE-CERTIFY OCCUPATIONAL THERAPY ORDERS 1 X A WEEK FOR 4 WEEKS    In what timeframe would the patient need to come in: ASAP    Where will you receive your lab/imaging services: AT HOME    Additional notes:

## 2024-01-01 ENCOUNTER — APPOINTMENT (OUTPATIENT)
Dept: GENERAL RADIOLOGY | Facility: HOSPITAL | Age: 84
DRG: 871 | End: 2024-01-01
Payer: MEDICARE

## 2024-01-01 ENCOUNTER — APPOINTMENT (OUTPATIENT)
Dept: MRI IMAGING | Facility: HOSPITAL | Age: 84
DRG: 871 | End: 2024-01-01
Payer: MEDICARE

## 2024-01-01 ENCOUNTER — APPOINTMENT (OUTPATIENT)
Dept: CT IMAGING | Facility: HOSPITAL | Age: 84
DRG: 871 | End: 2024-01-01
Payer: MEDICARE

## 2024-01-01 ENCOUNTER — READMISSION MANAGEMENT (OUTPATIENT)
Dept: CALL CENTER | Facility: HOSPITAL | Age: 84
End: 2024-01-01
Payer: MEDICARE

## 2024-01-01 ENCOUNTER — TELEPHONE (OUTPATIENT)
Dept: INTERNAL MEDICINE | Facility: CLINIC | Age: 84
End: 2024-01-01

## 2024-01-01 ENCOUNTER — HOSPITAL ENCOUNTER (INPATIENT)
Facility: HOSPITAL | Age: 84
LOS: 5 days | DRG: 871 | End: 2024-05-25
Attending: EMERGENCY MEDICINE | Admitting: INTERNAL MEDICINE
Payer: MEDICARE

## 2024-01-01 VITALS
DIASTOLIC BLOOD PRESSURE: 36 MMHG | HEART RATE: 62 BPM | HEIGHT: 70 IN | OXYGEN SATURATION: 95 % | BODY MASS INDEX: 32.93 KG/M2 | WEIGHT: 230 LBS | RESPIRATION RATE: 14 BRPM | TEMPERATURE: 97.6 F | SYSTOLIC BLOOD PRESSURE: 56 MMHG

## 2024-01-01 DIAGNOSIS — A41.9 SEPTIC SHOCK: Primary | ICD-10-CM

## 2024-01-01 DIAGNOSIS — R65.21 SEPTIC SHOCK: Primary | ICD-10-CM

## 2024-01-01 DIAGNOSIS — D72.829 LEUKOCYTOSIS, UNSPECIFIED TYPE: ICD-10-CM

## 2024-01-01 DIAGNOSIS — R13.10 DYSPHAGIA, UNSPECIFIED TYPE: ICD-10-CM

## 2024-01-01 DIAGNOSIS — D69.6 THROMBOCYTOPENIA: ICD-10-CM

## 2024-01-01 DIAGNOSIS — N17.9 AKI (ACUTE KIDNEY INJURY): ICD-10-CM

## 2024-01-01 DIAGNOSIS — J18.9 MULTIFOCAL PNEUMONIA: ICD-10-CM

## 2024-01-01 LAB
ABO GROUP BLD: NORMAL
ABO GROUP BLD: NORMAL
ALBUMIN SERPL-MCNC: 1.8 G/DL (ref 3.5–5.2)
ALBUMIN SERPL-MCNC: 2 G/DL (ref 3.5–5.2)
ALBUMIN/GLOB SERPL: 0.7 G/DL
ALBUMIN/GLOB SERPL: 0.8 G/DL
ALP SERPL-CCNC: 142 U/L (ref 39–117)
ALP SERPL-CCNC: 97 U/L (ref 39–117)
ALT SERPL W P-5'-P-CCNC: 36 U/L (ref 1–41)
ALT SERPL W P-5'-P-CCNC: 49 U/L (ref 1–41)
AMMONIA BLD-SCNC: 29 UMOL/L (ref 16–60)
AMPHET+METHAMPHET UR QL: NEGATIVE
AMPHETAMINES UR QL: NEGATIVE
ANION GAP SERPL CALCULATED.3IONS-SCNC: 10 MMOL/L (ref 5–15)
ANION GAP SERPL CALCULATED.3IONS-SCNC: 11 MMOL/L (ref 5–15)
ANION GAP SERPL CALCULATED.3IONS-SCNC: 6 MMOL/L (ref 5–15)
ANION GAP SERPL CALCULATED.3IONS-SCNC: 8 MMOL/L (ref 5–15)
ANION GAP SERPL CALCULATED.3IONS-SCNC: 8 MMOL/L (ref 5–15)
ANION GAP SERPL CALCULATED.3IONS-SCNC: 9 MMOL/L (ref 5–15)
ANISOCYTOSIS BLD QL: NORMAL
APAP SERPL-MCNC: <5 MCG/ML (ref 0–30)
APTT PPP: 45.6 SECONDS (ref 22–39)
ARTERIAL PATENCY WRIST A: ABNORMAL
ARTERIAL PATENCY WRIST A: ABNORMAL
AST SERPL-CCNC: 44 U/L (ref 1–40)
AST SERPL-CCNC: 64 U/L (ref 1–40)
ATMOSPHERIC PRESS: ABNORMAL MM[HG]
ATMOSPHERIC PRESS: ABNORMAL MM[HG]
BACTERIA BLD CULT: ABNORMAL
BACTERIA SPEC AEROBE CULT: ABNORMAL
BACTERIA UR QL AUTO: ABNORMAL /HPF
BARBITURATES UR QL SCN: NEGATIVE
BASE EXCESS BLDA CALC-SCNC: -0.4 MMOL/L (ref 0–2)
BASE EXCESS BLDA CALC-SCNC: -1.9 MMOL/L (ref 0–2)
BASOPHILS # BLD AUTO: 0.01 10*3/MM3 (ref 0–0.2)
BASOPHILS # BLD AUTO: 0.01 10*3/MM3 (ref 0–0.2)
BASOPHILS # BLD AUTO: 0.02 10*3/MM3 (ref 0–0.2)
BASOPHILS # BLD AUTO: 0.03 10*3/MM3 (ref 0–0.2)
BASOPHILS NFR BLD AUTO: 0.1 % (ref 0–1.5)
BASOPHILS NFR BLD AUTO: 0.2 % (ref 0–1.5)
BDY SITE: ABNORMAL
BDY SITE: ABNORMAL
BENZODIAZ UR QL SCN: NEGATIVE
BILIRUB SERPL-MCNC: 0.9 MG/DL (ref 0–1.2)
BILIRUB SERPL-MCNC: 1.8 MG/DL (ref 0–1.2)
BILIRUB UR QL STRIP: ABNORMAL
BLD GP AB SCN SERPL QL: NEGATIVE
BODY TEMPERATURE: 37
BODY TEMPERATURE: 37
BOTTLE TYPE: ABNORMAL
BUN BLDA-MCNC: 72 MG/DL (ref 8–26)
BUN SERPL-MCNC: 51 MG/DL (ref 8–23)
BUN SERPL-MCNC: 52 MG/DL (ref 8–23)
BUN SERPL-MCNC: 53 MG/DL (ref 8–23)
BUN SERPL-MCNC: 55 MG/DL (ref 8–23)
BUN SERPL-MCNC: 55 MG/DL (ref 8–23)
BUN SERPL-MCNC: 60 MG/DL (ref 8–23)
BUN/CREAT SERPL: 23.4 (ref 7–25)
BUN/CREAT SERPL: 27.1 (ref 7–25)
BUN/CREAT SERPL: 32 (ref 7–25)
BUN/CREAT SERPL: 32.3 (ref 7–25)
BUN/CREAT SERPL: 32.7 (ref 7–25)
BUN/CREAT SERPL: 33.6 (ref 7–25)
BUPRENORPHINE SERPL-MCNC: NEGATIVE NG/ML
BURR CELLS BLD QL SMEAR: NORMAL
CA-I BLDA-SCNC: 1.07 MMOL/L (ref 1.2–1.32)
CALCIUM SPEC-SCNC: 7.1 MG/DL (ref 8.6–10.5)
CALCIUM SPEC-SCNC: 7.2 MG/DL (ref 8.6–10.5)
CALCIUM SPEC-SCNC: 7.9 MG/DL (ref 8.6–10.5)
CALCIUM SPEC-SCNC: 7.9 MG/DL (ref 8.6–10.5)
CALCIUM SPEC-SCNC: 8.2 MG/DL (ref 8.6–10.5)
CALCIUM SPEC-SCNC: 8.2 MG/DL (ref 8.6–10.5)
CANNABINOIDS SERPL QL: NEGATIVE
CHLORIDE BLDA-SCNC: 104 MMOL/L (ref 98–109)
CHLORIDE SERPL-SCNC: 105 MMOL/L (ref 98–107)
CHLORIDE SERPL-SCNC: 109 MMOL/L (ref 98–107)
CHLORIDE SERPL-SCNC: 115 MMOL/L (ref 98–107)
CHOLEST SERPL-MCNC: 57 MG/DL (ref 0–200)
CK SERPL-CCNC: 167 U/L (ref 20–200)
CLARITY UR: ABNORMAL
CO2 BLDA-SCNC: 26 MMOL/L (ref 24–29)
CO2 BLDA-SCNC: 26.3 MMOL/L (ref 22–33)
CO2 BLDA-SCNC: 26.5 MMOL/L (ref 22–33)
CO2 SERPL-SCNC: 21 MMOL/L (ref 22–29)
CO2 SERPL-SCNC: 23 MMOL/L (ref 22–29)
CO2 SERPL-SCNC: 23 MMOL/L (ref 22–29)
CO2 SERPL-SCNC: 24 MMOL/L (ref 22–29)
CO2 SERPL-SCNC: 25 MMOL/L (ref 22–29)
CO2 SERPL-SCNC: 25 MMOL/L (ref 22–29)
COCAINE UR QL: NEGATIVE
COHGB MFR BLD: 1.4 % (ref 0–2)
COHGB MFR BLD: 1.7 % (ref 0–2)
COLOR UR: ABNORMAL
CREAT BLDA-MCNC: 2.6 MG/DL (ref 0.6–1.3)
CREAT SERPL-MCNC: 1.52 MG/DL (ref 0.76–1.27)
CREAT SERPL-MCNC: 1.62 MG/DL (ref 0.76–1.27)
CREAT SERPL-MCNC: 1.72 MG/DL (ref 0.76–1.27)
CREAT SERPL-MCNC: 1.86 MG/DL (ref 0.76–1.27)
CREAT SERPL-MCNC: 1.92 MG/DL (ref 0.76–1.27)
CREAT SERPL-MCNC: 2.35 MG/DL (ref 0.76–1.27)
D-LACTATE SERPL-SCNC: 2 MMOL/L (ref 0.5–2)
D-LACTATE SERPL-SCNC: 2.7 MMOL/L (ref 0.5–2)
DEPRECATED RDW RBC AUTO: 65.3 FL (ref 37–54)
DEPRECATED RDW RBC AUTO: 66.4 FL (ref 37–54)
DEPRECATED RDW RBC AUTO: 67 FL (ref 37–54)
DEPRECATED RDW RBC AUTO: 68 FL (ref 37–54)
DEPRECATED RDW RBC AUTO: 68.4 FL (ref 37–54)
DEPRECATED RDW RBC AUTO: 69.1 FL (ref 37–54)
EGFRCR SERPLBLD CKD-EPI 2021: 23.6 ML/MIN/1.73
EGFRCR SERPLBLD CKD-EPI 2021: 26.6 ML/MIN/1.73
EGFRCR SERPLBLD CKD-EPI 2021: 33.9 ML/MIN/1.73
EGFRCR SERPLBLD CKD-EPI 2021: 35.2 ML/MIN/1.73
EGFRCR SERPLBLD CKD-EPI 2021: 38.7 ML/MIN/1.73
EGFRCR SERPLBLD CKD-EPI 2021: 41.6 ML/MIN/1.73
EGFRCR SERPLBLD CKD-EPI 2021: 44.9 ML/MIN/1.73
EOSINOPHIL # BLD AUTO: 0 10*3/MM3 (ref 0–0.4)
EOSINOPHIL # BLD AUTO: 0.02 10*3/MM3 (ref 0–0.4)
EOSINOPHIL # BLD AUTO: 0.07 10*3/MM3 (ref 0–0.4)
EOSINOPHIL # BLD AUTO: 0.13 10*3/MM3 (ref 0–0.4)
EOSINOPHIL NFR BLD AUTO: 0 % (ref 0.3–6.2)
EOSINOPHIL NFR BLD AUTO: 0.1 % (ref 0.3–6.2)
EOSINOPHIL NFR BLD AUTO: 0.5 % (ref 0.3–6.2)
EOSINOPHIL NFR BLD AUTO: 0.9 % (ref 0.3–6.2)
EPAP: 0
EPAP: 0
ERYTHROCYTE [DISTWIDTH] IN BLOOD BY AUTOMATED COUNT: 19.2 % (ref 12.3–15.4)
ERYTHROCYTE [DISTWIDTH] IN BLOOD BY AUTOMATED COUNT: 19.4 % (ref 12.3–15.4)
ERYTHROCYTE [DISTWIDTH] IN BLOOD BY AUTOMATED COUNT: 19.6 % (ref 12.3–15.4)
ERYTHROCYTE [DISTWIDTH] IN BLOOD BY AUTOMATED COUNT: 19.6 % (ref 12.3–15.4)
ERYTHROCYTE [DISTWIDTH] IN BLOOD BY AUTOMATED COUNT: 19.9 % (ref 12.3–15.4)
ERYTHROCYTE [DISTWIDTH] IN BLOOD BY AUTOMATED COUNT: 20 % (ref 12.3–15.4)
ETHANOL BLD-MCNC: <10 MG/DL (ref 0–10)
FENTANYL UR-MCNC: NEGATIVE NG/ML
FLUAV RNA RESP QL NAA+PROBE: NOT DETECTED
FLUBV RNA RESP QL NAA+PROBE: NOT DETECTED
GEN 5 2HR TROPONIN T REFLEX: 94 NG/L
GLOBULIN UR ELPH-MCNC: 2.4 GM/DL
GLOBULIN UR ELPH-MCNC: 2.8 GM/DL
GLUCOSE BLDC GLUCOMTR-MCNC: 100 MG/DL (ref 70–130)
GLUCOSE BLDC GLUCOMTR-MCNC: 100 MG/DL (ref 70–130)
GLUCOSE BLDC GLUCOMTR-MCNC: 112 MG/DL (ref 70–130)
GLUCOSE BLDC GLUCOMTR-MCNC: 141 MG/DL (ref 70–130)
GLUCOSE BLDC GLUCOMTR-MCNC: 166 MG/DL (ref 70–130)
GLUCOSE BLDC GLUCOMTR-MCNC: 181 MG/DL (ref 70–130)
GLUCOSE BLDC GLUCOMTR-MCNC: 194 MG/DL (ref 70–130)
GLUCOSE BLDC GLUCOMTR-MCNC: 49 MG/DL (ref 70–130)
GLUCOSE BLDC GLUCOMTR-MCNC: 52 MG/DL (ref 70–130)
GLUCOSE BLDC GLUCOMTR-MCNC: 67 MG/DL (ref 70–130)
GLUCOSE BLDC GLUCOMTR-MCNC: 84 MG/DL (ref 70–130)
GLUCOSE BLDC GLUCOMTR-MCNC: 87 MG/DL (ref 70–130)
GLUCOSE BLDC GLUCOMTR-MCNC: 91 MG/DL (ref 70–130)
GLUCOSE BLDC GLUCOMTR-MCNC: 95 MG/DL (ref 70–130)
GLUCOSE BLDC GLUCOMTR-MCNC: 98 MG/DL (ref 70–130)
GLUCOSE SERPL-MCNC: 110 MG/DL (ref 65–99)
GLUCOSE SERPL-MCNC: 114 MG/DL (ref 65–99)
GLUCOSE SERPL-MCNC: 279 MG/DL (ref 65–99)
GLUCOSE SERPL-MCNC: 288 MG/DL (ref 65–99)
GLUCOSE SERPL-MCNC: 64 MG/DL (ref 65–99)
GLUCOSE SERPL-MCNC: 95 MG/DL (ref 65–99)
GLUCOSE UR STRIP-MCNC: NEGATIVE MG/DL
GRAM STN SPEC: ABNORMAL
HBA1C MFR BLD: 4.5 % (ref 4.8–5.6)
HCO3 BLDA-SCNC: 24.9 MMOL/L (ref 20–26)
HCO3 BLDA-SCNC: 25 MMOL/L (ref 20–26)
HCT VFR BLD AUTO: 28.8 % (ref 37.5–51)
HCT VFR BLD AUTO: 30.2 % (ref 37.5–51)
HCT VFR BLD AUTO: 31 % (ref 37.5–51)
HCT VFR BLD AUTO: 33.7 % (ref 37.5–51)
HCT VFR BLD AUTO: 35.2 % (ref 37.5–51)
HCT VFR BLD AUTO: 35.4 % (ref 37.5–51)
HCT VFR BLD CALC: 31.9 % (ref 38–51)
HCT VFR BLD CALC: 34.8 % (ref 38–51)
HCT VFR BLDA CALC: 35 % (ref 38–51)
HDLC SERPL-MCNC: 18 MG/DL (ref 40–60)
HGB BLD-MCNC: 10.5 G/DL (ref 13–17.7)
HGB BLD-MCNC: 11.3 G/DL (ref 13–17.7)
HGB BLD-MCNC: 11.7 G/DL (ref 13–17.7)
HGB BLD-MCNC: 12 G/DL (ref 13–17.7)
HGB BLD-MCNC: 9.5 G/DL (ref 13–17.7)
HGB BLD-MCNC: 9.8 G/DL (ref 13–17.7)
HGB BLDA-MCNC: 10.4 G/DL (ref 13.5–17.5)
HGB BLDA-MCNC: 11.4 G/DL (ref 13.5–17.5)
HGB BLDA-MCNC: 11.9 G/DL (ref 12–17)
HGB UR QL STRIP.AUTO: NEGATIVE
HOLD SPECIMEN: NORMAL
HYALINE CASTS UR QL AUTO: ABNORMAL /LPF
IMM GRANULOCYTES # BLD AUTO: 0.16 10*3/MM3 (ref 0–0.05)
IMM GRANULOCYTES # BLD AUTO: 0.19 10*3/MM3 (ref 0–0.05)
IMM GRANULOCYTES # BLD AUTO: 0.2 10*3/MM3 (ref 0–0.05)
IMM GRANULOCYTES # BLD AUTO: 0.24 10*3/MM3 (ref 0–0.05)
IMM GRANULOCYTES NFR BLD AUTO: 0.9 % (ref 0–0.5)
IMM GRANULOCYTES NFR BLD AUTO: 1.3 % (ref 0–0.5)
IMM GRANULOCYTES NFR BLD AUTO: 1.4 % (ref 0–0.5)
IMM GRANULOCYTES NFR BLD AUTO: 1.7 % (ref 0–0.5)
INHALED O2 CONCENTRATION: 28 %
INHALED O2 CONCENTRATION: 28 %
INR PPP: 1.4 (ref 0.8–1.2)
IPAP: 0
IPAP: 0
ISOLATED FROM: ABNORMAL
ISOLATED FROM: ABNORMAL
KETONES UR QL STRIP: ABNORMAL
LDLC SERPL CALC-MCNC: 22 MG/DL (ref 0–100)
LDLC/HDLC SERPL: 1.28 {RATIO}
LEUKOCYTE ESTERASE UR QL STRIP.AUTO: ABNORMAL
LYMPHOCYTES # BLD AUTO: 0.34 10*3/MM3 (ref 0.7–3.1)
LYMPHOCYTES # BLD AUTO: 0.98 10*3/MM3 (ref 0.7–3.1)
LYMPHOCYTES # BLD AUTO: 1.02 10*3/MM3 (ref 0.7–3.1)
LYMPHOCYTES # BLD AUTO: 1.03 10*3/MM3 (ref 0.7–3.1)
LYMPHOCYTES NFR BLD AUTO: 1.9 % (ref 19.6–45.3)
LYMPHOCYTES NFR BLD AUTO: 6.5 % (ref 19.6–45.3)
LYMPHOCYTES NFR BLD AUTO: 7.3 % (ref 19.6–45.3)
LYMPHOCYTES NFR BLD AUTO: 7.4 % (ref 19.6–45.3)
MAGNESIUM SERPL-MCNC: 1.6 MG/DL (ref 1.6–2.4)
MAGNESIUM SERPL-MCNC: 1.6 MG/DL (ref 1.6–2.4)
MAGNESIUM SERPL-MCNC: 1.8 MG/DL (ref 1.6–2.4)
MAGNESIUM SERPL-MCNC: 2.1 MG/DL (ref 1.6–2.4)
MCH RBC QN AUTO: 31 PG (ref 26.6–33)
MCH RBC QN AUTO: 31.1 PG (ref 26.6–33)
MCH RBC QN AUTO: 31.1 PG (ref 26.6–33)
MCH RBC QN AUTO: 31.6 PG (ref 26.6–33)
MCH RBC QN AUTO: 32.3 PG (ref 26.6–33)
MCH RBC QN AUTO: 32.4 PG (ref 26.6–33)
MCHC RBC AUTO-ENTMCNC: 32.5 G/DL (ref 31.5–35.7)
MCHC RBC AUTO-ENTMCNC: 33 G/DL (ref 31.5–35.7)
MCHC RBC AUTO-ENTMCNC: 33.2 G/DL (ref 31.5–35.7)
MCHC RBC AUTO-ENTMCNC: 33.5 G/DL (ref 31.5–35.7)
MCHC RBC AUTO-ENTMCNC: 33.9 G/DL (ref 31.5–35.7)
MCHC RBC AUTO-ENTMCNC: 33.9 G/DL (ref 31.5–35.7)
MCV RBC AUTO: 93.6 FL (ref 79–97)
MCV RBC AUTO: 94.1 FL (ref 79–97)
MCV RBC AUTO: 94.1 FL (ref 79–97)
MCV RBC AUTO: 95.4 FL (ref 79–97)
MCV RBC AUTO: 95.7 FL (ref 79–97)
MCV RBC AUTO: 95.9 FL (ref 79–97)
METHADONE UR QL SCN: NEGATIVE
METHGB BLD QL: 0.2 % (ref 0–1.5)
METHGB BLD QL: 0.3 % (ref 0–1.5)
MODALITY: ABNORMAL
MODALITY: ABNORMAL
MONOCYTES # BLD AUTO: 0.16 10*3/MM3 (ref 0.1–0.9)
MONOCYTES # BLD AUTO: 1.57 10*3/MM3 (ref 0.1–0.9)
MONOCYTES # BLD AUTO: 1.84 10*3/MM3 (ref 0.1–0.9)
MONOCYTES # BLD AUTO: 2.07 10*3/MM3 (ref 0.1–0.9)
MONOCYTES NFR BLD AUTO: 0.9 % (ref 5–12)
MONOCYTES NFR BLD AUTO: 11.3 % (ref 5–12)
MONOCYTES NFR BLD AUTO: 13.2 % (ref 5–12)
MONOCYTES NFR BLD AUTO: 13.8 % (ref 5–12)
MRSA DNA SPEC QL NAA+PROBE: NEGATIVE
NEUTROPHILS NFR BLD AUTO: 10.73 10*3/MM3 (ref 1.7–7)
NEUTROPHILS NFR BLD AUTO: 11.04 10*3/MM3 (ref 1.7–7)
NEUTROPHILS NFR BLD AUTO: 11.59 10*3/MM3 (ref 1.7–7)
NEUTROPHILS NFR BLD AUTO: 17.16 10*3/MM3 (ref 1.7–7)
NEUTROPHILS NFR BLD AUTO: 77.4 % (ref 42.7–76)
NEUTROPHILS NFR BLD AUTO: 77.4 % (ref 42.7–76)
NEUTROPHILS NFR BLD AUTO: 79.4 % (ref 42.7–76)
NEUTROPHILS NFR BLD AUTO: 96.2 % (ref 42.7–76)
NITRITE UR QL STRIP: NEGATIVE
NRBC BLD AUTO-RTO: 0.1 /100 WBC (ref 0–0.2)
NRBC BLD AUTO-RTO: 0.1 /100 WBC (ref 0–0.2)
NRBC BLD AUTO-RTO: 0.2 /100 WBC (ref 0–0.2)
NRBC BLD AUTO-RTO: 0.2 /100 WBC (ref 0–0.2)
OPIATES UR QL: NEGATIVE
OXYCODONE UR QL SCN: NEGATIVE
OXYHGB MFR BLDV: 96.1 % (ref 94–99)
OXYHGB MFR BLDV: 96.8 % (ref 94–99)
PAW @ PEAK INSP FLOW SETTING VENT: 0 CMH2O
PAW @ PEAK INSP FLOW SETTING VENT: 0 CMH2O
PCO2 BLDA: 43.3 MM HG (ref 35–45)
PCO2 BLDA: 50.9 MM HG (ref 35–45)
PCO2 TEMP ADJ BLD: 43.3 MM HG (ref 35–48)
PCO2 TEMP ADJ BLD: 50.9 MM HG (ref 35–48)
PCP UR QL SCN: NEGATIVE
PH BLDA: 7.3 PH UNITS (ref 7.35–7.45)
PH BLDA: 7.37 PH UNITS (ref 7.35–7.45)
PH UR STRIP.AUTO: <=5 [PH] (ref 5–8)
PH, TEMP CORRECTED: 7.3 PH UNITS
PH, TEMP CORRECTED: 7.37 PH UNITS
PHOSPHATE SERPL-MCNC: 2.2 MG/DL (ref 2.5–4.5)
PHOSPHATE SERPL-MCNC: 2.7 MG/DL (ref 2.5–4.5)
PHOSPHATE SERPL-MCNC: 3 MG/DL (ref 2.5–4.5)
PHOSPHATE SERPL-MCNC: 3.1 MG/DL (ref 2.5–4.5)
PLAT MORPH BLD: NORMAL
PLATELET # BLD AUTO: 43 10*3/MM3 (ref 140–450)
PLATELET # BLD AUTO: 47 10*3/MM3 (ref 140–450)
PLATELET # BLD AUTO: 47 10*3/MM3 (ref 140–450)
PLATELET # BLD AUTO: 80 10*3/MM3 (ref 140–450)
PLATELET # BLD AUTO: 84 10*3/MM3 (ref 140–450)
PLATELET # BLD AUTO: 92 10*3/MM3 (ref 140–450)
PMV BLD AUTO: 11.9 FL (ref 6–12)
PMV BLD AUTO: 12.2 FL (ref 6–12)
PMV BLD AUTO: 12.4 FL (ref 6–12)
PMV BLD AUTO: 12.6 FL (ref 6–12)
PMV BLD AUTO: 12.8 FL (ref 6–12)
PO2 BLDA: 115 MM HG (ref 83–108)
PO2 BLDA: 98.4 MM HG (ref 83–108)
PO2 TEMP ADJ BLD: 115 MM HG (ref 83–108)
PO2 TEMP ADJ BLD: 98.4 MM HG (ref 83–108)
POTASSIUM BLDA-SCNC: 5 MMOL/L (ref 3.5–4.9)
POTASSIUM SERPL-SCNC: 3.2 MMOL/L (ref 3.5–5.2)
POTASSIUM SERPL-SCNC: 3.7 MMOL/L (ref 3.5–5.2)
POTASSIUM SERPL-SCNC: 4 MMOL/L (ref 3.5–5.2)
POTASSIUM SERPL-SCNC: 4.1 MMOL/L (ref 3.5–5.2)
POTASSIUM SERPL-SCNC: 4.1 MMOL/L (ref 3.5–5.2)
POTASSIUM SERPL-SCNC: 4.8 MMOL/L (ref 3.5–5.2)
POTASSIUM SERPL-SCNC: 5 MMOL/L (ref 3.5–5.2)
PROCALCITONIN SERPL-MCNC: 5.62 NG/ML (ref 0–0.25)
PROT SERPL-MCNC: 4.2 G/DL (ref 6–8.5)
PROT SERPL-MCNC: 4.8 G/DL (ref 6–8.5)
PROT UR QL STRIP: NEGATIVE
PROTHROMBIN TIME: 17 SECONDS (ref 12.8–15.2)
QT INTERVAL: 396 MS
QT INTERVAL: 484 MS
QTC INTERVAL: 379 MS
QTC INTERVAL: 408 MS
RBC # BLD AUTO: 3.06 10*6/MM3 (ref 4.14–5.8)
RBC # BLD AUTO: 3.15 10*6/MM3 (ref 4.14–5.8)
RBC # BLD AUTO: 3.25 10*6/MM3 (ref 4.14–5.8)
RBC # BLD AUTO: 3.58 10*6/MM3 (ref 4.14–5.8)
RBC # BLD AUTO: 3.7 10*6/MM3 (ref 4.14–5.8)
RBC # BLD AUTO: 3.76 10*6/MM3 (ref 4.14–5.8)
RBC # UR STRIP: ABNORMAL /HPF
RBC MORPH BLD: NORMAL
REF LAB TEST METHOD: ABNORMAL
RH BLD: NEGATIVE
RH BLD: NEGATIVE
SALICYLATES SERPL-MCNC: <0.3 MG/DL
SARS-COV-2 RNA RESP QL NAA+PROBE: NOT DETECTED
SMALL PLATELETS BLD QL SMEAR: NORMAL
SODIUM BLD-SCNC: 139 MMOL/L (ref 138–146)
SODIUM SERPL-SCNC: 138 MMOL/L (ref 136–145)
SODIUM SERPL-SCNC: 140 MMOL/L (ref 136–145)
SODIUM SERPL-SCNC: 140 MMOL/L (ref 136–145)
SODIUM SERPL-SCNC: 142 MMOL/L (ref 136–145)
SODIUM SERPL-SCNC: 143 MMOL/L (ref 136–145)
SODIUM SERPL-SCNC: 146 MMOL/L (ref 136–145)
SP GR UR STRIP: 1.02 (ref 1–1.03)
SQUAMOUS #/AREA URNS HPF: ABNORMAL /HPF
T&S EXPIRATION DATE: NORMAL
T4 FREE SERPL-MCNC: 1.01 NG/DL (ref 0.92–1.68)
TARGETS BLD QL SMEAR: NORMAL
TOTAL RATE: 0 BREATHS/MINUTE
TOTAL RATE: 0 BREATHS/MINUTE
TRICYCLICS UR QL SCN: NEGATIVE
TRIGL SERPL-MCNC: 80 MG/DL (ref 0–150)
TROPONIN T DELTA: -12 NG/L
TROPONIN T SERPL HS-MCNC: 106 NG/L
TROPONIN T SERPL HS-MCNC: 75 NG/L
TSH SERPL DL<=0.05 MIU/L-ACNC: 4.43 UIU/ML (ref 0.27–4.2)
UROBILINOGEN UR QL STRIP: ABNORMAL
VLDLC SERPL-MCNC: 17 MG/DL (ref 5–40)
WBC # UR STRIP: ABNORMAL /HPF
WBC MORPH BLD: NORMAL
WBC MORPH BLD: NORMAL
WBC NRBC COR # BLD AUTO: 13.89 10*3/MM3 (ref 3.4–10.8)
WBC NRBC COR # BLD AUTO: 13.9 10*3/MM3 (ref 3.4–10.8)
WBC NRBC COR # BLD AUTO: 14.98 10*3/MM3 (ref 3.4–10.8)
WBC NRBC COR # BLD AUTO: 15.54 10*3/MM3 (ref 3.4–10.8)
WBC NRBC COR # BLD AUTO: 17.84 10*3/MM3 (ref 3.4–10.8)
WBC NRBC COR # BLD AUTO: 26.46 10*3/MM3 (ref 3.4–10.8)
WHOLE BLOOD HOLD COAG: NORMAL
WHOLE BLOOD HOLD SPECIMEN: NORMAL

## 2024-01-01 PROCEDURE — 99291 CRITICAL CARE FIRST HOUR: CPT | Performed by: INTERNAL MEDICINE

## 2024-01-01 PROCEDURE — 85014 HEMATOCRIT: CPT

## 2024-01-01 PROCEDURE — 83605 ASSAY OF LACTIC ACID: CPT | Performed by: EMERGENCY MEDICINE

## 2024-01-01 PROCEDURE — P9612 CATHETERIZE FOR URINE SPEC: HCPCS

## 2024-01-01 PROCEDURE — 80047 BASIC METABLC PNL IONIZED CA: CPT

## 2024-01-01 PROCEDURE — 87040 BLOOD CULTURE FOR BACTERIA: CPT | Performed by: EMERGENCY MEDICINE

## 2024-01-01 PROCEDURE — 25010000002 PIPERACILLIN SOD-TAZOBACTAM PER 1 G: Performed by: FAMILY MEDICINE

## 2024-01-01 PROCEDURE — 25010000002 PIPERACILLIN SOD-TAZOBACTAM PER 1 G: Performed by: EMERGENCY MEDICINE

## 2024-01-01 PROCEDURE — 25810000003 LACTATED RINGERS PER 1000 ML: Performed by: INTERNAL MEDICINE

## 2024-01-01 PROCEDURE — 86901 BLOOD TYPING SEROLOGIC RH(D): CPT | Performed by: NURSE PRACTITIONER

## 2024-01-01 PROCEDURE — 82140 ASSAY OF AMMONIA: CPT | Performed by: EMERGENCY MEDICINE

## 2024-01-01 PROCEDURE — 82550 ASSAY OF CK (CPK): CPT | Performed by: EMERGENCY MEDICINE

## 2024-01-01 PROCEDURE — 82948 REAGENT STRIP/BLOOD GLUCOSE: CPT

## 2024-01-01 PROCEDURE — 94761 N-INVAS EAR/PLS OXIMETRY MLT: CPT

## 2024-01-01 PROCEDURE — 86850 RBC ANTIBODY SCREEN: CPT | Performed by: NURSE PRACTITIONER

## 2024-01-01 PROCEDURE — 80048 BASIC METABOLIC PNL TOTAL CA: CPT | Performed by: INTERNAL MEDICINE

## 2024-01-01 PROCEDURE — 86901 BLOOD TYPING SEROLOGIC RH(D): CPT

## 2024-01-01 PROCEDURE — 84100 ASSAY OF PHOSPHORUS: CPT | Performed by: INTERNAL MEDICINE

## 2024-01-01 PROCEDURE — 85610 PROTHROMBIN TIME: CPT

## 2024-01-01 PROCEDURE — 85025 COMPLETE CBC W/AUTO DIFF WBC: CPT | Performed by: EMERGENCY MEDICINE

## 2024-01-01 PROCEDURE — 93005 ELECTROCARDIOGRAM TRACING: CPT | Performed by: INTERNAL MEDICINE

## 2024-01-01 PROCEDURE — 87205 SMEAR GRAM STAIN: CPT

## 2024-01-01 PROCEDURE — 99291 CRITICAL CARE FIRST HOUR: CPT | Performed by: STUDENT IN AN ORGANIZED HEALTH CARE EDUCATION/TRAINING PROGRAM

## 2024-01-01 PROCEDURE — 80061 LIPID PANEL: CPT

## 2024-01-01 PROCEDURE — 36600 WITHDRAWAL OF ARTERIAL BLOOD: CPT

## 2024-01-01 PROCEDURE — 97162 PT EVAL MOD COMPLEX 30 MIN: CPT

## 2024-01-01 PROCEDURE — 92610 EVALUATE SWALLOWING FUNCTION: CPT

## 2024-01-01 PROCEDURE — 85730 THROMBOPLASTIN TIME PARTIAL: CPT | Performed by: EMERGENCY MEDICINE

## 2024-01-01 PROCEDURE — 25010000002 ONDANSETRON PER 1 MG: Performed by: FAMILY MEDICINE

## 2024-01-01 PROCEDURE — 83036 HEMOGLOBIN GLYCOSYLATED A1C: CPT

## 2024-01-01 PROCEDURE — 83735 ASSAY OF MAGNESIUM: CPT | Performed by: INTERNAL MEDICINE

## 2024-01-01 PROCEDURE — 25010000002 MIDAZOLAM PER 1 MG: Performed by: INTERNAL MEDICINE

## 2024-01-01 PROCEDURE — 71045 X-RAY EXAM CHEST 1 VIEW: CPT

## 2024-01-01 PROCEDURE — 97166 OT EVAL MOD COMPLEX 45 MIN: CPT

## 2024-01-01 PROCEDURE — 82375 ASSAY CARBOXYHB QUANT: CPT

## 2024-01-01 PROCEDURE — 25810000003 DEXTROSE-NACL PER 500 ML: Performed by: INTERNAL MEDICINE

## 2024-01-01 PROCEDURE — 25010000002 DOPAMINE PER 40 MG: Performed by: INTERNAL MEDICINE

## 2024-01-01 PROCEDURE — 80048 BASIC METABOLIC PNL TOTAL CA: CPT | Performed by: FAMILY MEDICINE

## 2024-01-01 PROCEDURE — 94799 UNLISTED PULMONARY SVC/PX: CPT

## 2024-01-01 PROCEDURE — 86900 BLOOD TYPING SEROLOGIC ABO: CPT | Performed by: NURSE PRACTITIONER

## 2024-01-01 PROCEDURE — C1894 INTRO/SHEATH, NON-LASER: HCPCS

## 2024-01-01 PROCEDURE — 85025 COMPLETE CBC W/AUTO DIFF WBC: CPT | Performed by: INTERNAL MEDICINE

## 2024-01-01 PROCEDURE — 94660 CPAP INITIATION&MGMT: CPT

## 2024-01-01 PROCEDURE — 25010000002 HEPARIN (PORCINE) PER 1000 UNITS: Performed by: INTERNAL MEDICINE

## 2024-01-01 PROCEDURE — 80053 COMPREHEN METABOLIC PANEL: CPT | Performed by: EMERGENCY MEDICINE

## 2024-01-01 PROCEDURE — 84484 ASSAY OF TROPONIN QUANT: CPT | Performed by: INTERNAL MEDICINE

## 2024-01-01 PROCEDURE — 80053 COMPREHEN METABOLIC PANEL: CPT | Performed by: INTERNAL MEDICINE

## 2024-01-01 PROCEDURE — 87154 CUL TYP ID BLD PTHGN 6+ TRGT: CPT | Performed by: EMERGENCY MEDICINE

## 2024-01-01 PROCEDURE — C1751 CATH, INF, PER/CENT/MIDLINE: HCPCS

## 2024-01-01 PROCEDURE — 87077 CULTURE AEROBIC IDENTIFY: CPT | Performed by: EMERGENCY MEDICINE

## 2024-01-01 PROCEDURE — 25810000003 SODIUM CHLORIDE 0.9 % SOLUTION: Performed by: FAMILY MEDICINE

## 2024-01-01 PROCEDURE — 84443 ASSAY THYROID STIM HORMONE: CPT | Performed by: EMERGENCY MEDICINE

## 2024-01-01 PROCEDURE — 83050 HGB METHEMOGLOBIN QUAN: CPT

## 2024-01-01 PROCEDURE — 5A09457 ASSISTANCE WITH RESPIRATORY VENTILATION, 24-96 CONSECUTIVE HOURS, CONTINUOUS POSITIVE AIRWAY PRESSURE: ICD-10-PCS | Performed by: INTERNAL MEDICINE

## 2024-01-01 PROCEDURE — 25010000002 POTASSIUM CHLORIDE PER 2 MEQ: Performed by: INTERNAL MEDICINE

## 2024-01-01 PROCEDURE — 87077 CULTURE AEROBIC IDENTIFY: CPT

## 2024-01-01 PROCEDURE — 84145 PROCALCITONIN (PCT): CPT | Performed by: EMERGENCY MEDICINE

## 2024-01-01 PROCEDURE — 99223 1ST HOSP IP/OBS HIGH 75: CPT | Performed by: FAMILY MEDICINE

## 2024-01-01 PROCEDURE — 85007 BL SMEAR W/DIFF WBC COUNT: CPT | Performed by: INTERNAL MEDICINE

## 2024-01-01 PROCEDURE — 93010 ELECTROCARDIOGRAM REPORT: CPT | Performed by: INTERNAL MEDICINE

## 2024-01-01 PROCEDURE — 25010000002 ALBUMIN HUMAN 5% PER 50 ML: Performed by: INTERNAL MEDICINE

## 2024-01-01 PROCEDURE — 87070 CULTURE OTHR SPECIMN AEROBIC: CPT

## 2024-01-01 PROCEDURE — 93005 ELECTROCARDIOGRAM TRACING: CPT | Performed by: EMERGENCY MEDICINE

## 2024-01-01 PROCEDURE — 99233 SBSQ HOSP IP/OBS HIGH 50: CPT | Performed by: INTERNAL MEDICINE

## 2024-01-01 PROCEDURE — 85027 COMPLETE CBC AUTOMATED: CPT | Performed by: NURSE PRACTITIONER

## 2024-01-01 PROCEDURE — 87186 SC STD MICRODIL/AGAR DIL: CPT | Performed by: EMERGENCY MEDICINE

## 2024-01-01 PROCEDURE — 74176 CT ABD & PELVIS W/O CONTRAST: CPT

## 2024-01-01 PROCEDURE — 82805 BLOOD GASES W/O2 SATURATION: CPT

## 2024-01-01 PROCEDURE — 97605 NEG PRS WND THER DME<=50SQCM: CPT

## 2024-01-01 PROCEDURE — 84484 ASSAY OF TROPONIN QUANT: CPT | Performed by: EMERGENCY MEDICINE

## 2024-01-01 PROCEDURE — 87086 URINE CULTURE/COLONY COUNT: CPT | Performed by: EMERGENCY MEDICINE

## 2024-01-01 PROCEDURE — 36415 COLL VENOUS BLD VENIPUNCTURE: CPT

## 2024-01-01 PROCEDURE — 99221 1ST HOSP IP/OBS SF/LOW 40: CPT | Performed by: PHYSICIAN ASSISTANT

## 2024-01-01 PROCEDURE — 87102 FUNGUS ISOLATION CULTURE: CPT

## 2024-01-01 PROCEDURE — 87186 SC STD MICRODIL/AGAR DIL: CPT

## 2024-01-01 PROCEDURE — 25010000002 HYDROMORPHONE PER 4 MG: Performed by: INTERNAL MEDICINE

## 2024-01-01 PROCEDURE — 25010000002 GLUCAGON (RDNA) PER 1 MG: Performed by: INTERNAL MEDICINE

## 2024-01-01 PROCEDURE — 84132 ASSAY OF SERUM POTASSIUM: CPT | Performed by: INTERNAL MEDICINE

## 2024-01-01 PROCEDURE — 92523 SPEECH SOUND LANG COMPREHEN: CPT

## 2024-01-01 PROCEDURE — 25010000002 MORPHINE PER 10 MG: Performed by: INTERNAL MEDICINE

## 2024-01-01 PROCEDURE — 81001 URINALYSIS AUTO W/SCOPE: CPT | Performed by: EMERGENCY MEDICINE

## 2024-01-01 PROCEDURE — 25010000002 GLYCOPYRROLATE 0.2 MG/ML SOLUTION: Performed by: INTERNAL MEDICINE

## 2024-01-01 PROCEDURE — 82077 ASSAY SPEC XCP UR&BREATH IA: CPT | Performed by: EMERGENCY MEDICINE

## 2024-01-01 PROCEDURE — 99222 1ST HOSP IP/OBS MODERATE 55: CPT

## 2024-01-01 PROCEDURE — 85027 COMPLETE CBC AUTOMATED: CPT | Performed by: FAMILY MEDICINE

## 2024-01-01 PROCEDURE — 84439 ASSAY OF FREE THYROXINE: CPT | Performed by: EMERGENCY MEDICINE

## 2024-01-01 PROCEDURE — 86900 BLOOD TYPING SEROLOGIC ABO: CPT

## 2024-01-01 PROCEDURE — 71250 CT THORAX DX C-: CPT

## 2024-01-01 PROCEDURE — 80179 DRUG ASSAY SALICYLATE: CPT | Performed by: EMERGENCY MEDICINE

## 2024-01-01 PROCEDURE — P9041 ALBUMIN (HUMAN),5%, 50ML: HCPCS | Performed by: INTERNAL MEDICINE

## 2024-01-01 PROCEDURE — 80143 DRUG ASSAY ACETAMINOPHEN: CPT | Performed by: EMERGENCY MEDICINE

## 2024-01-01 PROCEDURE — 80307 DRUG TEST PRSMV CHEM ANLYZR: CPT | Performed by: EMERGENCY MEDICINE

## 2024-01-01 PROCEDURE — 02HV33Z INSERTION OF INFUSION DEVICE INTO SUPERIOR VENA CAVA, PERCUTANEOUS APPROACH: ICD-10-PCS | Performed by: INTERNAL MEDICINE

## 2024-01-01 PROCEDURE — 99232 SBSQ HOSP IP/OBS MODERATE 35: CPT | Performed by: INTERNAL MEDICINE

## 2024-01-01 PROCEDURE — 87641 MR-STAPH DNA AMP PROBE: CPT

## 2024-01-01 PROCEDURE — 25810000003 SEPSIS FLUID NS 0.9 % SOLUTION: Performed by: EMERGENCY MEDICINE

## 2024-01-01 PROCEDURE — 99291 CRITICAL CARE FIRST HOUR: CPT

## 2024-01-01 PROCEDURE — 87636 SARSCOV2 & INF A&B AMP PRB: CPT | Performed by: EMERGENCY MEDICINE

## 2024-01-01 PROCEDURE — 70450 CT HEAD/BRAIN W/O DYE: CPT

## 2024-01-01 PROCEDURE — 83735 ASSAY OF MAGNESIUM: CPT | Performed by: EMERGENCY MEDICINE

## 2024-01-01 PROCEDURE — 25010000002 HYDROMORPHONE PER 4 MG: Performed by: NURSE PRACTITIONER

## 2024-01-01 PROCEDURE — 99232 SBSQ HOSP IP/OBS MODERATE 35: CPT | Performed by: NURSE PRACTITIONER

## 2024-01-01 PROCEDURE — 97164 PT RE-EVAL EST PLAN CARE: CPT

## 2024-01-01 PROCEDURE — 25010000002 ALTEPLASE 2 MG RECONSTITUTED SOLUTION 1 EACH VIAL: Performed by: INTERNAL MEDICINE

## 2024-01-01 RX ORDER — IBUPROFEN 600 MG/1
1 TABLET ORAL
Status: DISCONTINUED | OUTPATIENT
Start: 2024-01-01 | End: 2024-01-01

## 2024-01-01 RX ORDER — MORPHINE SULFATE 2 MG/ML
2 INJECTION, SOLUTION INTRAMUSCULAR; INTRAVENOUS
Status: DISCONTINUED | OUTPATIENT
Start: 2024-01-01 | End: 2024-01-01 | Stop reason: HOSPADM

## 2024-01-01 RX ORDER — MIRTAZAPINE 15 MG/1
15 TABLET, FILM COATED ORAL NIGHTLY
Status: DISCONTINUED | OUTPATIENT
Start: 2024-01-01 | End: 2024-01-01

## 2024-01-01 RX ORDER — SODIUM CHLORIDE 0.9 % (FLUSH) 0.9 %
10 SYRINGE (ML) INJECTION EVERY 12 HOURS SCHEDULED
Status: DISCONTINUED | OUTPATIENT
Start: 2024-01-01 | End: 2024-01-01 | Stop reason: HOSPADM

## 2024-01-01 RX ORDER — NOREPINEPHRINE BITARTRATE 0.03 MG/ML
.02-.3 INJECTION, SOLUTION INTRAVENOUS
Status: DISCONTINUED | OUTPATIENT
Start: 2024-01-01 | End: 2024-01-01

## 2024-01-01 RX ORDER — VANCOMYCIN 2 GRAM/500 ML IN 0.9 % SODIUM CHLORIDE INTRAVENOUS
20 ONCE
Qty: 500 ML | Refills: 0 | Status: DISCONTINUED | OUTPATIENT
Start: 2024-01-01 | End: 2024-01-01

## 2024-01-01 RX ORDER — ACETAMINOPHEN 325 MG/1
650 TABLET ORAL EVERY 4 HOURS PRN
Status: DISCONTINUED | OUTPATIENT
Start: 2024-01-01 | End: 2024-01-01 | Stop reason: HOSPADM

## 2024-01-01 RX ORDER — CLOPIDOGREL BISULFATE 75 MG/1
75 TABLET ORAL DAILY
Status: DISCONTINUED | OUTPATIENT
Start: 2024-01-01 | End: 2024-01-01

## 2024-01-01 RX ORDER — MIDAZOLAM HYDROCHLORIDE 1 MG/ML
2 INJECTION INTRAMUSCULAR; INTRAVENOUS
Status: DISCONTINUED | OUTPATIENT
Start: 2024-01-01 | End: 2024-01-01 | Stop reason: HOSPADM

## 2024-01-01 RX ORDER — ALBUMIN (HUMAN) 12.5 G/50ML
SOLUTION INTRAVENOUS
Status: DISPENSED
Start: 2024-01-01 | End: 2024-01-01

## 2024-01-01 RX ORDER — SODIUM CHLORIDE 9 MG/ML
40 INJECTION, SOLUTION INTRAVENOUS AS NEEDED
Status: DISCONTINUED | OUTPATIENT
Start: 2024-01-01 | End: 2024-01-01 | Stop reason: SDUPTHER

## 2024-01-01 RX ORDER — ATORVASTATIN CALCIUM 40 MG/1
40 TABLET, FILM COATED ORAL NIGHTLY
Status: DISCONTINUED | OUTPATIENT
Start: 2024-01-01 | End: 2024-01-01

## 2024-01-01 RX ORDER — ERYTHROMYCIN 5 MG/G
OINTMENT OPHTHALMIC NIGHTLY
Status: DISCONTINUED | OUTPATIENT
Start: 2024-01-01 | End: 2024-01-01 | Stop reason: HOSPADM

## 2024-01-01 RX ORDER — POTASSIUM CHLORIDE 29.8 MG/ML
20 INJECTION INTRAVENOUS
Status: COMPLETED | OUTPATIENT
Start: 2024-01-01 | End: 2024-01-01

## 2024-01-01 RX ORDER — LACTULOSE 10 G/15ML
10 SOLUTION ORAL 2 TIMES DAILY PRN
COMMUNITY

## 2024-01-01 RX ORDER — HEPARIN SODIUM 5000 [USP'U]/ML
5000 INJECTION, SOLUTION INTRAVENOUS; SUBCUTANEOUS EVERY 12 HOURS SCHEDULED
Status: DISCONTINUED | OUTPATIENT
Start: 2024-01-01 | End: 2024-01-01

## 2024-01-01 RX ORDER — SODIUM CHLORIDE 0.9 % (FLUSH) 0.9 %
10 SYRINGE (ML) INJECTION EVERY 12 HOURS SCHEDULED
Status: DISCONTINUED | OUTPATIENT
Start: 2024-01-01 | End: 2024-01-01 | Stop reason: SDUPTHER

## 2024-01-01 RX ORDER — ACETAMINOPHEN 500 MG
1000 TABLET ORAL 4 TIMES DAILY PRN
COMMUNITY

## 2024-01-01 RX ORDER — ASPIRIN 300 MG/1
300 SUPPOSITORY RECTAL DAILY
Status: DISCONTINUED | OUTPATIENT
Start: 2024-01-01 | End: 2024-01-01

## 2024-01-01 RX ORDER — HYOSCYAMINE SULFATE 16 OZ
SOLUTION MISCELLANEOUS
COMMUNITY

## 2024-01-01 RX ORDER — FUROSEMIDE 20 MG/1
20 TABLET ORAL DAILY
Status: DISCONTINUED | OUTPATIENT
Start: 2024-01-01 | End: 2024-01-01

## 2024-01-01 RX ORDER — MIDODRINE HYDROCHLORIDE 5 MG/1
2.5 TABLET ORAL
Status: DISCONTINUED | OUTPATIENT
Start: 2024-01-01 | End: 2024-01-01

## 2024-01-01 RX ORDER — ACETAMINOPHEN 650 MG/1
650 SUPPOSITORY RECTAL EVERY 4 HOURS PRN
Status: DISCONTINUED | OUTPATIENT
Start: 2024-01-01 | End: 2024-01-01 | Stop reason: HOSPADM

## 2024-01-01 RX ORDER — SODIUM CHLORIDE 0.9 % (FLUSH) 0.9 %
10 SYRINGE (ML) INJECTION AS NEEDED
Status: DISCONTINUED | OUTPATIENT
Start: 2024-01-01 | End: 2024-01-01 | Stop reason: SDUPTHER

## 2024-01-01 RX ORDER — FAMOTIDINE 20 MG/1
20 TABLET, FILM COATED ORAL 2 TIMES DAILY
Status: DISCONTINUED | OUTPATIENT
Start: 2024-01-01 | End: 2024-01-01

## 2024-01-01 RX ORDER — HYDROMORPHONE HYDROCHLORIDE 1 MG/ML
0.25 INJECTION, SOLUTION INTRAMUSCULAR; INTRAVENOUS; SUBCUTANEOUS 2 TIMES DAILY PRN
Status: COMPLETED | OUTPATIENT
Start: 2024-01-01 | End: 2024-01-01

## 2024-01-01 RX ORDER — IPRATROPIUM BROMIDE AND ALBUTEROL SULFATE 2.5; .5 MG/3ML; MG/3ML
3 SOLUTION RESPIRATORY (INHALATION) EVERY 4 HOURS PRN
Status: DISCONTINUED | OUTPATIENT
Start: 2024-01-01 | End: 2024-01-01 | Stop reason: HOSPADM

## 2024-01-01 RX ORDER — GLYCOPYRROLATE 0.2 MG/ML
0.4 INJECTION INTRAMUSCULAR; INTRAVENOUS EVERY 6 HOURS PRN
Status: DISCONTINUED | OUTPATIENT
Start: 2024-01-01 | End: 2024-01-01 | Stop reason: HOSPADM

## 2024-01-01 RX ORDER — ONDANSETRON 4 MG/1
4 TABLET, ORALLY DISINTEGRATING ORAL EVERY 6 HOURS PRN
Status: DISCONTINUED | OUTPATIENT
Start: 2024-01-01 | End: 2024-01-01 | Stop reason: HOSPADM

## 2024-01-01 RX ORDER — ASCORBIC ACID 500 MG
500 TABLET ORAL EVERY MORNING
Status: DISCONTINUED | OUTPATIENT
Start: 2024-01-01 | End: 2024-01-01

## 2024-01-01 RX ORDER — FERROUS SULFATE 325(65) MG
325 TABLET ORAL DAILY
Status: DISCONTINUED | OUTPATIENT
Start: 2024-01-01 | End: 2024-01-01

## 2024-01-01 RX ORDER — NITROGLYCERIN 0.4 MG/1
0.4 TABLET SUBLINGUAL
Status: DISCONTINUED | OUTPATIENT
Start: 2024-01-01 | End: 2024-01-01

## 2024-01-01 RX ORDER — ASCORBIC ACID 500 MG
500 TABLET ORAL EVERY MORNING
COMMUNITY

## 2024-01-01 RX ORDER — BISACODYL 5 MG/1
5 TABLET, DELAYED RELEASE ORAL DAILY PRN
Status: DISCONTINUED | OUTPATIENT
Start: 2024-01-01 | End: 2024-01-01

## 2024-01-01 RX ORDER — AMOXICILLIN 250 MG
2 CAPSULE ORAL 2 TIMES DAILY PRN
Status: DISCONTINUED | OUTPATIENT
Start: 2024-01-01 | End: 2024-01-01

## 2024-01-01 RX ORDER — LORATADINE 10 MG/1
10 TABLET ORAL NIGHTLY
COMMUNITY

## 2024-01-01 RX ORDER — POLYVINYL ALCOHOL 14 MG/ML
1 SOLUTION/ DROPS OPHTHALMIC
Status: DISCONTINUED | OUTPATIENT
Start: 2024-01-01 | End: 2024-01-01 | Stop reason: ALTCHOICE

## 2024-01-01 RX ORDER — ACETAMINOPHEN 160 MG/5ML
650 SOLUTION ORAL EVERY 4 HOURS PRN
Status: DISCONTINUED | OUTPATIENT
Start: 2024-01-01 | End: 2024-01-01 | Stop reason: HOSPADM

## 2024-01-01 RX ORDER — ERYTHROMYCIN 5 MG/G
OINTMENT OPHTHALMIC
COMMUNITY

## 2024-01-01 RX ORDER — NICOTINE POLACRILEX 4 MG
15 LOZENGE BUCCAL
Status: DISCONTINUED | OUTPATIENT
Start: 2024-01-01 | End: 2024-01-01

## 2024-01-01 RX ORDER — POLYETHYLENE GLYCOL 3350 17 G/17G
17 POWDER, FOR SOLUTION ORAL DAILY PRN
Status: DISCONTINUED | OUTPATIENT
Start: 2024-01-01 | End: 2024-01-01

## 2024-01-01 RX ORDER — ATORVASTATIN CALCIUM 40 MG/1
80 TABLET, FILM COATED ORAL NIGHTLY
Status: DISCONTINUED | OUTPATIENT
Start: 2024-01-01 | End: 2024-01-01

## 2024-01-01 RX ORDER — ONDANSETRON 2 MG/ML
4 INJECTION INTRAMUSCULAR; INTRAVENOUS EVERY 6 HOURS PRN
Status: DISCONTINUED | OUTPATIENT
Start: 2024-01-01 | End: 2024-01-01 | Stop reason: HOSPADM

## 2024-01-01 RX ORDER — SODIUM CHLORIDE 0.9 % (FLUSH) 0.9 %
10 SYRINGE (ML) INJECTION AS NEEDED
Status: DISCONTINUED | OUTPATIENT
Start: 2024-01-01 | End: 2024-01-01 | Stop reason: HOSPADM

## 2024-01-01 RX ORDER — CYCLOSPORINE 0.5 MG/ML
1 EMULSION OPHTHALMIC EVERY 12 HOURS
Status: DISCONTINUED | OUTPATIENT
Start: 2024-01-01 | End: 2024-01-01 | Stop reason: HOSPADM

## 2024-01-01 RX ORDER — DOPAMINE HYDROCHLORIDE 160 MG/100ML
2-20 INJECTION, SOLUTION INTRAVENOUS
Status: DISCONTINUED | OUTPATIENT
Start: 2024-01-01 | End: 2024-01-01

## 2024-01-01 RX ORDER — SODIUM CHLORIDE 9 MG/ML
40 INJECTION, SOLUTION INTRAVENOUS AS NEEDED
Status: DISCONTINUED | OUTPATIENT
Start: 2024-01-01 | End: 2024-01-01 | Stop reason: HOSPADM

## 2024-01-01 RX ORDER — ALBUMIN, HUMAN INJ 5% 5 %
500 SOLUTION INTRAVENOUS ONCE
Status: COMPLETED | OUTPATIENT
Start: 2024-01-01 | End: 2024-01-01

## 2024-01-01 RX ORDER — DEXTROSE MONOHYDRATE 25 G/50ML
25 INJECTION, SOLUTION INTRAVENOUS
Status: DISCONTINUED | OUTPATIENT
Start: 2024-01-01 | End: 2024-01-01

## 2024-01-01 RX ORDER — CLOPIDOGREL BISULFATE 75 MG/1
300 TABLET ORAL ONCE
Status: DISCONTINUED | OUTPATIENT
Start: 2024-01-01 | End: 2024-01-01

## 2024-01-01 RX ORDER — CETIRIZINE HYDROCHLORIDE 10 MG/1
10 TABLET ORAL DAILY
Status: DISCONTINUED | OUTPATIENT
Start: 2024-01-01 | End: 2024-01-01

## 2024-01-01 RX ORDER — MIRTAZAPINE 15 MG/1
15 TABLET, FILM COATED ORAL NIGHTLY
COMMUNITY

## 2024-01-01 RX ORDER — SODIUM CHLORIDE, SODIUM LACTATE, POTASSIUM CHLORIDE, CALCIUM CHLORIDE 600; 310; 30; 20 MG/100ML; MG/100ML; MG/100ML; MG/100ML
100 INJECTION, SOLUTION INTRAVENOUS CONTINUOUS
Status: DISCONTINUED | OUTPATIENT
Start: 2024-01-01 | End: 2024-01-01

## 2024-01-01 RX ORDER — GLYCOPYRROLATE 0.2 MG/ML
0.4 INJECTION INTRAMUSCULAR; INTRAVENOUS ONCE
Status: DISCONTINUED | OUTPATIENT
Start: 2024-01-01 | End: 2024-01-01

## 2024-01-01 RX ORDER — EYELID CLEANSER COMBINATION 3
PADS, MEDICATED (EA) TOPICAL
COMMUNITY

## 2024-01-01 RX ORDER — BISACODYL 10 MG
10 SUPPOSITORY, RECTAL RECTAL DAILY PRN
Status: DISCONTINUED | OUTPATIENT
Start: 2024-01-01 | End: 2024-01-01

## 2024-01-01 RX ORDER — HYDROMORPHONE HYDROCHLORIDE 1 MG/ML
0.5 INJECTION, SOLUTION INTRAMUSCULAR; INTRAVENOUS; SUBCUTANEOUS
Status: DISCONTINUED | OUTPATIENT
Start: 2024-01-01 | End: 2024-01-01 | Stop reason: HOSPADM

## 2024-01-01 RX ORDER — ASPIRIN 81 MG/1
81 TABLET, CHEWABLE ORAL DAILY
Status: DISCONTINUED | OUTPATIENT
Start: 2024-01-01 | End: 2024-01-01

## 2024-01-01 RX ADMIN — HYDROMORPHONE HYDROCHLORIDE 0.5 MG: 1 INJECTION, SOLUTION INTRAMUSCULAR; INTRAVENOUS; SUBCUTANEOUS at 20:03

## 2024-01-01 RX ADMIN — POTASSIUM PHOSPHATE, MONOBASIC POTASSIUM PHOSPHATE, DIBASIC 15 MMOL: 224; 236 INJECTION, SOLUTION, CONCENTRATE INTRAVENOUS at 08:59

## 2024-01-01 RX ADMIN — Medication 10 ML: at 03:47

## 2024-01-01 RX ADMIN — HYDROMORPHONE HYDROCHLORIDE 0.5 MG: 1 INJECTION, SOLUTION INTRAMUSCULAR; INTRAVENOUS; SUBCUTANEOUS at 22:39

## 2024-01-01 RX ADMIN — ALTEPLASE: 2.2 INJECTION, POWDER, LYOPHILIZED, FOR SOLUTION INTRAVENOUS at 12:16

## 2024-01-01 RX ADMIN — SODIUM CHLORIDE, POTASSIUM CHLORIDE, SODIUM LACTATE AND CALCIUM CHLORIDE 100 ML/HR: 600; 310; 30; 20 INJECTION, SOLUTION INTRAVENOUS at 18:33

## 2024-01-01 RX ADMIN — PIPERACILLIN AND TAZOBACTAM 3.38 G: 3; .375 INJECTION, POWDER, LYOPHILIZED, FOR SOLUTION INTRAVENOUS at 12:50

## 2024-01-01 RX ADMIN — DOPAMINE HYDROCHLORIDE 4 MCG/KG/MIN: 160 INJECTION, SOLUTION INTRAVENOUS at 16:40

## 2024-01-01 RX ADMIN — Medication 10 ML: at 16:29

## 2024-01-01 RX ADMIN — NOREPINEPHRINE BITARTRATE 0.06 MCG/KG/MIN: 0.03 INJECTION, SOLUTION INTRAVENOUS at 17:41

## 2024-01-01 RX ADMIN — Medication 10 ML: at 20:07

## 2024-01-01 RX ADMIN — NOREPINEPHRINE BITARTRATE 0.26 MCG/KG/MIN: 0.03 INJECTION, SOLUTION INTRAVENOUS at 20:53

## 2024-01-01 RX ADMIN — PIPERACILLIN AND TAZOBACTAM 3.38 G: 3; .375 INJECTION, POWDER, LYOPHILIZED, FOR SOLUTION INTRAVENOUS at 21:33

## 2024-01-01 RX ADMIN — ACETAMINOPHEN 650 MG: 650 SUPPOSITORY RECTAL at 14:01

## 2024-01-01 RX ADMIN — HYDROMORPHONE HYDROCHLORIDE 0.5 MG: 1 INJECTION, SOLUTION INTRAMUSCULAR; INTRAVENOUS; SUBCUTANEOUS at 17:46

## 2024-01-01 RX ADMIN — SODIUM CHLORIDE, POTASSIUM CHLORIDE, SODIUM LACTATE AND CALCIUM CHLORIDE 100 ML/HR: 600; 310; 30; 20 INJECTION, SOLUTION INTRAVENOUS at 20:50

## 2024-01-01 RX ADMIN — MORPHINE SULFATE 2 MG: 2 INJECTION, SOLUTION INTRAMUSCULAR; INTRAVENOUS at 08:47

## 2024-01-01 RX ADMIN — ONDANSETRON 4 MG: 2 INJECTION INTRAMUSCULAR; INTRAVENOUS at 11:09

## 2024-01-01 RX ADMIN — MORPHINE SULFATE 2 MG: 2 INJECTION, SOLUTION INTRAMUSCULAR; INTRAVENOUS at 10:57

## 2024-01-01 RX ADMIN — ALBUMIN (HUMAN) 500 ML: 12.5 INJECTION, SOLUTION INTRAVENOUS at 16:52

## 2024-01-01 RX ADMIN — CYCLOSPORINE 1 DROP: 0.5 EMULSION OPHTHALMIC at 06:39

## 2024-01-01 RX ADMIN — PIPERACILLIN AND TAZOBACTAM 3.38 G: 3; .375 INJECTION, POWDER, LYOPHILIZED, FOR SOLUTION INTRAVENOUS at 12:38

## 2024-01-01 RX ADMIN — HEPARIN SODIUM 5000 UNITS: 5000 INJECTION INTRAVENOUS; SUBCUTANEOUS at 20:07

## 2024-01-01 RX ADMIN — PIPERACILLIN AND TAZOBACTAM 3.38 G: 3; .375 INJECTION, POWDER, LYOPHILIZED, FOR SOLUTION INTRAVENOUS at 20:42

## 2024-01-01 RX ADMIN — CYCLOSPORINE 1 DROP: 0.5 EMULSION OPHTHALMIC at 17:54

## 2024-01-01 RX ADMIN — Medication 10 ML: at 08:30

## 2024-01-01 RX ADMIN — ASPIRIN 300 MG: 300 SUPPOSITORY RECTAL at 14:11

## 2024-01-01 RX ADMIN — POTASSIUM CHLORIDE 20 MEQ: 400 INJECTION, SOLUTION INTRAVENOUS at 14:10

## 2024-01-01 RX ADMIN — PIPERACILLIN AND TAZOBACTAM 3.38 G: 3; .375 INJECTION, POWDER, LYOPHILIZED, FOR SOLUTION INTRAVENOUS at 14:26

## 2024-01-01 RX ADMIN — ERYTHROMYCIN 1 APPLICATION: 5 OINTMENT OPHTHALMIC at 20:52

## 2024-01-01 RX ADMIN — GLYCOPYRROLATE 0.4 MG: 0.2 INJECTION INTRAMUSCULAR; INTRAVENOUS at 09:51

## 2024-01-01 RX ADMIN — MORPHINE SULFATE 2 MG: 2 INJECTION, SOLUTION INTRAMUSCULAR; INTRAVENOUS at 04:20

## 2024-01-01 RX ADMIN — PIPERACILLIN AND TAZOBACTAM 3.38 G: 3; .375 INJECTION, POWDER, LYOPHILIZED, FOR SOLUTION INTRAVENOUS at 12:00

## 2024-01-01 RX ADMIN — POTASSIUM CHLORIDE 20 MEQ: 400 INJECTION, SOLUTION INTRAVENOUS at 16:38

## 2024-01-01 RX ADMIN — SODIUM CHLORIDE, POTASSIUM CHLORIDE, SODIUM LACTATE AND CALCIUM CHLORIDE 100 ML/HR: 600; 310; 30; 20 INJECTION, SOLUTION INTRAVENOUS at 08:57

## 2024-01-01 RX ADMIN — DOPAMINE HYDROCHLORIDE 4 MCG/KG/MIN: 160 INJECTION, SOLUTION INTRAVENOUS at 08:56

## 2024-01-01 RX ADMIN — CYCLOSPORINE 1 DROP: 0.5 EMULSION OPHTHALMIC at 18:23

## 2024-01-01 RX ADMIN — PIPERACILLIN AND TAZOBACTAM 3.38 G: 3; .375 INJECTION, POWDER, LYOPHILIZED, FOR SOLUTION INTRAVENOUS at 20:25

## 2024-01-01 RX ADMIN — HYDROMORPHONE HYDROCHLORIDE 0.5 MG: 1 INJECTION, SOLUTION INTRAMUSCULAR; INTRAVENOUS; SUBCUTANEOUS at 14:39

## 2024-01-01 RX ADMIN — ASPIRIN 300 MG: 300 SUPPOSITORY RECTAL at 14:27

## 2024-01-01 RX ADMIN — Medication 10 ML: at 08:46

## 2024-01-01 RX ADMIN — Medication 10 ML: at 20:42

## 2024-01-01 RX ADMIN — SODIUM CHLORIDE, POTASSIUM CHLORIDE, SODIUM LACTATE AND CALCIUM CHLORIDE 100 ML/HR: 600; 310; 30; 20 INJECTION, SOLUTION INTRAVENOUS at 09:11

## 2024-01-01 RX ADMIN — PIPERACILLIN AND TAZOBACTAM 3.38 G: 3; .375 INJECTION, POWDER, LYOPHILIZED, FOR SOLUTION INTRAVENOUS at 20:51

## 2024-01-01 RX ADMIN — PIPERACILLIN AND TAZOBACTAM 3.38 G: 3; .375 INJECTION, POWDER, LYOPHILIZED, FOR SOLUTION INTRAVENOUS at 04:15

## 2024-01-01 RX ADMIN — Medication 10 ML: at 20:52

## 2024-01-01 RX ADMIN — ASPIRIN 81 MG: 81 TABLET, CHEWABLE ORAL at 13:54

## 2024-01-01 RX ADMIN — MIDAZOLAM HYDROCHLORIDE 2 MG: 1 INJECTION, SOLUTION INTRAMUSCULAR; INTRAVENOUS at 04:20

## 2024-01-01 RX ADMIN — NOREPINEPHRINE BITARTRATE 0.12 MCG/KG/MIN: 0.03 INJECTION, SOLUTION INTRAVENOUS at 13:03

## 2024-01-01 RX ADMIN — HYDROMORPHONE HYDROCHLORIDE 0.5 MG: 1 INJECTION, SOLUTION INTRAMUSCULAR; INTRAVENOUS; SUBCUTANEOUS at 14:10

## 2024-01-01 RX ADMIN — PIPERACILLIN AND TAZOBACTAM 3.38 G: 3; .375 INJECTION, POWDER, LYOPHILIZED, FOR SOLUTION INTRAVENOUS at 04:37

## 2024-01-01 RX ADMIN — DOPAMINE HYDROCHLORIDE 4 MCG/KG/MIN: 160 INJECTION, SOLUTION INTRAVENOUS at 20:00

## 2024-01-01 RX ADMIN — Medication 10 ML: at 08:58

## 2024-01-01 RX ADMIN — HEPARIN SODIUM 5000 UNITS: 5000 INJECTION INTRAVENOUS; SUBCUTANEOUS at 20:51

## 2024-01-01 RX ADMIN — MIDAZOLAM HYDROCHLORIDE 2 MG: 1 INJECTION, SOLUTION INTRAMUSCULAR; INTRAVENOUS at 10:12

## 2024-01-01 RX ADMIN — PIPERACILLIN AND TAZOBACTAM 3.38 G: 3; .375 INJECTION, POWDER, LYOPHILIZED, FOR SOLUTION INTRAVENOUS at 12:15

## 2024-01-01 RX ADMIN — Medication 10 ML: at 08:50

## 2024-01-01 RX ADMIN — SODIUM CHLORIDE 500 ML: 9 INJECTION, SOLUTION INTRAVENOUS at 14:44

## 2024-01-01 RX ADMIN — DOPAMINE HYDROCHLORIDE 4 MCG/KG/MIN: 160 INJECTION, SOLUTION INTRAVENOUS at 12:21

## 2024-01-01 RX ADMIN — Medication 1 MG: at 09:59

## 2024-01-01 RX ADMIN — MORPHINE SULFATE 2 MG: 2 INJECTION, SOLUTION INTRAMUSCULAR; INTRAVENOUS at 13:45

## 2024-01-01 RX ADMIN — Medication 10 ML: at 09:12

## 2024-01-01 RX ADMIN — NOREPINEPHRINE BITARTRATE 0.02 MCG/KG/MIN: 0.03 INJECTION, SOLUTION INTRAVENOUS at 14:43

## 2024-01-01 RX ADMIN — NOREPINEPHRINE BITARTRATE 0.2 MCG/KG/MIN: 0.03 INJECTION, SOLUTION INTRAVENOUS at 03:48

## 2024-01-01 RX ADMIN — PIPERACILLIN AND TAZOBACTAM 3.38 G: 3; .375 INJECTION, POWDER, LYOPHILIZED, FOR SOLUTION INTRAVENOUS at 03:30

## 2024-01-01 RX ADMIN — ERYTHROMYCIN 1 APPLICATION: 5 OINTMENT OPHTHALMIC at 20:51

## 2024-01-01 RX ADMIN — SODIUM CHLORIDE 2562 ML: 9 INJECTION, SOLUTION INTRAVENOUS at 11:42

## 2024-01-01 RX ADMIN — DEXTROSE AND SODIUM CHLORIDE 50 ML/HR: 5; 900 INJECTION, SOLUTION INTRAVENOUS at 10:19

## 2024-01-01 RX ADMIN — ASPIRIN 300 MG: 300 SUPPOSITORY RECTAL at 08:49

## 2024-01-01 RX ADMIN — NOREPINEPHRINE BITARTRATE 0.24 MCG/KG/MIN: 0.03 INJECTION, SOLUTION INTRAVENOUS at 12:04

## 2024-01-01 RX ADMIN — Medication 10 ML: at 20:24

## 2024-01-01 RX ADMIN — CYCLOSPORINE 1 DROP: 0.5 EMULSION OPHTHALMIC at 05:33

## 2024-01-01 RX ADMIN — NOREPINEPHRINE BITARTRATE 0.26 MCG/KG/MIN: 0.03 INJECTION, SOLUTION INTRAVENOUS at 16:29

## 2024-01-01 RX ADMIN — HYDROMORPHONE HYDROCHLORIDE 0.25 MG: 1 INJECTION, SOLUTION INTRAMUSCULAR; INTRAVENOUS; SUBCUTANEOUS at 03:32

## 2024-01-01 RX ADMIN — DOPAMINE HYDROCHLORIDE 2 MCG/KG/MIN: 160 INJECTION, SOLUTION INTRAVENOUS at 09:54

## 2024-01-01 RX ADMIN — CYCLOSPORINE 1 DROP: 0.5 EMULSION OPHTHALMIC at 05:32

## 2024-01-01 RX ADMIN — PIPERACILLIN AND TAZOBACTAM 3.38 G: 3; .375 INJECTION, POWDER, LYOPHILIZED, FOR SOLUTION INTRAVENOUS at 20:06

## 2024-01-01 RX ADMIN — PIPERACILLIN AND TAZOBACTAM 3.38 G: 3; .375 INJECTION, POWDER, LYOPHILIZED, FOR SOLUTION INTRAVENOUS at 03:48

## 2024-01-01 RX ADMIN — DOPAMINE HYDROCHLORIDE 4 MCG/KG/MIN: 160 INJECTION, SOLUTION INTRAVENOUS at 03:51

## 2024-01-01 RX ADMIN — MORPHINE SULFATE 2 MG: 2 INJECTION, SOLUTION INTRAMUSCULAR; INTRAVENOUS at 00:12

## 2024-01-01 RX ADMIN — HEPARIN SODIUM 5000 UNITS: 5000 INJECTION INTRAVENOUS; SUBCUTANEOUS at 08:50

## 2024-01-01 RX ADMIN — NOREPINEPHRINE BITARTRATE 0.12 MCG/KG/MIN: 0.03 INJECTION, SOLUTION INTRAVENOUS at 04:36

## 2024-01-01 RX ADMIN — NOREPINEPHRINE BITARTRATE 0.04 MCG/KG/MIN: 0.03 INJECTION, SOLUTION INTRAVENOUS at 20:41

## 2024-01-01 RX ADMIN — MORPHINE SULFATE 2 MG: 2 INJECTION, SOLUTION INTRAMUSCULAR; INTRAVENOUS at 06:22

## 2024-01-01 RX ADMIN — ERYTHROMYCIN 1 APPLICATION: 5 OINTMENT OPHTHALMIC at 21:24

## 2024-01-01 RX ADMIN — MIDAZOLAM HYDROCHLORIDE 2 MG: 1 INJECTION, SOLUTION INTRAMUSCULAR; INTRAVENOUS at 22:58

## 2024-01-16 ENCOUNTER — PATIENT MESSAGE (OUTPATIENT)
Dept: INTERNAL MEDICINE | Facility: CLINIC | Age: 84
End: 2024-01-16
Payer: MEDICARE

## 2024-01-16 NOTE — TELEPHONE ENCOUNTER
From: Tho Vasquez  To: Ja Bladwin  Sent: 1/16/2024 8:50 AM EST  Subject: Carvedil     Hello!    We just got one of dad’s meds and it’s wrong. Supposed to be carvedil tablets 12.5 mg twice a day but the pharmacy refilled the old one that is 6.25 mg twice a day. The new one that is wrong was filled 12/26 and has one refill left. The correct one was filled 11/29 and has one refill left. What should we do to correct this error?     Thanks!

## 2024-01-29 ENCOUNTER — OUTSIDE FACILITY SERVICE (OUTPATIENT)
Dept: INTERNAL MEDICINE | Facility: CLINIC | Age: 84
End: 2024-01-29
Payer: MEDICARE

## 2024-02-08 RX ORDER — ATORVASTATIN CALCIUM 40 MG/1
TABLET, FILM COATED ORAL
Qty: 90 TABLET | Refills: 0 | Status: SHIPPED | OUTPATIENT
Start: 2024-02-08

## 2024-02-08 RX ORDER — FENOFIBRATE 145 MG/1
TABLET, COATED ORAL
Qty: 90 TABLET | Refills: 0 | Status: SHIPPED | OUTPATIENT
Start: 2024-02-08

## 2024-03-28 ENCOUNTER — TELEPHONE (OUTPATIENT)
Dept: INTERNAL MEDICINE | Facility: CLINIC | Age: 84
End: 2024-03-28
Payer: MEDICARE

## 2024-03-28 NOTE — TELEPHONE ENCOUNTER
Caller: pop tanya    Relationship: Emergency Contact    Best call back number: [phone]     What medication are you requesting: PAXLOVID    What are your current symptoms: COVID POSITIVE-CONGESTION-DIZZINESS WHEN STANDING-FATIGUE    How long have you been experiencing symptoms: 1 DAY    Have you had these symptoms before:    [] Yes  [x] No    Have you been treated for these symptoms before:   [] Yes  [x] No    If a prescription is needed, what is your preferred pharmacy and phone number: [id number] [address]  AT [facility] - [phone] [facility] [phone] FX     Additional notes:PATIENT TESTED POSITIVE WITH AN AT HOME TEST TODAY.

## 2024-03-28 NOTE — TELEPHONE ENCOUNTER
I have sent in paxlovid and he will need to switch lipitor to 1/2 tab every other day for next 5 days while on it   If worsens can go to ER

## 2024-03-29 ENCOUNTER — APPOINTMENT (OUTPATIENT)
Dept: CT IMAGING | Facility: HOSPITAL | Age: 84
DRG: 177 | End: 2024-03-29
Payer: MEDICARE

## 2024-03-29 ENCOUNTER — APPOINTMENT (OUTPATIENT)
Dept: GENERAL RADIOLOGY | Facility: HOSPITAL | Age: 84
DRG: 177 | End: 2024-03-29
Payer: MEDICARE

## 2024-03-29 ENCOUNTER — HOSPITAL ENCOUNTER (INPATIENT)
Facility: HOSPITAL | Age: 84
LOS: 6 days | Discharge: NURSING, REHAB OR LONG-TERM CARE | DRG: 177 | End: 2024-04-05
Attending: EMERGENCY MEDICINE | Admitting: FAMILY MEDICINE
Payer: MEDICARE

## 2024-03-29 DIAGNOSIS — R09.02 HYPOXIA: ICD-10-CM

## 2024-03-29 DIAGNOSIS — U07.1 COVID-19: Primary | ICD-10-CM

## 2024-03-29 DIAGNOSIS — R53.1 GENERALIZED WEAKNESS: ICD-10-CM

## 2024-03-29 PROBLEM — I25.10 CAD (CORONARY ARTERY DISEASE): Status: ACTIVE | Noted: 2024-03-29

## 2024-03-29 PROBLEM — E83.42 HYPOMAGNESEMIA: Status: ACTIVE | Noted: 2024-03-29

## 2024-03-29 PROBLEM — J96.01 ACUTE HYPOXEMIC RESPIRATORY FAILURE DUE TO COVID-19: Status: ACTIVE | Noted: 2024-03-29

## 2024-03-29 LAB
ALBUMIN SERPL-MCNC: 3.4 G/DL (ref 3.5–5.2)
ALBUMIN/GLOB SERPL: 1.3 G/DL
ALP SERPL-CCNC: 70 U/L (ref 39–117)
ALT SERPL W P-5'-P-CCNC: 36 U/L (ref 1–41)
ANION GAP SERPL CALCULATED.3IONS-SCNC: 7 MMOL/L (ref 5–15)
AST SERPL-CCNC: 74 U/L (ref 1–40)
BACTERIA UR QL AUTO: ABNORMAL /HPF
BASOPHILS # BLD MANUAL: 0 10*3/MM3 (ref 0–0.2)
BASOPHILS NFR BLD MANUAL: 0 % (ref 0–1.5)
BILIRUB SERPL-MCNC: 1.4 MG/DL (ref 0–1.2)
BILIRUB UR QL STRIP: NEGATIVE
BUN SERPL-MCNC: 23 MG/DL (ref 8–23)
BUN/CREAT SERPL: 22.8 (ref 7–25)
CALCIUM SPEC-SCNC: 9.1 MG/DL (ref 8.6–10.5)
CHLORIDE SERPL-SCNC: 103 MMOL/L (ref 98–107)
CK SERPL-CCNC: 492 U/L (ref 20–200)
CLARITY UR: ABNORMAL
CO2 SERPL-SCNC: 31 MMOL/L (ref 22–29)
COLOR UR: ABNORMAL
CREAT SERPL-MCNC: 1.01 MG/DL (ref 0.76–1.27)
CRP SERPL-MCNC: 2.09 MG/DL (ref 0–0.5)
D DIMER PPP FEU-MCNC: 5.2 MCGFEU/ML (ref 0–0.83)
D-LACTATE SERPL-SCNC: 1.5 MMOL/L (ref 0.5–2)
DEPRECATED RDW RBC AUTO: 57.5 FL (ref 37–54)
EGFRCR SERPLBLD CKD-EPI 2021: 73.8 ML/MIN/1.73
EOSINOPHIL # BLD MANUAL: 0 10*3/MM3 (ref 0–0.4)
EOSINOPHIL NFR BLD MANUAL: 0 % (ref 0.3–6.2)
ERYTHROCYTE [DISTWIDTH] IN BLOOD BY AUTOMATED COUNT: 16.7 % (ref 12.3–15.4)
ERYTHROCYTE [SEDIMENTATION RATE] IN BLOOD: 11 MM/HR (ref 0–20)
FLUAV SUBTYP SPEC NAA+PROBE: NOT DETECTED
FLUBV RNA ISLT QL NAA+PROBE: NOT DETECTED
GEN 5 2HR TROPONIN T REFLEX: 58 NG/L
GLOBULIN UR ELPH-MCNC: 2.7 GM/DL
GLUCOSE SERPL-MCNC: 85 MG/DL (ref 65–99)
GLUCOSE UR STRIP-MCNC: NEGATIVE MG/DL
HCT VFR BLD AUTO: 39.4 % (ref 37.5–51)
HGB BLD-MCNC: 13.2 G/DL (ref 13–17.7)
HGB UR QL STRIP.AUTO: NEGATIVE
HOLD SPECIMEN: NORMAL
HYALINE CASTS UR QL AUTO: ABNORMAL /LPF
KETONES UR QL STRIP: ABNORMAL
LDH SERPL-CCNC: 287 U/L (ref 135–225)
LEUKOCYTE ESTERASE UR QL STRIP.AUTO: NEGATIVE
LYMPHOCYTES # BLD MANUAL: 1.1 10*3/MM3 (ref 0.7–3.1)
LYMPHOCYTES NFR BLD MANUAL: 20 % (ref 5–12)
MAGNESIUM SERPL-MCNC: 1.5 MG/DL (ref 1.6–2.4)
MCH RBC QN AUTO: 31.6 PG (ref 26.6–33)
MCHC RBC AUTO-ENTMCNC: 33.5 G/DL (ref 31.5–35.7)
MCV RBC AUTO: 94.3 FL (ref 79–97)
MONOCYTES # BLD: 1.47 10*3/MM3 (ref 0.1–0.9)
MUCOUS THREADS URNS QL MICRO: ABNORMAL /HPF
NEUTROPHILS # BLD AUTO: 4.77 10*3/MM3 (ref 1.7–7)
NEUTROPHILS NFR BLD MANUAL: 64 % (ref 42.7–76)
NEUTS BAND NFR BLD MANUAL: 1 % (ref 0–5)
NITRITE UR QL STRIP: NEGATIVE
NRBC SPEC MANUAL: 0 /100 WBC (ref 0–0.2)
NT-PROBNP SERPL-MCNC: 1270 PG/ML (ref 0–1800)
PH UR STRIP.AUTO: 7 [PH] (ref 5–8)
PLAT MORPH BLD: NORMAL
PLATELET # BLD AUTO: 97 10*3/MM3 (ref 140–450)
PMV BLD AUTO: 11.8 FL (ref 6–12)
POTASSIUM SERPL-SCNC: 3.8 MMOL/L (ref 3.5–5.2)
PROCALCITONIN SERPL-MCNC: 0.13 NG/ML (ref 0–0.25)
PROT SERPL-MCNC: 6.1 G/DL (ref 6–8.5)
PROT UR QL STRIP: ABNORMAL
QT INTERVAL: 380 MS
QTC INTERVAL: 401 MS
RBC # BLD AUTO: 4.18 10*6/MM3 (ref 4.14–5.8)
RBC # UR STRIP: ABNORMAL /HPF
RBC MORPH BLD: NORMAL
REF LAB TEST METHOD: ABNORMAL
SARS-COV-2 RNA RESP QL NAA+PROBE: DETECTED
SODIUM SERPL-SCNC: 141 MMOL/L (ref 136–145)
SP GR UR STRIP: 1.02 (ref 1–1.03)
SQUAMOUS #/AREA URNS HPF: ABNORMAL /HPF
T4 FREE SERPL-MCNC: 1.33 NG/DL (ref 0.92–1.68)
TRI-PHOS CRY URNS QL MICRO: ABNORMAL /HPF
TROPONIN T DELTA: -10 NG/L
TROPONIN T SERPL HS-MCNC: 68 NG/L
TSH SERPL DL<=0.05 MIU/L-ACNC: 1.75 UIU/ML (ref 0.27–4.2)
UROBILINOGEN UR QL STRIP: ABNORMAL
VARIANT LYMPHS NFR BLD MANUAL: 15 % (ref 19.6–45.3)
WBC # UR STRIP: ABNORMAL /HPF
WBC MORPH BLD: NORMAL
WBC NRBC COR # BLD AUTO: 7.34 10*3/MM3 (ref 3.4–10.8)
WHOLE BLOOD HOLD COAG: NORMAL
WHOLE BLOOD HOLD SPECIMEN: NORMAL

## 2024-03-29 PROCEDURE — 25510000001 IOPAMIDOL PER 1 ML: Performed by: EMERGENCY MEDICINE

## 2024-03-29 PROCEDURE — G0378 HOSPITAL OBSERVATION PER HR: HCPCS

## 2024-03-29 PROCEDURE — 84484 ASSAY OF TROPONIN QUANT: CPT | Performed by: EMERGENCY MEDICINE

## 2024-03-29 PROCEDURE — 87636 SARSCOV2 & INF A&B AMP PRB: CPT | Performed by: EMERGENCY MEDICINE

## 2024-03-29 PROCEDURE — 25010000002 ENOXAPARIN PER 10 MG: Performed by: NURSE PRACTITIONER

## 2024-03-29 PROCEDURE — 80053 COMPREHEN METABOLIC PANEL: CPT | Performed by: EMERGENCY MEDICINE

## 2024-03-29 PROCEDURE — 86140 C-REACTIVE PROTEIN: CPT | Performed by: EMERGENCY MEDICINE

## 2024-03-29 PROCEDURE — 85007 BL SMEAR W/DIFF WBC COUNT: CPT | Performed by: EMERGENCY MEDICINE

## 2024-03-29 PROCEDURE — XW033E5 INTRODUCTION OF REMDESIVIR ANTI-INFECTIVE INTO PERIPHERAL VEIN, PERCUTANEOUS APPROACH, NEW TECHNOLOGY GROUP 5: ICD-10-PCS | Performed by: HOSPITALIST

## 2024-03-29 PROCEDURE — 25810000003 SODIUM CHLORIDE 0.9 % SOLUTION 250 ML FLEX CONT

## 2024-03-29 PROCEDURE — 85025 COMPLETE CBC W/AUTO DIFF WBC: CPT | Performed by: EMERGENCY MEDICINE

## 2024-03-29 PROCEDURE — 84439 ASSAY OF FREE THYROXINE: CPT | Performed by: EMERGENCY MEDICINE

## 2024-03-29 PROCEDURE — 99285 EMERGENCY DEPT VISIT HI MDM: CPT

## 2024-03-29 PROCEDURE — 25010000002 FUROSEMIDE PER 20 MG: Performed by: NURSE PRACTITIONER

## 2024-03-29 PROCEDURE — 25010000002 DEXAMETHASONE PER 1 MG: Performed by: EMERGENCY MEDICINE

## 2024-03-29 PROCEDURE — 71275 CT ANGIOGRAPHY CHEST: CPT

## 2024-03-29 PROCEDURE — 83735 ASSAY OF MAGNESIUM: CPT | Performed by: EMERGENCY MEDICINE

## 2024-03-29 PROCEDURE — 84443 ASSAY THYROID STIM HORMONE: CPT | Performed by: EMERGENCY MEDICINE

## 2024-03-29 PROCEDURE — 85379 FIBRIN DEGRADATION QUANT: CPT | Performed by: EMERGENCY MEDICINE

## 2024-03-29 PROCEDURE — 85652 RBC SED RATE AUTOMATED: CPT | Performed by: EMERGENCY MEDICINE

## 2024-03-29 PROCEDURE — 82550 ASSAY OF CK (CPK): CPT | Performed by: EMERGENCY MEDICINE

## 2024-03-29 PROCEDURE — 36415 COLL VENOUS BLD VENIPUNCTURE: CPT

## 2024-03-29 PROCEDURE — 70450 CT HEAD/BRAIN W/O DYE: CPT

## 2024-03-29 PROCEDURE — 25010000002 MAGNESIUM SULFATE 2 GM/50ML SOLUTION: Performed by: HOSPITALIST

## 2024-03-29 PROCEDURE — 93005 ELECTROCARDIOGRAM TRACING: CPT | Performed by: EMERGENCY MEDICINE

## 2024-03-29 PROCEDURE — 83880 ASSAY OF NATRIURETIC PEPTIDE: CPT | Performed by: EMERGENCY MEDICINE

## 2024-03-29 PROCEDURE — 83605 ASSAY OF LACTIC ACID: CPT | Performed by: EMERGENCY MEDICINE

## 2024-03-29 PROCEDURE — 99223 1ST HOSP IP/OBS HIGH 75: CPT | Performed by: NURSE PRACTITIONER

## 2024-03-29 PROCEDURE — 83615 LACTATE (LD) (LDH) ENZYME: CPT | Performed by: EMERGENCY MEDICINE

## 2024-03-29 PROCEDURE — 84145 PROCALCITONIN (PCT): CPT | Performed by: EMERGENCY MEDICINE

## 2024-03-29 PROCEDURE — 71045 X-RAY EXAM CHEST 1 VIEW: CPT

## 2024-03-29 PROCEDURE — 83735 ASSAY OF MAGNESIUM: CPT | Performed by: HOSPITALIST

## 2024-03-29 PROCEDURE — 25010000002 REMDESIVIR 100 MG/20ML SOLUTION 1 EACH VIAL

## 2024-03-29 PROCEDURE — 81001 URINALYSIS AUTO W/SCOPE: CPT | Performed by: EMERGENCY MEDICINE

## 2024-03-29 RX ORDER — DEXAMETHASONE 4 MG/1
6 TABLET ORAL DAILY
Qty: 20 TABLET | Refills: 0 | Status: DISCONTINUED | OUTPATIENT
Start: 2024-03-30 | End: 2024-04-05 | Stop reason: HOSPADM

## 2024-03-29 RX ORDER — ASPIRIN 81 MG/1
81 TABLET ORAL DAILY
Status: DISCONTINUED | OUTPATIENT
Start: 2024-03-29 | End: 2024-04-05 | Stop reason: HOSPADM

## 2024-03-29 RX ORDER — ENOXAPARIN SODIUM 100 MG/ML
40 INJECTION SUBCUTANEOUS EVERY 24 HOURS
Status: DISCONTINUED | OUTPATIENT
Start: 2024-03-29 | End: 2024-03-30

## 2024-03-29 RX ORDER — ATORVASTATIN CALCIUM 40 MG/1
40 TABLET, FILM COATED ORAL NIGHTLY
Status: DISCONTINUED | OUTPATIENT
Start: 2024-03-29 | End: 2024-04-05 | Stop reason: HOSPADM

## 2024-03-29 RX ORDER — MAGNESIUM SULFATE HEPTAHYDRATE 40 MG/ML
2 INJECTION, SOLUTION INTRAVENOUS
Status: COMPLETED | OUTPATIENT
Start: 2024-03-29 | End: 2024-03-30

## 2024-03-29 RX ORDER — SODIUM CHLORIDE 0.9 % (FLUSH) 0.9 %
10 SYRINGE (ML) INJECTION AS NEEDED
Status: DISCONTINUED | OUTPATIENT
Start: 2024-03-29 | End: 2024-04-05 | Stop reason: HOSPADM

## 2024-03-29 RX ORDER — FUROSEMIDE 10 MG/ML
40 INJECTION INTRAMUSCULAR; INTRAVENOUS ONCE
Status: COMPLETED | OUTPATIENT
Start: 2024-03-29 | End: 2024-03-29

## 2024-03-29 RX ORDER — ACETAMINOPHEN 325 MG/1
650 TABLET ORAL EVERY 4 HOURS PRN
Status: DISCONTINUED | OUTPATIENT
Start: 2024-03-29 | End: 2024-03-30 | Stop reason: SDUPTHER

## 2024-03-29 RX ORDER — FAMOTIDINE 20 MG/1
20 TABLET, FILM COATED ORAL EVERY 12 HOURS SCHEDULED
Status: DISCONTINUED | OUTPATIENT
Start: 2024-03-29 | End: 2024-04-05 | Stop reason: HOSPADM

## 2024-03-29 RX ORDER — DEXAMETHASONE SODIUM PHOSPHATE 4 MG/ML
6 INJECTION, SOLUTION INTRA-ARTICULAR; INTRALESIONAL; INTRAMUSCULAR; INTRAVENOUS; SOFT TISSUE ONCE
Status: COMPLETED | OUTPATIENT
Start: 2024-03-29 | End: 2024-03-29

## 2024-03-29 RX ORDER — DEXAMETHASONE SODIUM PHOSPHATE 4 MG/ML
6 INJECTION, SOLUTION INTRA-ARTICULAR; INTRALESIONAL; INTRAMUSCULAR; INTRAVENOUS; SOFT TISSUE DAILY
Qty: 20 ML | Refills: 0 | Status: DISCONTINUED | OUTPATIENT
Start: 2024-03-30 | End: 2024-04-05 | Stop reason: HOSPADM

## 2024-03-29 RX ORDER — FERROUS SULFATE 325(65) MG
325 TABLET ORAL DAILY
Status: DISCONTINUED | OUTPATIENT
Start: 2024-03-30 | End: 2024-04-05 | Stop reason: HOSPADM

## 2024-03-29 RX ORDER — ACETAMINOPHEN 325 MG/1
650 TABLET ORAL EVERY 4 HOURS PRN
Status: DISCONTINUED | OUTPATIENT
Start: 2024-03-29 | End: 2024-04-05 | Stop reason: HOSPADM

## 2024-03-29 RX ORDER — CETIRIZINE HYDROCHLORIDE 10 MG/1
5 TABLET ORAL DAILY
Status: DISCONTINUED | OUTPATIENT
Start: 2024-03-30 | End: 2024-04-05 | Stop reason: HOSPADM

## 2024-03-29 RX ORDER — ACETAMINOPHEN 650 MG/1
650 SUPPOSITORY RECTAL EVERY 4 HOURS PRN
Status: DISCONTINUED | OUTPATIENT
Start: 2024-03-29 | End: 2024-04-05 | Stop reason: HOSPADM

## 2024-03-29 RX ORDER — ACETAMINOPHEN 160 MG/5ML
650 SOLUTION ORAL EVERY 4 HOURS PRN
Status: DISCONTINUED | OUTPATIENT
Start: 2024-03-29 | End: 2024-04-05 | Stop reason: HOSPADM

## 2024-03-29 RX ORDER — ONDANSETRON 4 MG/1
4 TABLET, ORALLY DISINTEGRATING ORAL EVERY 6 HOURS PRN
Status: DISCONTINUED | OUTPATIENT
Start: 2024-03-29 | End: 2024-04-05 | Stop reason: HOSPADM

## 2024-03-29 RX ORDER — ALBUTEROL SULFATE 90 UG/1
2 AEROSOL, METERED RESPIRATORY (INHALATION) EVERY 6 HOURS PRN
Status: DISCONTINUED | OUTPATIENT
Start: 2024-03-29 | End: 2024-04-05 | Stop reason: HOSPADM

## 2024-03-29 RX ORDER — DEXTROMETHORPHAN POLISTIREX 30 MG/5ML
60 SUSPENSION ORAL EVERY 12 HOURS PRN
Status: DISCONTINUED | OUTPATIENT
Start: 2024-03-29 | End: 2024-04-05 | Stop reason: HOSPADM

## 2024-03-29 RX ORDER — GUAIFENESIN 600 MG/1
1200 TABLET, EXTENDED RELEASE ORAL EVERY 12 HOURS SCHEDULED
Status: DISCONTINUED | OUTPATIENT
Start: 2024-03-29 | End: 2024-04-05 | Stop reason: HOSPADM

## 2024-03-29 RX ORDER — BENZONATATE 100 MG/1
100 CAPSULE ORAL 3 TIMES DAILY PRN
Status: DISCONTINUED | OUTPATIENT
Start: 2024-03-29 | End: 2024-04-05 | Stop reason: HOSPADM

## 2024-03-29 RX ORDER — VALSARTAN 160 MG/1
160 TABLET ORAL DAILY
Status: DISCONTINUED | OUTPATIENT
Start: 2024-03-30 | End: 2024-04-05 | Stop reason: HOSPADM

## 2024-03-29 RX ORDER — SODIUM CHLORIDE 0.9 % (FLUSH) 0.9 %
10 SYRINGE (ML) INJECTION EVERY 12 HOURS SCHEDULED
Status: DISCONTINUED | OUTPATIENT
Start: 2024-03-29 | End: 2024-04-05 | Stop reason: HOSPADM

## 2024-03-29 RX ORDER — CYCLOSPORINE 0.5 MG/ML
1 EMULSION OPHTHALMIC EVERY 12 HOURS
Status: DISCONTINUED | OUTPATIENT
Start: 2024-03-29 | End: 2024-04-05 | Stop reason: HOSPADM

## 2024-03-29 RX ORDER — SODIUM CHLORIDE 9 MG/ML
40 INJECTION, SOLUTION INTRAVENOUS AS NEEDED
Status: DISCONTINUED | OUTPATIENT
Start: 2024-03-29 | End: 2024-04-05 | Stop reason: HOSPADM

## 2024-03-29 RX ORDER — ACETAMINOPHEN 650 MG/1
650 SUPPOSITORY RECTAL EVERY 4 HOURS PRN
Status: DISCONTINUED | OUTPATIENT
Start: 2024-03-29 | End: 2024-03-30 | Stop reason: SDUPTHER

## 2024-03-29 RX ORDER — NITROGLYCERIN 0.4 MG/1
0.4 TABLET SUBLINGUAL
Status: DISCONTINUED | OUTPATIENT
Start: 2024-03-29 | End: 2024-04-05 | Stop reason: HOSPADM

## 2024-03-29 RX ORDER — CARVEDILOL 12.5 MG/1
12.5 TABLET ORAL 2 TIMES DAILY WITH MEALS
Status: DISCONTINUED | OUTPATIENT
Start: 2024-03-29 | End: 2024-03-30

## 2024-03-29 RX ORDER — ONDANSETRON 2 MG/ML
4 INJECTION INTRAMUSCULAR; INTRAVENOUS EVERY 6 HOURS PRN
Status: DISCONTINUED | OUTPATIENT
Start: 2024-03-29 | End: 2024-04-05 | Stop reason: HOSPADM

## 2024-03-29 RX ADMIN — Medication 10 ML: at 20:33

## 2024-03-29 RX ADMIN — GUAIFENESIN 1200 MG: 600 TABLET, EXTENDED RELEASE ORAL at 20:30

## 2024-03-29 RX ADMIN — MICONAZOLE NITRATE 1 APPLICATION: 20 POWDER TOPICAL at 20:30

## 2024-03-29 RX ADMIN — FAMOTIDINE 20 MG: 20 TABLET, FILM COATED ORAL at 20:31

## 2024-03-29 RX ADMIN — MAGNESIUM SULFATE HEPTAHYDRATE 2 G: 40 INJECTION, SOLUTION INTRAVENOUS at 20:32

## 2024-03-29 RX ADMIN — REMDESIVIR 200 MG: 100 INJECTION, POWDER, LYOPHILIZED, FOR SOLUTION INTRAVENOUS at 23:54

## 2024-03-29 RX ADMIN — DEXAMETHASONE SODIUM PHOSPHATE 6 MG: 4 INJECTION INTRA-ARTICULAR; INTRALESIONAL; INTRAMUSCULAR; INTRAVENOUS; SOFT TISSUE at 15:56

## 2024-03-29 RX ADMIN — CYCLOSPORINE 1 DROP: 0.5 EMULSION OPHTHALMIC at 20:32

## 2024-03-29 RX ADMIN — ASPIRIN 81 MG: 81 TABLET, COATED ORAL at 20:31

## 2024-03-29 RX ADMIN — ENOXAPARIN SODIUM 40 MG: 100 INJECTION SUBCUTANEOUS at 20:30

## 2024-03-29 RX ADMIN — IOPAMIDOL 75 ML: 755 INJECTION, SOLUTION INTRAVENOUS at 16:23

## 2024-03-29 RX ADMIN — ATORVASTATIN CALCIUM 40 MG: 40 TABLET, FILM COATED ORAL at 20:30

## 2024-03-29 RX ADMIN — FUROSEMIDE 40 MG: 10 INJECTION, SOLUTION INTRAMUSCULAR; INTRAVENOUS at 20:30

## 2024-03-29 RX ADMIN — MAGNESIUM SULFATE HEPTAHYDRATE 2 G: 40 INJECTION, SOLUTION INTRAVENOUS at 22:29

## 2024-03-29 NOTE — H&P
[facility] Medicine Services  HISTORY AND PHYSICAL    Patient Name: Tho Vasquez  : 1940  MRN: [id number]  Primary Care Physician: Ja Baldwin MD  Date of admission: 3/29/2024    Subjective   Subjective     Chief Complaint:  Weakness, fatigue     HPI:  Tho Vasquez is a 83 y.o. male with past medical history significant for CAD, HTN, HLD, and cataracts who presents to the ED due to increased weakness and fatigue for the past 3 days with concern for positive at-home COVID-19 test.  Patient reports that his daughter tested positive on 3/25/2024.  He began having symptoms approximately 3 days ago with increased fatigue and weakness.  His daughter notes that he fell twice in the bathroom yesterday.  He denies loss of consciousness or head injury.  He reports that he was weak and his legs just gave out.  He has had poor appetite and poor p.o. intake for the past 2 days. This morning he was too weak to get out of bed which prompted his daughter to call EMS. He endorses chills, body aches, shortness of breath on exertion, and cough.  He denies any recent fever, diarrhea, constipation, abdominal pain, or chest pain.    In the ED, CT angiogram of the chest revealed no evidence of pulmonary embolism or thoracic aortic aneurysm/dissection with small patchy airspace opacities in the right upper lobe, lateral right lower lobe, and inferior lingula suggestive of mild multifocal pneumonia.  CT also noted extensive coronary calcifications with cholelithiasis and slightly nodular liver contour concerning for cirrhosis.  Labs were reviewed and significant for Ck 92, troponin 68, , magnesium 1.5, AST 74, total bilirubin 1.4, CRP 2.09, and D-dimer 5.2.  Respiratory PCR was positive for COVID-19.  Vital signs were reviewed and are unremarkable.  Patient will be admitted by hospital medicine for further evaluation and treatment.    Personal History     Past Medical History:    Diagnosis Date   • Allergic ?   • Cataract    • Coronary artery disease    • Hyperlipidemia    • Hypertension    • Myocardial infarction    • Pneumonia of both lower lobes due to infectious organism 10/03/2019   • Positive PPD    • Respiratory failure 2005    requiring tracheostomy             Past Surgical History:   Procedure Laterality Date   • CARDIAC SURGERY     • HERNIA REPAIR         Family History:  family history includes Arthritis in his mother; Cancer in his paternal uncle; Diabetes in his father; Heart attack in his brother and father; Obesity in an other family member; Stroke in his father.     Social History:  reports that he quit smoking about 24 years ago. His smoking use included cigars. He has never used smokeless tobacco. He reports that he does not drink alcohol and does not use drugs.  Social History     Social History Narrative   • Not on file     Medications:  Acetaminophen, Dextromethorphan-guaiFENesin, Loratadine, NON FORMULARY, Nirmatrelvir & Ritonavir (300mg/100mg), aspirin, atorvastatin, carvedilol, cycloSPORINE, erythromycin, famotidine, fenofibrate, ferrous sulfate, furosemide, nystatin, and valsartan    Allergies   Allergen Reactions   • Prednisone Rash       Objective   Objective     Vital Signs:   Temp:  [98.2 °F (36.8 °C)] 98.2 °F (36.8 °C)  Heart Rate:  [76-85] 85  Resp:  [16] 16  BP: (138-147)/(71-91) 140/71    Physical Exam   Constitutional: Awake, alert, chronically ill appearing, obese  Eyes: PERRLA, sclerae anicteric, no conjunctival injection  HENT: NCAT, mucous membranes moist  Neck: Supple, no thyromegaly, no lymphadenopathy, trachea midline  Respiratory: course, diminished in bases, nonlabored respirations on 2L NC   Cardiovascular: RRR, no murmurs, rubs, or gallops, palpable pedal pulses bilaterally  Gastrointestinal: Positive bowel sounds,abdomen distended   Musculoskeletal: BLE compression wraps in place, 1+ BLE pitting edema up to thighs   Psychiatric: flat affect,  cooperative  Neurologic: Oriented x 3, moves all extremities, speech clear  Skin: warm, dry, right forearm skin tear noted     Result Review:  I have personally reviewed the results from the time of this admission to 3/29/2024 18:07 EDT and agree with these findings:  [x]  Laboratory list / accordion  [x]  Microbiology  [x]  Radiology  [x]  EKG/Telemetry   []  Cardiology/Vascular   []  Pathology  [x]  Old records  []  Other:  Most notable findings include:     LAB RESULTS:      Lab 03/29/24  1528 03/29/24  1241 03/29/24  1158   WBC  --   --  7.34   HEMOGLOBIN  --   --  13.2   HEMATOCRIT  --   --  39.4   PLATELETS  --   --  97*   NEUTROS ABS  --   --  4.77   EOS ABS  --   --  0.00   MCV  --   --  94.3   SED RATE  --   --  11   CRP  --  2.09*  --    PROCALCITONIN 0.13  --   --    LACTATE  --   --  1.5   LDH  --  287*  --    D DIMER QUANT  --   --  5.20*   CK TOTAL  --  492*  --          Lab 03/29/24  1241   SODIUM 141   POTASSIUM 3.8   CHLORIDE 103   CO2 31.0*   ANION GAP 7.0   BUN 23   CREATININE 1.01   EGFR 73.8   GLUCOSE 85   CALCIUM 9.1   MAGNESIUM 1.5*   TSH 1.750         Lab 03/29/24  1241   TOTAL PROTEIN 6.1   ALBUMIN 3.4*   GLOBULIN 2.7   ALT (SGPT) 36   AST (SGOT) 74*   BILIRUBIN 1.4*   ALK PHOS 70         Lab 03/29/24  1528 03/29/24  1241   PROBNP  --  1,270.0   HSTROP T 58* 68*                 Brief Urine Lab Results  (Last result in the past 365 days)        Color   Clarity   Blood   Leuk Est   Nitrite   Protein   CREAT   Urine HCG        03/29/24 1236 Dark Yellow   Cloudy   Negative   Negative   Negative   Trace                 Microbiology Results (last 10 days)       Procedure Component Value - Date/Time    COVID-19 and FLU A/B PCR, 1 HR TAT - Swab, Nasopharynx [[id number]]  (Abnormal) Collected: 03/29/24 1210    Lab Status: Final result Specimen: Swab from Nasopharynx Updated: 03/29/24 1239     COVID19 Detected     Influenza A PCR Not Detected     Influenza B PCR Not Detected    Narrative:      Fact  sheet for providers: https://www.fda.gov/media/240957/download    Fact sheet for patients: https://www.fda.gov/media/846731/download    Test performed by PCR.  Influenza A and Influenza B negative results should be considered presumptive in samples that have a positive SARS-CoV-2 result.    Competitive inhibition studies showed that SARS-CoV-2 virus, when present at concentrations above 3.6E+04 copies/mL, can inhibit the detection and amplification of influenza A and influenza B virus RNA if present at or below 1.8E+02 copies/mL or 4.9E+02 copies/mL, respectively, and may lead to false negative influenza virus results. If co-infection with influenza A or influenza B virus is suspected in samples with a positive SARS-CoV-2 result, the sample should be re-tested with another FDA cleared, approved, or authorized influenza test, if influenza virus detection would change clinical management.            CT Angiogram Chest    Result Date: 3/29/2024  CT ANGIOGRAM CHEST Date of Exam: 3/29/2024 4:20 PM EDT Indication: hypoxia, elevated dimer, COVID +. Comparison: None available. Technique: CTA of the chest was performed after the uneventful intravenous administration of Isovue-370, 75 mL. Reconstructed coronal and sagittal images were also obtained. In addition, a 3-D volume rendered image was created for interpretation. Automated exposure control and iterative reconstruction methods were used. Findings: Pulmonary arteries: Adequate opacification of the pulmonary arteries. No evidence of acute pulmonary embolism. Thoracic aorta: The thoracic aorta is normal in caliber and contour. Scattered atheromatous calcification of the thoracic aorta visualized. Thyroid: The visualized portion of the thyroid is unremarkable. Lungs and Pleura: Small scattered patchy airspace opacities are visualized in the right upper lobe, near the apex, posterior right upper lobe, lateral right lower lobe, and inferior lingula. No pleural effusion.  There is suggestion of small volume round atelectasis in the left lateral costophrenic sulcus. No pneumothorax. Mediastinum/Sherry: No mediastinal or hilar lymphadenopathy. Calcifications are visualized throughout the mediastinal lymph nodes and are most prominent in the right subcarinal lymph nodes. Lymph nodes: No axillary or supraclavicular lymphadenopathy. Cardiovascular: The heart is normal in size.No evidence of pericardial effusion. Extensive coronary calcifications are visualized. Upper Abdomen: Gallbladder is filled with radiopaque calculi. Liver is slightly nodular in contour. Spleen is normal in size. Coarse calcification in the anterior spleen measures 1.7 cm possibly presenting granuloma.. Bones and Soft Tissue: No acute fracture, aggressive osseous lesions, or soft tissue process. Mild degenerative changes of the thoracic spine are visualized.     Impression: Impression: No evidence of pulmonary embolism. No evidence of thoracic aortic aneurysm or dissection. Small patchy airspace opacities in the right upper lobe, lateral right lower lobe, and inferior lingula suggestive of mild multifocal pneumonia. Extensive coronary calcifications. Cholelithiasis. Slightly nodular liver contour. Correlate with cirrhosis. Electronically Signed: Kavon Lock MD  3/29/2024 4:31 PM EDT  Workstation ID: [id number]    CT Head Without Contrast    Result Date: 3/29/2024  CT HEAD WO CONTRAST Date of Exam: 3/29/2024 12:56 PM EDT Indication: gen weakness. Comparison: MRI brain 10/3/2019 Technique: Axial CT images were obtained of the head without contrast administration.  Automated exposure control and iterative construction methods were used. Findings: There is no evidence of acute territorial infarction. There is no acute intracranial hemorrhage. There are no extra-axial collections. Ventricles and CSF spaces are symmetric. No mass effect nor hydrocephalus. There is moderate white matter hypoattenuation without mass effect,  typical for age. Brain parenchyma is otherwise unremarkable.  Paranasal sinuses and mastoid air cells are adequately aerated.  Osseous structures and orbits appear intact.     Impression: Impression: 1.No acute process identified. Electronically Signed: Shankar Espinoza MD  3/29/2024 1:06 PM EDT  Workstation ID: [id number]    XR Chest 1 View    Result Date: 3/29/2024  XR CHEST 1 VW Date of Exam: 3/29/2024 12:03 PM EDT Indication: Weak/Dizzy/AMS triage protocol Comparison: Chest radiograph 10/25/2019. Findings: Cardiomediastinal silhouette is unchanged. Low lung volumes. Chronic small left pleural effusion/thickening with adjacent atelectasis/scarring. No new focal consolidation or enlarging pleural effusion. No pneumothorax. Osseous structures are unchanged.     Impression: Impression: Similar chronic findings without evidence of acute cardiopulmonary disease. Electronically Signed: Dale Darby MD  3/29/2024 12:56 PM EDT  Workstation ID: [id number]         Assessment & Plan   Assessment & Plan       Acute hypoxemic respiratory failure due to COVID-19    Mixed hyperlipidemia    Primary hypertension    Chronic kidney disease, stage 3    Hypomagnesemia    CAD (coronary artery disease)    Tho Vasquez is a 83 y.o. male with past medical history significant for CAD, HTN, HLD, and cataracts who presents to the ED due to increased weakness and fatigue for the past 3 days with concern for positive at-home COVID-19 test. Respiratory PCR was positive for COVID-19.    COVID-19 PNA   -Symptoms started 3/27, tested positive 3/29  -CTA negative for PE with small patchy airspace opacities in the right upper lobe, lateral right lower lobe, and inferior lingula suggestive of mild multifocal pneumonia  -SpO2 92% on RA in ED, currently on 2L NC   -Procal 0.13, monitor off abx for now  -Start Remdesivir day 1/5  -Start Decadron day 1/10  -Mucinex BID   -Albuterol, Delsym, Tylenol PRN  -AM labs      Recent falls  Generalized weakness  -CT  head revealed no acute intracranial abnormality   -suspect d/t above  -PT/OT consults  -Fall precautions     Bilateral lower extremity edema  -PT wound consulted for compression wraps  -hold home Lasix 20 mg daily for now   -Lasix 40 mg IV x1 now   -Echo ordered  -Strict I&O, daily weight     Poor appetite  -Nutrition consulted     Hypomagnesemia   -Replace per protocol     Abnormal CT  -CT chest revealed slightly nodular liver contours concerning for cirrhosis  -discussed with patient and his daughter at bedside, this is a new finding.    HTN  HLD  Hx CAD  -continue ASA, Coreg, Valsartan, Lipitor     DVT prophylaxis:  Lovenox     CODE STATUS:    Level Of Support Discussed With: Patient  Code Status (Patient has no pulse and is not breathing): CPR (Attempt to Resuscitate)  Medical Interventions (Patient has pulse or is breathing): Full Support    Expected Discharge 4/2/2024  Expected discharge date/ time has not been documented.      This note has been completed as part of a split-shared workflow.     Signature: Electronically signed by ADALBERTO Paredes, 03/29/24, 6:06 PM EDT      Attending   Admission Attestation       I have performed an independent face-to-face diagnostic evaluation including performing an independent physical examination.  I approve of the documented plan of care above that was reviewed and developed with the advanced practice clinician (APC) and take responsibility for that plan along with its associated risks.  I have updated the HPI as appropriate.    Brief HPI    Tho Vasquez is a 83 y.o. male with past medical history significant for CAD, HTN, HLD, and cataracts who presents to the ED due to increased weakness and fatigue for the past 3 days with concern for positive at-home COVID-19 test. The patient states that he began having symptoms for the past 3 days with increased lethargy and fatigue. The patient's daughter is at bedside and states he has fallen twice in the bathroom over  the past 24 hours. He did not hit his head but weakness has worsened with poor appetite and very little PO intake over the past 2 days. This morning he was so weak he couldn't get out of bed. The patient states he has been having chills, body aches and shortness of air with exertion and cough. The patient will be admitted to the hospitalist service for further evaluation and treatment.     Attending Physical Exam:  Temp:  [98.2 °F (36.8 °C)-99.2 °F (37.3 °C)] 99.2 °F (37.3 °C)  Heart Rate:  [76-85] 85  Resp:  [16] 16  BP: (135-147)/(71-91) 135/77  Flow (L/min):  [2] 2    Constitutional: Awake, alert  Eyes: PERRLA, sclerae anicteric, no conjunctival injection  HENT: NCAT, mucous membranes moist  Neck: Supple, no thyromegaly, no lymphadenopathy, trachea midline  Respiratory: course and decreased to auscultation bilaterally, nonlabored respirations on 2L NC   Cardiovascular: RRR, no murmurs, rubs, or gallops, palpable pedal pulses bilaterally  Gastrointestinal: Positive bowel sounds, soft, nontender, nondistended  Musculoskeletal: 1+ bilateral ankle edema, no clubbing or cyanosis to extremities  Psychiatric: Appropriate affect, cooperative  Neurologic: Oriented x 3, GALEANA, speech clear  Skin: No rashes    Result Review:  I have personally reviewed the results from the time of this admission to 3/29/2024 18:14 EDT and agree with these findings:  [x]  Laboratory list / accordion  []  Microbiology  [x]  Radiology  []  EKG/Telemetry   []  Cardiology/Vascular   []  Pathology  []  Old records  []  Other:    Assessment and Plan:    See assessment and plan documented by APC above and updated/edited by me as appropriate.    Mckenzie Mandel,   03/29/24

## 2024-03-29 NOTE — ED PROVIDER NOTES
"Subjective   History of Present Illness  83-year-old male presents for evaluation of generalized weakness.  The patient is accompanied by his daughter.  She corroborates his history.  She tells me that she began to feel ill this past week and tested positive for COVID-19.  The patient subsequently tested positive for COVID-19 earlier this week as well.  He notes that his symptoms are gradually worsened over the past few days and that his generalized weakness and fatigue was so severe this morning that he could not even get out of bed.  He endorses a mild cough.  No fevers.  He tells me that he feels like he has \"no energy.\"  As a result of his ongoing symptoms, EMS was called and he was brought to our facility to be evaluated.  He is not typically oxygen dependent at home.  He is typically ambulatory at baseline.      Review of Systems   Constitutional:  Positive for fatigue.   Respiratory:  Positive for cough.    Neurological:  Positive for weakness.   All other systems reviewed and are negative.      Past Medical History:   Diagnosis Date    Allergic ?    Cataract     Coronary artery disease     Hyperlipidemia     Hypertension     Myocardial infarction     Pneumonia of both lower lobes due to infectious organism 10/03/2019    Positive PPD     Respiratory failure 2005    requiring tracheostomy       Allergies   Allergen Reactions    Prednisone Rash       Past Surgical History:   Procedure Laterality Date    CARDIAC SURGERY      HERNIA REPAIR         Family History   Problem Relation Age of Onset    Arthritis Mother     Heart attack Father     Stroke Father     Diabetes Father     Heart attack Brother     Cancer Paternal Uncle     Obesity Other        Social History     Socioeconomic History    Marital status:    Tobacco Use    Smoking status: Former     Types: Cigars     Quit date:      Years since quittin.2    Smokeless tobacco: Never   Substance and Sexual Activity    Alcohol use: No    Drug use: " Never    Sexual activity: Not Currently           Objective   Physical Exam  Vitals and nursing note reviewed.   Constitutional:       Appearance: He is well-developed. He is not diaphoretic.   HENT:      Head: Normocephalic and atraumatic.   Neck:      Vascular: No JVD.      Comments: No meningeal signs or nuchal rigidity  Cardiovascular:      Rate and Rhythm: Normal rate and regular rhythm.      Heart sounds: Normal heart sounds. No murmur heard.     No friction rub. No gallop.   Pulmonary:      Effort: Pulmonary effort is normal. No respiratory distress.      Breath sounds: Normal breath sounds. No wheezing or rales.   Abdominal:      General: Bowel sounds are normal. There is no distension.      Palpations: Abdomen is soft. There is no mass.      Tenderness: There is no abdominal tenderness. There is no guarding.   Musculoskeletal:         General: Normal range of motion.      Cervical back: Normal range of motion.   Skin:     General: Skin is warm and dry.      Coloration: Skin is not pale.      Findings: No erythema or rash.   Neurological:      Mental Status: He is alert and oriented to person, place, and time.   Psychiatric:         Thought Content: Thought content normal.         Judgment: Judgment normal.         Procedures           ED Course  ED Course as of 03/29/24 1959   Fri Mar 29, 2024   1456 83-year-old male presents for evaluation of generalized weakness.  The patient is accompanied by his daughter.  She tells me that she began to feel ill this past week and tested positive for COVID-19.  The patient tested positive for COVID-19 earlier this week as well.  He notes that he has been experiencing gradually worsening generalized weakness and fatigue over the past several days.  This morning, his symptoms were so severe that he could not get up out of bed to go to the bathroom.  As a result of his ongoing symptoms, EMS was called and he was brought to our facility to be evaluated.  On arrival, the  patient is nontoxic-appearing.  Oxygen saturations initially are maintaining between 92 and 94% on room air. [DD]   1457 Labs obtained remarkable for mild hypomagnesemia and elevated high-sensitivity troponin. [DD]   1458 I personally and independently reviewed the patient's CT images and findings, and I am in agreement with the radiologist regarding CT interpretation--particularly there is no emergent intracranial process noted. [DD]   1458 I personally and independently viewed the patient's x-ray images myself, and I am in agreement with the radiologist's reading for final interpretation, particularly there is no pneumonia noted. [DD]   1458 Upon reevaluation, [DD]   1458  the patient was noted to be hypoxic with room air oxygen saturations in the mid 80s.  Supplemental oxygen given via nasal cannula.  The patient is currently requiring 2 L.  Decadron given.  Given the patient's generalized weakness, oxygen requirement, and overall clinical picture, feel that he warrants admission to the hospital at this point. [DD]   1458 Electrolyte replacement protocol initiated. [DD]   1518 D-dimer is elevated.  Chest CTA ordered. [DD]   1704 I personally and independently reviewed the patient's CT images and findings, and I am in agreement with the radiologist regarding CT interpretation--particularly regarding mild multifocal pneumonia.  I feel that the patient's pneumonia is likely viral in nature given his positive COVID-19 diagnosis.  Antibiotics withheld.  I discussed the patient's case with our hospitalist, Dr. Mandel, the patient will be admitted under her care for further evaluation and treatment.  The patient is hemodynamically stable at this time and is aware/agreeable with the plan. [DD]      ED Course User Index  [DD] Don Roche MD                                        Recent Results (from the past 24 hour(s))   ECG 12 Lead ED Triage Standing Order; Weak / Dizzy / AMS    Collection Time: 03/29/24 11:52  AM   Result Value Ref Range    QT Interval 380 ms    QTC Interval 401 ms   Lavender Top    Collection Time: 03/29/24 11:58 AM   Result Value Ref Range    Extra Tube hold for add-on    Gray Top    Collection Time: 03/29/24 11:58 AM   Result Value Ref Range    Extra Tube Hold for add-ons.    Light Blue Top    Collection Time: 03/29/24 11:58 AM   Result Value Ref Range    Extra Tube Hold for add-ons.    CBC Auto Differential    Collection Time: 03/29/24 11:58 AM    Specimen: Blood   Result Value Ref Range    WBC 7.34 3.40 - 10.80 10*3/mm3    RBC 4.18 4.14 - 5.80 10*6/mm3    Hemoglobin 13.2 13.0 - 17.7 g/dL    Hematocrit 39.4 37.5 - 51.0 %    MCV 94.3 79.0 - 97.0 fL    MCH 31.6 26.6 - 33.0 pg    MCHC 33.5 31.5 - 35.7 g/dL    RDW 16.7 (H) 12.3 - 15.4 %    RDW-SD 57.5 (H) 37.0 - 54.0 fl    MPV 11.8 6.0 - 12.0 fL    Platelets 97 (L) 140 - 450 10*3/mm3   Manual Differential    Collection Time: 03/29/24 11:58 AM    Specimen: Blood   Result Value Ref Range    Neutrophil % 64.0 42.7 - 76.0 %    Lymphocyte % 15.0 (L) 19.6 - 45.3 %    Monocyte % 20.0 (H) 5.0 - 12.0 %    Eosinophil % 0.0 (L) 0.3 - 6.2 %    Basophil % 0.0 0.0 - 1.5 %    Bands %  1.0 0.0 - 5.0 %    Neutrophils Absolute 4.77 1.70 - 7.00 10*3/mm3    Lymphocytes Absolute 1.10 0.70 - 3.10 10*3/mm3    Monocytes Absolute 1.47 (H) 0.10 - 0.90 10*3/mm3    Eosinophils Absolute 0.00 0.00 - 0.40 10*3/mm3    Basophils Absolute 0.00 0.00 - 0.20 10*3/mm3    nRBC 0.0 0.0 - 0.2 /100 WBC    RBC Morphology Normal Normal    WBC Morphology Normal Normal    Platelet Morphology Normal Normal   D-dimer, Quantitative    Collection Time: 03/29/24 11:58 AM    Specimen: Blood   Result Value Ref Range    D-Dimer, Quantitative 5.20 (H) 0.00 - 0.83 MCGFEU/mL   Sedimentation Rate    Collection Time: 03/29/24 11:58 AM    Specimen: Blood   Result Value Ref Range    Sed Rate 11 0 - 20 mm/hr   Lactic Acid, Plasma    Collection Time: 03/29/24 11:58 AM    Specimen: Blood   Result Value Ref Range     Lactate 1.5 0.5 - 2.0 mmol/L   COVID-19 and FLU A/B PCR, 1 HR TAT - Swab, Nasopharynx    Collection Time: 03/29/24 12:10 PM    Specimen: Nasopharynx; Swab   Result Value Ref Range    COVID19 Detected (C) Not Detected - Ref. Range    Influenza A PCR Not Detected Not Detected    Influenza B PCR Not Detected Not Detected   Urinalysis With Microscopic If Indicated (No Culture) - Urine, Clean Catch    Collection Time: 03/29/24 12:36 PM    Specimen: Urine, Clean Catch   Result Value Ref Range    Color, UA Dark Yellow (A) Yellow, Straw    Appearance, UA Cloudy (A) Clear    pH, UA 7.0 5.0 - 8.0    Specific Gravity, UA 1.024 1.001 - 1.030    Glucose, UA Negative Negative    Ketones, UA Trace (A) Negative    Bilirubin, UA Negative Negative    Blood, UA Negative Negative    Protein, UA Trace (A) Negative    Leuk Esterase, UA Negative Negative    Nitrite, UA Negative Negative    Urobilinogen, UA 1.0 E.U./dL 0.2 - 1.0 E.U./dL   Urinalysis, Microscopic Only - Urine, Clean Catch    Collection Time: 03/29/24 12:36 PM    Specimen: Urine, Clean Catch   Result Value Ref Range    RBC, UA 0-2 None Seen, 0-2 /HPF    WBC, UA 0-2 None Seen, 0-2 /HPF    Bacteria, UA None Seen None Seen, Trace /HPF    Squamous Epithelial Cells, UA 3-6 (A) None Seen, 0-2 /HPF    Hyaline Casts, UA 0-6 0 - 6 /LPF    Triple Phosphate Crystals, UA Small/1+ None Seen /HPF    Mucus, UA Trace None Seen, Trace /HPF    Methodology Manual Light Microscopy    Comprehensive Metabolic Panel    Collection Time: 03/29/24 12:41 PM    Specimen: Blood   Result Value Ref Range    Glucose 85 65 - 99 mg/dL    BUN 23 8 - 23 mg/dL    Creatinine 1.01 0.76 - 1.27 mg/dL    Sodium 141 136 - 145 mmol/L    Potassium 3.8 3.5 - 5.2 mmol/L    Chloride 103 98 - 107 mmol/L    CO2 31.0 (H) 22.0 - 29.0 mmol/L    Calcium 9.1 8.6 - 10.5 mg/dL    Total Protein 6.1 6.0 - 8.5 g/dL    Albumin 3.4 (L) 3.5 - 5.2 g/dL    ALT (SGPT) 36 1 - 41 U/L    AST (SGOT) 74 (H) 1 - 40 U/L    Alkaline Phosphatase 70  39 - 117 U/L    Total Bilirubin 1.4 (H) 0.0 - 1.2 mg/dL    Globulin 2.7 gm/dL    A/G Ratio 1.3 g/dL    BUN/Creatinine Ratio 22.8 7.0 - 25.0    Anion Gap 7.0 5.0 - 15.0 mmol/L    eGFR 73.8 >60.0 mL/min/1.73   Single High Sensitivity Troponin T    Collection Time: 03/29/24 12:41 PM    Specimen: Blood   Result Value Ref Range    HS Troponin T 68 (C) <22 ng/L   Magnesium    Collection Time: 03/29/24 12:41 PM    Specimen: Blood   Result Value Ref Range    Magnesium 1.5 (L) 1.6 - 2.4 mg/dL   Green Top (Gel)    Collection Time: 03/29/24 12:41 PM   Result Value Ref Range    Extra Tube Hold for add-ons.    Gold Top - SST    Collection Time: 03/29/24 12:41 PM   Result Value Ref Range    Extra Tube Hold for add-ons.    T4, Free    Collection Time: 03/29/24 12:41 PM    Specimen: Blood   Result Value Ref Range    Free T4 1.33 0.92 - 1.68 ng/dL   TSH    Collection Time: 03/29/24 12:41 PM    Specimen: Blood   Result Value Ref Range    TSH 1.750 0.270 - 4.200 uIU/mL   CK    Collection Time: 03/29/24 12:41 PM    Specimen: Blood   Result Value Ref Range    Creatine Kinase 492 (H) 20 - 200 U/L   BNP    Collection Time: 03/29/24 12:41 PM    Specimen: Blood   Result Value Ref Range    proBNP 1,270.0 0.0 - 1,800.0 pg/mL   Lactate Dehydrogenase    Collection Time: 03/29/24 12:41 PM    Specimen: Blood   Result Value Ref Range     (H) 135 - 225 U/L   C-reactive Protein    Collection Time: 03/29/24 12:41 PM    Specimen: Blood   Result Value Ref Range    C-Reactive Protein 2.09 (H) 0.00 - 0.50 mg/dL   High Sensitivity Troponin T 2Hr    Collection Time: 03/29/24  3:28 PM    Specimen: Blood   Result Value Ref Range    HS Troponin T 58 (C) <22 ng/L    Troponin T Delta -10 (L) >=-4 - <+4 ng/L   Procalcitonin    Collection Time: 03/29/24  3:28 PM    Specimen: Blood   Result Value Ref Range    Procalcitonin 0.13 0.00 - 0.25 ng/mL     Note: In addition to lab results from this visit, the labs listed above may include labs taken at another  facility or during a different encounter within the last 24 hours. Please correlate lab times with ED admission and discharge times for further clarification of the services performed during this visit.    CT Angiogram Chest   Final Result   Impression:      No evidence of pulmonary embolism.      No evidence of thoracic aortic aneurysm or dissection.      Small patchy airspace opacities in the right upper lobe, lateral right lower lobe, and inferior lingula suggestive of mild multifocal pneumonia.      Extensive coronary calcifications.      Cholelithiasis.      Slightly nodular liver contour. Correlate with cirrhosis.            Electronically Signed: Kavon Lock MD     3/29/2024 4:31 PM EDT     Workstation ID: [id number]      CT Head Without Contrast   Final Result   Impression:   1.No acute process identified.            Electronically Signed: Shankar Espinoza MD     3/29/2024 1:06 PM EDT     Workstation ID: [id number]      XR Chest 1 View   Final Result   Impression:   Similar chronic findings without evidence of acute cardiopulmonary disease.          Electronically Signed: Dale Darby MD     3/29/2024 12:56 PM EDT     Workstation ID: [id number]        Vitals:    03/29/24 1500 03/29/24 1530 03/29/24 1630 03/29/24 1807   BP: 147/77 144/91 140/71 135/77   BP Location:    Left arm   Patient Position:    Lying   Pulse: 76 77 85    Resp:    16   Temp:    99.2 °F (37.3 °C)   TempSrc:    Oral   SpO2: 100% 96% 99% 95%   Weight:       Height:         Medications   sodium chloride 0.9 % flush 10 mL (has no administration in time range)   Potassium Replacement - Follow Nurse / BPA Driven Protocol (has no administration in time range)   Magnesium Standard Dose Replacement - Follow Nurse / BPA Driven Protocol (has no administration in time range)   Phosphorus Replacement - Follow Nurse / BPA Driven Protocol (has no administration in time range)   Calcium Replacement - Follow Nurse / BPA Driven Protocol (has no administration  in time range)   aspirin EC tablet 81 mg (has no administration in time range)   atorvastatin (LIPITOR) tablet 40 mg (has no administration in time range)   carvedilol (COREG) tablet 12.5 mg (has no administration in time range)   famotidine (PEPCID) tablet 20 mg (has no administration in time range)   ferrous sulfate tablet 325 mg (has no administration in time range)   cetirizine (zyrTEC) tablet 5 mg (has no administration in time range)   miconazole (MICOTIN) 2 % powder 1 Application (has no administration in time range)   cycloSPORINE (RESTASIS) 0.05 % ophthalmic emulsion 1 drop (has no administration in time range)   valsartan (DIOVAN) tablet 160 mg (has no administration in time range)   nitroglycerin (NITROSTAT) SL tablet 0.4 mg (has no administration in time range)   Enoxaparin Sodium (LOVENOX) syringe 40 mg (has no administration in time range)   dextromethorphan polistirex ER (DELSYM) 30 MG/5ML oral suspension 60 mg (has no administration in time range)   benzonatate (TESSALON) capsule 100 mg (has no administration in time range)   albuterol sulfate HFA (PROVENTIL HFA;VENTOLIN HFA;PROAIR HFA) inhaler 2 puff (has no administration in time range)   acetaminophen (TYLENOL) tablet 650 mg (has no administration in time range)     Or   acetaminophen (TYLENOL) suppository 650 mg (has no administration in time range)   dexAMETHasone (DECADRON) tablet 6 mg (has no administration in time range)     Or   dexAMETHasone (DECADRON) injection 6 mg (has no administration in time range)   Pharmacy Consult - Remdesivir for Mild to Moderate COVID-19 (Within 5 days of symptom onset)- only if contraindicated to Paxlovid (has no administration in time range)   sodium chloride 0.9 % flush 10 mL (has no administration in time range)   sodium chloride 0.9 % flush 10 mL (has no administration in time range)   sodium chloride 0.9 % infusion 40 mL (has no administration in time range)   furosemide (LASIX) injection 40 mg (has no  administration in time range)   acetaminophen (TYLENOL) tablet 650 mg (has no administration in time range)     Or   acetaminophen (TYLENOL) 160 MG/5ML oral solution 650 mg (has no administration in time range)     Or   acetaminophen (TYLENOL) suppository 650 mg (has no administration in time range)   ondansetron ODT (ZOFRAN-ODT) disintegrating tablet 4 mg (has no administration in time range)     Or   ondansetron (ZOFRAN) injection 4 mg (has no administration in time range)   guaiFENesin (MUCINEX) 12 hr tablet 1,200 mg (has no administration in time range)   remdesivir 200 mg in sodium chloride 0.9 % 290 mL IVPB (powder vial) (has no administration in time range)     Followed by   remdesivir 100 mg in sodium chloride 0.9 % 250 mL IVPB (powder vial) (has no administration in time range)   magnesium sulfate 2g/50 mL (PREMIX) infusion (has no administration in time range)   dexAMETHasone (DECADRON) injection 6 mg (6 mg Intravenous Given 3/29/24 1556)   iopamidol (ISOVUE-370) 76 % injection 100 mL (75 mL Intravenous Given 3/29/24 1623)     ECG/EMG Results (last 24 hours)       Procedure Component Value Units Date/Time    ECG 12 Lead ED Triage Standing Order; Weak / Dizzy / AMS [[id number]] Collected: 03/29/24 1152     Updated: 03/29/24 1617     QT Interval 380 ms      QTC Interval 401 ms     Narrative:      Test Reason : ED Triage Standing Order~  Blood Pressure :   */*   mmHG  Vent. Rate :  67 BPM     Atrial Rate : 288 BPM     P-R Int :   * ms          QRS Dur :  66 ms      QT Int : 380 ms       P-R-T Axes :   * -12   4 degrees     QTc Int : 401 ms    ** Poor data quality, interpretation may be adversely affected  Atrial fibrillation  Minimal voltage criteria for LVH, may be normal variant  Inferior infarct (cited on or before 03-OCT-2019)  Anterior infarct , age undetermined  Abnormal ECG  Confirmed by MD Kaley, Abhijit ([id number]) on 3/29/2024 4:17:26 PM    Referred By: AMAURY           Confirmed By: Abhijit Roche MD           ECG 12 Lead ED Triage Standing Order; Weak / Dizzy / AMS   Final Result   Test Reason : ED Triage Standing Order~   Blood Pressure :   */*   mmHG   Vent. Rate :  67 BPM     Atrial Rate : 288 BPM      P-R Int :   * ms          QRS Dur :  66 ms       QT Int : 380 ms       P-R-T Axes :   * -12   4 degrees      QTc Int : 401 ms      ** Poor data quality, interpretation may be adversely affected   Atrial fibrillation   Minimal voltage criteria for LVH, may be normal variant   Inferior infarct (cited on or before 03-OCT-2019)   Anterior infarct , age undetermined   Abnormal ECG   Confirmed by MD Roche Michael ([id number]) on 3/29/2024 4:17:26 PM      Referred By: EDMD           Confirmed By: Abhijit Roche MD                 Medical Decision Making  Amount and/or Complexity of Data Reviewed  Labs: ordered.  Radiology: ordered.  ECG/medicine tests: ordered.    Risk  Prescription drug management.  Decision regarding hospitalization.        Final diagnoses:   COVID-19   Hypoxia   Generalized weakness       ED Disposition  ED Disposition       ED Disposition   Decision to Admit    Condition   --    Comment   Level of Care: Telemetry [5]   Diagnosis: Acute hypoxemic respiratory failure due to COVID-19 [2960401]   Admitting Physician: CYRIL LIMA [[id number]]   Bed Request Comments: covid +                 No follow-up provider specified.       Medication List      No changes were made to your prescriptions during this visit.            Don Roche MD  03/29/24 2001

## 2024-03-29 NOTE — Clinical Note
Level of Care: Telemetry [5]   Diagnosis: Acute hypoxemic respiratory failure due to COVID-19 [[id number]]   Admitting Physician: CYRIL LIMA [[id number]]   Bed Request Comments: covid +

## 2024-03-29 NOTE — ED NOTES
Tho Vasquez    Nursing Report ED to Floor:  Mental status: a/o4  Ambulatory status: max assist  Oxygen Therapy:  2L  Cardiac Rhythm: nsr  Admitted from: ed  Safety Concerns:  figer twitch causing spo2 prob to often be unreliable.   Social Issues: none  ED Room #:  17    ED Nurse Phone Extension - [phone] or may call [phone].      HPI:   Chief Complaint   Patient presents with    Weakness - Generalized       Past Medical History:  Past Medical History:   Diagnosis Date    Allergic ?    Cataract     Coronary artery disease     Hyperlipidemia     Hypertension     Myocardial infarction     Pneumonia of both lower lobes due to infectious organism 10/03/2019    Positive PPD     Respiratory failure 2005    requiring tracheostomy        Past Surgical History:  Past Surgical History:   Procedure Laterality Date    CARDIAC SURGERY      HERNIA REPAIR          Admitting Doctor:   Mckenzie Mandel DO    Consulting Provider(s):  Consults       No orders found from 2/29/2024 to 3/30/2024.             Admitting Diagnosis:   There were no encounter diagnoses.    Most Recent Vitals:   Vitals:    03/29/24 1230 03/29/24 1500 03/29/24 1530 03/29/24 1630   BP:  147/77 144/91 140/71   Pulse: 82 76 77 85   Resp:       Temp:       TempSrc:       SpO2: (S) 92% 100% 96% 99%   Weight:       Height:           Active LDAs/IV Access:   Lines, Drains & Airways       Active LDAs       Name Placement date Placement time Site Days    Peripheral IV 03/29/24 1142 Anterior;Left Hand 03/29/24  1142  Hand  less than 1    Peripheral IV 03/29/24 1158 Anterior;Left Forearm 03/29/24  1158  Forearm  less than 1                    Labs (abnormal labs have a star):   Labs Reviewed   COVID-19 AND FLU A/B, NP SWAB IN TRANSPORT MEDIA 1 HR TAT - Abnormal; Notable for the following components:       Result Value    COVID19 Detected (*)     All other components within normal limits    Narrative:     Fact sheet for providers:  https://www.fda.gov/media/492801/download    Fact sheet for patients: https://www.fda.gov/media/460041/download    Test performed by PCR.  Influenza A and Influenza B negative results should be considered presumptive in samples that have a positive SARS-CoV-2 result.    Competitive inhibition studies showed that SARS-CoV-2 virus, when present at concentrations above 3.6E+04 copies/mL, can inhibit the detection and amplification of influenza A and influenza B virus RNA if present at or below 1.8E+02 copies/mL or 4.9E+02 copies/mL, respectively, and may lead to false negative influenza virus results. If co-infection with influenza A or influenza B virus is suspected in samples with a positive SARS-CoV-2 result, the sample should be re-tested with another FDA cleared, approved, or authorized influenza test, if influenza virus detection would change clinical management.   COMPREHENSIVE METABOLIC PANEL - Abnormal; Notable for the following components:    CO2 31.0 (*)     Albumin 3.4 (*)     AST (SGOT) 74 (*)     Total Bilirubin 1.4 (*)     All other components within normal limits    Narrative:     GFR Normal >60  Chronic Kidney Disease <60  Kidney Failure <15    The GFR formula is only valid for adults with stable renal function between ages 18 and 70.   SINGLE HS TROPONIN T - Abnormal; Notable for the following components:    HS Troponin T 68 (*)     All other components within normal limits    Narrative:     High Sensitive Troponin T Reference Range:  <14.0 ng/L- Negative Female for AMI  <22.0 ng/L- Negative Male for AMI  >=14 - Abnormal Female indicating possible myocardial injury.  >=22 - Abnormal Male indicating possible myocardial injury.   Clinicians would have to utilize clinical acumen, EKG, Troponin, and serial changes to determine if it is an Acute Myocardial Infarction or myocardial injury due to an underlying chronic condition.        MAGNESIUM - Abnormal; Notable for the following components:    Magnesium  1.5 (*)     All other components within normal limits   URINALYSIS W/ MICROSCOPIC IF INDICATED (NO CULTURE) - Abnormal; Notable for the following components:    Color, UA Dark Yellow (*)     Appearance, UA Cloudy (*)     Ketones, UA Trace (*)     Protein, UA Trace (*)     All other components within normal limits   CBC WITH AUTO DIFFERENTIAL - Abnormal; Notable for the following components:    RDW 16.7 (*)     RDW-SD 57.5 (*)     Platelets 97 (*)     All other components within normal limits    Narrative:     The previously reported component NRBC is no longer being reported. Previous result was 0.0 /100 WBC (Reference Range: 0.0-0.2 /100 WBC) on 3/29/2024 at 1222 EDT.   CK - Abnormal; Notable for the following components:    Creatine Kinase 492 (*)     All other components within normal limits   URINALYSIS, MICROSCOPIC ONLY - Abnormal; Notable for the following components:    Squamous Epithelial Cells, UA 3-6 (*)     All other components within normal limits   MANUAL DIFFERENTIAL - Abnormal; Notable for the following components:    Lymphocyte % 15.0 (*)     Monocyte % 20.0 (*)     Eosinophil % 0.0 (*)     Monocytes Absolute 1.47 (*)     All other components within normal limits   HIGH SENSITIVITIY TROPONIN T 2HR - Abnormal; Notable for the following components:    HS Troponin T 58 (*)     Troponin T Delta -10 (*)     All other components within normal limits    Narrative:     High Sensitive Troponin T Reference Range:  <14.0 ng/L- Negative Female for AMI  <22.0 ng/L- Negative Male for AMI  >=14 - Abnormal Female indicating possible myocardial injury.  >=22 - Abnormal Male indicating possible myocardial injury.   Clinicians would have to utilize clinical acumen, EKG, Troponin, and serial changes to determine if it is an Acute Myocardial Infarction or myocardial injury due to an underlying chronic condition.        D-DIMER, QUANTITATIVE - Abnormal; Notable for the following components:    D-Dimer, Quantitative 5.20  "(*)     All other components within normal limits    Narrative:     According to the assay 's published package insert, a normal (<0.50 MCGFEU/mL) D-dimer result in conjunction with a non-high clinical probability assessment, excludes deep vein thrombosis (DVT) and pulmonary embolism (PE) with high sensitivity.    D-dimer values increase with age and this can make VTE exclusion of an older population difficult. To address this, the [employer], based on best available evidence and recent guidelines, recommends that clinicians use age-adjusted D-dimer thresholds in patients greater than 50 years of age with: a) a low probability of PE who do not meet all Pulmonary Embolism Rule Out Criteria, or b) in those with intermediate probability of PE.   The formula for an age-adjusted D-dimer cut-off is \"age/100\".  For example, a 60 year old patient would have an age-adjusted cut-off of 0.60 MCGFEU/mL and an 80 year old 0.80 MCGFEU/mL.   LACTATE DEHYDROGENASE - Abnormal; Notable for the following components:     (*)     All other components within normal limits   C-REACTIVE PROTEIN - Abnormal; Notable for the following components:    C-Reactive Protein 2.09 (*)     All other components within normal limits   T4, FREE - Normal   TSH - Normal   BNP (IN-HOUSE) - Normal    Narrative:     This assay is used as an aid in the diagnosis of individuals suspected of having heart failure. It can be used as an aid in the diagnosis of acute decompensated heart failure (ADHF) in patients presenting with signs and symptoms of ADHF to the emergency department (ED). In addition, NT-proBNP of <300 pg/mL indicates ADHF is not likely.    Age Range Result Interpretation  NT-proBNP Concentration (pg/mL:      <50             Positive            >450                   Gray                 300-450                    Negative             <300    50-75           Positive            >900                  [place]        "         300-900                  Negative            <300      >75             Positive            >1800                  Gray                300-1800                  Negative            <300   SEDIMENTATION RATE - Normal   RAINBOW DRAW    Narrative:     The following orders were created for panel order [place] Draw.  Procedure                               Abnormality         Status                     ---------                               -----------         ------                     Green Top (Gel)[[id number]]                                  Final result               Lavender Top[[id number]]                                     Final result               Gold Top - SST[[id number]]                                   Final result               [place] Top[[id number]]                                         Final result               Light Blue Top[[id number]]                                   Final result                 Please view results for these tests on the individual orders.   PROCALCITONIN   LACTIC ACID, PLASMA   CBC AND DIFFERENTIAL    Narrative:     The following orders were created for panel order CBC & Differential.  Procedure                               Abnormality         Status                     ---------                               -----------         ------                     CBC Auto Differential[[id number]]        Abnormal            Final result               Scan Slide[[id number]]                                                                    Please view results for these tests on the individual orders.   GREEN TOP   LAVENDER TOP   GOLD TOP - SST   GRAY TOP   LIGHT BLUE TOP       Meds Given in ED:   Medications   sodium chloride 0.9 % flush 10 mL (has no administration in time range)   Potassium Replacement - Follow Nurse / BPA Driven Protocol (has no administration in time range)   Magnesium Standard Dose Replacement - Follow Nurse / BPA Driven Protocol (has no administration in time  range)   Phosphorus Replacement - Follow Nurse / BPA Driven Protocol (has no administration in time range)   Calcium Replacement - Follow Nurse / BPA Driven Protocol (has no administration in time range)   dexAMETHasone (DECADRON) injection 6 mg (6 mg Intravenous Given 3/29/24 1556)   iopamidol (ISOVUE-370) 76 % injection 100 mL (75 mL Intravenous Given 3/29/24 1623)           Last NIH score:                                                          Dysphagia screening results:        [place] Coma Scale:  No data recorded     CIWA:        Restraint Type:            Isolation Status:  Contact and Droplet

## 2024-03-30 ENCOUNTER — APPOINTMENT (OUTPATIENT)
Dept: CARDIOLOGY | Facility: HOSPITAL | Age: 84
DRG: 177 | End: 2024-03-30
Payer: MEDICARE

## 2024-03-30 LAB
ALBUMIN SERPL-MCNC: 2.8 G/DL (ref 3.5–5.2)
ALBUMIN/GLOB SERPL: 1.5 G/DL
ALP SERPL-CCNC: 62 U/L (ref 39–117)
ALT SERPL W P-5'-P-CCNC: 34 U/L (ref 1–41)
ANION GAP SERPL CALCULATED.3IONS-SCNC: 6 MMOL/L (ref 5–15)
ASCENDING AORTA: 3.9 CM
AST SERPL-CCNC: 73 U/L (ref 1–40)
BASOPHILS # BLD AUTO: 0 10*3/MM3 (ref 0–0.2)
BASOPHILS NFR BLD AUTO: 0 % (ref 0–1.5)
BH CV ECHO MEAS - AO MAX PG: 9.6 MMHG
BH CV ECHO MEAS - AO MEAN PG: 5 MMHG
BH CV ECHO MEAS - AO ROOT DIAM: 3.8 CM
BH CV ECHO MEAS - AO V2 MAX: 155 CM/SEC
BH CV ECHO MEAS - AO V2 VTI: 35.8 CM
BH CV ECHO MEAS - AVA(I,D): 1.89 CM2
BH CV ECHO MEAS - EDV(CUBED): 68.9 ML
BH CV ECHO MEAS - EDV(MOD-SP2): 71.3 ML
BH CV ECHO MEAS - EDV(MOD-SP4): 97.4 ML
BH CV ECHO MEAS - EF(MOD-BP): 55.8 %
BH CV ECHO MEAS - EF(MOD-SP2): 50.6 %
BH CV ECHO MEAS - EF(MOD-SP4): 57.6 %
BH CV ECHO MEAS - ESV(CUBED): 29.8 ML
BH CV ECHO MEAS - ESV(MOD-SP2): 35.2 ML
BH CV ECHO MEAS - ESV(MOD-SP4): 41.3 ML
BH CV ECHO MEAS - FS: 24.4 %
BH CV ECHO MEAS - IVS/LVPW: 1 CM
BH CV ECHO MEAS - IVSD: 1 CM
BH CV ECHO MEAS - LA DIMENSION: 4.6 CM
BH CV ECHO MEAS - LAT PEAK E' VEL: 8.9 CM/SEC
BH CV ECHO MEAS - LV DIASTOLIC VOL/BSA (35-75): 44 CM2
BH CV ECHO MEAS - LV MASS(C)D: 132.1 GRAMS
BH CV ECHO MEAS - LV MAX PG: 3.2 MMHG
BH CV ECHO MEAS - LV MEAN PG: 2 MMHG
BH CV ECHO MEAS - LV SYSTOLIC VOL/BSA (12-30): 18.6 CM2
BH CV ECHO MEAS - LV V1 MAX: 89.9 CM/SEC
BH CV ECHO MEAS - LV V1 VTI: 21.5 CM
BH CV ECHO MEAS - LVIDD: 4.1 CM
BH CV ECHO MEAS - LVIDS: 3.1 CM
BH CV ECHO MEAS - LVOT AREA: 3.1 CM2
BH CV ECHO MEAS - LVOT DIAM: 2 CM
BH CV ECHO MEAS - LVPWD: 1 CM
BH CV ECHO MEAS - MED PEAK E' VEL: 6 CM/SEC
BH CV ECHO MEAS - MV A MAX VEL: 90.1 CM/SEC
BH CV ECHO MEAS - MV DEC SLOPE: 473 CM/SEC2
BH CV ECHO MEAS - MV DEC TIME: 0.26 SEC
BH CV ECHO MEAS - MV E MAX VEL: 76.5 CM/SEC
BH CV ECHO MEAS - MV E/A: 0.85
BH CV ECHO MEAS - MV MAX PG: 3.7 MMHG
BH CV ECHO MEAS - MV MEAN PG: 3 MMHG
BH CV ECHO MEAS - MV P1/2T: 64.4 MSEC
BH CV ECHO MEAS - MV V2 VTI: 35.7 CM
BH CV ECHO MEAS - MVA(P1/2T): 3.4 CM2
BH CV ECHO MEAS - MVA(VTI): 1.89 CM2
BH CV ECHO MEAS - PA ACC TIME: 0.09 SEC
BH CV ECHO MEAS - RAP SYSTOLE: 3 MMHG
BH CV ECHO MEAS - RVSP: 9 MMHG
BH CV ECHO MEAS - SI(MOD-SP2): 16.3 ML/M2
BH CV ECHO MEAS - SI(MOD-SP4): 25.3 ML/M2
BH CV ECHO MEAS - SV(LVOT): 67.5 ML
BH CV ECHO MEAS - SV(MOD-SP2): 36.1 ML
BH CV ECHO MEAS - SV(MOD-SP4): 56.1 ML
BH CV ECHO MEAS - TAPSE (>1.6): 2.7 CM
BH CV ECHO MEAS - TR MAX PG: 6.2 MMHG
BH CV ECHO MEAS - TR MAX VEL: 124 CM/SEC
BH CV ECHO MEASUREMENTS AVERAGE E/E' RATIO: 10.27
BH CV VAS BP RIGHT ARM: NORMAL MMHG
BH CV XLRA - RV BASE: 4.5 CM
BH CV XLRA - RV LENGTH: 8.6 CM
BH CV XLRA - RV MID: 4 CM
BH CV XLRA - TDI S': 14.7 CM/SEC
BILIRUB SERPL-MCNC: 0.8 MG/DL (ref 0–1.2)
BUN SERPL-MCNC: 23 MG/DL (ref 8–23)
BUN/CREAT SERPL: 27.4 (ref 7–25)
CALCIUM SPEC-SCNC: 8.3 MG/DL (ref 8.6–10.5)
CHLORIDE SERPL-SCNC: 105 MMOL/L (ref 98–107)
CO2 SERPL-SCNC: 31 MMOL/L (ref 22–29)
CREAT SERPL-MCNC: 0.84 MG/DL (ref 0.76–1.27)
CRP SERPL-MCNC: 2.34 MG/DL (ref 0–0.5)
DEPRECATED RDW RBC AUTO: 58.9 FL (ref 37–54)
EGFRCR SERPLBLD CKD-EPI 2021: 86.5 ML/MIN/1.73
EOSINOPHIL # BLD AUTO: 0 10*3/MM3 (ref 0–0.4)
EOSINOPHIL NFR BLD AUTO: 0 % (ref 0.3–6.2)
ERYTHROCYTE [DISTWIDTH] IN BLOOD BY AUTOMATED COUNT: 16.4 % (ref 12.3–15.4)
GLOBULIN UR ELPH-MCNC: 1.9 GM/DL
GLUCOSE SERPL-MCNC: 110 MG/DL (ref 65–99)
HCT VFR BLD AUTO: 33.9 % (ref 37.5–51)
HGB BLD-MCNC: 11.2 G/DL (ref 13–17.7)
IMM GRANULOCYTES # BLD AUTO: 0.02 10*3/MM3 (ref 0–0.05)
IMM GRANULOCYTES NFR BLD AUTO: 0.3 % (ref 0–0.5)
LEFT ATRIUM VOLUME INDEX: 30.4 ML/M2
LYMPHOCYTES # BLD AUTO: 0.66 10*3/MM3 (ref 0.7–3.1)
LYMPHOCYTES NFR BLD AUTO: 10.4 % (ref 19.6–45.3)
MAGNESIUM SERPL-MCNC: 2 MG/DL (ref 1.6–2.4)
MAGNESIUM SERPL-MCNC: 2.2 MG/DL (ref 1.6–2.4)
MCH RBC QN AUTO: 31.6 PG (ref 26.6–33)
MCHC RBC AUTO-ENTMCNC: 33 G/DL (ref 31.5–35.7)
MCV RBC AUTO: 95.8 FL (ref 79–97)
MONOCYTES # BLD AUTO: 0.72 10*3/MM3 (ref 0.1–0.9)
MONOCYTES NFR BLD AUTO: 11.3 % (ref 5–12)
NEUTROPHILS NFR BLD AUTO: 4.95 10*3/MM3 (ref 1.7–7)
NEUTROPHILS NFR BLD AUTO: 78 % (ref 42.7–76)
NRBC BLD AUTO-RTO: 0 /100 WBC (ref 0–0.2)
PLATELET # BLD AUTO: 70 10*3/MM3 (ref 140–450)
PMV BLD AUTO: 12.8 FL (ref 6–12)
POTASSIUM SERPL-SCNC: 3.9 MMOL/L (ref 3.5–5.2)
PROT SERPL-MCNC: 4.7 G/DL (ref 6–8.5)
QT INTERVAL: 448 MS
QT INTERVAL: 470 MS
QTC INTERVAL: 415 MS
QTC INTERVAL: 436 MS
RBC # BLD AUTO: 3.54 10*6/MM3 (ref 4.14–5.8)
SODIUM SERPL-SCNC: 142 MMOL/L (ref 136–145)
WBC NRBC COR # BLD AUTO: 6.35 10*3/MM3 (ref 3.4–10.8)

## 2024-03-30 PROCEDURE — 93306 TTE W/DOPPLER COMPLETE: CPT | Performed by: INTERNAL MEDICINE

## 2024-03-30 PROCEDURE — 99222 1ST HOSP IP/OBS MODERATE 55: CPT | Performed by: INTERNAL MEDICINE

## 2024-03-30 PROCEDURE — 99232 SBSQ HOSP IP/OBS MODERATE 35: CPT | Performed by: PEDIATRICS

## 2024-03-30 PROCEDURE — 83735 ASSAY OF MAGNESIUM: CPT | Performed by: NURSE PRACTITIONER

## 2024-03-30 PROCEDURE — 93005 ELECTROCARDIOGRAM TRACING: CPT | Performed by: NURSE PRACTITIONER

## 2024-03-30 PROCEDURE — 93306 TTE W/DOPPLER COMPLETE: CPT

## 2024-03-30 PROCEDURE — 93010 ELECTROCARDIOGRAM REPORT: CPT | Performed by: INTERNAL MEDICINE

## 2024-03-30 PROCEDURE — 25010000002 MAGNESIUM SULFATE 2 GM/50ML SOLUTION: Performed by: HOSPITALIST

## 2024-03-30 PROCEDURE — 85025 COMPLETE CBC W/AUTO DIFF WBC: CPT | Performed by: NURSE PRACTITIONER

## 2024-03-30 PROCEDURE — 25010000002 DEXAMETHASONE PER 1 MG: Performed by: NURSE PRACTITIONER

## 2024-03-30 PROCEDURE — 86140 C-REACTIVE PROTEIN: CPT | Performed by: NURSE PRACTITIONER

## 2024-03-30 PROCEDURE — 80053 COMPREHEN METABOLIC PANEL: CPT | Performed by: NURSE PRACTITIONER

## 2024-03-30 PROCEDURE — 25010000002 ENOXAPARIN PER 10 MG: Performed by: PEDIATRICS

## 2024-03-30 RX ORDER — HYDRALAZINE HYDROCHLORIDE 20 MG/ML
10 INJECTION INTRAMUSCULAR; INTRAVENOUS EVERY 4 HOURS PRN
Status: DISCONTINUED | OUTPATIENT
Start: 2024-03-30 | End: 2024-04-05 | Stop reason: HOSPADM

## 2024-03-30 RX ORDER — FUROSEMIDE 20 MG/1
20 TABLET ORAL DAILY
Status: DISCONTINUED | OUTPATIENT
Start: 2024-03-30 | End: 2024-04-05 | Stop reason: HOSPADM

## 2024-03-30 RX ORDER — ENOXAPARIN SODIUM 100 MG/ML
40 INJECTION SUBCUTANEOUS EVERY 12 HOURS
Status: DISCONTINUED | OUTPATIENT
Start: 2024-03-30 | End: 2024-04-05 | Stop reason: HOSPADM

## 2024-03-30 RX ORDER — SPIRONOLACTONE 25 MG/1
25 TABLET ORAL DAILY
Status: DISCONTINUED | OUTPATIENT
Start: 2024-03-30 | End: 2024-04-05 | Stop reason: HOSPADM

## 2024-03-30 RX ADMIN — VALSARTAN 160 MG: 160 TABLET, FILM COATED ORAL at 08:53

## 2024-03-30 RX ADMIN — MICONAZOLE NITRATE 1 APPLICATION: 20 POWDER TOPICAL at 20:09

## 2024-03-30 RX ADMIN — ASPIRIN 81 MG: 81 TABLET, COATED ORAL at 08:53

## 2024-03-30 RX ADMIN — MAGNESIUM SULFATE HEPTAHYDRATE 2 G: 40 INJECTION, SOLUTION INTRAVENOUS at 01:13

## 2024-03-30 RX ADMIN — ENOXAPARIN SODIUM 40 MG: 100 INJECTION SUBCUTANEOUS at 17:42

## 2024-03-30 RX ADMIN — ATORVASTATIN CALCIUM 40 MG: 40 TABLET, FILM COATED ORAL at 20:09

## 2024-03-30 RX ADMIN — FAMOTIDINE 20 MG: 20 TABLET, FILM COATED ORAL at 08:53

## 2024-03-30 RX ADMIN — Medication 10 ML: at 21:00

## 2024-03-30 RX ADMIN — FAMOTIDINE 20 MG: 20 TABLET, FILM COATED ORAL at 20:09

## 2024-03-30 RX ADMIN — DEXAMETHASONE SODIUM PHOSPHATE 6 MG: 4 INJECTION INTRA-ARTICULAR; INTRALESIONAL; INTRAMUSCULAR; INTRAVENOUS; SOFT TISSUE at 08:53

## 2024-03-30 RX ADMIN — FUROSEMIDE 20 MG: 20 TABLET ORAL at 13:57

## 2024-03-30 RX ADMIN — GUAIFENESIN 1200 MG: 600 TABLET, EXTENDED RELEASE ORAL at 08:53

## 2024-03-30 RX ADMIN — CYCLOSPORINE 1 DROP: 0.5 EMULSION OPHTHALMIC at 08:54

## 2024-03-30 RX ADMIN — MICONAZOLE NITRATE 1 APPLICATION: 20 POWDER TOPICAL at 08:54

## 2024-03-30 RX ADMIN — SPIRONOLACTONE 25 MG: 25 TABLET ORAL at 13:57

## 2024-03-30 RX ADMIN — CETIRIZINE HYDROCHLORIDE 5 MG: 10 TABLET, FILM COATED ORAL at 08:53

## 2024-03-30 RX ADMIN — CYCLOSPORINE 1 DROP: 0.5 EMULSION OPHTHALMIC at 20:09

## 2024-03-30 RX ADMIN — GUAIFENESIN 1200 MG: 600 TABLET, EXTENDED RELEASE ORAL at 20:08

## 2024-03-30 RX ADMIN — FERROUS SULFATE TAB 325 MG (65 MG ELEMENTAL FE) 325 MG: 325 (65 FE) TAB at 08:53

## 2024-03-30 RX ADMIN — Medication 10 ML: at 08:53

## 2024-03-30 NOTE — PROGRESS NOTES
[facility] Medicine Services  PROGRESS NOTE    Patient Name: Tho Vasquez  : 1940  MRN: [id number]    Date of Admission: 3/29/2024  Primary Care Physician: Ja Baldwin MD    Subjective   Subjective     CC:  COVID    HPI:  Pt doing ok.  Bradycardic overnight.  Breathing is stable.  No CP, N/V.    Pacer pads placed overnight, on tele, HR seems slightly improved.  Reviewed all ECGs from overnight.      Objective   Objective     Vital Signs:   Temp:  [97.6 °F (36.4 °C)-99.2 °F (37.3 °C)] 97.6 °F (36.4 °C)  Heart Rate:  [68-85] 68  Resp:  [16-18] 18  BP: (135-159)/(71-91) 158/91  Flow (L/min):  [2] 2     Physical Exam:  Constitutional: No acute distress, awake, alert, elderly gentleman  HENT: NCAT, mucous membranes moist  Respiratory: dec at based bilaterally, respiratory effort normal   Cardiovascular: irreg, bradycardic, no murmurs, rubs, or gallops  Gastrointestinal: Positive bowel sounds, soft, nontender, nondistended  Musculoskeletal: No bilateral ankle edema  Psychiatric: Appropriate affect, cooperative  Neurologic: Oriented x 3, hard of hearing. strength symmetric in all extremities, Cranial Nerves grossly intact to confrontation, speech clear  Skin: No rashes      Results Reviewed:  LAB RESULTS:      Lab 24  0841 24  1528 24  1241 24  1158   WBC 6.35  --   --  7.34   HEMOGLOBIN 11.2*  --   --  13.2   HEMATOCRIT 33.9*  --   --  39.4   PLATELETS 70*  --   --  97*   NEUTROS ABS 4.95  --   --  4.77   IMMATURE GRANS (ABS) 0.02  --   --   --    LYMPHS ABS 0.66*  --   --   --    MONOS ABS 0.72  --   --   --    EOS ABS 0.00  --   --  0.00   MCV 95.8  --   --  94.3   SED RATE  --   --   --  11   CRP 2.34*  --  2.09*  --    PROCALCITONIN  --  0.13  --   --    LACTATE  --   --   --  1.5   LDH  --   --  287*  --    D DIMER QUANT  --   --   --  5.20*         Lab 24  0841 24  4921 24  1241   SODIUM 142  --  141   POTASSIUM 3.9  --  3.8    CHLORIDE 105  --  103   CO2 31.0*  --  31.0*   ANION GAP 6.0  --  7.0   BUN 23  --  23   CREATININE 0.84  --  1.01   EGFR 86.5  --  73.8   GLUCOSE 110*  --  85   CALCIUM 8.3*  --  9.1   MAGNESIUM 2.0 2.2 1.5*   TSH  --   --  1.750         Lab 03/30/24  0841 03/29/24  1241   TOTAL PROTEIN 4.7* 6.1   ALBUMIN 2.8* 3.4*   GLOBULIN 1.9 2.7   ALT (SGPT) 34 36   AST (SGOT) 73* 74*   BILIRUBIN 0.8 1.4*   ALK PHOS 62 70         Lab 03/29/24  1528 03/29/24  1241   PROBNP  --  1,270.0   HSTROP T 58* 68*                 Brief Urine Lab Results  (Last result in the past 365 days)        Color   Clarity   Blood   Leuk Est   Nitrite   Protein   CREAT   Urine HCG        03/29/24 1236 Dark Yellow   Cloudy   Negative   Negative   Negative   Trace                   Microbiology Results Abnormal       None            Adult Transthoracic Echo Complete W/ Cont if Necessary Per Protocol    Result Date: 3/30/2024    Left ventricular ejection fraction appears to be 56 - 60%.   The right ventricular cavity is mildly dilated.   The left atrial cavity is mildly dilated.   Estimated right ventricular systolic pressure from tricuspid regurgitation is normal (<35 mmHg). Calculated right ventricular systolic pressure from tricuspid regurgitation is 9 mmHg.     CT Angiogram Chest    Result Date: 3/29/2024  CT ANGIOGRAM CHEST Date of Exam: 3/29/2024 4:20 PM EDT Indication: hypoxia, elevated dimer, COVID +. Comparison: None available. Technique: CTA of the chest was performed after the uneventful intravenous administration of Isovue-370, 75 mL. Reconstructed coronal and sagittal images were also obtained. In addition, a 3-D volume rendered image was created for interpretation. Automated exposure control and iterative reconstruction methods were used. Findings: Pulmonary arteries: Adequate opacification of the pulmonary arteries. No evidence of acute pulmonary embolism. Thoracic aorta: The thoracic aorta is normal in caliber and contour. Scattered  atheromatous calcification of the thoracic aorta visualized. Thyroid: The visualized portion of the thyroid is unremarkable. Lungs and Pleura: Small scattered patchy airspace opacities are visualized in the right upper lobe, near the apex, posterior right upper lobe, lateral right lower lobe, and inferior lingula. No pleural effusion. There is suggestion of small volume round atelectasis in the left lateral costophrenic sulcus. No pneumothorax. Mediastinum/Sherry: No mediastinal or hilar lymphadenopathy. Calcifications are visualized throughout the mediastinal lymph nodes and are most prominent in the right subcarinal lymph nodes. Lymph nodes: No axillary or supraclavicular lymphadenopathy. Cardiovascular: The heart is normal in size.No evidence of pericardial effusion. Extensive coronary calcifications are visualized. Upper Abdomen: Gallbladder is filled with radiopaque calculi. Liver is slightly nodular in contour. Spleen is normal in size. Coarse calcification in the anterior spleen measures 1.7 cm possibly presenting granuloma.. Bones and Soft Tissue: No acute fracture, aggressive osseous lesions, or soft tissue process. Mild degenerative changes of the thoracic spine are visualized.     Impression: Impression: No evidence of pulmonary embolism. No evidence of thoracic aortic aneurysm or dissection. Small patchy airspace opacities in the right upper lobe, lateral right lower lobe, and inferior lingula suggestive of mild multifocal pneumonia. Extensive coronary calcifications. Cholelithiasis. Slightly nodular liver contour. Correlate with cirrhosis. Electronically Signed: Kavon Lock MD  3/29/2024 4:31 PM EDT  Workstation ID: [id number]    CT Head Without Contrast    Result Date: 3/29/2024  CT HEAD WO CONTRAST Date of Exam: 3/29/2024 12:56 PM EDT Indication: gen weakness. Comparison: MRI brain 10/3/2019 Technique: Axial CT images were obtained of the head without contrast administration.  Automated exposure  control and iterative construction methods were used. Findings: There is no evidence of acute territorial infarction. There is no acute intracranial hemorrhage. There are no extra-axial collections. Ventricles and CSF spaces are symmetric. No mass effect nor hydrocephalus. There is moderate white matter hypoattenuation without mass effect, typical for age. Brain parenchyma is otherwise unremarkable.  Paranasal sinuses and mastoid air cells are adequately aerated.  Osseous structures and orbits appear intact.     Impression: Impression: 1.No acute process identified. Electronically Signed: Shankar Espinoza MD  3/29/2024 1:06 PM EDT  Workstation ID: [id number]    XR Chest 1 View    Result Date: 3/29/2024  XR CHEST 1 VW Date of Exam: 3/29/2024 12:03 PM EDT Indication: Weak/Dizzy/AMS triage protocol Comparison: Chest radiograph 10/25/2019. Findings: Cardiomediastinal silhouette is unchanged. Low lung volumes. Chronic small left pleural effusion/thickening with adjacent atelectasis/scarring. No new focal consolidation or enlarging pleural effusion. No pneumothorax. Osseous structures are unchanged.     Impression: Impression: Similar chronic findings without evidence of acute cardiopulmonary disease. Electronically Signed: Dale Darby MD  3/29/2024 12:56 PM EDT  Workstation ID: [id number]     Results for orders placed during the hospital encounter of 03/29/24    Adult Transthoracic Echo Complete W/ Cont if Necessary Per Protocol    Interpretation Summary    Left ventricular ejection fraction appears to be 56 - 60%.    The right ventricular cavity is mildly dilated.    The left atrial cavity is mildly dilated.    Estimated right ventricular systolic pressure from tricuspid regurgitation is normal (<35 mmHg). Calculated right ventricular systolic pressure from tricuspid regurgitation is 9 mmHg.      Current medications:  Scheduled Meds:aspirin, 81 mg, Oral, Daily  atorvastatin, 40 mg, Oral, Nightly  cetirizine, 5 mg, Oral,  Daily  cycloSPORINE, 1 drop, Both Eyes, Q12H  dexAMETHasone, 6 mg, Oral, Daily   Or  dexAMETHasone, 6 mg, Intravenous, Daily  enoxaparin, 40 mg, Subcutaneous, Q24H  famotidine, 20 mg, Oral, Q12H  ferrous sulfate, 325 mg, Oral, Daily  guaiFENesin, 1,200 mg, Oral, Q12H  miconazole, 1 Application, Topical, Q12H  sodium chloride, 10 mL, Intravenous, Q12H  spironolactone, 25 mg, Oral, Daily  valsartan, 160 mg, Oral, Daily      Continuous Infusions:   PRN Meds:.  acetaminophen **OR** acetaminophen **OR** acetaminophen    acetaminophen **OR** acetaminophen    albuterol sulfate HFA    benzonatate    Calcium Replacement - Follow Nurse / BPA Driven Protocol    dextromethorphan polistirex ER    Magnesium Standard Dose Replacement - Follow Nurse / BPA Driven Protocol    nitroglycerin    ondansetron ODT **OR** ondansetron    Phosphorus Replacement - Follow Nurse / BPA Driven Protocol    Potassium Replacement - Follow Nurse / BPA Driven Protocol    sodium chloride    sodium chloride    sodium chloride    Assessment & Plan   Assessment & Plan     Active Hospital Problems    Diagnosis  POA    **Acute hypoxemic respiratory failure due to COVID-19 [U07.1, J96.01]  Yes    Hypomagnesemia [E83.42]  Yes    CAD (coronary artery disease) [I25.10]  Yes    Mixed hyperlipidemia [E78.2]  Yes    Primary hypertension [I10]  Yes    Chronic kidney disease, stage 3 [N18.30]  Yes      Resolved Hospital Problems   No resolved problems to display.        Brief Hospital Course to date:  Tho Vasquez is a 83 y.o. male with past medical history significant for CAD, HTN, HLD, and cataracts who presents to the ED due to increased weakness and fatigue for the past 3 days with concern for positive at-home COVID-19 test. Respiratory PCR was positive for COVID-19.     COVID-19 PNA   -Symptoms started 3/27, tested positive 3/29  -CTA negative for PE with small patchy airspace opacities in the right upper lobe, lateral right lower lobe, and inferior lingula  suggestive of mild multifocal pneumonia  -Given remdesivir overnight and stopped due to bradycardia--1st degree to 2nd degree with 2:1 conduction AV block.   -Cont Decadron 2/10 days  -Could consider Paxlovid, but this has a bradycardic side effect as well, so will hold off on this for now.  O2 requirement seems stable/improved.  -Lovenox inc to BID 40mg  -Mucinex BID   -Albuterol, Delsym, Tylenol PRN  -AM labs      Bradycardia  -ECG with 1st degree to 2nd degree with 2:1 conduction AV block.   -Cardiology consulted  -Holding coreg     Recent falls  Generalized weakness  -CT head revealed no acute intracranial abnormality   -suspect d/t above  -PT/OT consults  -Fall precautions      Bilateral lower extremity edema  -PT wound consulted for compression wraps  -hold home Lasix 20 mg daily for now   -Echo ordered  -Strict I&O, daily weight      Poor appetite  -Nutrition consulted      Hypomagnesemia   -Replace per protocol      Abnormal CT  -CT chest revealed slightly nodular liver contours concerning for cirrhosis  -Will need further eval regarding this.  Will send off coags, hepatitis panel     HTN  HLD  Hx CAD  -continue ASA, Coreg, Valsartan, Lipitor     Expected Discharge Location and Transportation: pending  Expected Discharge   Expected discharge date/ time has not been documented.     DVT prophylaxis:  Medical DVT prophylaxis orders are present.         AM-PAC 6 Clicks Score (PT): 17 (03/29/24 1819)    CODE STATUS:   Code Status and Medical Interventions:   Ordered at: 03/29/24 1801     Level Of Support Discussed With:    Patient     Code Status (Patient has no pulse and is not breathing):    CPR (Attempt to Resuscitate)     Medical Interventions (Patient has pulse or is breathing):    Full Support       Christina Cyr MD  03/30/24

## 2024-03-30 NOTE — CONSULTS
[facility] Cardiology, Inpatient Consult  Date of Hospital Visit: 24  Encounter Provider: Mary Oquendo PA-C    Place of Service: [facility]  Patient Name: Tho Vasquez  :1940  MRN: [id number]     Primary Care Provider: Ja Baldwin MD    Chief complaint: Bradycardia    PROBLEM LIST    CARDIAC  Coronary Artery Disease:   Reports stent by Dr. Connell over 10 years ago    Myocardium:   Echo 3/30/2024: EF 56-60%    Valvular:   No significant valvular disease    Electrical:   Bradycardia    Percardium:   Normal    VASCULAR  Cerebrovascular disease:     Peripheral vascular disease:     AAA:     Venous:      AUTONOMIC DYSFUNCTION  Episodic orthostatic intolerance/Vasovagal:    Chronic orthostatic intolerance/POTS:    Neurocardiogenic Orthostatic Hypotension:      CARDIAC RISK FACTORS:  Hypertension,   hyperlipidemia,   physical inactivity,   obesity,   TERRIE      NON-CARDIAC:  COVID-19 infection 3/2024    SURGERIES:  Hernia repair    History of Present Illness:  Patient is an 83-year-old history of CAD s/p stents, hypertension, hyperlipidemia recent COVID exposure by his daughter and testing positive for COVID-19 this week presents to the hospital with increased fatigue and shortness of breath.  At baseline patient uses a cane and lives with his daughter but is able to get up and move around the house without difficulty until earlier this week where he became so weak that he fell.  He had no head injury.  He did test positive for COVID-19.  We have been consulted for bradycardia.  Overnight patient's heart rate was dropping into the 40s and there was concern for possible second-degree heart block.  Patient has not been to a cardiologist since about  when he was released from the care of of Dr. Connell.  Daughter in room states that another sibling checks his blood pressure regularly and she is not here right now but does state that his blood pressure currently in the  "150s over 90s that she has \"seen it better and seeing it worse.\"  Patient did receive the initial dose of remdesivir but this was discontinued due to his bradycardia.  He is on carvedilol at home but his last dose was the evening of 3/28                I reviewed patient's past medical history, surgical history, family history and social history.    Current Outpatient Medications   Medication Instructions    aspirin 81 MG tablet 1 tablet, Oral, Daily    atorvastatin (LIPITOR) 40 MG tablet TAKE 1 TABLET DAILY    carvedilol (COREG) 12.5 mg, Oral, 2 Times Daily With Meals    Dextromethorphan-guaiFENesin (CORICIDIN HBP CONGESTION/COUGH PO) 1 tablet, Oral, Daily    erythromycin (ROMYCIN) 5 MG/GM ophthalmic ointment 1 application , Both Eyes, Daily PRN    famotidine (PEPCID) 20 MG tablet 1 tablet, Oral, Every 12 Hours Scheduled    fenofibrate (TRICOR) 145 MG tablet TAKE 1 TABLET DAILY    Ferrous Sulfate (IRON) 325 (65 Fe) MG tablet 1 tablet, Oral, Daily    furosemide (LASIX) 20 MG tablet TAKE 1 TABLET DAILY    Loratadine (Claritin) 10 MG capsule 1 capsule, Oral, Nightly    Nirmatrelvir & Ritonavir, 300mg/100mg, (PAXLOVID) 3 tablets, Oral, 2 Times Daily    NON FORMULARY 1 dose, Topical, 2 Times Daily PRN, ocusoft lid scrub - 1 pad for each eye bid     nystatin (MYCOSTATIN) 358435 UNIT/GM powder Topical, 3 Times Daily    Restasis 0.05 % ophthalmic emulsion 1 drop, Both Eyes, Every 12 Hours    Tylenol 500 mg, Oral, Daily PRN    valsartan (DIOVAN) 160 MG tablet TAKE 1 TABLET DAILY          Scheduled Meds:aspirin, 81 mg, Oral, Daily  atorvastatin, 40 mg, Oral, Nightly  cetirizine, 5 mg, Oral, Daily  cycloSPORINE, 1 drop, Both Eyes, Q12H  dexAMETHasone, 6 mg, Oral, Daily   Or  dexAMETHasone, 6 mg, Intravenous, Daily  enoxaparin, 40 mg, Subcutaneous, Q24H  famotidine, 20 mg, Oral, Q12H  ferrous sulfate, 325 mg, Oral, Daily  guaiFENesin, 1,200 mg, Oral, Q12H  miconazole, 1 Application, Topical, Q12H  sodium chloride, 10 mL, " "Intravenous, Q12H  valsartan, 160 mg, Oral, Daily      Continuous Infusions:       Review of Systems   Respiratory:  Positive for shortness of breath.                Objective:     Vitals:    03/29/24 2319 03/30/24 0348 03/30/24 0800 03/30/24 0845   BP: 159/78 147/77 158/91    BP Location: Left arm Right arm Right arm    Patient Position: Lying Lying Lying    Pulse: 70  68    Resp: 16 18 18    Temp: 98.4 °F (36.9 °C) 98.1 °F (36.7 °C) 97.6 °F (36.4 °C)    TempSrc: Axillary Oral Oral    SpO2: 93%  96%    Weight:    104 kg (229 lb 4.5 oz)   Height:    177.8 cm (70\")       Body mass index is 32.9 kg/m².  Flowsheet Rows      Flowsheet Row First Filed Value   Admission Height 177.8 cm (70\") Documented at 03/29/2024 1145   Admission Weight 104 kg (230 lb) Documented at 03/29/2024 1145          No intake or output data in the 24 hours ending 03/30/24 1202    Constitutional:       Appearance: Healthy appearance. Not in distress.   Pulmonary:      Effort: Pulmonary effort is normal.      Breath sounds: Normal breath sounds.   Cardiovascular:      PMI at left midclavicular line. Bradycardia present. Regular rhythm.      Murmurs: There is a systolic murmur.      No rub.   Abdominal:      Palpations: Abdomen is soft.   Psychiatric:         Behavior: Behavior is cooperative.           Lab Review:                CBC:      Lab 03/30/24  0841 03/29/24  1158   WBC 6.35 7.34   HEMOGLOBIN 11.2* 13.2   HEMATOCRIT 33.9* 39.4   PLATELETS 70* 97*   NEUTROS ABS 4.95 4.77   IMMATURE GRANS (ABS) 0.02  --    LYMPHS ABS 0.66*  --    MONOS ABS 0.72  --    EOS ABS 0.00 0.00   MCV 95.8 94.3     CMP:        Lab 03/30/24  0841 03/29/24  2351 03/29/24  1241   SODIUM 142  --  141   POTASSIUM 3.9  --  3.8   CHLORIDE 105  --  103   CO2 31.0*  --  31.0*   ANION GAP 6.0  --  7.0   BUN 23  --  23   CREATININE 0.84  --  1.01   EGFR 86.5  --  73.8   GLUCOSE 110*  --  85   CALCIUM 8.3*  --  9.1   MAGNESIUM 2.0 2.2 1.5*   TOTAL PROTEIN 4.7*  --  6.1   ALBUMIN " "2.8*  --  3.4*   GLOBULIN 1.9  --  2.7   ALT (SGPT) 34  --  36   AST (SGOT) 73*  --  74*   BILIRUBIN 0.8  --  1.4*   ALK PHOS 62  --  70     Results from last 7 days   Lab Units 03/30/24  0841   MAGNESIUM mg/dL 2.0     Lab Results   Component Value Date    HGBA1C 5.30 11/27/2023     Estimated Creatinine Clearance: 80.5 mL/min (by C-G formula based on SCr of 0.84 mg/dL).  No results found for: \"DDIMER\"        Lab 03/29/24  1528 03/29/24  1241   PROBNP  --  1,270.0   HSTROP T 58* 68*       Lab Results   Component Value Date    CHOL 124 11/27/2023    TRIG 104 11/27/2023    HDL 40 11/27/2023    LDL 65 11/27/2023         CT Angiogram Chest    Result Date: 3/29/2024  Impression: No evidence of pulmonary embolism. No evidence of thoracic aortic aneurysm or dissection. Small patchy airspace opacities in the right upper lobe, lateral right lower lobe, and inferior lingula suggestive of mild multifocal pneumonia. Extensive coronary calcifications. Cholelithiasis. Slightly nodular liver contour. Correlate with cirrhosis. Electronically Signed: Kavon Lock MD  3/29/2024 4:31 PM EDT  Workstation ID: [id number]    CT Head Without Contrast    Result Date: 3/29/2024  Impression: 1.No acute process identified. Electronically Signed: Shankar Espinoza MD  3/29/2024 1:06 PM EDT  Workstation ID: [id number]    XR Chest 1 View    Result Date: 3/29/2024  Impression: Similar chronic findings without evidence of acute cardiopulmonary disease. Electronically Signed: Dale Darby MD  3/29/2024 12:56 PM EDT  Workstation ID: [id number]      Results for orders placed during the hospital encounter of 03/29/24    Adult Transthoracic Echo Complete W/ Cont if Necessary Per Protocol    Interpretation Summary    Left ventricular ejection fraction appears to be 56 - 60%.    The right ventricular cavity is mildly dilated.    The left atrial cavity is mildly dilated.    Estimated right ventricular systolic pressure from tricuspid regurgitation is normal (<35 " mmHg). Calculated right ventricular systolic pressure from tricuspid regurgitation is 9 mmHg.       EKG: Sinus bradycardia, first-degree AV block, heart rate 57 bpm    Reviewed patient's telemetry and it appears patient may intermittently be having sinus arrhythmia or AV annabel abnormalities.  Will continue to monitor    Result Review:  I have personally reviewed the results from the time of admission and agree with these findings:  [x]  Laboratory  [x]  Radiology  [x]  EKG/Telemetry   []  Pathology  []  Old records  []  Other:             Assessment:   Sinus bradycardia, first-degree AV block  Will continue to monitor off of carvedilol to see if it resolves  COVID-19 with acute hypoxic respiratory failure  Remote history of CAD s/p stents over 10 years ago at [facility] Hypertension  Hyperlipidemia  CKD      Plan:   Continue to monitor patient off of carvedilol as patient is having some AV node conduction abnormalities on telemetry.  He remains asymptomatic.  Blood pressure is slightly elevated today off of his carvedilol.  Continue Diovan 160 mg daily and will add spironolactone 25 mg daily.  Can use hydralazine 10 mg IV push for systolic blood pressure greater than 170.  Continue patient's at home dose of Lasix 20 mg for now.  Okay to give patient remdesivir if needed for COVID-19 infection and spoke with hospitalist about this.  We both agreed that she will hold off on reinitiating to make sure that it is not adversely reacting with the patient.  Will check on patient in the morning to see if his heart rate and rhythm have improved off of carvedilol.  If not we will consider electrophysiology evaluation.  Patient also may need an ischemic evaluation after recovery from COVID-19.      Mary Oquendo PA-C         STAFF CARDIOLOGIST:      SUMMARY:    83 years old who got exposed 1 we are consulted for bradycardia with Mobitz type I with no symptoms at this time      PERTINENT:    Positive for COVID  On  carvedilol  Troponin 58      IMPRESSION:    Asymptomatic obese type I bradycardia  COVID  Hypertension  Hyperlipidemia      RECOMMENDATIONS:    Will will hold her carvedilol and watch for 48 hours before we further investigating to treatment options for bradycardia if it does not resolve.  Patient might need a stress as an outpatient after COVID resolved  We will add spironolactone as discussed with PA.  Will watch for now        I have seen and examined the patient, reviewed the above note, necessary changes were made and I agree with the final note.   Cristhian Haider MD

## 2024-03-30 NOTE — PLAN OF CARE
Goal Outcome Evaluation:   Alert and oriented, relaxing in bed today, heart rates continue to drop down into the 30s, This afternoon it remained in the 30s the majority of the time. It is hard to determine if he is symptomatic because daughter states he is lethargic and sleeps a lot at home.

## 2024-03-31 PROBLEM — T50.905A BRADYCARDIA, DRUG INDUCED: Status: ACTIVE | Noted: 2024-03-31

## 2024-03-31 PROBLEM — R00.1 BRADYCARDIA, DRUG INDUCED: Status: ACTIVE | Noted: 2024-03-31

## 2024-03-31 LAB
ALBUMIN SERPL-MCNC: 2.8 G/DL (ref 3.5–5.2)
ALBUMIN/GLOB SERPL: 1.6 G/DL
ALP SERPL-CCNC: 58 U/L (ref 39–117)
ALT SERPL W P-5'-P-CCNC: 40 U/L (ref 1–41)
ANION GAP SERPL CALCULATED.3IONS-SCNC: 7 MMOL/L (ref 5–15)
AST SERPL-CCNC: 79 U/L (ref 1–40)
BASOPHILS # BLD AUTO: 0.01 10*3/MM3 (ref 0–0.2)
BASOPHILS NFR BLD AUTO: 0.1 % (ref 0–1.5)
BILIRUB SERPL-MCNC: 0.7 MG/DL (ref 0–1.2)
BUN SERPL-MCNC: 25 MG/DL (ref 8–23)
BUN/CREAT SERPL: 29.1 (ref 7–25)
CALCIUM SPEC-SCNC: 8.1 MG/DL (ref 8.6–10.5)
CHLORIDE SERPL-SCNC: 106 MMOL/L (ref 98–107)
CO2 SERPL-SCNC: 30 MMOL/L (ref 22–29)
CREAT SERPL-MCNC: 0.86 MG/DL (ref 0.76–1.27)
CRP SERPL-MCNC: 1.51 MG/DL (ref 0–0.5)
DEPRECATED RDW RBC AUTO: 56.9 FL (ref 37–54)
EGFRCR SERPLBLD CKD-EPI 2021: 85.9 ML/MIN/1.73
EOSINOPHIL # BLD AUTO: 0 10*3/MM3 (ref 0–0.4)
EOSINOPHIL NFR BLD AUTO: 0 % (ref 0.3–6.2)
ERYTHROCYTE [DISTWIDTH] IN BLOOD BY AUTOMATED COUNT: 16.3 % (ref 12.3–15.4)
GLOBULIN UR ELPH-MCNC: 1.8 GM/DL
GLUCOSE SERPL-MCNC: 97 MG/DL (ref 65–99)
HCT VFR BLD AUTO: 33.8 % (ref 37.5–51)
HGB BLD-MCNC: 11.1 G/DL (ref 13–17.7)
IMM GRANULOCYTES # BLD AUTO: 0.06 10*3/MM3 (ref 0–0.05)
IMM GRANULOCYTES NFR BLD AUTO: 0.6 % (ref 0–0.5)
INR PPP: 1.19 (ref 0.89–1.12)
LYMPHOCYTES # BLD AUTO: 0.76 10*3/MM3 (ref 0.7–3.1)
LYMPHOCYTES NFR BLD AUTO: 7.5 % (ref 19.6–45.3)
MAGNESIUM SERPL-MCNC: 1.8 MG/DL (ref 1.6–2.4)
MCH RBC QN AUTO: 31.1 PG (ref 26.6–33)
MCHC RBC AUTO-ENTMCNC: 32.8 G/DL (ref 31.5–35.7)
MCV RBC AUTO: 94.7 FL (ref 79–97)
MONOCYTES # BLD AUTO: 0.99 10*3/MM3 (ref 0.1–0.9)
MONOCYTES NFR BLD AUTO: 9.8 % (ref 5–12)
NEUTROPHILS NFR BLD AUTO: 8.32 10*3/MM3 (ref 1.7–7)
NEUTROPHILS NFR BLD AUTO: 82 % (ref 42.7–76)
NRBC BLD AUTO-RTO: 0 /100 WBC (ref 0–0.2)
PLATELET # BLD AUTO: 77 10*3/MM3 (ref 140–450)
PMV BLD AUTO: 12.5 FL (ref 6–12)
POTASSIUM SERPL-SCNC: 4.3 MMOL/L (ref 3.5–5.2)
PROT SERPL-MCNC: 4.6 G/DL (ref 6–8.5)
PROTHROMBIN TIME: 15.2 SECONDS (ref 12.2–14.5)
QT INTERVAL: 386 MS
QTC INTERVAL: 388 MS
RBC # BLD AUTO: 3.57 10*6/MM3 (ref 4.14–5.8)
SODIUM SERPL-SCNC: 143 MMOL/L (ref 136–145)
WBC NRBC COR # BLD AUTO: 10.14 10*3/MM3 (ref 3.4–10.8)

## 2024-03-31 PROCEDURE — 86140 C-REACTIVE PROTEIN: CPT | Performed by: NURSE PRACTITIONER

## 2024-03-31 PROCEDURE — 97161 PT EVAL LOW COMPLEX 20 MIN: CPT

## 2024-03-31 PROCEDURE — 29581 APPL MULTLAYER CMPRN SYS LEG: CPT

## 2024-03-31 PROCEDURE — 85610 PROTHROMBIN TIME: CPT | Performed by: PEDIATRICS

## 2024-03-31 PROCEDURE — 63710000001 DEXAMETHASONE PER 0.25 MG: Performed by: NURSE PRACTITIONER

## 2024-03-31 PROCEDURE — 83735 ASSAY OF MAGNESIUM: CPT | Performed by: NURSE PRACTITIONER

## 2024-03-31 PROCEDURE — 93010 ELECTROCARDIOGRAM REPORT: CPT | Performed by: INTERNAL MEDICINE

## 2024-03-31 PROCEDURE — 86803 HEPATITIS C AB TEST: CPT | Performed by: PEDIATRICS

## 2024-03-31 PROCEDURE — 93005 ELECTROCARDIOGRAM TRACING: CPT | Performed by: PHYSICIAN ASSISTANT

## 2024-03-31 PROCEDURE — 85025 COMPLETE CBC W/AUTO DIFF WBC: CPT | Performed by: NURSE PRACTITIONER

## 2024-03-31 PROCEDURE — 86706 HEP B SURFACE ANTIBODY: CPT | Performed by: PEDIATRICS

## 2024-03-31 PROCEDURE — 86704 HEP B CORE ANTIBODY TOTAL: CPT | Performed by: PEDIATRICS

## 2024-03-31 PROCEDURE — 25010000002 ENOXAPARIN PER 10 MG: Performed by: PEDIATRICS

## 2024-03-31 PROCEDURE — 80053 COMPREHEN METABOLIC PANEL: CPT | Performed by: NURSE PRACTITIONER

## 2024-03-31 PROCEDURE — 87340 HEPATITIS B SURFACE AG IA: CPT | Performed by: PEDIATRICS

## 2024-03-31 PROCEDURE — 99232 SBSQ HOSP IP/OBS MODERATE 35: CPT | Performed by: PEDIATRICS

## 2024-03-31 PROCEDURE — 99232 SBSQ HOSP IP/OBS MODERATE 35: CPT | Performed by: INTERNAL MEDICINE

## 2024-03-31 RX ORDER — HYDRALAZINE HYDROCHLORIDE 25 MG/1
25 TABLET, FILM COATED ORAL EVERY 8 HOURS SCHEDULED
Status: DISCONTINUED | OUTPATIENT
Start: 2024-03-31 | End: 2024-04-05 | Stop reason: HOSPADM

## 2024-03-31 RX ORDER — AMLODIPINE BESYLATE 5 MG/1
5 TABLET ORAL
Status: DISCONTINUED | OUTPATIENT
Start: 2024-03-31 | End: 2024-04-05 | Stop reason: HOSPADM

## 2024-03-31 RX ADMIN — ENOXAPARIN SODIUM 40 MG: 100 INJECTION SUBCUTANEOUS at 05:22

## 2024-03-31 RX ADMIN — CYCLOSPORINE 1 DROP: 0.5 EMULSION OPHTHALMIC at 08:28

## 2024-03-31 RX ADMIN — Medication 10 ML: at 21:39

## 2024-03-31 RX ADMIN — FAMOTIDINE 20 MG: 20 TABLET, FILM COATED ORAL at 08:28

## 2024-03-31 RX ADMIN — GUAIFENESIN 1200 MG: 600 TABLET, EXTENDED RELEASE ORAL at 08:28

## 2024-03-31 RX ADMIN — FUROSEMIDE 20 MG: 20 TABLET ORAL at 08:28

## 2024-03-31 RX ADMIN — ENOXAPARIN SODIUM 40 MG: 100 INJECTION SUBCUTANEOUS at 17:45

## 2024-03-31 RX ADMIN — HYDRALAZINE HYDROCHLORIDE 25 MG: 25 TABLET ORAL at 14:13

## 2024-03-31 RX ADMIN — FAMOTIDINE 20 MG: 20 TABLET, FILM COATED ORAL at 21:40

## 2024-03-31 RX ADMIN — HYDRALAZINE HYDROCHLORIDE 25 MG: 25 TABLET ORAL at 21:40

## 2024-03-31 RX ADMIN — SPIRONOLACTONE 25 MG: 25 TABLET ORAL at 08:28

## 2024-03-31 RX ADMIN — AMLODIPINE BESYLATE 5 MG: 5 TABLET ORAL at 21:39

## 2024-03-31 RX ADMIN — VALSARTAN 160 MG: 160 TABLET, FILM COATED ORAL at 08:28

## 2024-03-31 RX ADMIN — CETIRIZINE HYDROCHLORIDE 5 MG: 10 TABLET, FILM COATED ORAL at 08:28

## 2024-03-31 RX ADMIN — CYCLOSPORINE 1 DROP: 0.5 EMULSION OPHTHALMIC at 21:39

## 2024-03-31 RX ADMIN — MICONAZOLE NITRATE 1 APPLICATION: 20 POWDER TOPICAL at 08:28

## 2024-03-31 RX ADMIN — FERROUS SULFATE TAB 325 MG (65 MG ELEMENTAL FE) 325 MG: 325 (65 FE) TAB at 08:28

## 2024-03-31 RX ADMIN — Medication 10 ML: at 08:28

## 2024-03-31 RX ADMIN — MICONAZOLE NITRATE 1 APPLICATION: 20 POWDER TOPICAL at 21:00

## 2024-03-31 RX ADMIN — GUAIFENESIN 1200 MG: 600 TABLET, EXTENDED RELEASE ORAL at 21:40

## 2024-03-31 RX ADMIN — ATORVASTATIN CALCIUM 40 MG: 40 TABLET, FILM COATED ORAL at 21:40

## 2024-03-31 RX ADMIN — ASPIRIN 81 MG: 81 TABLET, COATED ORAL at 08:28

## 2024-03-31 RX ADMIN — DEXAMETHASONE 6 MG: 4 TABLET ORAL at 08:28

## 2024-03-31 NOTE — PLAN OF CARE
Goal Outcome Evaluation:  Plan of Care Reviewed With: patient, family           Outcome Evaluation: PT Wound care eval complete. Pt with unna boots in place for 13 days. He has had weekly unna boots placed for several years to manage baseline edema. Upon removal, the patient does not have any open wounds and has trace edema. Placed MLW with compressogrips today on a trial basis, as it would allow more inspection and skin hygiene/breaks vs the weekly unna boot. Plan to change in 2-3 days.      Anticipated Discharge Disposition (PT): other (see comments) (will defer to PT mobility. Will need wound care follow up.)

## 2024-03-31 NOTE — PLAN OF CARE
"Assumed care at 19:00.  Pt awake in bed. A/O, on 2L NC. Pt did not ambulate this shift. Incontinent of urine. No BM. Pt's HR mostly staying in the lower 50s but got as high as 81. Reached the upper 30s once and only for a few seconds. Other vitals stable. Although pt in A/O, there were episodes of confusion, with pt mistaking the nurse for a daughter. Pt cleaned and turned as needed. Family remained at bedside.   Elevated b/p in AM. Left in bed at lowest position with bed alarm on,call light within reach, and daughter at bedside.    /89 (BP Location: Left arm)   Pulse 70   Temp 98 °F (36.7 °C) (Oral)   Resp 18   Ht 177.8 cm (70\")   Wt 104 kg (229 lb 4.5 oz)   SpO2 97%   BMI 32.90 kg/m²         Problem: Adult Inpatient Plan of Care  Goal: Plan of Care Review  Outcome: Ongoing, Progressing  Flowsheets (Taken 3/31/2024 0258)  Progress: improving  Plan of Care Reviewed With: patient  Outcome Evaluation: Pt A/O, on 2L NC. Pt did not ambulate this shift.  Incontinent of urine.  No BM. Pt's HR mostly staying in the lower 50s but got as high as 81.  Reached the upper 30s once and only for a few seconds. Other vitals stable. Although pt in A/O, there were episodes of confusion, with pt mistaking the nurse for a daughter.  Pt cleaned and turned as needed. Family remained at bedside.  Goal: Patient-Specific Goal (Individualized)  Outcome: Ongoing, Progressing  Goal: Absence of Hospital-Acquired Illness or Injury  Outcome: Ongoing, Progressing  Intervention: Identify and Manage Fall Risk  Recent Flowsheet Documentation  Taken 3/31/2024 0200 by Sherry Queen, RN  Safety Promotion/Fall Prevention:   activity supervised   assistive device/personal items within reach   safety round/check completed  Taken 3/31/2024 0038 by Sherry Queen, RN  Safety Promotion/Fall Prevention:   activity supervised   assistive device/personal items within reach   safety round/check completed  Taken 3/30/2024 2251 by Bernice" MORELIA Powell  Safety Promotion/Fall Prevention:   activity supervised   assistive device/personal items within reach   safety round/check completed  Taken 3/30/2024 2000 by Sherry Queen RN  Safety Promotion/Fall Prevention:   activity supervised   assistive device/personal items within reach   safety round/check completed  Intervention: Prevent Skin Injury  Recent Flowsheet Documentation  Taken 3/31/2024 0200 by Sherry Queen RN  Body Position: left  Skin Protection:   adhesive use limited   incontinence pads utilized   tubing/devices free from skin contact  Taken 3/31/2024 0038 by Sherry Queen RN  Body Position:   turned   left  Taken 3/30/2024 2251 by Sherry Queen RN  Body Position:   turned   right  Taken 3/30/2024 2000 by Sherry Queen RN  Body Position:   turned   left  Skin Protection:   adhesive use limited   incontinence pads utilized   tubing/devices free from skin contact  Intervention: Prevent and Manage VTE (Venous Thromboembolism) Risk  Recent Flowsheet Documentation  Taken 3/31/2024 0200 by Sherry Queen RN  Activity Management: activity encouraged  VTE Prevention/Management:   bilateral   sequential compression devices on  Taken 3/31/2024 0038 by Sherry Queen RN  Activity Management: activity encouraged  Taken 3/31/2024 0000 by Sherry Queen RN  VTE Prevention/Management:   bilateral   sequential compression devices on  Taken 3/30/2024 2251 by Sherry Queen RN  Activity Management: activity encouraged  Taken 3/30/2024 2000 by Sherry Queen RN  Activity Management: activity encouraged  VTE Prevention/Management:   bilateral   sequential compression devices on  Range of Motion:   active ROM (range of motion) encouraged   ROM (range of motion) performed  Intervention: Prevent Infection  Recent Flowsheet Documentation  Taken 3/31/2024 0200 by Sherry Queen RN  Infection Prevention:   environmental surveillance performed   rest/sleep  promoted  Taken 3/31/2024 0038 by Sherry Queen RN  Infection Prevention:   environmental surveillance performed   rest/sleep promoted  Taken 3/30/2024 2251 by Sherry Queen RN  Infection Prevention:   environmental surveillance performed   rest/sleep promoted  Taken 3/30/2024 2000 by Sherry Queen RN  Infection Prevention:   environmental surveillance performed   rest/sleep promoted  Goal: Optimal Comfort and Wellbeing  Outcome: Ongoing, Progressing  Intervention: Provide Person-Centered Care  Recent Flowsheet Documentation  Taken 3/30/2024 2000 by Sherry Queen RN  Trust Relationship/Rapport:   care explained   choices provided   empathic listening provided   questions answered   thoughts/feelings acknowledged  Goal: Readiness for Transition of Care  Outcome: Ongoing, Progressing     Problem: Fall Injury Risk  Goal: Absence of Fall and Fall-Related Injury  Outcome: Ongoing, Progressing  Intervention: Identify and Manage Contributors  Recent Flowsheet Documentation  Taken 3/31/2024 0200 by Sherry Queen RN  Self-Care Promotion:   independence encouraged   BADL personal objects within reach  Taken 3/31/2024 0038 by Sherry Queen RN  Self-Care Promotion:   independence encouraged   BADL personal objects within reach  Taken 3/30/2024 2000 by Sherry Queen RN  Self-Care Promotion:   independence encouraged   BADL personal objects within reach  Intervention: Promote Injury-Free Environment  Recent Flowsheet Documentation  Taken 3/31/2024 0200 by Sherry Queen RN  Safety Promotion/Fall Prevention:   activity supervised   assistive device/personal items within reach   safety round/check completed  Taken 3/31/2024 0038 by Sherry Queen RN  Safety Promotion/Fall Prevention:   activity supervised   assistive device/personal items within reach   safety round/check completed  Taken 3/30/2024 2251 by Sherry Queen RN  Safety Promotion/Fall Prevention:   activity supervised    assistive device/personal items within reach   safety round/check completed  Taken 3/30/2024 2000 by Sherry Queen RN  Safety Promotion/Fall Prevention:   activity supervised   assistive device/personal items within reach   safety round/check completed     Problem: Skin Injury Risk Increased  Goal: Skin Health and Integrity  Outcome: Ongoing, Progressing  Intervention: Optimize Skin Protection  Recent Flowsheet Documentation  Taken 3/31/2024 0200 by Sherry Queen RN  Pressure Reduction Techniques:   frequent weight shift encouraged   weight shift assistance provided  Head of Bed (HOB) Positioning: HOB elevated  Pressure Reduction Devices: pressure-redistributing mattress utilized  Skin Protection:   adhesive use limited   incontinence pads utilized   tubing/devices free from skin contact  Taken 3/31/2024 0038 by Sherry Queen RN  Head of Bed (HOB) Positioning: HOB elevated  Taken 3/30/2024 2251 by Sherry Queen RN  Head of Bed (HOB) Positioning: HOB elevated  Taken 3/30/2024 2000 by Sherry Queen RN  Pressure Reduction Techniques: frequent weight shift encouraged  Head of Bed (HOB) Positioning: HOB elevated  Pressure Reduction Devices: pressure-redistributing mattress utilized  Skin Protection:   adhesive use limited   incontinence pads utilized   tubing/devices free from skin contact   Goal Outcome Evaluation:  Plan of Care Reviewed With: patient        Progress: improving  Outcome Evaluation: Pt A/O, on 2L NC. Pt did not ambulate this shift.  Incontinent of urine.  No BM. Pt's HR mostly staying in the lower 50s but got as high as 81.  Reached the upper 30s once and only for a few seconds. Other vitals stable. Although pt in A/O, there were episodes of confusion, with pt mistaking the nurse for a daughter.  Pt cleaned and turned as needed. Family remained at bedside.

## 2024-03-31 NOTE — THERAPY WOUND CARE TREATMENT
Acute Care - Wound/Debridement Initial Evaluation  [facility]     Patient Name: Tho Vasquez  : 1940  MRN: [id number]  Today's Date: 3/31/2024                Admit Date: 3/29/2024    Visit Dx:    ICD-10-CM ICD-9-CM   1. COVID-19  U07.1 079.89   2. Hypoxia  R09.02 799.02   3. Generalized weakness  R53.1 780.79       Patient Active Problem List   Diagnosis    Mixed hyperlipidemia    Abnormal glucose level    Abrasion    Adult BMI 37.0-37.9 kg/sq m    Annual physical exam    ASHD (arteriosclerotic heart disease)    Primary hypertension    Chronic kidney disease, stage 3    Medicare annual wellness visit, subsequent    Morbid obesity    Obstructive sleep apnea    Old myocardial infarction    Vitamin D deficiency    Venous insufficiency of both lower extremities    Transaminitis    Acute hypoxemic respiratory failure due to COVID-19    Hypomagnesemia    CAD (coronary artery disease)    Bradycardia, drug induced        Past Medical History:   Diagnosis Date    Allergic ?    Cataract     Coronary artery disease     Hyperlipidemia     Hypertension     Myocardial infarction     Pneumonia of both lower lobes due to infectious organism 10/03/2019    Positive PPD     Respiratory failure 2005    requiring tracheostomy        Past Surgical History:   Procedure Laterality Date    CARDIAC SURGERY      HERNIA REPAIR             Rash 10/03/19 1830 scrotum (Active)   Distribution localized 24 0800   Color red 24 0800   Configuration/Shape asymmetric 24 0800   Borders irregular 24 0800   Characteristics moist 24 0800   Care, Rash cleansed with 24 0800      Lymphedema       Row Name 24 0920             Lymphedema Edema Assessment    Ptting Edema Category By severity  -MC      Pitting Edema Mild;Other (comment)  trace  -MC         Skin Changes/Observations    Location/Assessment Lower Extremity  -      Lower Extremity Conditions bilateral:;intact;clean;dry  -MC      Lower Extremity  Color/Pigment bilateral:;normal;brawny  -         Lymphedema Pulses/Capillary Refill    Lymphedema Pulses/Capillary Refill lower extremity pulses;capillary refill  -      Dorsalis Pedis Pulse right:;left:;+2 normal  -      Posterior Tibialis Pulse right:;left:;+2 normal  -      Capillary Refill lower extremity capillary refill  -      Lower Extremity Capillary Refill right:;left:;less than 3 seconds  -         Compression/Skin Care    Compression/Skin Care skin care;wrapping location;bandaging  -      Skin Care washed/dried;lotion applied  -      Wrapping Location lower extremity  -      Wrapping Location LE bilateral:;foot to knee  -      Wrapping Comments size 3.5/4 compressogrips doubled and overlapping to create gradient compression  -      Bandage Layers cotton elastic stocking- double layer (comment size)  -                User Key  (r) = Recorded By, (t) = Taken By, (c) = Cosigned By      Initials Name Provider Type     Nabila Gu, PT Physical Therapist                    WOUND DEBRIDEMENT                     PT Assessment (Last 12 Hours)       PT Evaluation and Treatment       Row Name 03/31/24 0946          Physical Therapy Time and Intention    Subjective Information no complaints  -     Document Type wound care;evaluation;therapy note (daily note)  -       Row Name 03/31/24 7325          General Information    Patient Profile Reviewed yes  -     Patient Observations alert;cooperative;agree to therapy  -     Patient/Family/Caregiver Comments/Observations Pt reports he's been getting his legs wrapped weekly by a podiatrist for quite a long time (several years). He's tried several types of compression stockings and has worsening each time he tries them.  -     Risks Reviewed patient and family:;increased discomfort  -     Benefits Reviewed patient and family:;decrease risk of DVT;improve skin integrity  -     Barriers to Rehab medically complex;previous  functional deficit  -       Row Name 03/31/24 0920          Pain    Pretreatment Pain Rating 0/10 - no pain  -     Posttreatment Pain Rating 0/10 - no pain  -       Row Name 03/31/24 0920          Cognition    Orientation Status (Cognition) oriented x 4  -       Row Name 03/31/24 0920          Coping    Observed Emotional State calm;cooperative;pleasant  -     Verbalized Emotional State acceptance  -       Row Name 03/31/24 0920          Plan of Care Review    Plan of Care Reviewed With patient;family  -     Outcome Evaluation PT Wound care eval complete. Pt with unna boots in place for 13 days. He has had weekly unna boots placed for several years to manage baseline edema. Upon removal, the patient does not have any open wounds and has trace edema. Placed MLW with compressogrips today on a trial basis, as it would allow more inspection and skin hygiene/breaks vs the weekly unna boot. Plan to change in 2-3 days.  -       Row Name 03/31/24 0920          Positioning and Restraints    Pre-Treatment Position in bed  -     Post Treatment Position bed  -     In Bed supine;call light within reach;encouraged to call for assist;with family/caregiver;SCD pump applied  -       Row Name 03/31/24 0920          Therapy Assessment/Plan (PT)    Patient/Family Therapy Goals Statement (PT) pt wants to due whatever it takes to keep his legs in good shape  -     Rehab Potential (PT) good, to achieve stated therapy goals  -     Criteria for Skilled Interventions Met (PT) yes;meets criteria;skilled treatment is necessary  -       Row Name 03/31/24 0920          PT Evaluation Complexity    History, PT Evaluation Complexity 1-2 personal factors and/or comorbidities  -     Examination of Body Systems (PT Eval Complexity) 1-2 elements  -     Clinical Presentation (PT Evaluation Complexity) stable  -     Clinical Decision Making (PT Evaluation Complexity) low complexity  -     Overall Complexity (PT  Evaluation Complexity) low complexity  -       Row Name 03/31/24 0920          Physical Therapy Goals    Wound Care Goal Selection (PT) wound care, PT goal 1  -       Row Name 03/31/24 0920          Wound Care Goal 1 (PT)    Wound Care Goal 1 (PT) Pt will demonstrate no remaining BLE edema to allow for transition to long term edema management system.  -     Time Frame (Wound Care Goal 1, PT) 10 days;long term goal (LTG)  -               User Key  (r) = Recorded By, (t) = Taken By, (c) = Cosigned By      Initials Name Provider Type    Nabila Galicia PT Physical Therapist                  Physical Therapy Education       Title: PT OT SLP Therapies (Not Started)       Topic: Physical Therapy (Not Started)       Point: Mobility training (Not Started)       Learner Progress:  Not documented in this visit.              Point: Home exercise program (Not Started)       Learner Progress:  Not documented in this visit.              Point: Body mechanics (Not Started)       Learner Progress:  Not documented in this visit.              Point: Precautions (Not Started)       Learner Progress:  Not documented in this visit.                                    Recommendation and Plan  Anticipated Discharge Disposition (PT): other (see comments) (will defer to PT mobility. Will need wound care follow up.)  Planned Therapy Interventions (PT): wound care, patient/family education  Plan of Care Reviewed With: patient, family           Outcome Evaluation: PT Wound care eval complete. Pt with unna boots in place for 13 days. He has had weekly unna boots placed for several years to manage baseline edema. Upon removal, the patient does not have any open wounds and has trace edema. Placed MLW with compressogrips today on a trial basis, as it would allow more inspection and skin hygiene/breaks vs the weekly unna boot. Plan to change in 2-3 days.  Plan of Care Reviewed With: patient, family            Time Calculation   PT  Charges       Row Name 03/31/24 1154             Time Calculation    Start Time 0920  -MC      PT Goal Re-Cert Due Date 04/10/24  -MC         Untimed Charges    PT Eval/Re-eval Minutes 40  -MC      Wound Care 93572 Multilayer comp below knee  -MC      [id number] comp below knee 10  -MC         Total Minutes    Untimed Charges Total Minutes 50  -MC       Total Minutes 50  -MC                User Key  (r) = Recorded By, (t) = Taken By, (c) = Cosigned By      Initials Name Provider Type    Nabila Galicia, PT Physical Therapist                            PT G-Codes  AM-PAC 6 Clicks Score (PT): 16       Nabila Gu, PT  3/31/2024

## 2024-03-31 NOTE — PROGRESS NOTES
"  [facility]  INPATIENT PROGRESS NOTE    Date of Admission: 3/29/2024  Date of Service: 03/31/24    Identification: Tho Vasquez is a 83 y.o. male   Primary Care Physician: Ja Baldwin MD    Chief Complaint: Asymptomatic sinus bradycardia with intermittent AV block  Problem List:   Acute hypoxemic respiratory failure due to COVID-19    Mixed hyperlipidemia    Primary hypertension    Chronic kidney disease, stage 3    Hypomagnesemia    CAD (coronary artery disease)          Subjective/Interval History    Patient sitting up in bed this morning eating breakfast.  He has no complaints.  Daughter at bedside does state that he had a much better night with the telemetry monitor not alerting as much.  She noticed that he still was having some breathing issues overnight and is still on nasal cannula oxygen.  Blood pressure is elevated this morning although he just received his blood pressure medications.            Objective   Vitals:  /91   Pulse 67   Temp 97.9 °F (36.6 °C) (Oral)   Resp 18   Ht 177.8 cm (70\")   Wt 108 kg (238 lb 3.2 oz)   SpO2 97%   BMI 34.18 kg/m²     Intake/Output Summary (Last 24 hours) at 3/31/2024 0849  Last data filed at 3/30/2024 2000  Gross per 24 hour   Intake 460 ml   Output --   Net 460 ml       Current Medications:  aspirin, 81 mg, Oral, Daily  atorvastatin, 40 mg, Oral, Nightly  cetirizine, 5 mg, Oral, Daily  cycloSPORINE, 1 drop, Both Eyes, Q12H  dexAMETHasone, 6 mg, Oral, Daily   Or  dexAMETHasone, 6 mg, Intravenous, Daily  enoxaparin, 40 mg, Subcutaneous, Q12H  famotidine, 20 mg, Oral, Q12H  ferrous sulfate, 325 mg, Oral, Daily  furosemide, 20 mg, Oral, Daily  guaiFENesin, 1,200 mg, Oral, Q12H  miconazole, 1 Application, Topical, Q12H  sodium chloride, 10 mL, Intravenous, Q12H  spironolactone, 25 mg, Oral, Daily  valsartan, 160 mg, Oral, Daily         Constitutional:       Appearance: Not in distress.      Comments: On nasal " cannula   Pulmonary:      Comments: Scattered rhonchi and decreased breath sounds in bases  Cardiovascular:      Normal rate. Regular rhythm.      Murmurs: There is no murmur.   Edema:     Comments: Trace bilateral lower extremity edema  Neurological:      Mental Status: Alert.          Data Review:  Results from last 7 days   Lab Units 03/31/24  0513 03/30/24  0841 03/29/24  1158   WBC 10*3/mm3 10.14 6.35 7.34   HEMOGLOBIN g/dL 11.1* 11.2* 13.2   HEMATOCRIT % 33.8* 33.9* 39.4   PLATELETS 10*3/mm3 77* 70* 97*     Results from last 7 days   Lab Units 03/31/24  0513 03/30/24  0841 03/29/24  1241   SODIUM mmol/L 143 142 141   POTASSIUM mmol/L 4.3 3.9 3.8   CHLORIDE mmol/L 106 105 103   CO2 mmol/L 30.0* 31.0* 31.0*   BUN mg/dL 25* 23 23   CREATININE mg/dL 0.86 0.84 1.01   GLUCOSE mg/dL 97 110* 85              Results from last 7 days   Lab Units 03/29/24  1241   TSH uIU/mL 1.750   FREE T4 ng/dL 1.33         Results from last 7 days   Lab Units 03/31/24  0513   PROTIME Seconds 15.2*   INR  1.19*     Results from last 7 days   Lab Units 03/29/24  1528 03/29/24  1241   CK TOTAL U/L  --  492*   HSTROP T ng/L 58* 68*     Results from last 7 days   Lab Units 03/29/24  1241   PROBNP pg/mL 1,270.0       CT Angiogram Chest    Result Date: 3/29/2024  Impression: No evidence of pulmonary embolism. No evidence of thoracic aortic aneurysm or dissection. Small patchy airspace opacities in the right upper lobe, lateral right lower lobe, and inferior lingula suggestive of mild multifocal pneumonia. Extensive coronary calcifications. Cholelithiasis. Slightly nodular liver contour. Correlate with cirrhosis. Electronically Signed: Kavon Lock MD  3/29/2024 4:31 PM EDT  Workstation ID: [id number]    CT Head Without Contrast    Result Date: 3/29/2024  Impression: 1.No acute process identified. Electronically Signed: Shankar Espinoza MD  3/29/2024 1:06 PM EDT  Workstation ID: [id number]    XR Chest 1 View    Result Date: 3/29/2024  Impression:  Similar chronic findings without evidence of acute cardiopulmonary disease. Electronically Signed: Dale Darby MD  3/29/2024 12:56 PM EDT  Workstation ID: [id number]     Results for orders placed during the hospital encounter of 03/29/24    Adult Transthoracic Echo Complete W/ Cont if Necessary Per Protocol    Interpretation Summary    Left ventricular ejection fraction appears to be 56 - 60%.    The right ventricular cavity is mildly dilated.    The left atrial cavity is mildly dilated.    Estimated right ventricular systolic pressure from tricuspid regurgitation is normal (<35 mmHg). Calculated right ventricular systolic pressure from tricuspid regurgitation is 9 mmHg.      RESULTS REVIEW:    I reviewed the patient's new clinical results.    I personally reviewed the patient's EKG/Telemetry data    Telemetry: Normal sinus rhythm with heart rate in the 70s            Assessment:   Sinus bradycardia, first-degree AV block  Will continue to monitor off of carvedilol to see if it resolves  Heart rate improved 3/31 is now in the 70s  COVID-19 with acute hypoxic respiratory failure  Remote history of CAD s/p stents over 10 years ago at [facility] Hypertension  Hyperlipidemia  CKD            Plan:   Heart rate has improved and patient now in the 70s off of carvedilol.  Patient will likely remain in the hospital for treatment of COVID-19 and will continue to monitor and place a Holter monitor upon discharge.  Will add hydralazine 25 mg every 8 hours as blood pressure is elevated this morning likely coming off of the carvedilol.  Continue aspirin 81 mg for history of CAD  Continue Lasix 20 mg daily, this is his home dose and with the spironolactone patient seems to be diuresing well.  Can adjust as needed based on patient's respiratory status.  Continue Lipitor 40 mg nightly.    Continue valsartan 160 mg daily for hypertension.  Will continue to follow as needed and monitor patient's telemetry.  Please let us know if  patient has recurrent bradycardia if he is symptomatic.         Electronically signed by Mary Oquendo PA-C, 03/31/24, 9:02 AM EDT.       STAFF CARDIOLOGIST:      SUMMARY:    83 years old who was admitted with dizziness and shortness of breath was found to have type I AV block which is now resolved almost with few episodes of Mobitz type I      PERTINENT:    Blood pressure 170s to 190s  Asymptomatic bradycardia  Need to rule out sick sinus      IMPRESSION:    Hypertensive heart disease  COVID  Arrhythmias  Chronic kidney disease      RECOMMENDATIONS:    Patient will need ischemic eval once he is stable from COVID  We will add Norvasc 5 mg daily for better blood pressure control  He will be discharged home on monitor to rule out any evidence of sick sinus syndrome  He might need pacemaker for his rhythm control in the future        I have seen and examined the patient, reviewed the above note, necessary changes were made and I agree with the final note.   Cristhian Haider MD

## 2024-03-31 NOTE — PROGRESS NOTES
[facility] Medicine Services  PROGRESS NOTE    Patient Name: Tho Vasquez  : 1940  MRN: [id number]    Date of Admission: 3/29/2024  Primary Care Physician: Ja Baldwin MD    Subjective   Subjective     CC:  COVID    HPI:  Pt doing ok.  Bradycardic overnight, less than yesterday.  Breathing is stable.  No CP, N/V.    Daughter is worried about his weakness.      Objective   Objective     Vital Signs:   Temp:  [97.9 °F (36.6 °C)-99 °F (37.2 °C)] 97.9 °F (36.6 °C)  Heart Rate:  [67-70] 67  Resp:  [16-18] 18  BP: (142-170)/(75-91) 170/91  Flow (L/min):  [2] 2     Physical Exam:  Constitutional: No acute distress, awake, alert, elderly gentleman  HENT: NCAT, mucous membranes moist  Respiratory: dec at based bilaterally, respiratory effort normal   Cardiovascular: irreg, bradycardic-HR in 57-67 on monitor during my exam, no murmurs, rubs, or gallops  Gastrointestinal: Positive bowel sounds, soft, nontender, nondistended  Musculoskeletal: 1+ bilateral ankle edema- wraps in place  Psychiatric: Appropriate affect, cooperative  Neurologic: Oriented x 3, hard of hearing. strength symmetric in all extremities, Cranial Nerves grossly intact to confrontation, speech clear  Skin: No rashes      Results Reviewed:  LAB RESULTS:      Lab 24  0513 24  0841 24  1528 24  1241 24  1158   WBC 10.14 6.35  --   --  7.34   HEMOGLOBIN 11.1* 11.2*  --   --  13.2   HEMATOCRIT 33.8* 33.9*  --   --  39.4   PLATELETS 77* 70*  --   --  97*   NEUTROS ABS 8.32* 4.95  --   --  4.77   IMMATURE GRANS (ABS) 0.06* 0.02  --   --   --    LYMPHS ABS 0.76 0.66*  --   --   --    MONOS ABS 0.99* 0.72  --   --   --    EOS ABS 0.00 0.00  --   --  0.00   MCV 94.7 95.8  --   --  94.3   SED RATE  --   --   --   --  11   CRP 1.51* 2.34*  --  2.09*  --    PROCALCITONIN  --   --  0.13  --   --    LACTATE  --   --   --   --  1.5   LDH  --   --   --  287*  --    PROTIME 15.2*  --   --   --   --     D DIMER QUANT  --   --   --   --  5.20*         Lab 03/31/24  0513 03/30/24  0841 03/29/24  2351 03/29/24  1241   SODIUM 143 142  --  141   POTASSIUM 4.3 3.9  --  3.8   CHLORIDE 106 105  --  103   CO2 30.0* 31.0*  --  31.0*   ANION GAP 7.0 6.0  --  7.0   BUN 25* 23  --  23   CREATININE 0.86 0.84  --  1.01   EGFR 85.9 86.5  --  73.8   GLUCOSE 97 110*  --  85   CALCIUM 8.1* 8.3*  --  9.1   MAGNESIUM 1.8 2.0 2.2 1.5*   TSH  --   --   --  1.750         Lab 03/31/24  0513 03/30/24  0841 03/29/24  1241   TOTAL PROTEIN 4.6* 4.7* 6.1   ALBUMIN 2.8* 2.8* 3.4*   GLOBULIN 1.8 1.9 2.7   ALT (SGPT) 40 34 36   AST (SGOT) 79* 73* 74*   BILIRUBIN 0.7 0.8 1.4*   ALK PHOS 58 62 70         Lab 03/31/24  0513 03/29/24  1528 03/29/24  1241   PROBNP  --   --  1,270.0   HSTROP T  --  58* 68*   PROTIME 15.2*  --   --    INR 1.19*  --   --                  Brief Urine Lab Results  (Last result in the past 365 days)        Color   Clarity   Blood   Leuk Est   Nitrite   Protein   CREAT   Urine HCG        03/29/24 1236 Dark Yellow   Cloudy   Negative   Negative   Negative   Trace                   Microbiology Results Abnormal       None            Adult Transthoracic Echo Complete W/ Cont if Necessary Per Protocol    Result Date: 3/30/2024    Left ventricular ejection fraction appears to be 56 - 60%.   The right ventricular cavity is mildly dilated.   The left atrial cavity is mildly dilated.   Estimated right ventricular systolic pressure from tricuspid regurgitation is normal (<35 mmHg). Calculated right ventricular systolic pressure from tricuspid regurgitation is 9 mmHg.     CT Angiogram Chest    Result Date: 3/29/2024  CT ANGIOGRAM CHEST Date of Exam: 3/29/2024 4:20 PM EDT Indication: hypoxia, elevated dimer, COVID +. Comparison: None available. Technique: CTA of the chest was performed after the uneventful intravenous administration of Isovue-370, 75 mL. Reconstructed coronal and sagittal images were also obtained. In addition, a 3-D  volume rendered image was created for interpretation. Automated exposure control and iterative reconstruction methods were used. Findings: Pulmonary arteries: Adequate opacification of the pulmonary arteries. No evidence of acute pulmonary embolism. Thoracic aorta: The thoracic aorta is normal in caliber and contour. Scattered atheromatous calcification of the thoracic aorta visualized. Thyroid: The visualized portion of the thyroid is unremarkable. Lungs and Pleura: Small scattered patchy airspace opacities are visualized in the right upper lobe, near the apex, posterior right upper lobe, lateral right lower lobe, and inferior lingula. No pleural effusion. There is suggestion of small volume round atelectasis in the left lateral costophrenic sulcus. No pneumothorax. Mediastinum/Sherry: No mediastinal or hilar lymphadenopathy. Calcifications are visualized throughout the mediastinal lymph nodes and are most prominent in the right subcarinal lymph nodes. Lymph nodes: No axillary or supraclavicular lymphadenopathy. Cardiovascular: The heart is normal in size.No evidence of pericardial effusion. Extensive coronary calcifications are visualized. Upper Abdomen: Gallbladder is filled with radiopaque calculi. Liver is slightly nodular in contour. Spleen is normal in size. Coarse calcification in the anterior spleen measures 1.7 cm possibly presenting granuloma.. Bones and Soft Tissue: No acute fracture, aggressive osseous lesions, or soft tissue process. Mild degenerative changes of the thoracic spine are visualized.     Impression: Impression: No evidence of pulmonary embolism. No evidence of thoracic aortic aneurysm or dissection. Small patchy airspace opacities in the right upper lobe, lateral right lower lobe, and inferior lingula suggestive of mild multifocal pneumonia. Extensive coronary calcifications. Cholelithiasis. Slightly nodular liver contour. Correlate with cirrhosis. Electronically Signed: Kavon Lock MD   3/29/2024 4:31 PM EDT  Workstation ID: [id number]    CT Head Without Contrast    Result Date: 3/29/2024  CT HEAD WO CONTRAST Date of Exam: 3/29/2024 12:56 PM EDT Indication: gen weakness. Comparison: MRI brain 10/3/2019 Technique: Axial CT images were obtained of the head without contrast administration.  Automated exposure control and iterative construction methods were used. Findings: There is no evidence of acute territorial infarction. There is no acute intracranial hemorrhage. There are no extra-axial collections. Ventricles and CSF spaces are symmetric. No mass effect nor hydrocephalus. There is moderate white matter hypoattenuation without mass effect, typical for age. Brain parenchyma is otherwise unremarkable.  Paranasal sinuses and mastoid air cells are adequately aerated.  Osseous structures and orbits appear intact.     Impression: Impression: 1.No acute process identified. Electronically Signed: Shankar Espinoza MD  3/29/2024 1:06 PM EDT  Workstation ID: [id number]    XR Chest 1 View    Result Date: 3/29/2024  XR CHEST 1 VW Date of Exam: 3/29/2024 12:03 PM EDT Indication: Weak/Dizzy/AMS triage protocol Comparison: Chest radiograph 10/25/2019. Findings: Cardiomediastinal silhouette is unchanged. Low lung volumes. Chronic small left pleural effusion/thickening with adjacent atelectasis/scarring. No new focal consolidation or enlarging pleural effusion. No pneumothorax. Osseous structures are unchanged.     Impression: Impression: Similar chronic findings without evidence of acute cardiopulmonary disease. Electronically Signed: Dale Darby MD  3/29/2024 12:56 PM EDT  Workstation ID: [id number]     Results for orders placed during the hospital encounter of 03/29/24    Adult Transthoracic Echo Complete W/ Cont if Necessary Per Protocol    Interpretation Summary    Left ventricular ejection fraction appears to be 56 - 60%.    The right ventricular cavity is mildly dilated.    The left atrial cavity is mildly  dilated.    Estimated right ventricular systolic pressure from tricuspid regurgitation is normal (<35 mmHg). Calculated right ventricular systolic pressure from tricuspid regurgitation is 9 mmHg.      Current medications:  Scheduled Meds:aspirin, 81 mg, Oral, Daily  atorvastatin, 40 mg, Oral, Nightly  cetirizine, 5 mg, Oral, Daily  cycloSPORINE, 1 drop, Both Eyes, Q12H  dexAMETHasone, 6 mg, Oral, Daily   Or  dexAMETHasone, 6 mg, Intravenous, Daily  enoxaparin, 40 mg, Subcutaneous, Q12H  famotidine, 20 mg, Oral, Q12H  ferrous sulfate, 325 mg, Oral, Daily  furosemide, 20 mg, Oral, Daily  guaiFENesin, 1,200 mg, Oral, Q12H  hydrALAZINE, 25 mg, Oral, Q8H  miconazole, 1 Application, Topical, Q12H  sodium chloride, 10 mL, Intravenous, Q12H  spironolactone, 25 mg, Oral, Daily  valsartan, 160 mg, Oral, Daily      Continuous Infusions:   PRN Meds:.  acetaminophen **OR** acetaminophen **OR** acetaminophen    albuterol sulfate HFA    benzonatate    Calcium Replacement - Follow Nurse / BPA Driven Protocol    dextromethorphan polistirex ER    hydrALAZINE    Magnesium Standard Dose Replacement - Follow Nurse / BPA Driven Protocol    nitroglycerin    ondansetron ODT **OR** ondansetron    Phosphorus Replacement - Follow Nurse / BPA Driven Protocol    Potassium Replacement - Follow Nurse / BPA Driven Protocol    sodium chloride    sodium chloride    sodium chloride    Assessment & Plan   Assessment & Plan     Active Hospital Problems    Diagnosis  POA    **Acute hypoxemic respiratory failure due to COVID-19 [U07.1, J96.01]  Yes    Bradycardia, drug induced [R00.1, T50.905A]  Unknown    Hypomagnesemia [E83.42]  Yes    CAD (coronary artery disease) [I25.10]  Yes    Mixed hyperlipidemia [E78.2]  Yes    Primary hypertension [I10]  Yes    Chronic kidney disease, stage 3 [N18.30]  Yes      Resolved Hospital Problems   No resolved problems to display.        Brief Hospital Course to date:  Tho Vasquez is a 83 y.o. male with past medical  history significant for CAD, HTN, HLD, and cataracts who presents to the ED due to increased weakness and fatigue for the past 3 days with concern for positive at-home COVID-19 test. Respiratory PCR was positive for COVID-19.     COVID-19 PNA   -Symptoms started 3/27, tested positive 3/29  -CTA negative for PE with small patchy airspace opacities in the right upper lobe, lateral right lower lobe, and inferior lingula suggestive of mild multifocal pneumonia  -Given remdesivir initially but stopped due to bradycardia.  Cardiology is okay with restarting it, but seems to be doing okay without it and has weaned to room air.    -Could consider Paxlovid, but this has a bradycardic side effect as well, so will hold off on this for now.    -Cont Decadron 3/10 days  -Lovenox BID 40mg  -Mucinex BID   -Albuterol, Delsym, Tylenol PRN  -AM labs      Bradycardia  -ECG with 1st degree to 2nd degree with 2:1 conduction AV block.   -Cardiology input appreciated  -Holding coreg     Recent falls  Generalized weakness  -CT head revealed no acute intracranial abnormality   -suspect d/t above  -PT/OT consults  -Fall precautions      Bilateral lower extremity edema  -PT wound consulted for compression wraps  -Restart home Lasix 20 mg daily   -Echo EF of 55 to 60%  -Strict I&O, daily weight      Poor appetite  -Nutrition consulted      Hypomagnesemia   -Replace per protocol      Abnormal CT  -CT chest revealed slightly nodular liver contours concerning for cirrhosis  -hepatitis panel pending, albumin is slightly low but this could be related to acute illness.  AST is slightly high as well.  Platelets are low but this could be related to the acute infection.  Will continue to follow.  would benefit from outpatient follow-up and further evaluation.     HTN  HLD  Hx CAD  -continue ASA, Valsartan, Lipitor  -Cardiology added hydralazine for blood pressure control off the Coreg    Expected Discharge Location and Transportation: pending  Expected  Discharge   Expected Discharge Date: 4/2/2024; Expected Discharge Time:      DVT prophylaxis:  Medical DVT prophylaxis orders are present.         AM-PAC 6 Clicks Score (PT): 16 (03/31/24 0800)    CODE STATUS:   Code Status and Medical Interventions:   Ordered at: 03/29/24 1801     Level Of Support Discussed With:    Patient     Code Status (Patient has no pulse and is not breathing):    CPR (Attempt to Resuscitate)     Medical Interventions (Patient has pulse or is breathing):    Full Support       Christina Cyr MD  03/31/24

## 2024-03-31 NOTE — PLAN OF CARE
Goal Outcome Evaluation:  Plan of Care Reviewed With: patient        Progress: improving  Outcome Evaluation: Alert, oriented x 4, and pleasant. HR would dip to the 40s but would come back up to 60s-70s. O2 on room air would dip to 88-89% then come back up. Spent most of the day on room air. Incontinent of bowel and bladder. JIMENEZ bed received, placed paient on it. Turn q 2 hours. No c/o pain.

## 2024-03-31 NOTE — CASE MANAGEMENT/SOCIAL WORK
Discharge Planning Assessment  [facility]     Patient Name: Tho Vasquez  MRN: [id number]  Today's Date: 3/31/2024    Admit Date: 3/29/2024    Plan: Possible rehab vs home with family   Discharge Needs Assessment       Row Name 03/31/24 1030       Living Environment    People in Home child(juan a), adult  daughter Mariah    Current Living Arrangements home    Primary Care Provided by child(juan a)    Provides Primary Care For no one, unable/limited ability to care for self    Family Caregiver if Needed child(juan a), adult    Quality of Family Relationships involved       Transition Planning    Patient/Family Anticipates Transition to other (see comments)  will need to see rehab recommendations    Transportation Anticipated family or friend will provide       Discharge Needs Assessment    Readmission Within the Last 30 Days no previous admission in last 30 days    Equipment Currently Used at Home cane, straight;walker, rolling;commode  Primarily uses cane in home, has a raised toilet    Concerns to be Addressed discharge planning    Discharge Facility/Level of Care Needs home with home health;nursing facility, skilled                   Discharge Plan       Row Name 03/31/24 1034       Plan    Plan Possible rehab vs home with family    Plan Comments I spoke with patient's daughter, Mariah, over the phone in regards to discharge planning. SHe tells me her father lives with her. She reports his activity level as fair, only going small areas within the home. No current home health. I reviewed with her  will review rehab recommendations and review with them once they are available.    Final Discharge Disposition Code 30 - still a patient                  Continued Care and Services - Admitted Since 3/29/2024    No active coordination exists for this encounter.       Expected Discharge Date and Time       Expected Discharge Date Expected Discharge Time    Apr 1, 2024            Demographic Summary       Row Name  03/31/24 1029       General Information    Admission Type inpatient    Referral Source admission list    Reason for Consult discharge planning    Preferred Language English                   Functional Status       Row Name 03/31/24 1030       Functional Status    Usual Activity Tolerance fair       Functional Status, IADL    Medications assistive person    Meal Preparation assistive person    Housekeeping assistive person    Laundry assistive person    Shopping assistive person       Employment/    Employment/ Comments Patient has Medicare and [employer] insurance and denies concerns regarding coverage or disruption in coverage issues. Patient has drug coverage and denies issues obtaining or affording current medications.     His PCP is CYNDEE Baldwin    Patient's daughter Mallory is POA.                   Psychosocial    No documentation.                  Abuse/Neglect    No documentation.                  Legal    No documentation.                  Substance Abuse    No documentation.                  Patient Forms    No documentation.                     Sheri Larios RN

## 2024-04-01 PROCEDURE — 63710000001 DEXAMETHASONE PER 0.25 MG: Performed by: NURSE PRACTITIONER

## 2024-04-01 PROCEDURE — 97164 PT RE-EVAL EST PLAN CARE: CPT

## 2024-04-01 PROCEDURE — 97116 GAIT TRAINING THERAPY: CPT

## 2024-04-01 PROCEDURE — 99232 SBSQ HOSP IP/OBS MODERATE 35: CPT | Performed by: PEDIATRICS

## 2024-04-01 PROCEDURE — 97530 THERAPEUTIC ACTIVITIES: CPT

## 2024-04-01 PROCEDURE — 97166 OT EVAL MOD COMPLEX 45 MIN: CPT

## 2024-04-01 PROCEDURE — 25010000002 ENOXAPARIN PER 10 MG: Performed by: PEDIATRICS

## 2024-04-01 RX ADMIN — DEXAMETHASONE 6 MG: 4 TABLET ORAL at 08:00

## 2024-04-01 RX ADMIN — HYDRALAZINE HYDROCHLORIDE 25 MG: 25 TABLET ORAL at 06:08

## 2024-04-01 RX ADMIN — GUAIFENESIN 1200 MG: 600 TABLET, EXTENDED RELEASE ORAL at 22:18

## 2024-04-01 RX ADMIN — MICONAZOLE NITRATE 1 APPLICATION: 20 POWDER TOPICAL at 22:19

## 2024-04-01 RX ADMIN — ENOXAPARIN SODIUM 40 MG: 100 INJECTION SUBCUTANEOUS at 06:08

## 2024-04-01 RX ADMIN — CYCLOSPORINE 1 DROP: 0.5 EMULSION OPHTHALMIC at 22:18

## 2024-04-01 RX ADMIN — ATORVASTATIN CALCIUM 40 MG: 40 TABLET, FILM COATED ORAL at 22:18

## 2024-04-01 RX ADMIN — ASPIRIN 81 MG: 81 TABLET, COATED ORAL at 08:00

## 2024-04-01 RX ADMIN — VALSARTAN 160 MG: 160 TABLET, FILM COATED ORAL at 08:00

## 2024-04-01 RX ADMIN — AMLODIPINE BESYLATE 5 MG: 5 TABLET ORAL at 08:01

## 2024-04-01 RX ADMIN — Medication 10 ML: at 22:23

## 2024-04-01 RX ADMIN — SPIRONOLACTONE 25 MG: 25 TABLET ORAL at 08:00

## 2024-04-01 RX ADMIN — HYDRALAZINE HYDROCHLORIDE 25 MG: 25 TABLET ORAL at 13:44

## 2024-04-01 RX ADMIN — Medication 10 ML: at 08:01

## 2024-04-01 RX ADMIN — FAMOTIDINE 20 MG: 20 TABLET, FILM COATED ORAL at 22:18

## 2024-04-01 RX ADMIN — ENOXAPARIN SODIUM 40 MG: 100 INJECTION SUBCUTANEOUS at 17:52

## 2024-04-01 RX ADMIN — HYDRALAZINE HYDROCHLORIDE 25 MG: 25 TABLET ORAL at 22:18

## 2024-04-01 RX ADMIN — FAMOTIDINE 20 MG: 20 TABLET, FILM COATED ORAL at 08:01

## 2024-04-01 RX ADMIN — CYCLOSPORINE 1 DROP: 0.5 EMULSION OPHTHALMIC at 10:22

## 2024-04-01 RX ADMIN — FERROUS SULFATE TAB 325 MG (65 MG ELEMENTAL FE) 325 MG: 325 (65 FE) TAB at 08:00

## 2024-04-01 RX ADMIN — GUAIFENESIN 1200 MG: 600 TABLET, EXTENDED RELEASE ORAL at 08:01

## 2024-04-01 RX ADMIN — FUROSEMIDE 20 MG: 20 TABLET ORAL at 08:00

## 2024-04-01 RX ADMIN — CETIRIZINE HYDROCHLORIDE 5 MG: 10 TABLET, FILM COATED ORAL at 08:01

## 2024-04-01 RX ADMIN — MICONAZOLE NITRATE 1 APPLICATION: 20 POWDER TOPICAL at 08:05

## 2024-04-01 NOTE — PLAN OF CARE
Goal Outcome Evaluation:  Plan of Care Reviewed With: patient        Progress: no change  Outcome Evaluation: Alert, oriented x 4, and pleasant. VSS. Ambulates to chair with assist x 1, walker, and gait belt. Incontinent of bowel and bladder. Tilted in chair to keep off coccyx. No c/o pain. O2 stable of room air today.

## 2024-04-01 NOTE — NURSING NOTE
WOC consult for     erythema on butt and scrotum.  Several skin abrasions on arms bilaterally.     Visited patient, he has skin tear to left arm and perineal mild yeast infection. Will place orders for nursing management.    Bottom with allevyn life dressing and mild bruising from fall.

## 2024-04-01 NOTE — PLAN OF CARE
Goal Outcome Evaluation:  Plan of Care Reviewed With: patient, daughter           Outcome Evaluation: Patient presents with deficits in balance, endurance, and balance with functional mobility below his baseline. He ambulated in room 15' Avery with FWW with good effort and motivated to progress his mobility and improve his safety. IPPT is indicated to address current deficits. Recommend D/C to IPR.      Anticipated Discharge Disposition (PT): inpatient rehabilitation facility

## 2024-04-01 NOTE — PLAN OF CARE
"  Assumed care at 19:00.  Pt A/O, on RA, 2L NC added for HOS. Pt did not ambulate this shift. Rolls well in bed for cleaning and positioning. Urinates well. Cleaned and turned as needed. PT HR dropped into the 30s multiple times for a few seconds, but pt remained asymptomatic. b/p improving with addition of hydralazine. Left in bed at lowest position with bed alarm on, call light within reach, and daughter at bedside.    /85 (BP Location: Left arm, Patient Position: Lying)   Pulse 70   Temp 98.6 °F (37 °C) (Oral)   Resp 16   Ht 177.8 cm (70\")   Wt 113 kg (248 lb 3.8 oz)   SpO2 96%   BMI 35.62 kg/m²         Problem: Adult Inpatient Plan of Care  Goal: Plan of Care Review  Outcome: Ongoing, Progressing  Flowsheets  Taken 4/1/2024 0317 by Sherry Queen RN  Plan of Care Reviewed With: patient  Outcome Evaluation: Pt A/O, on RA, 2L NC added for HOS. Pt did not ambulate this shift.  Rolls well in bed for cleaning and positioning. Urinates well. Cleaned and turned as needed.  PT HR dropped into the 30s multiple times for a few seconds, but pt remained asymptomatic. b/p improving with addition of hydralazine.  Taken 3/31/2024 1753 by Cristel Haddad, MORELIA  Progress: improving  Goal: Patient-Specific Goal (Individualized)  Outcome: Ongoing, Progressing  Goal: Absence of Hospital-Acquired Illness or Injury  Outcome: Ongoing, Progressing  Intervention: Identify and Manage Fall Risk  Recent Flowsheet Documentation  Taken 4/1/2024 0200 by Sherry Queen RN  Safety Promotion/Fall Prevention:   activity supervised   assistive device/personal items within reach   safety round/check completed  Taken 4/1/2024 0000 by Sherry Queen RN  Safety Promotion/Fall Prevention:   activity supervised   assistive device/personal items within reach   safety round/check completed  Taken 3/31/2024 2140 by Sherry Queen RN  Safety Promotion/Fall Prevention:   activity supervised   assistive device/personal items within " reach   safety round/check completed  Intervention: Prevent Skin Injury  Recent Flowsheet Documentation  Taken 4/1/2024 0200 by Sherry Queen RN  Body Position: weight shifting  Taken 4/1/2024 0000 by Sherry Queen RN  Body Position: right  Skin Protection:   adhesive use limited   incontinence pads utilized  Taken 3/31/2024 2140 by Sherry Queen RN  Body Position:   turned   left  Skin Protection:   adhesive use limited   incontinence pads utilized   tubing/devices free from skin contact  Intervention: Prevent and Manage VTE (Venous Thromboembolism) Risk  Recent Flowsheet Documentation  Taken 4/1/2024 0200 by Sherry Queen RN  Activity Management: activity minimized  VTE Prevention/Management:   bilateral   foot pump device on  Taken 4/1/2024 0000 by Sherry Queen RN  Activity Management: activity encouraged  VTE Prevention/Management:   bilateral   foot pump device on  Range of Motion: active ROM (range of motion) encouraged  Taken 3/31/2024 2140 by Sherry Queen RN  Activity Management: activity encouraged  VTE Prevention/Management:   bilateral   foot pump device on  Range of Motion:   active ROM (range of motion) encouraged   ROM (range of motion) performed  Intervention: Prevent Infection  Recent Flowsheet Documentation  Taken 4/1/2024 0200 by Sherry Queen RN  Infection Prevention:   environmental surveillance performed   rest/sleep promoted  Taken 4/1/2024 0000 by Sherry Queen RN  Infection Prevention:   environmental surveillance performed   rest/sleep promoted  Taken 3/31/2024 2140 by Sherry Queen RN  Infection Prevention:   environmental surveillance performed   rest/sleep promoted  Goal: Optimal Comfort and Wellbeing  Outcome: Ongoing, Progressing  Intervention: Provide Person-Centered Care  Recent Flowsheet Documentation  Taken 4/1/2024 0000 by Sherry Queen RN  Trust Relationship/Rapport:   care explained   choices provided   empathic listening  provided   questions answered   thoughts/feelings acknowledged  Taken 3/31/2024 2140 by Sherry Queen RN  Trust Relationship/Rapport:   care explained   choices provided   empathic listening provided   questions answered   thoughts/feelings acknowledged  Goal: Readiness for Transition of Care  Outcome: Ongoing, Progressing     Problem: Fall Injury Risk  Goal: Absence of Fall and Fall-Related Injury  Outcome: Ongoing, Progressing  Intervention: Identify and Manage Contributors  Recent Flowsheet Documentation  Taken 4/1/2024 0200 by Sheryr Queen RN  Self-Care Promotion:   independence encouraged   BADL personal objects within reach  Taken 4/1/2024 0000 by Sherry Queen RN  Self-Care Promotion:   independence encouraged   BADL personal objects within reach  Taken 3/31/2024 2140 by Sherry Queen RN  Medication Review/Management: medications reviewed  Self-Care Promotion:   independence encouraged   BADL personal objects within reach  Intervention: Promote Injury-Free Environment  Recent Flowsheet Documentation  Taken 4/1/2024 0200 by Sherry Queen RN  Safety Promotion/Fall Prevention:   activity supervised   assistive device/personal items within reach   safety round/check completed  Taken 4/1/2024 0000 by Sherry Queen RN  Safety Promotion/Fall Prevention:   activity supervised   assistive device/personal items within reach   safety round/check completed  Taken 3/31/2024 2140 by Sherry Queen RN  Safety Promotion/Fall Prevention:   activity supervised   assistive device/personal items within reach   safety round/check completed     Problem: Skin Injury Risk Increased  Goal: Skin Health and Integrity  Outcome: Ongoing, Progressing  Intervention: Optimize Skin Protection  Recent Flowsheet Documentation  Taken 4/1/2024 0200 by Sherry Queen RN  Head of Bed (HOB) Positioning: HOB elevated  Taken 4/1/2024 0000 by Sherry Queen RN  Pressure Reduction Techniques: frequent weight  shift encouraged  Head of Bed (HOB) Positioning: \[facility]\"" elevated  Skin Protection:   adhesive use limited   incontinence pads utilized  Taken 3/31/2024 2140 by Sherry Queen RN  Pressure Reduction Techniques:   frequent weight shift encouraged   weight shift assistance provided  Head of Bed (\[facility]\"") Positioning: \[facility]\"" elevated  Pressure Reduction Devices: specialty bed utilized  Skin Protection:   adhesive use limited   incontinence pads utilized   tubing/devices free from skin contact   Goal Outcome Evaluation:  Plan of Care Reviewed With: patient        Progress: improving  Outcome Evaluation: Pt A/O, on RA, 2L NC added for HOS. Pt did not ambulate this shift.  Rolls well in bed for cleaning and positioning. Urinates well. Cleaned and turned as needed.  PT HR dropped into the 30s multiple times for a few seconds, but pt remained asymptomatic. b/p improving with addition of hydralazine.

## 2024-04-01 NOTE — THERAPY EVALUATION
Patient Name: Tho Vasquez  : 1940    MRN: [id number]                              Today's Date: 2024       Admit Date: 3/29/2024    Visit Dx:     ICD-10-CM ICD-9-CM   1. COVID-19  U07.1 079.89   2. Hypoxia  R09.02 799.02   3. Generalized weakness  R53.1 780.79     Patient Active Problem List   Diagnosis    Mixed hyperlipidemia    Abnormal glucose level    Abrasion    Adult BMI 37.0-37.9 kg/sq m    Annual physical exam    ASHD (arteriosclerotic heart disease)    Primary hypertension    Chronic kidney disease, stage 3    Medicare annual wellness visit, subsequent    Morbid obesity    Obstructive sleep apnea    Old myocardial infarction    Vitamin D deficiency    Venous insufficiency of both lower extremities    Transaminitis    Acute hypoxemic respiratory failure due to COVID-19    Hypomagnesemia    CAD (coronary artery disease)    Bradycardia, drug induced     Past Medical History:   Diagnosis Date    Allergic ?    Cataract     Coronary artery disease     Hyperlipidemia     Hypertension     Myocardial infarction     Pneumonia of both lower lobes due to infectious organism 10/03/2019    Positive PPD     Respiratory failure 2005    requiring tracheostomy     Past Surgical History:   Procedure Laterality Date    CARDIAC SURGERY      HERNIA REPAIR        General Information       Row Name 24 1137          OT Time and Intention    Document Type evaluation  -AN     Mode of Treatment occupational therapy  -AN       Row Name 24 1137          General Information    Patient Profile Reviewed yes  -AN     Prior Level of Function independent:;all household mobility;community mobility;gait;ADL's  Prior to admission pt reports ambulating using a cane. Pt reports 3 recent falls  -AN     Existing Precautions/Restrictions fall  -AN     Barriers to Rehab medically complex;previous functional deficit  -AN       Row Name 24 1137          Living Environment    People in Home child(juan a), adult  -AN        Row Name 04/01/24 1137          Home Main Entrance    Number of Stairs, Main Entrance other (see comments);two  1+1  -AN     Stair Railings, Main Entrance railing on right side (ascending)  -AN       Row Name 04/01/24 1137          Stairs Within Home, Primary    Stairs, Within Home, Primary 2 level home, able to stay on the 1st floor  -AN     Number of Stairs, Within Home, Primary none  -AN       Row Name 04/01/24 1137          Cognition    Orientation Status (Cognition) oriented x 4  -AN       Row Name 04/01/24 1137          Safety Issues, Functional Mobility    Safety Issues Affecting Function (Mobility) safety precautions follow-through/compliance;safety precaution awareness  -AN     Impairments Affecting Function (Mobility) balance;endurance/activity tolerance;strength  -AN               User Key  (r) = Recorded By, (t) = Taken By, (c) = Cosigned By      Initials Name Provider Type    Hyun Reid OT Occupational Therapist                   Lymphedema       Row Name 03/31/24 0920             Lymphedema Edema Assessment    Ptting Edema Category By severity  -      Pitting Edema Mild;Other (comment)  trace  -MC      Recorded by [MC] Nabila Gu, PT              Skin Changes/Observations    Location/Assessment Lower Extremity  -      Lower Extremity Conditions bilateral:;intact;clean;dry  -      Lower Extremity Color/Pigment bilateral:;normal;brawny  -MC      Recorded by [MC] Nabila Gu, PT              Lymphedema Pulses/Capillary Refill    Lymphedema Pulses/Capillary Refill lower extremity pulses;capillary refill  -      Dorsalis Pedis Pulse right:;left:;+2 normal  -MC      Posterior Tibialis Pulse right:;left:;+2 normal  -MC      Capillary Refill lower extremity capillary refill  -      Lower Extremity Capillary Refill right:;left:;less than 3 seconds  -MC      Recorded by [MC] Nabila Gu, PT              Compression/Skin Care    Compression/Skin Care skin care;wrapping  location;bandaging  -      Skin Care washed/dried;lotion applied  -      Wrapping Location lower extremity  -      Wrapping Location LE bilateral:;foot to knee  -      Wrapping Comments size 3.5/4 compressogrips doubled and overlapping to create gradient compression  -      Bandage Layers cotton elastic stocking- double layer (comment size)  -      Recorded by [] Nabila Gu, PT                User Key  (r) = Recorded By, (t) = Taken By, (c) = Cosigned By      Initials Name Effective Dates     Nabila Gu, PT 06/16/21 -                    Mobility/ADL's       Row Name 04/01/24 1455          Bed Mobility    Comment, (Bed Mobility) Received [facility]  -AN       Row Name 04/01/24 1455          Transfers    Transfers sit-stand transfer;stand-sit transfer  -AN       Row Name 04/01/24 1455          Sit-Stand Transfer    Sit-Stand [place] (Transfers) minimum assist (75% patient effort);2 person assist;verbal cues  -AN     Assistive Device (Sit-Stand Transfers) walker, front-wheeled  -AN       Row Name 04/01/24 1455          Stand-Sit Transfer    Stand-Sit [place] (Transfers) verbal cues;minimum assist (75% patient effort);2 person assist  -AN     Assistive Device (Stand-Sit Transfers) walker, front-wheeled  -AN       Row Name 04/01/24 1455          Functional Mobility    Functional Mobility- Ind. Level minimum assist (75% patient effort);1 person  -AN     Functional Mobility- Device walker, front-wheeled  -AN       Row Name 04/01/24 1455          Activities of Daily Living    BADL Assessment/Intervention upper body dressing;lower body dressing  -AN       Row Name 04/01/24 1455          Upper Body Dressing Assessment/Training    [place] Level (Upper Body Dressing) don;janet/eneida;minimum assist (75% patient effort)  -AN     Position (Upper Body Dressing) edge of bed sitting  -AN       Row Name 04/01/24 1455          Lower Body Dressing Assessment/Training    [place] Level (Lower  Body Dressing) don;socks;dependent (less than 25% patient effort)  -AN     Position (Lower Body Dressing) supine  -AN               User Key  (r) = Recorded By, (t) = Taken By, (c) = Cosigned By      Initials Name Provider Type    AN Hyun Menchaca OT Occupational Therapist                   Obj/Interventions       Row Name 04/01/24 1456          Sensory Assessment (Somatosensory)    Sensory Assessment (Somatosensory) UE sensation intact  -AN       Row Name 04/01/24 1456          Vision Assessment/Intervention    Visual Impairment/Limitations WFL  -AN       Row Name 04/01/24 1456          Range of Motion Comprehensive    General Range of Motion bilateral upper extremity ROM WFL  -AN       Row Name 04/01/24 1456          Strength Comprehensive (MMT)    General Manual Muscle Testing (MMT) Assessment upper extremity strength deficits identified  -AN     Comment, General Manual Muscle Testing (MMT) Assessment BUE grossly 4+/5  -AN       Row Name 04/01/24 1456          Motor Skills    Motor Skills functional endurance  -AN     Functional Endurance decreased functional endurance  -AN       Row Name 04/01/24 1456          Balance    Balance Assessment sitting static balance;sitting dynamic balance;sit to stand dynamic balance;standing static balance;standing dynamic balance  -AN     Static Sitting Balance standby assist  -AN     Dynamic Sitting Balance standby assist  -AN     Position, Sitting Balance sitting in chair  -AN     Sit to Stand Dynamic Balance verbal cues;minimal assist;2-person assist  -AN     Static Standing Balance contact guard;verbal cues  -AN     Dynamic Standing Balance verbal cues;minimal assist;1-person assist  -AN     Position/Device Used, Standing Balance supported;walker, rolling  -AN     Balance Interventions standing;sit to stand;supported;static;dynamic;minimal challenge;occupation based/functional task  -AN               User Key  (r) = Recorded By, (t) = Taken By, (c) = Cosigned By       Initials Name Provider Type    Hyun Reid OT Occupational Therapist                   Goals/Plan       Row Name 04/01/24 1500          Bed Mobility Goal 1 (OT)    Activity/Assistive Device (Bed Mobility Goal 1, OT) sit to supine/supine to sit  -AN     [place] Level/Cues Needed (Bed Mobility Goal 1, OT) contact guard required  -AN     Time Frame (Bed Mobility Goal 1, OT) long term goal (LTG);10 days  -AN       Row Name 04/01/24 1500          Transfer Goal 1 (OT)    Activity/Assistive Device (Transfer Goal 1, OT) sit-to-stand/stand-to-sit;toilet;walker, rolling  -AN     [place] Level/Cues Needed (Transfer Goal 1, OT) contact guard required  -AN     Time Frame (Transfer Goal 1, OT) long term goal (LTG);10 days  -AN       Row Name 04/01/24 1500          Dressing Goal 1 (OT)    Activity/Device (Dressing Goal 1, OT) lower body dressing;long-handled shoe horn;reacher;sock-aid  -AN     [place]/Cues Needed (Dressing Goal 1, OT) minimum assist (75% or more patient effort)  -AN     Time Frame (Dressing Goal 1, OT) long term goal (LTG);10 days  -AN       Row Name 04/01/24 1500          Toileting Goal 1 (OT)    Activity/Device (Toileting Goal 1, OT) adjust/manage clothing;perform perineal hygiene;commode  -AN     [place] Level/Cues Needed (Toileting Goal 1, OT) minimum assist (75% or more patient effort)  -AN     Time Frame (Toileting Goal 1, OT) long term goal (LTG);10 days  -AN       Row Name 04/01/24 1500          Therapy Assessment/Plan (OT)    Planned Therapy Interventions (OT) activity tolerance training;adaptive equipment training;BADL retraining;functional balance retraining;occupation/activity based interventions;patient/caregiver education/training;transfer/mobility retraining;strengthening exercise  -AN               User Key  (r) = Recorded By, (t) = Taken By, (c) = Cosigned By      Initials Name Provider Type    Hyun Reid, CASTRO Occupational Therapist                   Clinical  Impression       Row Name 04/01/24 1457          Pain Assessment    Pretreatment Pain Rating 0/10 - no pain  -AN     Posttreatment Pain Rating 0/10 - no pain  -AN     Pre/Posttreatment Pain Comment asymptomatic  -AN     Pain Intervention(s) Repositioned;Ambulation/increased activity  -AN       Row Name 04/01/24 1457          Plan of Care Review    Plan of Care Reviewed With patient;daughter  -AN     Progress no change  -AN     Outcome Evaluation Pt presents below his functional baseline with deficits including generalized weakness, impaired balance, decreased activity tolerance, and impaired ADLs warranting skilled OT services. Pt required Min A x 2 for functional transfers using the RW and Min A for functional mobility. Rec IPR at dc for best functional outcomes.  -AN       Row Name 04/01/24 1457          Therapy Assessment/Plan (OT)    Patient/Family Therapy Goal Statement (OT) Return to PLOF  -AN     Rehab Potential (OT) good, to achieve stated therapy goals  -AN     Criteria for Skilled Therapeutic Interventions Met (OT) yes;skilled treatment is necessary  -AN     Therapy Frequency (OT) daily  -AN       Row Name 04/01/24 1457          Therapy Plan Review/Discharge Plan (OT)    Anticipated Discharge Disposition (OT) inpatient rehabilitation facility  -AN       Row Name 04/01/24 1457          Vital Signs    O2 Delivery Pre Treatment room air  -AN     O2 Delivery Intra Treatment room air  -AN     O2 Delivery Post Treatment room air  -AN     Pre Patient Position Sitting  -AN     Intra Patient Position Standing  -AN     Post Patient Position Sitting  -AN       Row Name 04/01/24 1457          Positioning and Restraints    Pre-Treatment Position sitting in chair/recliner  -AN     Post Treatment Position chair  -AN     In Chair notified nsg;reclined;call light within reach;encouraged to call for assist;exit alarm on;with family/caregiver;legs elevated;waffle cushion;on mechanical lift sling;compression device  -AN                User Key  (r) = Recorded By, (t) = Taken By, (c) = Cosigned By      Initials Name Provider Type    Hyun Reid, CASTRO Occupational Therapist                   Outcome Measures       Row Name 04/01/24 1501          How much help from another is currently needed...    Putting on and taking off regular lower body clothing? 1  -AN     Bathing (including washing, rinsing, and drying) 2  -AN     Toileting (which includes using toilet bed pan or urinal) 1  -AN     Putting on and taking off regular upper body clothing 3  -AN     Taking care of personal grooming (such as brushing teeth) 3  -AN     Eating meals 3  -AN     AM-PAC 6 Clicks Score (OT) 13  -AN       Row Name 04/01/24 1318 04/01/24 1000       How much help from another person do you currently need...    Turning from your back to your side while in flat bed without using bedrails? 3  -CK 3  -AC (r) SB (t) AC (c)    Moving from lying on back to sitting on the side of a flat bed without bedrails? 3  -CK 3  -AC (r) SB (t) AC (c)    Moving to and from a bed to a chair (including a wheelchair)? 3  -CK 3  -AC (r) SB (t) AC (c)    Standing up from a chair using your arms (e.g., wheelchair, bedside chair)? 3  -CK 2  -AC (r) SB (t) AC (c)    Climbing 3-5 steps with a railing? 3  -CK 2  -AC (r) SB (t) AC (c)    To walk in hospital room? 3  -CK 2  -AC (r) SB (t) AC (c)    AM-PAC 6 Clicks Score (PT) 18  -CK 15  -AC (r) SB (t)    Highest Level of Mobility Goal 6 --> Walk 10 steps or more  -CK 4 --> Transfer to chair/commode  -AC (r) SB (t)      Row Name 04/01/24 0800          How much help from another person do you currently need...    Turning from your back to your side while in flat bed without using bedrails? 3  -AC     Moving from lying on back to sitting on the side of a flat bed without bedrails? 3  -AC     Moving to and from a bed to a chair (including a wheelchair)? 3  -AC     Standing up from a chair using your arms (e.g., wheelchair, bedside chair)? 2   -AC     Climbing 3-5 steps with a railing? 2  -AC     To walk in hospital room? 2  -AC     AM-PAC 6 Clicks Score (PT) 15  -AC     Highest Level of Mobility Goal 4 --> Transfer to chair/commode  -AC       Row Name 04/01/24 1501 04/01/24 1318       Functional Assessment    Outcome Measure Options AM-PAC 6 Clicks Daily Activity (OT)  -AN AM-PAC 6 Clicks Basic Mobility (PT)  -CK              User Key  (r) = Recorded By, (t) = Taken By, (c) = Cosigned By      Initials Name Provider Type    AC Cristel Haddad, RN Registered Nurse    Hyun Reid, OT Occupational Therapist    Jayne Butcher, PT Physical Therapist    Qiana Bell, Nursing Student Nursing Student                    Occupational Therapy Education       Title: PT OT SLP Therapies (In Progress)       Topic: Occupational Therapy (In Progress)       Point: ADL training (Done)       Description:   Instruct learner(s) on proper safety adaptation and remediation techniques during self care or transfers.   Instruct in proper use of assistive devices.                  Learning Progress Summary             Patient Acceptance, E, VU by AN at 4/1/2024 1501   Family Acceptance, E, VU by AN at 4/1/2024 1501                         Point: Home exercise program (Not Started)       Description:   Instruct learner(s) on appropriate technique for monitoring, assisting and/or progressing therapeutic exercises/activities.                  Learner Progress:  Not documented in this visit.              Point: Precautions (Done)       Description:   Instruct learner(s) on prescribed precautions during self-care and functional transfers.                  Learning Progress Summary             Patient Acceptance, E, VU by AN at 4/1/2024 1501   Family Acceptance, E, VU by AN at 4/1/2024 1501                         Point: Body mechanics (Done)       Description:   Instruct learner(s) on proper positioning and spine alignment during self-care, functional mobility  activities and/or exercises.                  Learning Progress Summary             Patient Acceptance, E, VU by AN at 4/1/2024 1501   Family Acceptance, E, VU by AN at 4/1/2024 1501                                         User Key       Initials Effective Dates Name Provider Type Discipline    AN 09/21/21 -  Hyun Menchaca OT Occupational Therapist OT                  OT Recommendation and Plan  Planned Therapy Interventions (OT): activity tolerance training, adaptive equipment training, BADL retraining, functional balance retraining, occupation/activity based interventions, patient/caregiver education/training, transfer/mobility retraining, strengthening exercise  Therapy Frequency (OT): daily  Plan of Care Review  Plan of Care Reviewed With: patient, daughter  Progress: no change  Outcome Evaluation: Pt presents below his functional baseline with deficits including generalized weakness, impaired balance, decreased activity tolerance, and impaired ADLs warranting skilled OT services. Pt required Min A x 2 for functional transfers using the RW and Min A for functional mobility. Rec IPR at dc for best functional outcomes.     Time Calculation:   Evaluation Complexity (OT)  Review Occupational Profile/Medical/Therapy History Complexity: expanded/moderate complexity  Assessment, Occupational Performance/Identification of Deficit Complexity: 3-5 performance deficits  Clinical Decision Making Complexity (OT): detailed assessment/moderate complexity  Overall Complexity of Evaluation (OT): moderate complexity     Time Calculation- OT       Row Name 04/01/24 1502 04/01/24 1318          Time Calculation- OT    OT Start Time 1050  -AN --     OT Received On 04/01/24  -AN --     OT Goal Re-Cert Due Date 04/11/24  -AN --        Timed Charges    71619 - Gait Training Minutes  -- 8  -CK        Untimed Charges    OT Eval/Re-eval Minutes 46  -AN --        Total Minutes    Timed Charges Total Minutes -- 8  -CK     Untimed  Charges Total Minutes 46  -AN --      Total Minutes 46  -AN 8  -CK               User Key  (r) = Recorded By, (t) = Taken By, (c) = Cosigned By      Initials Name Provider Type    Hyun Reid OT Occupational Therapist    CK Jayne He PT Physical Therapist                  Therapy Charges for Today       Code Description Service Date Service Provider Modifiers Qty    [id number] HC OT EVAL MOD COMPLEXITY 4 4/1/2024 Hyun Menchaca OT GO 1                 Hyun Menchaca OT  4/1/2024

## 2024-04-01 NOTE — PROGRESS NOTES
[facility] Medicine Services  PROGRESS NOTE    Patient Name: Tho Vasquez  : 1940  MRN: [id number]    Date of Admission: 3/29/2024  Primary Care Physician: Ja Baldwin MD    Subjective   Subjective     CC:  COVID    HPI:  Pt doing ok.  Required intermittent oxygen just at night.  Otherwise heart rate seems to have remained stable to slightly improved.   Breathing is stable.  No CP, N/V.      Objective   Objective     Vital Signs:   Temp:  [97.6 °F (36.4 °C)-98.6 °F (37 °C)] 97.6 °F (36.4 °C)  Heart Rate:  [53-94] 94  Resp:  [16-18] 18  BP: (133-164)/(63-98) 142/63  Flow (L/min):  [2] 2     Physical Exam:  Constitutional: No acute distress, awake, alert, elderly gentleman  HENT: NCAT, mucous membranes moist  Respiratory: dec at based bilaterally, respiratory effort normal   Cardiovascular: Irreg rate and rhythm, not tachycardic, no murmurs, rubs, or gallops  Gastrointestinal: Positive bowel sounds, soft, nontender, nondistended  Musculoskeletal: 1+ bilateral ankle edema- wraps in place  Psychiatric: Appropriate affect, cooperative  Neurologic: Oriented x 3, hard of hearing. strength symmetric in all extremities, Cranial Nerves grossly intact to confrontation, speech clear  Skin: No rashes      Results Reviewed:  LAB RESULTS:      Lab 24  0513 24  0841 24  1528 24  1241 24  1158   WBC 10.14 6.35  --   --  7.34   HEMOGLOBIN 11.1* 11.2*  --   --  13.2   HEMATOCRIT 33.8* 33.9*  --   --  39.4   PLATELETS 77* 70*  --   --  97*   NEUTROS ABS 8.32* 4.95  --   --  4.77   IMMATURE GRANS (ABS) 0.06* 0.02  --   --   --    LYMPHS ABS 0.76 0.66*  --   --   --    MONOS ABS 0.99* 0.72  --   --   --    EOS ABS 0.00 0.00  --   --  0.00   MCV 94.7 95.8  --   --  94.3   SED RATE  --   --   --   --  11   CRP 1.51* 2.34*  --  2.09*  --    PROCALCITONIN  --   --  0.13  --   --    LACTATE  --   --   --   --  1.5   LDH  --   --   --  287*  --    PROTIME 15.2*  --    --   --   --    D DIMER QUANT  --   --   --   --  5.20*         Lab 03/31/24  0513 03/30/24  0841 03/29/24  2351 03/29/24  1241   SODIUM 143 142  --  141   POTASSIUM 4.3 3.9  --  3.8   CHLORIDE 106 105  --  103   CO2 30.0* 31.0*  --  31.0*   ANION GAP 7.0 6.0  --  7.0   BUN 25* 23  --  23   CREATININE 0.86 0.84  --  1.01   EGFR 85.9 86.5  --  73.8   GLUCOSE 97 110*  --  85   CALCIUM 8.1* 8.3*  --  9.1   MAGNESIUM 1.8 2.0 2.2 1.5*   TSH  --   --   --  1.750         Lab 03/31/24  0513 03/30/24  0841 03/29/24  1241   TOTAL PROTEIN 4.6* 4.7* 6.1   ALBUMIN 2.8* 2.8* 3.4*   GLOBULIN 1.8 1.9 2.7   ALT (SGPT) 40 34 36   AST (SGOT) 79* 73* 74*   BILIRUBIN 0.7 0.8 1.4*   ALK PHOS 58 62 70         Lab 03/31/24  0513 03/29/24  1528 03/29/24  1241   PROBNP  --   --  1,270.0   HSTROP T  --  58* 68*   PROTIME 15.2*  --   --    INR 1.19*  --   --                  Brief Urine Lab Results  (Last result in the past 365 days)        Color   Clarity   Blood   Leuk Est   Nitrite   Protein   CREAT   Urine HCG        03/29/24 1236 Dark Yellow   Cloudy   Negative   Negative   Negative   Trace                   Microbiology Results Abnormal       None            No radiology results from the last 24 hrs    Results for orders placed during the hospital encounter of 03/29/24    Adult Transthoracic Echo Complete W/ Cont if Necessary Per Protocol    Interpretation Summary    Left ventricular ejection fraction appears to be 56 - 60%.    The right ventricular cavity is mildly dilated.    The left atrial cavity is mildly dilated.    Estimated right ventricular systolic pressure from tricuspid regurgitation is normal (<35 mmHg). Calculated right ventricular systolic pressure from tricuspid regurgitation is 9 mmHg.      Current medications:  Scheduled Meds:amLODIPine, 5 mg, Oral, Q24H  aspirin, 81 mg, Oral, Daily  atorvastatin, 40 mg, Oral, Nightly  cetirizine, 5 mg, Oral, Daily  cycloSPORINE, 1 drop, Both Eyes, Q12H  dexAMETHasone, 6 mg, Oral,  Daily   Or  dexAMETHasone, 6 mg, Intravenous, Daily  enoxaparin, 40 mg, Subcutaneous, Q12H  famotidine, 20 mg, Oral, Q12H  ferrous sulfate, 325 mg, Oral, Daily  furosemide, 20 mg, Oral, Daily  guaiFENesin, 1,200 mg, Oral, Q12H  hydrALAZINE, 25 mg, Oral, Q8H  miconazole, 1 Application, Topical, Q12H  sodium chloride, 10 mL, Intravenous, Q12H  spironolactone, 25 mg, Oral, Daily  valsartan, 160 mg, Oral, Daily      Continuous Infusions:   PRN Meds:.  acetaminophen **OR** acetaminophen **OR** acetaminophen    albuterol sulfate HFA    benzonatate    Calcium Replacement - Follow Nurse / BPA Driven Protocol    dextromethorphan polistirex ER    hydrALAZINE    Magnesium Standard Dose Replacement - Follow Nurse / BPA Driven Protocol    nitroglycerin    ondansetron ODT **OR** ondansetron    Phosphorus Replacement - Follow Nurse / BPA Driven Protocol    Potassium Replacement - Follow Nurse / BPA Driven Protocol    sodium chloride    sodium chloride    sodium chloride    Assessment & Plan   Assessment & Plan     Active Hospital Problems    Diagnosis  POA    **Acute hypoxemic respiratory failure due to COVID-19 [U07.1, J96.01]  Yes    Bradycardia, drug induced [R00.1, T50.905A]  Unknown    Hypomagnesemia [E83.42]  Yes    CAD (coronary artery disease) [I25.10]  Yes    Mixed hyperlipidemia [E78.2]  Yes    Primary hypertension [I10]  Yes    Chronic kidney disease, stage 3 [N18.30]  Yes      Resolved Hospital Problems   No resolved problems to display.        Brief Hospital Course to date:  Tho Vasquez is a 83 y.o. male with past medical history significant for CAD, HTN, HLD, and cataracts who presents to the ED due to increased weakness and fatigue for the past 3 days with concern for positive at-home COVID-19 test. Respiratory PCR was positive for COVID-19.     COVID-19 PNA   -Symptoms started 3/27, tested positive 3/29  -CTA negative for PE with small patchy airspace opacities in the right upper lobe, lateral right lower lobe,  and inferior lingula suggestive of mild multifocal pneumonia  -Given remdesivir initially but stopped due to bradycardia.    -Cont Decadron 4/10 days  -Lovenox BID 40mg  -Mucinex BID   -Albuterol, Delsym, Tylenol PRN    Bradycardia  -ECG with 1st degree to 2nd degree with 2:1 conduction AV block.   -Cardiology input appreciated  -Holding coreg     Recent falls  Generalized weakness  -CT head revealed no acute intracranial abnormality   -suspect d/t above  -PT/OT consults  -Fall precautions      Bilateral lower extremity edema  -PT wound consulted for compression wraps  -Restart home Lasix 20 mg daily   -Echo EF of 55 to 60%  -Strict I&O, daily weight      Poor appetite  -Nutrition consulted      Hypomagnesemia   -Replace per protocol      Abnormal CT  -CT chest revealed slightly nodular liver contours concerning for cirrhosis  -hepatitis panel pending, albumin is slightly low but this could be related to acute illness.  AST is slightly high as well.  Platelets are low but this could be related to the acute infection.  Will continue to follow.  would benefit from outpatient follow-up and further evaluation.     HTN  HLD  Hx CAD  -continue ASA, Valsartan, Lipitor  -Cardiology added hydralazine for blood pressure control off the Coreg    Expected Discharge Location and Transportation: [facility], send off referrals after 10 days post + COVID test.  Expected Discharge   Expected Discharge Date: 4/8/2024; Expected Discharge Time:      DVT prophylaxis:  Medical DVT prophylaxis orders are present.         AM-PAC 6 Clicks Score (PT): 18 (04/01/24 1318)    CODE STATUS:   Code Status and Medical Interventions:   Ordered at: 03/29/24 1801     Level Of Support Discussed With:    Patient     Code Status (Patient has no pulse and is not breathing):    CPR (Attempt to Resuscitate)     Medical Interventions (Patient has pulse or is breathing):    Full Support       Christina Cyr MD  04/01/24

## 2024-04-01 NOTE — THERAPY RE-EVALUATION
Patient Name: Tho Vasquez  : 1940    MRN: [id number]                              Today's Date: 2024       Admit Date: 3/29/2024    Visit Dx:     ICD-10-CM ICD-9-CM   1. COVID-19  U07.1 079.89   2. Hypoxia  R09.02 799.02   3. Generalized weakness  R53.1 780.79     Patient Active Problem List   Diagnosis    Mixed hyperlipidemia    Abnormal glucose level    Abrasion    Adult BMI 37.0-37.9 kg/sq m    Annual physical exam    ASHD (arteriosclerotic heart disease)    Primary hypertension    Chronic kidney disease, stage 3    Medicare annual wellness visit, subsequent    Morbid obesity    Obstructive sleep apnea    Old myocardial infarction    Vitamin D deficiency    Venous insufficiency of both lower extremities    Transaminitis    Acute hypoxemic respiratory failure due to COVID-19    Hypomagnesemia    CAD (coronary artery disease)    Bradycardia, drug induced     Past Medical History:   Diagnosis Date    Allergic ?    Cataract     Coronary artery disease     Hyperlipidemia     Hypertension     Myocardial infarction     Pneumonia of both lower lobes due to infectious organism 10/03/2019    Positive PPD     Respiratory failure 2005    requiring tracheostomy     Past Surgical History:   Procedure Laterality Date    CARDIAC SURGERY      HERNIA REPAIR        General Information       Row Name 24 1148          Physical Therapy Time and Intention    Document Type re-evaluation  -CK     Mode of Treatment physical therapy;individual therapy  -CK       Row Name 24 1148          General Information    Patient Profile Reviewed yes  -CK     Prior Level of Function independent:;all household mobility;community mobility;gait;ADL's  Rodolfo with quad cane, 3 recent falls prompting this admission  -CK     Existing Precautions/Restrictions fall  -CK     Barriers to Rehab medically complex;previous functional deficit  -CK       Row Name 24 1148          Living Environment    People in Home child(juan a), adult   -CK       Row Name 04/01/24 1148          Home Main Entrance    Number of Stairs, Main Entrance two;other (see comments)  1+1  -CK     Stair Railings, Main Entrance railing on right side (ascending)  -CK       Row Name 04/01/24 1148          Stairs Within Home, Primary    Stairs, Within Home, Primary 2 story home, patient stays on main floor  -CK     Number of Stairs, Within Home, Primary none  -CK       Row Name 04/01/24 1148          Cognition    Orientation Status (Cognition) oriented x 4  -CK       Row Name 04/01/24 1148          Safety Issues, Functional Mobility    Safety Issues Affecting Function (Mobility) safety precaution awareness;safety precautions follow-through/compliance  -CK     Impairments Affecting Function (Mobility) balance;endurance/activity tolerance;shortness of breath;strength  -CK               User Key  (r) = Recorded By, (t) = Taken By, (c) = Cosigned By      Initials Name Provider Type    CK Jayne He, PT Physical Therapist                   Mobility       Row Name 04/01/24 1152          Bed Mobility    Bed Mobility supine-sit  -CK     Supine-Sit [place] (Bed Mobility) minimum assist (75% patient effort);verbal cues;1 person assist  -CK     Assistive Device (Bed Mobility) head of bed elevated;bed rails  -CK     Comment, (Bed Mobility) Avery to lift trunk from HOB  -CK       Row Name 04/01/24 1152          Sit-Stand Transfer    Sit-Stand [place] (Transfers) minimum assist (75% patient effort);2 person assist;verbal cues  -CK     Assistive Device (Sit-Stand Transfers) walker, front-wheeled  -CK     Comment, (Sit-Stand Transfer) cues for safe hand placement, Avery for small boost to clear hips from bed and for balance  -CK       Row Name 04/01/24 1152          Gait/Stairs (Locomotion)    [place] Level (Gait) minimum assist (75% patient effort);1 person assist;verbal cues  -CK     Assistive Device (Gait) walker, front-wheeled  -CK     Distance in Feet (Gait) 15  -CK      Deviations/Abnormal Patterns (Gait) bilateral deviations;base of support, wide;davon decreased;gait speed decreased;stride length decreased  -CK     Bilateral Gait Deviations forward flexed posture;heel strike decreased  -CK     Comment, (Gait/Stairs) Patient ambulated in room on RA with step through gait pattern. Cues provided for safe proximity of AD (closer to CHARLY) and for upright posture. Distance limited by fatigue, SpO2 >90% on RA upon sitting in chair.  -CK               User Key  (r) = Recorded By, (t) = Taken By, (c) = Cosigned By      Initials Name Provider Type    CK Jayne He PT Physical Therapist                   Obj/Interventions       Row Name 04/01/24 1311          Range of Motion Comprehensive    General Range of Motion bilateral lower extremity ROM WFL  -CK       Row Name 04/01/24 1311          Strength Comprehensive (MMT)    Comment, General Manual Muscle Testing (MMT) Assessment BLE grossly 4+/5  -CK       Row Name 04/01/24 1311          Balance    Balance Assessment sitting static balance;standing static balance;standing dynamic balance  -CK     Static Sitting Balance standby assist  -CK     Position, Sitting Balance unsupported;sitting edge of bed  -CK     Static Standing Balance contact guard  -CK     Dynamic Standing Balance minimal assist  -CK     Position/Device Used, Standing Balance supported;walker, front-wheeled  -CK     Comment, Balance mildly unsteady, no overt LOB  -CK       Row Name 04/01/24 1311          Sensory Assessment (Somatosensory)    Sensory Assessment (Somatosensory) LE sensation intact  -CK               User Key  (r) = Recorded By, (t) = Taken By, (c) = Cosigned By      Initials Name Provider Type    CK Jayne He PT Physical Therapist                   Goals/Plan       Row Name 04/01/24 1317          Bed Mobility Goal 1 (PT)    Activity/Assistive Device (Bed Mobility Goal 1, PT) sit to supine/supine to sit  -CK     [place] Level/Cues Needed  (Bed Mobility Goal 1, PT) standby assist  -CK     Time Frame (Bed Mobility Goal 1, PT) long term goal (LTG);10 days  -CK     Progress/Outcomes (Bed Mobility Goal 1, PT) new goal  -CK       Row Name 04/01/24 1317          Transfer Goal 1 (PT)    Activity/Assistive Device (Transfer Goal 1, PT) sit-to-stand/stand-to-sit;bed-to-chair/chair-to-bed  -CK     [place] Level/Cues Needed (Transfer Goal 1, PT) contact guard required  -CK     Time Frame (Transfer Goal 1, PT) long term goal (LTG);10 days  -CK     Progress/Outcome (Transfer Goal 1, PT) new goal  -CK       Row Name 04/01/24 1317          Gait Training Goal 1 (PT)    Activity/Assistive Device (Gait Training Goal 1, PT) gait (walking locomotion);assistive device use  -CK     [place] Level (Gait Training Goal 1, PT) contact guard required  -CK     Distance (Gait Training Goal 1, PT) 150'  -CK     Time Frame (Gait Training Goal 1, PT) long term goal (LTG);10 days  -CK     Progress/Outcome (Gait Training Goal 1, PT) new goal  -CK       Row Name 04/01/24 1317          Therapy Assessment/Plan (PT)    Planned Therapy Interventions (PT) balance training;bed mobility training;gait training;home exercise program;stretching;strengthening;ROM (range of motion);postural re-education;patient/family education;transfer training  -CK               User Key  (r) = Recorded By, (t) = Taken By, (c) = Cosigned By      Initials Name Provider Type    CK Jayne He, PT Physical Therapist                   Clinical Impression       Row Name 04/01/24 1312          Pain    Pretreatment Pain Rating 0/10 - no pain  -CK     Posttreatment Pain Rating 0/10 - no pain  -CK       Row Name 04/01/24 1312          Plan of Care Review    Plan of Care Reviewed With patient;daughter  -CK     Outcome Evaluation Patient presents with deficits in balance, endurance, and balance with functional mobility below his baseline. He ambulated in room 15' Avery with FWW with good effort and motivated  to progress his mobility and improve his safety. IPPT is indicated to address current deficits. Recommend D/C to IPR.  -CK       Row Name 04/01/24 1312          Vital Signs    Pre Systolic BP Rehab 144  -CK     Pre Treatment Diastolic BP 76  -CK     Pretreatment Heart Rate (beats/min) 64  -CK     Posttreatment Heart Rate (beats/min) 83  -CK     Pre SpO2 (%) 95  -CK     O2 Delivery Pre Treatment room air  -CK     O2 Delivery Intra Treatment room air  -CK     Post SpO2 (%) 92  -CK     O2 Delivery Post Treatment room air  -CK     Pre Patient Position Supine  -CK     Intra Patient Position Standing  -CK     Post Patient Position Sitting  -CK       Row Name 04/01/24 1312          Positioning and Restraints    Pre-Treatment Position in bed  -CK     Post Treatment Position chair  -CK     In Chair reclined;call light within reach;encouraged to call for assist;exit alarm on;with family/caregiver;waffle cushion;on mechanical lift sling;compression device;notified nsg  -CK               User Key  (r) = Recorded By, (t) = Taken By, (c) = Cosigned By      Initials Name Provider Type    CK Jayne He, PT Physical Therapist                   Outcome Measures       Row Name 04/01/24 1318 04/01/24 1000       How much help from another person do you currently need...    Turning from your back to your side while in flat bed without using bedrails? 3  -CK 3  -AC (r) SB (t) AC (c)    Moving from lying on back to sitting on the side of a flat bed without bedrails? 3  -CK 3  -AC (r) SB (t) AC (c)    Moving to and from a bed to a chair (including a wheelchair)? 3  -CK 3  -AC (r) SB (t) AC (c)    Standing up from a chair using your arms (e.g., wheelchair, bedside chair)? 3  -CK 2  -AC (r) SB (t) AC (c)    Climbing 3-5 steps with a railing? 3  -CK 2  -AC (r) SB (t) AC (c)    To walk in hospital room? 3  -CK 2  -AC (r) SB (t) AC (c)    AM-PAC 6 Clicks Score (PT) 18  -CK 15  -AC (r) SB (t)    Highest Level of Mobility Goal 6 --> Walk  10 steps or more  -CK 4 --> Transfer to chair/commode  -AC (r) SB (t)      Row Name 04/01/24 0800          How much help from another person do you currently need...    Turning from your back to your side while in flat bed without using bedrails? 3  -AC     Moving from lying on back to sitting on the side of a flat bed without bedrails? 3  -AC     Moving to and from a bed to a chair (including a wheelchair)? 3  -AC     Standing up from a chair using your arms (e.g., wheelchair, bedside chair)? 2  -AC     Climbing 3-5 steps with a railing? 2  -AC     To walk in hospital room? 2  -AC     AM-PAC 6 Clicks Score (PT) 15  -AC     Highest Level of Mobility Goal 4 --> Transfer to chair/commode  -AC       Row Name 04/01/24 1318          Functional Assessment    Outcome Measure Options AM-PAC 6 Clicks Basic Mobility (PT)  -CK               User Key  (r) = Recorded By, (t) = Taken By, (c) = Cosigned By      Initials Name Provider Type    Cristel Zaidi, RN Registered Nurse    Jayne Butcher, PT Physical Therapist    Qiana Bell, Nursing Student Nursing Student                                 Physical Therapy Education       Title: PT OT SLP Therapies (In Progress)       Topic: Physical Therapy (In Progress)       Point: Mobility training (Done)       Learning Progress Summary             Patient Acceptance, E, VU by CK at 4/1/2024 1318   Family Acceptance, E, VU by CK at 4/1/2024 1318                         Point: Home exercise program (Not Started)       Learner Progress:  Not documented in this visit.              Point: Body mechanics (Done)       Learning Progress Summary             Patient Acceptance, E, VU by CK at 4/1/2024 1318   Family Acceptance, E, VU by CK at 4/1/2024 1318                         Point: Precautions (Done)       Learning Progress Summary             Patient Acceptance, E, VU by CK at 4/1/2024 1318   Family Acceptance, E, VU by CK at 4/1/2024 1318                                          User Key       Initials Effective Dates Name Provider Type Discipline    CK 02/06/24 -  Jayne He PT Physical Therapist PT                  PT Recommendation and Plan  Planned Therapy Interventions (PT): balance training, bed mobility training, gait training, home exercise program, stretching, strengthening, ROM (range of motion), postural re-education, patient/family education, transfer training  Plan of Care Reviewed With: patient, daughter  Outcome Evaluation: Patient presents with deficits in balance, endurance, and balance with functional mobility below his baseline. He ambulated in room 15' Avery with FWW with good effort and motivated to progress his mobility and improve his safety. IPPT is indicated to address current deficits. Recommend D/C to IPR.     Time Calculation:   PT Evaluation Complexity  History, PT Evaluation Complexity: 3 or more personal factors and/or comorbidities  Examination of Body Systems (PT Eval Complexity): total of 3 or more elements  Clinical Presentation (PT Evaluation Complexity): stable  Clinical Decision Making (PT Evaluation Complexity): low complexity  Overall Complexity (PT Evaluation Complexity): low complexity     PT Charges       Row Name 04/01/24 1318             Time Calculation    Start Time 1039  -CK      PT Received On 04/01/24  -CK      PT Goal Re-Cert Due Date 04/11/24  -CK         Timed Charges    50063 - Gait Training Minutes  8  -CK      64374 - PT Therapeutic Activity Minutes 16  -CK         Untimed Charges    PT Eval/Re-eval Minutes 25  -CK         Total Minutes    Timed Charges Total Minutes 24  -CK      Untimed Charges Total Minutes 25  -CK       Total Minutes 49  -CK                User Key  (r) = Recorded By, (t) = Taken By, (c) = Cosigned By      Initials Name Provider Type    CK Jayne He PT Physical Therapist                  Therapy Charges for Today       Code Description Service Date Service Provider Modifiers Qty     09254467125 HC GAIT TRAINING EA 15 MIN 4/1/2024 Jayne He, PT GP 1    92437575735 HC PT THERAPEUTIC ACT EA 15 MIN 4/1/2024 Jayne He, PT GP 1    [id number] HC PT RE-EVAL ESTABLISHED PLAN 2 4/1/2024 Jayne He, PT GP 1            PT G-Codes  Outcome Measure Options: AM-PAC 6 Clicks Basic Mobility (PT)  AM-PAC 6 Clicks Score (PT): 18  PT Discharge Summary  Anticipated Discharge Disposition (PT): inpatient rehabilitation facility    Jayne He, PT  4/1/2024

## 2024-04-01 NOTE — PLAN OF CARE
Goal Outcome Evaluation:  Plan of Care Reviewed With: patient, daughter        Progress: no change  Outcome Evaluation: Pt presents below his functional baseline with deficits including generalized weakness, impaired balance, decreased activity tolerance, and impaired ADLs warranting skilled OT services. Pt required Min A x 2 for functional transfers using the RW and Min A for functional mobility. Rec IPR at dc for best functional outcomes.      Anticipated Discharge Disposition (OT): inpatient rehabilitation facility

## 2024-04-01 NOTE — CASE MANAGEMENT/SOCIAL WORK
Continued Stay Note  [facility]     Patient Name: Tho Vasquez  MRN: [id number]  Today's Date: 4/1/2024    Admit Date: 3/29/2024    Plan: Possible rehab vs home with family   Discharge Plan       Row Name 04/01/24 1055       Plan    Plan Comments Cm is following for PT/OT evals and will make referrals for rehab if needed. He will be out of Covid isolation 4/5 and referrals can be made at that time                   Discharge Codes    No documentation.                 Expected Discharge Date and Time       Expected Discharge Date Expected Discharge Time    Apr 2, 2024               Roma Gonzalez RN

## 2024-04-02 LAB
ALBUMIN SERPL-MCNC: 2.7 G/DL (ref 3.5–5.2)
ALBUMIN/GLOB SERPL: 1.4 G/DL
ALP SERPL-CCNC: 58 U/L (ref 39–117)
ALT SERPL W P-5'-P-CCNC: 50 U/L (ref 1–41)
ANION GAP SERPL CALCULATED.3IONS-SCNC: 5 MMOL/L (ref 5–15)
AST SERPL-CCNC: 59 U/L (ref 1–40)
BASOPHILS # BLD AUTO: 0.02 10*3/MM3 (ref 0–0.2)
BASOPHILS NFR BLD AUTO: 0.2 % (ref 0–1.5)
BILIRUB SERPL-MCNC: 1 MG/DL (ref 0–1.2)
BUN SERPL-MCNC: 29 MG/DL (ref 8–23)
BUN/CREAT SERPL: 31.5 (ref 7–25)
CALCIUM SPEC-SCNC: 8.2 MG/DL (ref 8.6–10.5)
CHLORIDE SERPL-SCNC: 103 MMOL/L (ref 98–107)
CO2 SERPL-SCNC: 32 MMOL/L (ref 22–29)
CREAT SERPL-MCNC: 0.92 MG/DL (ref 0.76–1.27)
DEPRECATED RDW RBC AUTO: 55.6 FL (ref 37–54)
EGFRCR SERPLBLD CKD-EPI 2021: 82.5 ML/MIN/1.73
EOSINOPHIL # BLD AUTO: 0 10*3/MM3 (ref 0–0.4)
EOSINOPHIL NFR BLD AUTO: 0 % (ref 0.3–6.2)
ERYTHROCYTE [DISTWIDTH] IN BLOOD BY AUTOMATED COUNT: 16.4 % (ref 12.3–15.4)
GLOBULIN UR ELPH-MCNC: 1.9 GM/DL
GLUCOSE SERPL-MCNC: 83 MG/DL (ref 65–99)
HBV CORE AB SERPL QL IA: NEGATIVE
HBV SURFACE AB SER QL: NON REACTIVE
HBV SURFACE AG SERPL QL IA: NEGATIVE
HCT VFR BLD AUTO: 35.4 % (ref 37.5–51)
HCV AB SERPL QL IA: NORMAL
HCV IGG SERPL QL IA: NON REACTIVE
HGB BLD-MCNC: 12 G/DL (ref 13–17.7)
IMM GRANULOCYTES # BLD AUTO: 0.1 10*3/MM3 (ref 0–0.05)
IMM GRANULOCYTES NFR BLD AUTO: 0.9 % (ref 0–0.5)
LYMPHOCYTES # BLD AUTO: 1.15 10*3/MM3 (ref 0.7–3.1)
LYMPHOCYTES NFR BLD AUTO: 9.9 % (ref 19.6–45.3)
MAGNESIUM SERPL-MCNC: 1.7 MG/DL (ref 1.6–2.4)
MCH RBC QN AUTO: 31.4 PG (ref 26.6–33)
MCHC RBC AUTO-ENTMCNC: 33.9 G/DL (ref 31.5–35.7)
MCV RBC AUTO: 92.7 FL (ref 79–97)
MONOCYTES # BLD AUTO: 1.65 10*3/MM3 (ref 0.1–0.9)
MONOCYTES NFR BLD AUTO: 14.2 % (ref 5–12)
NEUTROPHILS NFR BLD AUTO: 74.8 % (ref 42.7–76)
NEUTROPHILS NFR BLD AUTO: 8.72 10*3/MM3 (ref 1.7–7)
NRBC BLD AUTO-RTO: 0 /100 WBC (ref 0–0.2)
PLATELET # BLD AUTO: 105 10*3/MM3 (ref 140–450)
PMV BLD AUTO: 12 FL (ref 6–12)
POTASSIUM SERPL-SCNC: 4.3 MMOL/L (ref 3.5–5.2)
PROT SERPL-MCNC: 4.6 G/DL (ref 6–8.5)
RBC # BLD AUTO: 3.82 10*6/MM3 (ref 4.14–5.8)
SODIUM SERPL-SCNC: 140 MMOL/L (ref 136–145)
WBC NRBC COR # BLD AUTO: 11.64 10*3/MM3 (ref 3.4–10.8)

## 2024-04-02 PROCEDURE — 63710000001 DEXAMETHASONE PER 0.25 MG: Performed by: NURSE PRACTITIONER

## 2024-04-02 PROCEDURE — 99232 SBSQ HOSP IP/OBS MODERATE 35: CPT | Performed by: PEDIATRICS

## 2024-04-02 PROCEDURE — 83735 ASSAY OF MAGNESIUM: CPT | Performed by: PEDIATRICS

## 2024-04-02 PROCEDURE — 25010000002 ENOXAPARIN PER 10 MG: Performed by: PEDIATRICS

## 2024-04-02 PROCEDURE — 80053 COMPREHEN METABOLIC PANEL: CPT | Performed by: PEDIATRICS

## 2024-04-02 PROCEDURE — 29581 APPL MULTLAYER CMPRN SYS LEG: CPT

## 2024-04-02 PROCEDURE — 85025 COMPLETE CBC W/AUTO DIFF WBC: CPT | Performed by: PEDIATRICS

## 2024-04-02 RX ADMIN — CETIRIZINE HYDROCHLORIDE 5 MG: 10 TABLET, FILM COATED ORAL at 09:46

## 2024-04-02 RX ADMIN — HYDRALAZINE HYDROCHLORIDE 25 MG: 25 TABLET ORAL at 06:18

## 2024-04-02 RX ADMIN — VALSARTAN 160 MG: 160 TABLET, FILM COATED ORAL at 09:45

## 2024-04-02 RX ADMIN — Medication 10 ML: at 22:10

## 2024-04-02 RX ADMIN — ENOXAPARIN SODIUM 40 MG: 100 INJECTION SUBCUTANEOUS at 06:18

## 2024-04-02 RX ADMIN — AMLODIPINE BESYLATE 5 MG: 5 TABLET ORAL at 09:46

## 2024-04-02 RX ADMIN — FERROUS SULFATE TAB 325 MG (65 MG ELEMENTAL FE) 325 MG: 325 (65 FE) TAB at 10:55

## 2024-04-02 RX ADMIN — FUROSEMIDE 20 MG: 20 TABLET ORAL at 09:46

## 2024-04-02 RX ADMIN — DEXAMETHASONE 6 MG: 4 TABLET ORAL at 09:45

## 2024-04-02 RX ADMIN — SPIRONOLACTONE 25 MG: 25 TABLET ORAL at 09:46

## 2024-04-02 RX ADMIN — ATORVASTATIN CALCIUM 40 MG: 40 TABLET, FILM COATED ORAL at 22:08

## 2024-04-02 RX ADMIN — GUAIFENESIN 1200 MG: 600 TABLET, EXTENDED RELEASE ORAL at 22:08

## 2024-04-02 RX ADMIN — MICONAZOLE NITRATE 1 APPLICATION: 20 POWDER TOPICAL at 22:11

## 2024-04-02 RX ADMIN — CYCLOSPORINE 1 DROP: 0.5 EMULSION OPHTHALMIC at 09:47

## 2024-04-02 RX ADMIN — ENOXAPARIN SODIUM 40 MG: 100 INJECTION SUBCUTANEOUS at 17:46

## 2024-04-02 RX ADMIN — ASPIRIN 81 MG: 81 TABLET, COATED ORAL at 09:46

## 2024-04-02 RX ADMIN — HYDRALAZINE HYDROCHLORIDE 25 MG: 25 TABLET ORAL at 22:08

## 2024-04-02 RX ADMIN — GUAIFENESIN 1200 MG: 600 TABLET, EXTENDED RELEASE ORAL at 09:46

## 2024-04-02 RX ADMIN — MICONAZOLE NITRATE 1 APPLICATION: 20 POWDER TOPICAL at 09:47

## 2024-04-02 RX ADMIN — FAMOTIDINE 20 MG: 20 TABLET, FILM COATED ORAL at 09:46

## 2024-04-02 RX ADMIN — CYCLOSPORINE 1 DROP: 0.5 EMULSION OPHTHALMIC at 22:08

## 2024-04-02 RX ADMIN — Medication 10 ML: at 09:45

## 2024-04-02 RX ADMIN — FAMOTIDINE 20 MG: 20 TABLET, FILM COATED ORAL at 22:08

## 2024-04-02 NOTE — PLAN OF CARE
Goal Outcome Evaluation:  Plan of Care Reviewed With: patient        Progress: no change  Outcome Evaluation: Pt demonstrating good improvements in BLE edema and skin integrity in response to compression wraps. Patient with mild blanchable redness along dorsum of foot. PT applied optifoams, kerlix and sized up compression wraps. PT also informed staff to provided longer breaks from SCDs. PT encouraged BLE elevation and ankle pumps for edema management. Pt would continue to benefit from MLW changes every 2-3 days to further promote venous return, reduce limb girth, and improve skin integrity

## 2024-04-02 NOTE — THERAPY WOUND CARE TREATMENT
Acute Care - Wound/Debridement Treatment Note  [facility]     Patient Name: Tho Vasquez  : 1940  MRN: [id number]  Today's Date: 2024                Admit Date: 3/29/2024    Visit Dx:    ICD-10-CM ICD-9-CM   1. COVID-19  U07.1 079.89   2. Hypoxia  R09.02 799.02   3. Generalized weakness  R53.1 780.79       Patient Active Problem List   Diagnosis    Mixed hyperlipidemia    Abnormal glucose level    Abrasion    Adult BMI 37.0-37.9 kg/sq m    Annual physical exam    ASHD (arteriosclerotic heart disease)    Primary hypertension    Chronic kidney disease, stage 3    Medicare annual wellness visit, subsequent    Morbid obesity    Obstructive sleep apnea    Old myocardial infarction    Vitamin D deficiency    Venous insufficiency of both lower extremities    Transaminitis    Acute hypoxemic respiratory failure due to COVID-19    Hypomagnesemia    CAD (coronary artery disease)    Bradycardia, drug induced        Past Medical History:   Diagnosis Date    Allergic ?    Cataract     Coronary artery disease     Hyperlipidemia     Hypertension     Myocardial infarction     Pneumonia of both lower lobes due to infectious organism 10/03/2019    Positive PPD     Respiratory failure 2005    requiring tracheostomy        Past Surgical History:   Procedure Laterality Date    CARDIAC SURGERY      HERNIA REPAIR             Rash 10/03/19 1830 scrotum (Active)   Distribution localized 24 08   Color red 24 08   Configuration/Shape asymmetric 24 08   Borders irregular 24 08   Characteristics moist 24 0800   Care, Rash cleansed with;skin cleanser;antimicrobial agent applied 24 08       Wound 24 0830 other (see comments) Pressure Injury (Active)   Pressure Injury Stage DTPI 24 08   Dressing Appearance dry;intact;no drainage 24 08   Closure None 24 08   Base non-blanchable;maroon/purple 24 08   Periwound blanchable;intact 24 08   Care,  Wound other (see comments) 04/02/24 0800       Wound 04/01/24 0800 Right lower arm Skin Tear (Active)   Dressing Appearance dry;intact;no drainage 04/02/24 0800   Closure None 04/02/24 0800   Base red 04/02/24 0800   Periwound ecchymotic 04/02/24 0400   Drainage Amount small 04/02/24 0800   Care, Wound cleansed with;sterile normal saline 04/02/24 0800   Dressing Care dressing changed;other (see comments) 04/02/24 0800      Lymphedema       Row Name 04/02/24 0950             Lymphedema Edema Assessment    Ptting Edema Category By severity  -KW      Pitting Edema Mild  -KW         Skin Changes/Observations    Location/Assessment Lower Extremity  -KW      Lower Extremity Conditions bilateral:;intact;clean;dry  -KW      Lower Extremity Color/Pigment bilateral:;normal;brawny  -KW         Lymphedema Pulses/Capillary Refill    Lymphedema Pulses/Capillary Refill capillary refill  -KW      Capillary Refill lower extremity capillary refill  -KW      Lower Extremity Capillary Refill right:;left:;less than 3 seconds  -KW         Compression/Skin Care    Compression/Skin Care skin care;wrapping location;bandaging  -KW      Skin Care washed/dried;lotion applied  -KW      Wrapping Location lower extremity  -KW      Wrapping Location LE bilateral:;foot to knee  -KW      Wrapping Comments size 3.5/4 compressogrips doubled and overlapping to create gradient compression  -KW      Bandage Layers cotton elastic stocking- double layer (comment size)  -KW                User Key  (r) = Recorded By, (t) = Taken By, (c) = Cosigned By      Initials Name Provider Type    KW Deborah Baldwin, PT Physical Therapist                    WOUND DEBRIDEMENT                     PT Assessment (Last 12 Hours)       PT Evaluation and Treatment       Row Name 04/02/24 0950          Physical Therapy Time and Intention    Subjective Information no complaints  -KW     Document Type wound care;therapy note (daily note)  -KW     Mode of Treatment physical  therapy;individual therapy  -KW       Row Name 04/02/24 0950          General Information    Patient Profile Reviewed yes  -KW       Row Name             Wound 03/30/24 0830 other (see comments) Pressure Injury    Wound - Properties Group Placement Date: 03/30/24  -KL Placement Time: 0830  -KL Location: other (see comments)  -KL Primary Wound Type: Pressure inj  -KL    Retired Wound - Properties Group Placement Date: 03/30/24  -KL Placement Time: 0830  -KL Location: other (see comments)  -KL Primary Wound Type: Pressure inj  -KL    Retired Wound - Properties Group Date first assessed: 03/30/24  -KL Time first assessed: 0830  -KL Location: other (see comments)  -KL Primary Wound Type: Pressure inj  -KL      Row Name             Wound 04/01/24 0800 Right lower arm Skin Tear    Wound - Properties Group Placement Date: 04/01/24  -AC Placement Time: 0800  -AC Present on Original Admission: Y  -AC Side: Right  -AC Orientation: lower  -AC Location: arm  -AC Primary Wound Type: Skin tear  -AC    Retired Wound - Properties Group Placement Date: 04/01/24  -AC Placement Time: 0800  -AC Present on Original Admission: Y  -AC Side: Right  -AC Orientation: lower  -AC Location: arm  -AC Primary Wound Type: Skin tear  -AC    Retired Wound - Properties Group Date first assessed: 04/01/24  -AC Time first assessed: 0800  -AC Present on Original Admission: Y  -AC Side: Right  -AC Location: arm  -AC Primary Wound Type: Skin tear  -AC      Row Name 04/02/24 0950          Plan of Care Review    Plan of Care Reviewed With patient  -KW     Progress no change  -KW     Outcome Evaluation Pt demonstrating good improvements in BLE edema and skin integrity in response to compression wraps. Patient with mild blanchable redness along dorsum of foot. PT applied optifoams, kerlix and sized up compression wraps. PT also informed staff to provided longer breaks from SCDs. PT encouraged BLE elevation and ankle pumps for edema management. Pt would  continue to benefit from MLW changes every 2-3 days to further promote venous return, reduce limb girth, and improve skin integrity  -KW       Row Name 04/02/24 0950          Positioning and Restraints    Pre-Treatment Position sitting in chair/recliner  -KW     Post Treatment Position chair  -KW     In Chair reclined;call light within reach;encouraged to call for assist  -KW               User Key  (r) = Recorded By, (t) = Taken By, (c) = Cosigned By      Initials Name Provider Type    Yas Haley, RN Registered Nurse    Cristel Zaidi RN Registered Nurse    Deborah Hackett, NASIR Physical Therapist                  Physical Therapy Education       Title: PT OT SLP Therapies (In Progress)       Topic: Physical Therapy (In Progress)       Point: Mobility training (Done)       Learning Progress Summary             Patient Acceptance, E, VU by CK at 4/1/2024 1318   Family Acceptance, E, VU by CK at 4/1/2024 1318                         Point: Home exercise program (Not Started)       Learner Progress:  Not documented in this visit.              Point: Body mechanics (Done)       Learning Progress Summary             Patient Acceptance, E, VU by CK at 4/1/2024 1318   Family Acceptance, E, VU by CK at 4/1/2024 1318                         Point: Precautions (Done)       Learning Progress Summary             Patient Acceptance, E, VU by CK at 4/1/2024 1318   Family Acceptance, E, VU by CK at 4/1/2024 1318                                         User Key       Initials Effective Dates Name Provider Type Discipline     02/06/24 -  Jayne He, NASIR Physical Therapist PT                    Recommendation and Plan  Anticipated Discharge Disposition (PT): inpatient rehabilitation facility  Planned Therapy Interventions (PT): balance training, bed mobility training, gait training, home exercise program, stretching, strengthening, ROM (range of motion), postural re-education, patient/family education,  transfer training  Therapy Frequency (PT): daily  Plan of Care Reviewed With: patient   Progress: no change       Progress: no change  Outcome Evaluation: Pt demonstrating good improvements in BLE edema and skin integrity in response to compression wraps. Patient with mild blanchable redness along dorsum of foot. PT applied optifoams, kerlix and sized up compression wraps. PT also informed staff to provided longer breaks from SCDs. PT encouraged BLE elevation and ankle pumps for edema management. Pt would continue to benefit from MLW changes every 2-3 days to further promote venous return, reduce limb girth, and improve skin integrity  Plan of Care Reviewed With: patient            Time Calculation        Therapy Charges for Today       Code Description Service Date Service Provider Modifiers Qty    [id number] HC PT MULTI LAYER COMP SYS BELOW KNEE 4/2/2024 Deborah Baldwin, PT GP 1              PT G-Codes  Outcome Measure Options: AM-PAC 6 Clicks Daily Activity (OT)  AM-PAC 6 Clicks Score (PT): 18  AM-PAC 6 Clicks Score (OT): 13       Deborah Baldwin PT  4/2/2024

## 2024-04-02 NOTE — PROGRESS NOTES
[facility] Medicine Services  PROGRESS NOTE    Patient Name: Tho Vasquez  : 1940  MRN: [id number]    Date of Admission: 3/29/2024  Primary Care Physician: Ja Baldwin MD    Subjective   Subjective     CC:  COVID    HPI:  Pt doing ok.  Required intermittent oxygen just at night.  No issues overnight.  Improving clinically      Objective   Objective     Vital Signs:   Temp:  [97.5 °F (36.4 °C)-98.9 °F (37.2 °C)] 97.5 °F (36.4 °C)  Heart Rate:  [58-90] 62  Resp:  [18] 18  BP: (124-164)/() 124/55  Flow (L/min):  [2] 2     Physical Exam:  Constitutional: No acute distress, awake, alert, elderly gentleman  HENT: NCAT, mucous membranes moist  Respiratory: respiratory effort normal   Cardiovascular: Irreg rate and rhythm, not tachycardic, no murmurs, rubs, or gallops  Gastrointestinal: Positive bowel sounds, soft, nontender, nondistended  Musculoskeletal: 1+ bilateral ankle edema- wraps in place  Psychiatric: Appropriate affect, cooperative  Neurologic: Oriented x 3, hard of hearing. strength symmetric in all extremities, Cranial Nerves grossly intact to confrontation, speech clear  Skin: No rashes      Results Reviewed:  LAB RESULTS:      Lab 24  0645 24  0513 24  0841 24  1528 24  1241 24  1158   WBC 11.64* 10.14 6.35  --   --  7.34   HEMOGLOBIN 12.0* 11.1* 11.2*  --   --  13.2   HEMATOCRIT 35.4* 33.8* 33.9*  --   --  39.4   PLATELETS 105* 77* 70*  --   --  97*   NEUTROS ABS 8.72* 8.32* 4.95  --   --  4.77   IMMATURE GRANS (ABS) 0.10* 0.06* 0.02  --   --   --    LYMPHS ABS 1.15 0.76 0.66*  --   --   --    MONOS ABS 1.65* 0.99* 0.72  --   --   --    EOS ABS 0.00 0.00 0.00  --   --  0.00   MCV 92.7 94.7 95.8  --   --  94.3   SED RATE  --   --   --   --   --  11   CRP  --  1.51* 2.34*  --  2.09*  --    PROCALCITONIN  --   --   --  0.13  --   --    LACTATE  --   --   --   --   --  1.5   LDH  --   --   --   --  287*  --    PROTIME  --   15.2*  --   --   --   --    D DIMER QUANT  --   --   --   --   --  5.20*         Lab 04/02/24  0645 03/31/24  0513 03/30/24  0841 03/29/24  2351 03/29/24  1241   SODIUM 140 143 142  --  141   POTASSIUM 4.3 4.3 3.9  --  3.8   CHLORIDE 103 106 105  --  103   CO2 32.0* 30.0* 31.0*  --  31.0*   ANION GAP 5.0 7.0 6.0  --  7.0   BUN 29* 25* 23  --  23   CREATININE 0.92 0.86 0.84  --  1.01   EGFR 82.5 85.9 86.5  --  73.8   GLUCOSE 83 97 110*  --  85   CALCIUM 8.2* 8.1* 8.3*  --  9.1   MAGNESIUM 1.7 1.8 2.0 2.2 1.5*   TSH  --   --   --   --  1.750         Lab 04/02/24  0645 03/31/24  0513 03/30/24  0841 03/29/24  1241   TOTAL PROTEIN 4.6* 4.6* 4.7* 6.1   ALBUMIN 2.7* 2.8* 2.8* 3.4*   GLOBULIN 1.9 1.8 1.9 2.7   ALT (SGPT) 50* 40 34 36   AST (SGOT) 59* 79* 73* 74*   BILIRUBIN 1.0 0.7 0.8 1.4*   ALK PHOS 58 58 62 70         Lab 03/31/24  0513 03/29/24  1528 03/29/24  1241   PROBNP  --   --  1,270.0   HSTROP T  --  58* 68*   PROTIME 15.2*  --   --    INR 1.19*  --   --                  Brief Urine Lab Results  (Last result in the past 365 days)        Color   Clarity   Blood   Leuk Est   Nitrite   Protein   CREAT   Urine HCG        03/29/24 1236 Dark Yellow   Cloudy   Negative   Negative   Negative   Trace                   Microbiology Results Abnormal       None            No radiology results from the last 24 hrs    Results for orders placed during the hospital encounter of 03/29/24    Adult Transthoracic Echo Complete W/ Cont if Necessary Per Protocol    Interpretation Summary    Left ventricular ejection fraction appears to be 56 - 60%.    The right ventricular cavity is mildly dilated.    The left atrial cavity is mildly dilated.    Estimated right ventricular systolic pressure from tricuspid regurgitation is normal (<35 mmHg). Calculated right ventricular systolic pressure from tricuspid regurgitation is 9 mmHg.      Current medications:  Scheduled Meds:amLODIPine, 5 mg, Oral, Q24H  aspirin, 81 mg, Oral,  Daily  atorvastatin, 40 mg, Oral, Nightly  cetirizine, 5 mg, Oral, Daily  cycloSPORINE, 1 drop, Both Eyes, Q12H  dexAMETHasone, 6 mg, Oral, Daily   Or  dexAMETHasone, 6 mg, Intravenous, Daily  enoxaparin, 40 mg, Subcutaneous, Q12H  famotidine, 20 mg, Oral, Q12H  ferrous sulfate, 325 mg, Oral, Daily  furosemide, 20 mg, Oral, Daily  guaiFENesin, 1,200 mg, Oral, Q12H  hydrALAZINE, 25 mg, Oral, Q8H  miconazole, 1 Application, Topical, Q12H  sodium chloride, 10 mL, Intravenous, Q12H  spironolactone, 25 mg, Oral, Daily  valsartan, 160 mg, Oral, Daily      Continuous Infusions:   PRN Meds:.  acetaminophen **OR** acetaminophen **OR** acetaminophen    albuterol sulfate HFA    benzonatate    Calcium Replacement - Follow Nurse / BPA Driven Protocol    dextromethorphan polistirex ER    hydrALAZINE    Magnesium Standard Dose Replacement - Follow Nurse / BPA Driven Protocol    nitroglycerin    ondansetron ODT **OR** ondansetron    Phosphorus Replacement - Follow Nurse / BPA Driven Protocol    Potassium Replacement - Follow Nurse / BPA Driven Protocol    sodium chloride    sodium chloride    sodium chloride    Assessment & Plan   Assessment & Plan     Active Hospital Problems    Diagnosis  POA    **Acute hypoxemic respiratory failure due to COVID-19 [U07.1, J96.01]  Yes    Bradycardia, drug induced [R00.1, T50.905A]  Unknown    Hypomagnesemia [E83.42]  Yes    CAD (coronary artery disease) [I25.10]  Yes    Mixed hyperlipidemia [E78.2]  Yes    Primary hypertension [I10]  Yes    Chronic kidney disease, stage 3 [N18.30]  Yes      Resolved Hospital Problems   No resolved problems to display.        Brief Hospital Course to date:  Tho Vasquez is a 83 y.o. male with past medical history significant for CAD, HTN, HLD, and cataracts who presents to the ED due to increased weakness and fatigue for the past 3 days with concern for positive at-home COVID-19 test. Respiratory PCR was positive for COVID-19.     COVID-19 PNA   -Symptoms  started 3/27, tested positive 3/29  -CTA negative for PE with small patchy airspace opacities in the right upper lobe, lateral right lower lobe, and inferior lingula suggestive of mild multifocal pneumonia  -Given remdesivir initially but stopped due to bradycardia.    -Cont Decadron   -Lovenox BID 40mg  -Mucinex BID   -Albuterol, Delsym, Tylenol PRN    Bradycardia  -ECG with 1st degree to 2nd degree with 2:1 conduction AV block.   -Cardiology input appreciated  -Holding coreg     Recent falls  Generalized weakness  -CT head revealed no acute intracranial abnormality   -suspect d/t above  -PT/OT consults  -Fall precautions      Bilateral lower extremity edema  -PT wound consulted for compression wraps  -Restart home Lasix 20 mg daily   -Echo EF of 55 to 60%  -Strict I&O, daily weight      Poor appetite  -Nutrition consulted      Hypomagnesemia   -Replace per protocol      Abnormal CT  -CT chest revealed slightly nodular liver contours concerning for cirrhosis  -hepatitis panel neg, albumin is slightly low but this could be related to acute illness.  AST is slightly high as well.  Platelets improving.  Will continue to follow.  would benefit from outpatient follow-up and further evaluation.     HTN  HLD  Hx CAD  -continue ASA, Valsartan, Lipitor  -Cardiology added hydralazine for blood pressure control off the Coreg    Expected Discharge Location and Transportation: [facility], send off referrals after 10 days post + COVID test.  Expected Discharge   Expected Discharge Date: 4/8/2024; Expected Discharge Time:      DVT prophylaxis:  Medical DVT prophylaxis orders are present.         AM-PAC 6 Clicks Score (PT): 18 (04/02/24 0800)    CODE STATUS:   Code Status and Medical Interventions:   Ordered at: 03/29/24 1801     Level Of Support Discussed With:    Patient     Code Status (Patient has no pulse and is not breathing):    CPR (Attempt to Resuscitate)     Medical Interventions (Patient has pulse or is breathing):    Full  Support       Christina Cyr MD  04/02/24

## 2024-04-02 NOTE — PLAN OF CARE
"Assumed care at 19:00.  Pt A/O, on RA, up in chair. Pt requested to sleep in the chair, also does this at home. purwick added while in chair. HR stayed above 50 tonight. B/P improves after hydralazine. Slept most of the night.  Left in chair with alarm on, call light within reach, and daughter at bedside.      /95 (BP Location: Left arm, Patient Position: Lying)   Pulse 90   Temp 98.9 °F (37.2 °C) (Oral)   Resp 18   Ht 177.8 cm (70\")   Wt 113 kg (248 lb 3.8 oz)   SpO2 92%   BMI 35.62 kg/m²       Problem: Adult Inpatient Plan of Care  Goal: Plan of Care Review  Outcome: Ongoing, Progressing  Flowsheets (Taken 4/2/2024 0234)  Progress: improving  Plan of Care Reviewed With: patient  Outcome Evaluation: Pt A/O, on RA, up in chair.  Pt requested to sleep in the chair, also does this at home. purwick added while in chair.  HR stayed above 50 tonight.  B/P improves after hydralazine.  Goal: Patient-Specific Goal (Individualized)  Outcome: Ongoing, Progressing  Goal: Absence of Hospital-Acquired Illness or Injury  Outcome: Ongoing, Progressing  Intervention: Identify and Manage Fall Risk  Recent Flowsheet Documentation  Taken 4/2/2024 0200 by Sherry Queen RN  Safety Promotion/Fall Prevention: safety round/check completed  Taken 4/2/2024 0000 by Sherry Queen RN  Safety Promotion/Fall Prevention:   activity supervised   assistive device/personal items within reach   safety round/check completed  Taken 4/1/2024 2200 by Sherry Queen RN  Safety Promotion/Fall Prevention:   activity supervised   assistive device/personal items within reach   safety round/check completed  Taken 4/1/2024 2000 by Sherry Queen RN  Safety Promotion/Fall Prevention:   activity supervised   assistive device/personal items within reach   safety round/check completed  Intervention: Prevent Skin Injury  Recent Flowsheet Documentation  Taken 4/2/2024 0200 by Sherry Queen RN  Body Position: (poulled up in chair)   " legs elevated   weight shifting  Taken 4/2/2024 0000 by Sherry Queen RN  Body Position:   legs elevated   weight shifting  Skin Protection:   adhesive use limited   incontinence pads utilized   tubing/devices free from skin contact  Taken 4/1/2024 2200 by Sherry Queen RN  Body Position:   legs elevated   weight shifting  Skin Protection:   adhesive use limited   incontinence pads utilized   tubing/devices free from skin contact  Taken 4/1/2024 2000 by Sherry Queen RN  Body Position:   legs elevated   weight shifting  Intervention: Prevent and Manage VTE (Venous Thromboembolism) Risk  Recent Flowsheet Documentation  Taken 4/2/2024 0200 by Sherry Queen RN  Activity Management: activity encouraged  VTE Prevention/Management:   bilateral   foot pump device on  Taken 4/2/2024 0000 by Sherry Queen RN  Activity Management: activity encouraged  Range of Motion: active ROM (range of motion) encouraged  Taken 4/1/2024 2200 by Sherry Queen RN  Activity Management: up in chair  VTE Prevention/Management:   bilateral   foot pump device on  Range of Motion: active ROM (range of motion) encouraged  Taken 4/1/2024 2000 by Sherry Queen RN  Activity Management: up in chair  Intervention: Prevent Infection  Recent Flowsheet Documentation  Taken 4/2/2024 0200 by Sherry Queen RN  Infection Prevention:   environmental surveillance performed   rest/sleep promoted  Taken 4/2/2024 0000 by Sherry Queen RN  Infection Prevention:   environmental surveillance performed   rest/sleep promoted  Taken 4/1/2024 2200 by Sherry Queen RN  Infection Prevention:   environmental surveillance performed   rest/sleep promoted  Taken 4/1/2024 2000 by Sherry Queen RN  Infection Prevention:   environmental surveillance performed   rest/sleep promoted  Goal: Optimal Comfort and Wellbeing  Outcome: Ongoing, Progressing  Intervention: Provide Person-Centered Care  Recent Flowsheet  Documentation  Taken 4/2/2024 0000 by Sherry Queen RN  Trust Relationship/Rapport: care explained  Taken 4/1/2024 2200 by Sherry Queen RN  Trust Relationship/Rapport:   care explained   choices provided   empathic listening provided   questions answered   thoughts/feelings acknowledged  Goal: Readiness for Transition of Care  Outcome: Ongoing, Progressing     Problem: Fall Injury Risk  Goal: Absence of Fall and Fall-Related Injury  Outcome: Ongoing, Progressing  Intervention: Identify and Manage Contributors  Recent Flowsheet Documentation  Taken 4/2/2024 0200 by Sherry Queen RN  Self-Care Promotion:   independence encouraged   BADL personal objects within reach  Taken 4/2/2024 0000 by Sherry Queen RN  Self-Care Promotion:   independence encouraged   BADL personal objects within reach  Taken 4/1/2024 2200 by Sherry Queen RN  Self-Care Promotion:   independence encouraged   BADL personal objects within reach  Taken 4/1/2024 2000 by Sherry Queen RN  Self-Care Promotion:   independence encouraged   BADL personal objects within reach  Intervention: Promote Injury-Free Environment  Recent Flowsheet Documentation  Taken 4/2/2024 0200 by Sherry Queen RN  Safety Promotion/Fall Prevention: safety round/check completed  Taken 4/2/2024 0000 by Sherry Queen RN  Safety Promotion/Fall Prevention:   activity supervised   assistive device/personal items within reach   safety round/check completed  Taken 4/1/2024 2200 by Sherry Queen RN  Safety Promotion/Fall Prevention:   activity supervised   assistive device/personal items within reach   safety round/check completed  Taken 4/1/2024 2000 by Sherry Queen RN  Safety Promotion/Fall Prevention:   activity supervised   assistive device/personal items within reach   safety round/check completed     Problem: Skin Injury Risk Increased  Goal: Skin Health and Integrity  Outcome: Ongoing, Progressing  Intervention: Optimize Skin  Protection  Recent Flowsheet Documentation  Taken 4/2/2024 0200 by Sherry Queen RN  Head of Bed (HOB) Positioning: HOB elevated  Taken 4/2/2024 0000 by Sherry Queen RN  Pressure Reduction Techniques: frequent weight shift encouraged  Pressure Reduction Devices:   chair cushion utilized   feet on footrest/footstool  Skin Protection:   adhesive use limited   incontinence pads utilized   tubing/devices free from skin contact  Taken 4/1/2024 2200 by Sherry Queen RN  Pressure Reduction Techniques: frequent weight shift encouraged  Head of Bed (HOB) Positioning: HOB elevated  Pressure Reduction Devices: chair cushion utilized  Skin Protection:   adhesive use limited   incontinence pads utilized   tubing/devices free from skin contact  Taken 4/1/2024 2000 by Sherry Queen RN  Head of Bed ([facility]) Positioning: (chair) HOB elevated   Goal Outcome Evaluation:  Plan of Care Reviewed With: patient        Progress: improving  Outcome Evaluation: Pt A/O, on RA, up in chair.  Pt requested to sleep in the chair, also does this at home. purwick added while in chair.  HR stayed above 50 tonight.  B/P improves after hydralazine.

## 2024-04-03 LAB
QT INTERVAL: 430 MS
QTC INTERVAL: 403 MS

## 2024-04-03 PROCEDURE — 99232 SBSQ HOSP IP/OBS MODERATE 35: CPT | Performed by: INTERNAL MEDICINE

## 2024-04-03 PROCEDURE — 93005 ELECTROCARDIOGRAM TRACING: CPT | Performed by: PEDIATRICS

## 2024-04-03 PROCEDURE — 25010000002 ENOXAPARIN PER 10 MG: Performed by: PEDIATRICS

## 2024-04-03 PROCEDURE — 63710000001 DEXAMETHASONE PER 0.25 MG: Performed by: NURSE PRACTITIONER

## 2024-04-03 PROCEDURE — 97116 GAIT TRAINING THERAPY: CPT

## 2024-04-03 PROCEDURE — 97110 THERAPEUTIC EXERCISES: CPT

## 2024-04-03 PROCEDURE — 93010 ELECTROCARDIOGRAM REPORT: CPT | Performed by: INTERNAL MEDICINE

## 2024-04-03 PROCEDURE — 99232 SBSQ HOSP IP/OBS MODERATE 35: CPT | Performed by: PEDIATRICS

## 2024-04-03 RX ADMIN — CYCLOSPORINE 1 DROP: 0.5 EMULSION OPHTHALMIC at 21:25

## 2024-04-03 RX ADMIN — MICONAZOLE NITRATE 1 APPLICATION: 20 POWDER TOPICAL at 21:25

## 2024-04-03 RX ADMIN — HYDRALAZINE HYDROCHLORIDE 25 MG: 25 TABLET ORAL at 15:43

## 2024-04-03 RX ADMIN — SPIRONOLACTONE 25 MG: 25 TABLET ORAL at 10:06

## 2024-04-03 RX ADMIN — VALSARTAN 160 MG: 160 TABLET, FILM COATED ORAL at 10:06

## 2024-04-03 RX ADMIN — ACETAMINOPHEN 650 MG: 325 TABLET ORAL at 10:05

## 2024-04-03 RX ADMIN — FAMOTIDINE 20 MG: 20 TABLET, FILM COATED ORAL at 10:06

## 2024-04-03 RX ADMIN — ATORVASTATIN CALCIUM 40 MG: 40 TABLET, FILM COATED ORAL at 21:23

## 2024-04-03 RX ADMIN — FUROSEMIDE 20 MG: 20 TABLET ORAL at 10:06

## 2024-04-03 RX ADMIN — GUAIFENESIN 1200 MG: 600 TABLET, EXTENDED RELEASE ORAL at 10:06

## 2024-04-03 RX ADMIN — FAMOTIDINE 20 MG: 20 TABLET, FILM COATED ORAL at 21:23

## 2024-04-03 RX ADMIN — Medication 10 ML: at 10:05

## 2024-04-03 RX ADMIN — CYCLOSPORINE 1 DROP: 0.5 EMULSION OPHTHALMIC at 15:43

## 2024-04-03 RX ADMIN — AMLODIPINE BESYLATE 5 MG: 5 TABLET ORAL at 10:06

## 2024-04-03 RX ADMIN — MICONAZOLE NITRATE 1 APPLICATION: 20 POWDER TOPICAL at 10:07

## 2024-04-03 RX ADMIN — ENOXAPARIN SODIUM 40 MG: 100 INJECTION SUBCUTANEOUS at 05:49

## 2024-04-03 RX ADMIN — FERROUS SULFATE TAB 325 MG (65 MG ELEMENTAL FE) 325 MG: 325 (65 FE) TAB at 10:06

## 2024-04-03 RX ADMIN — HYDRALAZINE HYDROCHLORIDE 25 MG: 25 TABLET ORAL at 21:24

## 2024-04-03 RX ADMIN — DEXAMETHASONE 6 MG: 4 TABLET ORAL at 10:05

## 2024-04-03 RX ADMIN — Medication 10 ML: at 21:25

## 2024-04-03 RX ADMIN — ENOXAPARIN SODIUM 40 MG: 100 INJECTION SUBCUTANEOUS at 18:34

## 2024-04-03 RX ADMIN — ASPIRIN 81 MG: 81 TABLET, COATED ORAL at 10:06

## 2024-04-03 RX ADMIN — CETIRIZINE HYDROCHLORIDE 5 MG: 10 TABLET, FILM COATED ORAL at 10:06

## 2024-04-03 RX ADMIN — GUAIFENESIN 1200 MG: 600 TABLET, EXTENDED RELEASE ORAL at 21:24

## 2024-04-03 RX ADMIN — HYDRALAZINE HYDROCHLORIDE 25 MG: 25 TABLET ORAL at 05:49

## 2024-04-03 NOTE — PROGRESS NOTES
[facility] Medicine Services  PROGRESS NOTE    Patient Name: Tho Vasquez  : 1940  MRN: [id number]    Date of Admission: 3/29/2024  Primary Care Physician: Ja Baldwin MD    Subjective   Subjective     CC:  COVID    HPI:  Pt doing ok.  No SOB, CP, dizziness.  +cough.      Objective   Objective     Vital Signs:   Temp:  [97.7 °F (36.5 °C)-98.5 °F (36.9 °C)] 97.7 °F (36.5 °C)  Heart Rate:  [52-90] 75  Resp:  [18] 18  BP: (102-157)/(55-91) 133/69  Flow (L/min):  [2-3] 3     Physical Exam:  Constitutional: No acute distress, awake, alert, elderly gentleman  HENT: NCAT, mucous membranes moist  Respiratory: respiratory effort normal--coarse BS, upper airway noise  Cardiovascular: Irreg rate and rhythm, not tachycardic, no murmurs, rubs, or gallops  Gastrointestinal: Positive bowel sounds, soft, nontender, nondistended  Musculoskeletal: 1+ bilateral ankle edema- wraps in place  Psychiatric: Appropriate affect, cooperative  Neurologic: Oriented x 3, hard of hearing. strength symmetric in all extremities, Cranial Nerves grossly intact to confrontation, speech clear  Skin: No rashes      Results Reviewed:  LAB RESULTS:      Lab 24  0645 24  0513 24  0841 24  1528 24  1241 24  1158   WBC 11.64* 10.14 6.35  --   --  7.34   HEMOGLOBIN 12.0* 11.1* 11.2*  --   --  13.2   HEMATOCRIT 35.4* 33.8* 33.9*  --   --  39.4   PLATELETS 105* 77* 70*  --   --  97*   NEUTROS ABS 8.72* 8.32* 4.95  --   --  4.77   IMMATURE GRANS (ABS) 0.10* 0.06* 0.02  --   --   --    LYMPHS ABS 1.15 0.76 0.66*  --   --   --    MONOS ABS 1.65* 0.99* 0.72  --   --   --    EOS ABS 0.00 0.00 0.00  --   --  0.00   MCV 92.7 94.7 95.8  --   --  94.3   SED RATE  --   --   --   --   --  11   CRP  --  1.51* 2.34*  --  2.09*  --    PROCALCITONIN  --   --   --  0.13  --   --    LACTATE  --   --   --   --   --  1.5   LDH  --   --   --   --  287*  --    PROTIME  --  15.2*  --   --   --   --     D DIMER QUANT  --   --   --   --   --  5.20*         Lab 04/02/24  0645 03/31/24  0513 03/30/24  0841 03/29/24  2351 03/29/24  1241   SODIUM 140 143 142  --  141   POTASSIUM 4.3 4.3 3.9  --  3.8   CHLORIDE 103 106 105  --  103   CO2 32.0* 30.0* 31.0*  --  31.0*   ANION GAP 5.0 7.0 6.0  --  7.0   BUN 29* 25* 23  --  23   CREATININE 0.92 0.86 0.84  --  1.01   EGFR 82.5 85.9 86.5  --  73.8   GLUCOSE 83 97 110*  --  85   CALCIUM 8.2* 8.1* 8.3*  --  9.1   MAGNESIUM 1.7 1.8 2.0 2.2 1.5*   TSH  --   --   --   --  1.750         Lab 04/02/24  0645 03/31/24  0513 03/30/24  0841 03/29/24  1241   TOTAL PROTEIN 4.6* 4.6* 4.7* 6.1   ALBUMIN 2.7* 2.8* 2.8* 3.4*   GLOBULIN 1.9 1.8 1.9 2.7   ALT (SGPT) 50* 40 34 36   AST (SGOT) 59* 79* 73* 74*   BILIRUBIN 1.0 0.7 0.8 1.4*   ALK PHOS 58 58 62 70         Lab 03/31/24  0513 03/29/24  1528 03/29/24  1241   PROBNP  --   --  1,270.0   HSTROP T  --  58* 68*   PROTIME 15.2*  --   --    INR 1.19*  --   --                  Brief Urine Lab Results  (Last result in the past 365 days)        Color   Clarity   Blood   Leuk Est   Nitrite   Protein   CREAT   Urine HCG        03/29/24 1236 Dark Yellow   Cloudy   Negative   Negative   Negative   Trace                   Microbiology Results Abnormal       None            No radiology results from the last 24 hrs    Results for orders placed during the hospital encounter of 03/29/24    Adult Transthoracic Echo Complete W/ Cont if Necessary Per Protocol    Interpretation Summary    Left ventricular ejection fraction appears to be 56 - 60%.    The right ventricular cavity is mildly dilated.    The left atrial cavity is mildly dilated.    Estimated right ventricular systolic pressure from tricuspid regurgitation is normal (<35 mmHg). Calculated right ventricular systolic pressure from tricuspid regurgitation is 9 mmHg.      Current medications:  Scheduled Meds:amLODIPine, 5 mg, Oral, Q24H  aspirin, 81 mg, Oral, Daily  atorvastatin, 40 mg, Oral,  Nightly  cetirizine, 5 mg, Oral, Daily  cycloSPORINE, 1 drop, Both Eyes, Q12H  dexAMETHasone, 6 mg, Oral, Daily   Or  dexAMETHasone, 6 mg, Intravenous, Daily  enoxaparin, 40 mg, Subcutaneous, Q12H  famotidine, 20 mg, Oral, Q12H  ferrous sulfate, 325 mg, Oral, Daily  furosemide, 20 mg, Oral, Daily  guaiFENesin, 1,200 mg, Oral, Q12H  hydrALAZINE, 25 mg, Oral, Q8H  miconazole, 1 Application, Topical, Q12H  sodium chloride, 10 mL, Intravenous, Q12H  spironolactone, 25 mg, Oral, Daily  valsartan, 160 mg, Oral, Daily      Continuous Infusions:   PRN Meds:.  acetaminophen **OR** acetaminophen **OR** acetaminophen    albuterol sulfate HFA    benzonatate    Calcium Replacement - Follow Nurse / BPA Driven Protocol    dextromethorphan polistirex ER    hydrALAZINE    Magnesium Standard Dose Replacement - Follow Nurse / BPA Driven Protocol    nitroglycerin    ondansetron ODT **OR** ondansetron    Phosphorus Replacement - Follow Nurse / BPA Driven Protocol    Potassium Replacement - Follow Nurse / BPA Driven Protocol    sodium chloride    sodium chloride    sodium chloride    Assessment & Plan   Assessment & Plan     Active Hospital Problems    Diagnosis  POA    **Acute hypoxemic respiratory failure due to COVID-19 [U07.1, J96.01]  Yes    Bradycardia, drug induced [R00.1, T50.905A]  Unknown    Hypomagnesemia [E83.42]  Yes    CAD (coronary artery disease) [I25.10]  Yes    Mixed hyperlipidemia [E78.2]  Yes    Primary hypertension [I10]  Yes    Chronic kidney disease, stage 3 [N18.30]  Yes      Resolved Hospital Problems   No resolved problems to display.        Brief Hospital Course to date:  Tho Vasquez is a 83 y.o. male with past medical history significant for CAD, HTN, HLD, and cataracts who presents to the ED due to increased weakness and fatigue for the past 3 days with concern for positive at-home COVID-19 test. Respiratory PCR was positive for COVID-19.     COVID-19 PNA   -Symptoms started 3/27, tested positive  3/29  -CTA negative for PE with small patchy airspace opacities in the right upper lobe, lateral right lower lobe, and inferior lingula suggestive of mild multifocal pneumonia  -Given remdesivir initially but stopped due to bradycardia.    -Cont Decadron   -Lovenox BID 40mg  -Mucinex BID   -Albuterol, Delsym, Tylenol PRN    Bradycardia  -ECG with 1st degree to 2nd degree with 2:1 conduction AV block.   -Cardiology input appreciated  -Holding coreg     Recent falls  Generalized weakness  -CT head revealed no acute intracranial abnormality   -suspect d/t above  -PT/OT consults  -Fall precautions      Bilateral lower extremity edema  -PT wound consulted for compression wraps  -cont home Lasix 20 mg daily   -Echo EF of 55 to 60%  -Strict I&O, daily weight      Poor appetite  -Nutrition consulted      Hypomagnesemia   -Replace per protocol      Abnormal CT  -CT chest revealed slightly nodular liver contours concerning for cirrhosis  -hepatitis panel neg, albumin is slightly low but this could be related to acute illness.  AST is slightly high as well.  Platelets improving.  Will continue to follow.  would benefit from outpatient follow-up and further evaluation.     HTN  HLD  Hx CAD  -continue ASA, Valsartan, Hydralazine, Lipitor  -cont current meds.  May need to titrate up hydralazine for better BP control    Expected Discharge Location and Transportation: [facility], send off referrals after 10 days post + COVID test.  Expected Discharge   Expected Discharge Date: 4/8/2024; Expected Discharge Time:      DVT prophylaxis:  Medical DVT prophylaxis orders are present.         AM-PAC 6 Clicks Score (PT): 17 (04/02/24 2200)    CODE STATUS:   Code Status and Medical Interventions:   Ordered at: 03/29/24 1801     Level Of Support Discussed With:    Patient     Code Status (Patient has no pulse and is not breathing):    CPR (Attempt to Resuscitate)     Medical Interventions (Patient has pulse or is breathing):    Full Support        Christina Cyr MD  04/03/24

## 2024-04-03 NOTE — PLAN OF CARE
Goal Outcome Evaluation:  Plan of Care Reviewed With: patient, daughter        Progress: improving  Outcome Evaluation: Pt with good effort and increased ambulation distance to 20' with min A and RW. Pt continues to present below his functional baseline with weakness, impaired balance, and decreased endurance. Further IPPT is warrented. PT will progress as able per POC.      Anticipated Discharge Disposition (PT): inpatient rehabilitation facility

## 2024-04-03 NOTE — PROGRESS NOTES
"[facility]  IP Progress Note    HOSPITAL COURSE:  Patient was admitted with COVID fatigue and tiredness was found to have a Mobitz type block.  Patient is off and on it.  Patient beta-blocker was held that has improved her heart rate.      CHIEF COMPLAINTS:  Shortness of breath      Subjective   Patient feeling much better      Objective     Blood pressure 133/69, pulse 75, temperature 97.7 °F (36.5 °C), temperature source Oral, resp. rate 18, height 177.8 cm (70\"), weight 113 kg (248 lb 3.8 oz), SpO2 93%.     Intake/Output Summary (Last 24 hours) at 4/3/2024 1409  Last data filed at 4/3/2024 1402  Gross per 24 hour   Intake 318 ml   Output 300 ml   Net 18 ml       PHYSICAL EXAM:  Constitutional: Well-developed, well-nourished, no acute distress  HENT: Normocephalic, atraumatic moist oral mucosa  Neck: Neck supple. No JVD present. No carotid bruits.   Cardiovascular: Regular rate, regular rhythm and normal heart sounds. No murmur heard.   Pulmonary/Chest: Clear to auscultation bilaterally without wheezing, rhonchi, or rails  Abdominal: Nondistended  Neurological: Alert and oriented x3, no focal deficits  Extremities: No peripheral edema,     MEDICATIONS:    amLODIPine, 5 mg, Oral, Q24H  aspirin, 81 mg, Oral, Daily  atorvastatin, 40 mg, Oral, Nightly  cetirizine, 5 mg, Oral, Daily  cycloSPORINE, 1 drop, Both Eyes, Q12H  dexAMETHasone, 6 mg, Oral, Daily   Or  dexAMETHasone, 6 mg, Intravenous, Daily  enoxaparin, 40 mg, Subcutaneous, Q12H  famotidine, 20 mg, Oral, Q12H  ferrous sulfate, 325 mg, Oral, Daily  furosemide, 20 mg, Oral, Daily  guaiFENesin, 1,200 mg, Oral, Q12H  hydrALAZINE, 25 mg, Oral, Q8H  miconazole, 1 Application, Topical, Q12H  sodium chloride, 10 mL, Intravenous, Q12H  spironolactone, 25 mg, Oral, Daily  valsartan, 160 mg, Oral, Daily          Results from last 7 days   Lab Units 04/02/24  0645   WBC 10*3/mm3 11.64*   HEMOGLOBIN g/dL 12.0*   HEMATOCRIT % 35.4* " "  PLATELETS 10*3/mm3 105*     Results from last 7 days   Lab Units 04/02/24  0645   SODIUM mmol/L 140   POTASSIUM mmol/L 4.3   CHLORIDE mmol/L 103   CO2 mmol/L 32.0*   BUN mg/dL 29*   CREATININE mg/dL 0.92   CALCIUM mg/dL 8.2*   BILIRUBIN mg/dL 1.0   ALK PHOS U/L 58   ALT (SGPT) U/L 50*   AST (SGOT) U/L 59*   GLUCOSE mg/dL 83     Results from last 7 days   Lab Units 03/31/24  0513   INR  1.19*     Lab Results   Component Value Date    TROPONINT 58 (C) 03/29/2024     Results from last 7 days   Lab Units 03/29/24  1241   TSH uIU/mL 1.750   FREE T4 ng/dL 1.33             No results found for: \"IRON\", \"FERRITIN\", \"LABIRON\", \"TIBC\"   Hemoglobin A1C   Date Value Ref Range Status   11/27/2023 5.30 4.80 - 5.60 % Final     Magnesium   Date Value Ref Range Status   04/02/2024 1.7 1.6 - 2.4 mg/dL Final        RESULT REVIEW:    I reviewed the patient's new clinical results.    Tele: Sinus Rythym with Mobitz type I      ASSESSMENT:     Arrhythmias  Hypertension  Hyperlipidemia  Obesity    PLAN:     Patient is still having Mobitz type I block and I do not believe that needs any further investigation at this time.  Patient is recovering from his COVID and we might do a stress test on an outpatient once he fully recovered  He is not symptomatic at all with these episodes we will keep watching off-and-on  "

## 2024-04-03 NOTE — CASE MANAGEMENT/SOCIAL WORK
Continued Stay Note  [facility]     Patient Name: Tho Vasquez  MRN: [id number]  Today's Date: 4/3/2024    Admit Date: 3/29/2024    Plan: Possible rehab vs home with family   Discharge Plan       Row Name 04/03/24 0906       Plan    Plan Comments Pt remains in Covid isolation until 4/5. Cm spoke with pts daughter Mary about PT recs for rehab prior to returning home. She is in agreement with plan and requested referrals to [facility] or [facility]. Once updated therapy notes are available CM will make referrals                   Discharge Codes    No documentation.                 Expected Discharge Date and Time       Expected Discharge Date Expected Discharge Time    Apr 8, 2024               Roma Gonzalez RN

## 2024-04-03 NOTE — PLAN OF CARE
" Assumed care at 19:00. Pt awake in chair. A/O, on RA, 2L NC added for HOS. Ax1 with walker for ambulation. Pt prefers to sleep in chair, but was assisted back to bed to take pressure off of buttocks.    Turned as needed while in bed. SB/NSR on the monitor. [place] for elimination.  Left pt in bed at lowest position with bed alarm on and call light within reach.    /91 (BP Location: Left arm, Patient Position: Lying)   Pulse 58   Temp 98.5 °F (36.9 °C) (Oral)   Resp 18   Ht 177.8 cm (70\")   Wt 113 kg (248 lb 3.8 oz)   SpO2 94%   BMI 35.62 kg/m²       Problem: Adult Inpatient Plan of Care  Goal: Plan of Care Review  Outcome: Ongoing, Progressing  Flowsheets (Taken 4/3/2024 0429)  Progress: no change  Plan of Care Reviewed With:   patient   daughter  Outcome Evaluation: Pt A/O, on RA, 2L NC added for HOS. Ax1 with walker for ambulation. Pt prefers to sleep in chair, but was assisted back to bed to take pressure off of buttocks.  Turned as needed while in bed.  SB/NSR on the monitor.  Goal: Patient-Specific Goal (Individualized)  Outcome: Ongoing, Progressing  Goal: Absence of Hospital-Acquired Illness or Injury  Outcome: Ongoing, Progressing  Intervention: Identify and Manage Fall Risk  Recent Flowsheet Documentation  Taken 4/3/2024 0400 by Sherry Queen, RN  Safety Promotion/Fall Prevention:   activity supervised   assistive device/personal items within reach   safety round/check completed  Taken 4/3/2024 0200 by Sherry Queen, RN  Safety Promotion/Fall Prevention:   activity supervised   assistive device/personal items within reach   safety round/check completed  Taken 4/3/2024 0000 by Sherry Queen, RN  Safety Promotion/Fall Prevention:   activity supervised   assistive device/personal items within reach   toileting scheduled  Taken 4/2/2024 2200 by Sherry Queen, RN  Safety Promotion/Fall Prevention:   activity supervised   assistive device/personal items within reach  Intervention: " Prevent Skin Injury  Recent Flowsheet Documentation  Taken 4/3/2024 0400 by Sherry Queen RN  Body Position: weight shifting  Skin Protection:   adhesive use limited   incontinence pads utilized   tubing/devices free from skin contact  Taken 4/3/2024 0200 by Sherry Queen RN  Body Position: weight shifting  Skin Protection:   adhesive use limited   incontinence pads utilized   tubing/devices free from skin contact  Taken 4/3/2024 0000 by Sherry Queen RN  Body Position: weight shifting  Taken 4/2/2024 2200 by Sherry Queen RN  Body Position: weight shifting  Intervention: Prevent and Manage VTE (Venous Thromboembolism) Risk  Recent Flowsheet Documentation  Taken 4/3/2024 0400 by Sherry Queen RN  Activity Management: activity encouraged  VTE Prevention/Management: (lovenox) other (see comments)  Taken 4/3/2024 0200 by Sherry Queen RN  Activity Management: activity encouraged  VTE Prevention/Management: (lovenox) other (see comments)  Taken 4/3/2024 0000 by Sherry Queen RN  Activity Management: activity encouraged  VTE Prevention/Management: (lovenox)   bilateral   foot pump device off  Range of Motion: active ROM (range of motion) encouraged  Taken 4/2/2024 2200 by Sherry Queen RN  Activity Management:   up in chair   activity encouraged  VTE Prevention/Management:   bilateral   foot pump device off  Range of Motion: active ROM (range of motion) encouraged  Intervention: Prevent Infection  Recent Flowsheet Documentation  Taken 4/3/2024 0400 by Sherry Queen RN  Infection Prevention: environmental surveillance performed  Taken 4/3/2024 0200 by Sherry Queen RN  Infection Prevention:   environmental surveillance performed   rest/sleep promoted  Taken 4/3/2024 0000 by Sherry Queen RN  Infection Prevention:   environmental surveillance performed   single patient room provided  Taken 4/2/2024 2200 by Sherry Queen RN  Infection Prevention: visitors  restricted/screened  Goal: Optimal Comfort and Wellbeing  Outcome: Ongoing, Progressing  Intervention: Provide Person-Centered Care  Recent Flowsheet Documentation  Taken 4/3/2024 0000 by Sherry Queen RN  Trust Relationship/Rapport: care explained  Taken 4/2/2024 2200 by Sherry Queen RN  Trust Relationship/Rapport:   care explained   choices provided   empathic listening provided   questions answered   thoughts/feelings acknowledged  Goal: Readiness for Transition of Care  Outcome: Ongoing, Progressing     Problem: Fall Injury Risk  Goal: Absence of Fall and Fall-Related Injury  Outcome: Ongoing, Progressing  Intervention: Identify and Manage Contributors  Recent Flowsheet Documentation  Taken 4/3/2024 0400 by Sherry Queen RN  Self-Care Promotion:   independence encouraged   BADL personal objects within reach  Taken 4/3/2024 0200 by Sherry Queen RN  Self-Care Promotion:   independence encouraged   BADL personal objects within reach  Taken 4/3/2024 0000 by Sherry Queen RN  Self-Care Promotion:   independence encouraged   BADL personal objects within reach  Taken 4/2/2024 2200 by Sherry Queen RN  Self-Care Promotion:   independence encouraged   BADL personal objects within reach  Intervention: Promote Injury-Free Environment  Recent Flowsheet Documentation  Taken 4/3/2024 0400 by Sherry Queen RN  Safety Promotion/Fall Prevention:   activity supervised   assistive device/personal items within reach   safety round/check completed  Taken 4/3/2024 0200 by Sherry Queen RN  Safety Promotion/Fall Prevention:   activity supervised   assistive device/personal items within reach   safety round/check completed  Taken 4/3/2024 0000 by Sherry Queen RN  Safety Promotion/Fall Prevention:   activity supervised   assistive device/personal items within reach   toileting scheduled  Taken 4/2/2024 2200 by Sherry Queen RN  Safety Promotion/Fall Prevention:   activity supervised    assistive device/personal items within reach   Assumed care at 19:00. Pt awake in chair. A/O, on RA, 2L NC added for HOS. Ax1 with walker for ambulation. Pt prefers to sleep in chair, but was assisted back to bed to take pressure off of buttocks. Turned as needed while in bed. SB/NSR on the monitor.       Problem: Skin Injury Risk Increased  Goal: Skin Health and Integrity  Outcome: Ongoing, Progressing  Intervention: Optimize Skin Protection  Recent Flowsheet Documentation  Taken 4/3/2024 0400 by Sherry Queen RN  Pressure Reduction Techniques:   frequent weight shift encouraged   weight shift assistance provided  Head of Bed (HOB) Positioning: HOB elevated  Pressure Reduction Devices: pressure-redistributing mattress utilized  Skin Protection:   adhesive use limited   incontinence pads utilized   tubing/devices free from skin contact  Taken 4/3/2024 0200 by Sherry Queen RN  Head of Bed (HOB) Positioning: HOB elevated  Skin Protection:   adhesive use limited   incontinence pads utilized   tubing/devices free from skin contact  Taken 4/3/2024 0000 by Sherry Queen RN  Head of Bed (HOB) Positioning: HOB elevated  Taken 4/2/2024 2200 by Sherry Queen RN  Head of Bed (HOB) Positioning: HOB elevated   Goal Outcome Evaluation:  Plan of Care Reviewed With: patient, daughter        Progress: no change  Outcome Evaluation: Pt A/O, on RA, 2L NC added for HOS. Ax1 with walker for ambulation. Pt prefers to sleep in chair, but was assisted back to bed to take pressure off of buttocks.  Turned as needed while in bed.  SB/NSR on the monitor.

## 2024-04-03 NOTE — THERAPY TREATMENT NOTE
Patient Name: Tho Vasquez  : 1940    MRN: [id number]                              Today's Date: 4/3/2024       Admit Date: 3/29/2024    Visit Dx:     ICD-10-CM ICD-9-CM   1. COVID-19  U07.1 079.89   2. Hypoxia  R09.02 799.02   3. Generalized weakness  R53.1 780.79     Patient Active Problem List   Diagnosis    Mixed hyperlipidemia    Abnormal glucose level    Abrasion    Adult BMI 37.0-37.9 kg/sq m    Annual physical exam    ASHD (arteriosclerotic heart disease)    Primary hypertension    Chronic kidney disease, stage 3    Medicare annual wellness visit, subsequent    Morbid obesity    Obstructive sleep apnea    Old myocardial infarction    Vitamin D deficiency    Venous insufficiency of both lower extremities    Transaminitis    Acute hypoxemic respiratory failure due to COVID-19    Hypomagnesemia    CAD (coronary artery disease)    Bradycardia, drug induced     Past Medical History:   Diagnosis Date    Allergic ?    Cataract     Coronary artery disease     Hyperlipidemia     Hypertension     Myocardial infarction     Pneumonia of both lower lobes due to infectious organism 10/03/2019    Positive PPD     Respiratory failure 2005    requiring tracheostomy     Past Surgical History:   Procedure Laterality Date    CARDIAC SURGERY      HERNIA REPAIR        General Information       Row Name 24 1537          Physical Therapy Time and Intention    Document Type therapy note (daily note)  -AB     Mode of Treatment physical therapy  -AB       Row Name 24 1537          General Information    Patient Profile Reviewed yes  -AB     Existing Precautions/Restrictions fall;other (see comments)  COVID  -AB     Barriers to Rehab medically complex;previous functional deficit  -AB       Row Name 24 1537          Cognition    Orientation Status (Cognition) oriented x 4  -AB       Row Name 24 1537          Safety Issues, Functional Mobility    Safety Issues Affecting Function (Mobility) insight into  deficits/self-awareness;safety precaution awareness;safety precautions follow-through/compliance;friction/shear risk  -AB     Impairments Affecting Function (Mobility) balance;endurance/activity tolerance;shortness of breath;strength  -AB               User Key  (r) = Recorded By, (t) = Taken By, (c) = Cosigned By      Initials Name Provider Type    AB Loida Edward, PT Physical Therapist                   Mobility       Row Name 04/03/24 1538          Bed Mobility    Bed Mobility supine-sit  -AB     Supine-Sit Ledbetter (Bed Mobility) minimum assist (75% patient effort);verbal cues;1 person assist  -AB     Assistive Device (Bed Mobility) head of bed elevated;bed rails  -AB     Comment, (Bed Mobility) Boost at trunk to sit EOB.  -AB       Row Name 04/03/24 1538          Transfers    Comment, (Transfers) Cues for hand placement and sequencing.  -AB       Row Name 04/03/24 1538          Sit-Stand Transfer    Sit-Stand Ledbetter (Transfers) minimum assist (75% patient effort);verbal cues;1 person assist  -AB     Comment, (Sit-Stand Transfer) Boost to stand with cues to push up from bed. Pt impulsive to reach for walker.  -AB       Row Name 04/03/24 1538          Gait/Stairs (Locomotion)    Ledbetter Level (Gait) minimum assist (75% patient effort);1 person assist;verbal cues  -AB     Assistive Device (Gait) walker, front-wheeled  -AB     Distance in Feet (Gait) 20  -AB     Deviations/Abnormal Patterns (Gait) bilateral deviations;base of support, wide;davon decreased;gait speed decreased;stride length decreased  -AB     Bilateral Gait Deviations forward flexed posture;heel strike decreased  -AB     Comment, (Gait/Stairs) Pt ambulated with step through gait pattern, short strides, and slowed davon. Cues for walker positioning, upright posture, and decreased use of UE's on walker. No overt LOB or knee buckling. O2 sat >90% on RA. Further activity limited by fatigue.  -AB               User Key  (r) =  Recorded By, (t) = Taken By, (c) = Cosigned By      Initials Name Provider Type    AB Loida Edward PT Physical Therapist                   Obj/Interventions       Row Name 04/03/24 1540          Motor Skills    Therapeutic Exercise hip;ankle;knee  -AB       Row Name 04/03/24 1540          Hip (Therapeutic Exercise)    Hip (Therapeutic Exercise) strengthening exercise  -AB     Hip Strengthening (Therapeutic Exercise) bilateral;marching while seated;10 repetitions  -AB       Row Name 04/03/24 1540          Knee (Therapeutic Exercise)    Knee (Therapeutic Exercise) isometric exercises;strengthening exercise  -AB     Knee Isometrics (Therapeutic Exercise) bilateral;quad sets;10 repetitions  -AB     Knee Strengthening (Therapeutic Exercise) bilateral;LAQ (long arc quad);10 repetitions  -AB       Row Name 04/03/24 1540          Ankle (Therapeutic Exercise)    Ankle (Therapeutic Exercise) AROM (active range of motion)  -AB     Ankle AROM (Therapeutic Exercise) bilateral;dorsiflexion;plantarflexion;10 repetitions  -AB       Row Name 04/03/24 1540          Balance    Balance Assessment sitting dynamic balance;sitting static balance;standing static balance;standing dynamic balance  -AB     Static Sitting Balance standby assist  -AB     Dynamic Sitting Balance standby assist  -AB     Position, Sitting Balance unsupported;sitting edge of bed  -AB     Static Standing Balance minimal assist;1-person assist  -AB     Dynamic Standing Balance minimal assist;1-person assist;verbal cues  -AB     Position/Device Used, Standing Balance supported;walker, front-wheeled  -AB     Balance Interventions sitting;standing;sit to stand;supported;static;dynamic;occupation based/functional task  -AB     Comment, Balance Min A to steady  -AB               User Key  (r) = Recorded By, (t) = Taken By, (c) = Cosigned By      Initials Name Provider Type    AB Loida Edward PT Physical Therapist                   Goals/Plan    No  documentation.                  Clinical Impression       Row Name 04/03/24 1541          Pain    Pretreatment Pain Rating 0/10 - no pain  -AB     Posttreatment Pain Rating 0/10 - no pain  -AB       Row Name 04/03/24 1541          Plan of Care Review    Plan of Care Reviewed With patient;daughter  -AB     Progress improving  -AB     Outcome Evaluation Pt with good effort and increased ambulation distance to 20' with min A and RW. Pt continues to present below his functional baseline with weakness, impaired balance, and decreased endurance. Further IPPT is warrented. PT will progress as able per POC.  -AB       Row Name 04/03/24 1541          Vital Signs    Pre SpO2 (%) 94  -AB     O2 Delivery Pre Treatment room air  -AB     Intra SpO2 (%) 92  -AB     O2 Delivery Intra Treatment room air  -AB     Post SpO2 (%) 95  -AB     O2 Delivery Post Treatment room air  -AB     Pre Patient Position Supine  -AB     Intra Patient Position Standing  -AB     Post Patient Position Sitting  -AB       Row Name 04/03/24 1541          Positioning and Restraints    Pre-Treatment Position in bed  -AB     Post Treatment Position chair  -AB     In Chair notified nsg;reclined;sitting;call light within reach;encouraged to call for assist;exit alarm on;with family/caregiver;legs elevated;waffle cushion;heels elevated;with nsg  -AB               User Key  (r) = Recorded By, (t) = Taken By, (c) = Cosigned By      Initials Name Provider Type    Loida Mendoza, PT Physical Therapist                   Outcome Measures       Row Name 04/03/24 1542          How much help from another person do you currently need...    Turning from your back to your side while in flat bed without using bedrails? 3  -AB     Moving from lying on back to sitting on the side of a flat bed without bedrails? 3  -AB     Moving to and from a bed to a chair (including a wheelchair)? 3  -AB     Standing up from a chair using your arms (e.g., wheelchair, bedside chair)? 3   -AB     Climbing 3-5 steps with a railing? 2  -AB     To walk in hospital room? 3  -AB     AM-PAC 6 Clicks Score (PT) 17  -AB     Highest Level of Mobility Goal 5 --> Static standing  -AB       Row Name 04/03/24 1542          Functional Assessment    Outcome Measure Options AM-PAC 6 Clicks Basic Mobility (PT)  -AB               User Key  (r) = Recorded By, (t) = Taken By, (c) = Cosigned By      Initials Name Provider Type    AB Loida Edward, PT Physical Therapist                                 Physical Therapy Education       Title: PT OT SLP Therapies (In Progress)       Topic: Physical Therapy (Done)       Point: Mobility training (Done)       Learning Progress Summary             Patient Acceptance, E,D, VU,NR by AB at 4/3/2024 1542    Acceptance, E, VU by CK at 4/1/2024 1318   Family Acceptance, E, VU by CK at 4/1/2024 1318                         Point: Home exercise program (Done)       Learning Progress Summary             Patient Acceptance, E,D, VU,NR by AB at 4/3/2024 1542                         Point: Body mechanics (Done)       Learning Progress Summary             Patient Acceptance, E,D, VU,NR by AB at 4/3/2024 1542    Acceptance, E, VU by CK at 4/1/2024 1318   Family Acceptance, E, VU by CK at 4/1/2024 1318                         Point: Precautions (Done)       Learning Progress Summary             Patient Acceptance, E,D, VU,NR by AB at 4/3/2024 1542    Acceptance, E, VU by CK at 4/1/2024 1318   Family Acceptance, E, VU by CK at 4/1/2024 1318                                         User Key       Initials Effective Dates Name Provider Type Discipline    AB 09/22/22 -  Loida Edward, PT Physical Therapist PT    CK 02/06/24 -  Jayne He, PT Physical Therapist PT                  PT Recommendation and Plan     Plan of Care Reviewed With: patient, daughter  Progress: improving  Outcome Evaluation: Pt with good effort and increased ambulation distance to 20' with min A and RW. Pt  continues to present below his functional baseline with weakness, impaired balance, and decreased endurance. Further IPPT is warrented. PT will progress as able per POC.     Time Calculation:         PT Charges       Row Name 04/03/24 1543             Time Calculation    Start Time 1510  -AB      PT Received On 04/03/24  -AB         Timed Charges    52978 - PT Therapeutic Exercise Minutes 8  -AB      48197 - Gait Training Minutes  8  -AB      72651 - PT Therapeutic Activity Minutes 7  -AB         Total Minutes    Timed Charges Total Minutes 23  -AB       Total Minutes 23  -AB                User Key  (r) = Recorded By, (t) = Taken By, (c) = Cosigned By      Initials Name Provider Type    AB Loida Edward, PT Physical Therapist                  Therapy Charges for Today       Code Description Service Date Service Provider Modifiers Qty    38215788341 HC PT THER PROC EA 15 MIN 4/3/2024 Loida Edward, PT GP 1    38537117902 HC GAIT TRAINING EA 15 MIN 4/3/2024 Loida Edward, PT GP 1            PT G-Codes  Outcome Measure Options: AM-PAC 6 Clicks Basic Mobility (PT)  AM-PAC 6 Clicks Score (PT): 17  AM-PAC 6 Clicks Score (OT): 13  PT Discharge Summary  Anticipated Discharge Disposition (PT): inpatient rehabilitation facility    Loida Edward PT  4/3/2024

## 2024-04-04 PROCEDURE — 29581 APPL MULTLAYER CMPRN SYS LEG: CPT

## 2024-04-04 PROCEDURE — 25010000002 ENOXAPARIN PER 10 MG: Performed by: PEDIATRICS

## 2024-04-04 PROCEDURE — 99232 SBSQ HOSP IP/OBS MODERATE 35: CPT | Performed by: NURSE PRACTITIONER

## 2024-04-04 PROCEDURE — 97116 GAIT TRAINING THERAPY: CPT

## 2024-04-04 PROCEDURE — 63710000001 DEXAMETHASONE PER 0.25 MG: Performed by: NURSE PRACTITIONER

## 2024-04-04 PROCEDURE — 97110 THERAPEUTIC EXERCISES: CPT

## 2024-04-04 RX ADMIN — FAMOTIDINE 20 MG: 20 TABLET, FILM COATED ORAL at 20:02

## 2024-04-04 RX ADMIN — AMLODIPINE BESYLATE 5 MG: 5 TABLET ORAL at 09:47

## 2024-04-04 RX ADMIN — ATORVASTATIN CALCIUM 40 MG: 40 TABLET, FILM COATED ORAL at 20:03

## 2024-04-04 RX ADMIN — HYDRALAZINE HYDROCHLORIDE 25 MG: 25 TABLET ORAL at 05:08

## 2024-04-04 RX ADMIN — MICONAZOLE NITRATE 1 APPLICATION: 20 POWDER TOPICAL at 09:48

## 2024-04-04 RX ADMIN — DEXAMETHASONE 6 MG: 4 TABLET ORAL at 09:47

## 2024-04-04 RX ADMIN — CYCLOSPORINE 1 DROP: 0.5 EMULSION OPHTHALMIC at 20:03

## 2024-04-04 RX ADMIN — GUAIFENESIN 1200 MG: 600 TABLET, EXTENDED RELEASE ORAL at 20:02

## 2024-04-04 RX ADMIN — Medication 10 ML: at 09:48

## 2024-04-04 RX ADMIN — ACETAMINOPHEN 650 MG: 325 TABLET ORAL at 20:03

## 2024-04-04 RX ADMIN — SPIRONOLACTONE 25 MG: 25 TABLET ORAL at 09:47

## 2024-04-04 RX ADMIN — VALSARTAN 160 MG: 160 TABLET, FILM COATED ORAL at 09:48

## 2024-04-04 RX ADMIN — FAMOTIDINE 20 MG: 20 TABLET, FILM COATED ORAL at 09:47

## 2024-04-04 RX ADMIN — ENOXAPARIN SODIUM 40 MG: 100 INJECTION SUBCUTANEOUS at 05:08

## 2024-04-04 RX ADMIN — FERROUS SULFATE TAB 325 MG (65 MG ELEMENTAL FE) 325 MG: 325 (65 FE) TAB at 09:47

## 2024-04-04 RX ADMIN — ENOXAPARIN SODIUM 40 MG: 100 INJECTION SUBCUTANEOUS at 18:17

## 2024-04-04 RX ADMIN — HYDRALAZINE HYDROCHLORIDE 25 MG: 25 TABLET ORAL at 21:56

## 2024-04-04 RX ADMIN — BENZONATATE 100 MG: 100 CAPSULE ORAL at 20:03

## 2024-04-04 RX ADMIN — CETIRIZINE HYDROCHLORIDE 5 MG: 10 TABLET, FILM COATED ORAL at 09:47

## 2024-04-04 RX ADMIN — CYCLOSPORINE 1 DROP: 0.5 EMULSION OPHTHALMIC at 09:43

## 2024-04-04 RX ADMIN — Medication 10 ML: at 20:03

## 2024-04-04 RX ADMIN — ACETAMINOPHEN 650 MG: 325 TABLET ORAL at 09:47

## 2024-04-04 RX ADMIN — ASPIRIN 81 MG: 81 TABLET, COATED ORAL at 09:47

## 2024-04-04 RX ADMIN — GUAIFENESIN 1200 MG: 600 TABLET, EXTENDED RELEASE ORAL at 09:47

## 2024-04-04 RX ADMIN — FUROSEMIDE 20 MG: 20 TABLET ORAL at 09:48

## 2024-04-04 RX ADMIN — HYDRALAZINE HYDROCHLORIDE 25 MG: 25 TABLET ORAL at 13:33

## 2024-04-04 NOTE — PLAN OF CARE
Goal Outcome Evaluation:  Plan of Care Reviewed With: patient, daughter        Progress: improving  Outcome Evaluation: Pt with good effort and increased ambulation distance to 40' with min Ax1 and RW. Pt continues to present below his functional baseline with weakness, impaired endurance, and balance deficits. Further IPPT is warrented. PT will progress as able per POC.      Anticipated Discharge Disposition (PT): inpatient rehabilitation facility

## 2024-04-04 NOTE — THERAPY WOUND CARE TREATMENT
Acute Care - Wound/Debridement Treatment Note  [facility]     Patient Name: Tho Vasquez  : 1940  MRN: [id number]  Today's Date: 2024                Admit Date: 3/29/2024    Visit Dx:    ICD-10-CM ICD-9-CM   1. COVID-19  U07.1 079.89   2. Hypoxia  R09.02 799.02   3. Generalized weakness  R53.1 780.79       Patient Active Problem List   Diagnosis    Mixed hyperlipidemia    Abnormal glucose level    Abrasion    Adult BMI 37.0-37.9 kg/sq m    Annual physical exam    ASHD (arteriosclerotic heart disease)    Primary hypertension    Chronic kidney disease, stage 3    Medicare annual wellness visit, subsequent    Morbid obesity    Obstructive sleep apnea    Old myocardial infarction    Vitamin D deficiency    Venous insufficiency of both lower extremities    Transaminitis    Acute hypoxemic respiratory failure due to COVID-19    Hypomagnesemia    CAD (coronary artery disease)    Bradycardia, drug induced        Past Medical History:   Diagnosis Date    Allergic ?    Cataract     Coronary artery disease     Hyperlipidemia     Hypertension     Myocardial infarction     Pneumonia of both lower lobes due to infectious organism 10/03/2019    Positive PPD     Respiratory failure 2005    requiring tracheostomy        Past Surgical History:   Procedure Laterality Date    CARDIAC SURGERY      HERNIA REPAIR             Rash 10/03/19 1830 scrotum (Active)   Distribution localized 24 2100   Borders irregular 24 2100   Characteristics moist 24 2100       Wound 24 0830 other (see comments) Pressure Injury (Active)   Closure None 24 2100   Base non-blanchable;maroon/purple 24 2100   Periwound blanchable;intact 24 2100       Wound 24 0800 Right lower arm Skin Tear (Active)   Closure Unable to assess 24 2100   Base dressing in place, unable to visualize 24 2100   Periwound ecchymotic 24 2100   Drainage Amount none 24 2100      Lymphedema       Row Name  04/04/24 0900             Lymphedema Edema Assessment    Ptting Edema Category By severity  -      Pitting Edema Mild  -      Stemmer Sign right:  -         Skin Changes/Observations    Location/Assessment Lower Extremity  -      Lower Extremity Conditions bilateral:;intact;clean;dry  -      Lower Extremity Color/Pigment bilateral:;normal;brawny  -         Lymphedema Pulses/Capillary Refill    Capillary Refill lower extremity capillary refill  -      Lower Extremity Capillary Refill right:;left:;less than 3 seconds  -         Compression/Skin Care    Compression/Skin Care skin care;wrapping location;bandaging  -      Skin Care washed/dried;lotion applied  -      Wrapping Location lower extremity  -      Wrapping Location LE bilateral:;foot to knee  -      Wrapping Comments BLE size 4 compressogrips applied doubled distally for gradient compression.  -                User Key  (r) = Recorded By, (t) = Taken By, (c) = Cosigned By      Initials Name Provider Type     Donald Bolden, PT Physical Therapist                    WOUND DEBRIDEMENT                     PT Assessment (Last 12 Hours)       PT Evaluation and Treatment       Row Name 04/04/24 0851          Physical Therapy Time and Intention    Subjective Information complains of  -     Document Type therapy note (daily note);wound care  -     Mode of Treatment physical therapy;individual therapy  -       Row Name 04/04/24 0851          General Information    Patient Observations alert;cooperative;agree to therapy  -       Row Name 04/04/24 0851          Pain    Pretreatment Pain Rating 0/10 - no pain  -     Posttreatment Pain Rating 0/10 - no pain  -       Row Name 04/04/24 0851          Cognition    Affect/Mental Status (Cognition) WFL  -     Orientation Status (Cognition) oriented x 4  -       Row Name             Wound 03/30/24 0830 other (see comments) Pressure Injury    Wound - Properties Group Placement Date:  03/30/24  -KL Placement Time: 0830  -KL Location: other (see comments)  -KL Primary Wound Type: Pressure inj  -KL    Retired Wound - Properties Group Placement Date: 03/30/24  -KL Placement Time: 0830  -KL Location: other (see comments)  -KL Primary Wound Type: Pressure inj  -KL    Retired Wound - Properties Group Date first assessed: 03/30/24  -KL Time first assessed: 0830  -KL Location: other (see comments)  -KL Primary Wound Type: Pressure inj  -KL      Row Name             Wound 04/01/24 0800 Right lower arm Skin Tear    Wound - Properties Group Placement Date: 04/01/24  -AC Placement Time: 0800  -AC Present on Original Admission: Y  -AC Side: Right  -AC Orientation: lower  -AC Location: arm  -AC Primary Wound Type: Skin tear  -AC    Retired Wound - Properties Group Placement Date: 04/01/24  -AC Placement Time: 0800  -AC Present on Original Admission: Y  -AC Side: Right  -AC Orientation: lower  -AC Location: arm  -AC Primary Wound Type: Skin tear  -AC    Retired Wound - Properties Group Date first assessed: 04/01/24  -AC Time first assessed: 0800  -AC Present on Original Admission: Y  -AC Side: Right  -AC Location: arm  -AC Primary Wound Type: Skin tear  -AC      Row Name 04/04/24 0851          Coping    Observed Emotional State calm;cooperative;pleasant  -     Verbalized Emotional State acceptance  -     Trust Relationship/Rapport care explained;questions answered  -       Row Name 04/04/24 0851          Plan of Care Review    Plan of Care Reviewed With patient;daughter  -     Progress improving  -     Outcome Evaluation Pt demonstrating good response to current MLW as pt with improving BLE edema and skin integrity. Pt would continue to benefit from further BLE compression wrap changes every 2-3 days to further promote venous return, improve skin integrity, and improve BLE function.  -       Row Name 04/04/24 0851          Positioning and Restraints    Pre-Treatment Position in bed  -     Post  Treatment Position bed  -LH     In Bed supine;call light within reach;encouraged to call for assist;with family/caregiver  -               User Key  (r) = Recorded By, (t) = Taken By, (c) = Cosigned By      Initials Name Provider Type    Yas Haley, RN Registered Nurse    Cristel Zaidi RN Registered Nurse    Donald Snow, PT Physical Therapist                  Physical Therapy Education       Title: PT OT SLP Therapies (In Progress)       Topic: Physical Therapy (Done)       Point: Mobility training (Done)       Learning Progress Summary             Patient Acceptance, E,D, VU,NR by AB at 4/3/2024 1542    Acceptance, E, VU by CK at 4/1/2024 1318   Family Acceptance, E, VU by CK at 4/1/2024 1318                         Point: Home exercise program (Done)       Learning Progress Summary             Patient Acceptance, E,D, VU,NR by AB at 4/3/2024 1542                         Point: Body mechanics (Done)       Learning Progress Summary             Patient Acceptance, E,D, VU,NR by AB at 4/3/2024 1542    Acceptance, E, VU by CK at 4/1/2024 1318   Family Acceptance, E, VU by CK at 4/1/2024 1318                         Point: Precautions (Done)       Learning Progress Summary             Patient Acceptance, E,D, VU,NR by AB at 4/3/2024 1542    Acceptance, E, VU by CK at 4/1/2024 1318   Family Acceptance, E, VU by CK at 4/1/2024 1318                                         User Key       Initials Effective Dates Name Provider Type Discipline    AB 09/22/22 -  Loida Edward, PT Physical Therapist PT    CK 02/06/24 -  Jayne He, NASIR Physical Therapist PT                    Recommendation and Plan  Anticipated Discharge Disposition (PT): inpatient rehabilitation facility  Planned Therapy Interventions (PT): balance training, bed mobility training, gait training, home exercise program, stretching, strengthening, ROM (range of motion), postural re-education, patient/family education, transfer  training  Therapy Frequency (PT): daily  Plan of Care Reviewed With: patient, daughter   Progress: improving       Progress: improving  Outcome Evaluation: Pt demonstrating good response to current MLW as pt with improving BLE edema and skin integrity. Pt would continue to benefit from further BLE compression wrap changes every 2-3 days to further promote venous return, improve skin integrity, and improve BLE function.  Plan of Care Reviewed With: patient, daughter            Time Calculation   PT Charges       Row Name 04/04/24 0933             Time Calculation    Start Time 0851  -LH      PT Goal Re-Cert Due Date 04/11/24  -         Untimed Charges    11958-Hdhzxzzqse comp below knee 20  -LH         Total Minutes    Untimed Charges Total Minutes 20  -LH       Total Minutes 20  -LH                User Key  (r) = Recorded By, (t) = Taken By, (c) = Cosigned By      Initials Name Provider Type     Donald Bolden, PT Physical Therapist                      Therapy Charges for Today       Code Description Service Date Service Provider Modifiers Qty    27645044138 HC PT MULTI LAYER COMP SYS BELOW KNEE 4/4/2024 Donald Bolden, PT GP 1              PT G-Codes  Outcome Measure Options: AM-PAC 6 Clicks Basic Mobility (PT)  AM-PAC 6 Clicks Score (PT): 17  AM-PAC 6 Clicks Score (OT): 13       Donald Bolden PT  4/4/2024

## 2024-04-04 NOTE — PROGRESS NOTES
[facility] Medicine Services  PROGRESS NOTE    Patient Name: Tho Vasquez  : 1940  MRN: [id number]    Date of Admission: 3/29/2024  Primary Care Physician: Ja Baldwin MD    Subjective   Subjective     CC:  COVID    HPI:  Patient seen resting up in bed in no acute distress.  Awake and alert.  Remains in COVID isolation.  Daughter at bedside.  Reports he slept okay but daughter reports other members.  Still having cough.  Denies shortness of breath.  Currently has oxygen off of his nose with sats 88%.  Improves to 90-91% with 1 LNC.  No new complaints.  Tolerating diet.  Awaiting bed at [facility].      Objective   Objective     Vital Signs:   Temp:  [97.7 °F (36.5 °C)-98.6 °F (37 °C)] 97.7 °F (36.5 °C)  Heart Rate:  [55-98] 79  Resp:  [18] 18  BP: (117-139)/() 134/59  Flow (L/min):  [2] 2     Physical Exam:  Constitutional: No acute distress, awake, alert, resting up in bed.  Elderly chronically ill-appearing gentleman.  Daughter at bedside.  HENT: NCAT, mucous membranes moist  Respiratory: respiratory effort normal--coarse BS scattered, decreased at bases.  When placed on 1 LNC sats at 90-91%.  Cardiovascular: Irreg rate and rhythm, no murmurs, rubs, or gallops  Gastrointestinal: Positive bowel sounds, soft, nontender, nondistended.  Obese.  Musculoskeletal: 1+ bilateral ankle edema- wraps in place bilaterally.  Psychiatric: Appropriate affect, cooperative  Neurologic: Oriented x 3, hard of hearing. strength symmetric in all extremities, Cranial Nerves grossly intact to confrontation, speech clear.  Follows commands.  Skin: No rashes      Results Reviewed:  LAB RESULTS:      Lab 24  0645 24  0513 24  0841 24  1528 24  1241 24  1158   WBC 11.64* 10.14 6.35  --   --  7.34   HEMOGLOBIN 12.0* 11.1* 11.2*  --   --  13.2   HEMATOCRIT 35.4* 33.8* 33.9*  --   --  39.4   PLATELETS 105* 77* 70*  --   --  97*   NEUTROS ABS 8.72*  8.32* 4.95  --   --  4.77   IMMATURE GRANS (ABS) 0.10* 0.06* 0.02  --   --   --    LYMPHS ABS 1.15 0.76 0.66*  --   --   --    MONOS ABS 1.65* 0.99* 0.72  --   --   --    EOS ABS 0.00 0.00 0.00  --   --  0.00   MCV 92.7 94.7 95.8  --   --  94.3   SED RATE  --   --   --   --   --  11   CRP  --  1.51* 2.34*  --  2.09*  --    PROCALCITONIN  --   --   --  0.13  --   --    LACTATE  --   --   --   --   --  1.5   LDH  --   --   --   --  287*  --    PROTIME  --  15.2*  --   --   --   --    D DIMER QUANT  --   --   --   --   --  5.20*         Lab 04/02/24  0645 03/31/24  0513 03/30/24  0841 03/29/24  2351 03/29/24  1241   SODIUM 140 143 142  --  141   POTASSIUM 4.3 4.3 3.9  --  3.8   CHLORIDE 103 106 105  --  103   CO2 32.0* 30.0* 31.0*  --  31.0*   ANION GAP 5.0 7.0 6.0  --  7.0   BUN 29* 25* 23  --  23   CREATININE 0.92 0.86 0.84  --  1.01   EGFR 82.5 85.9 86.5  --  73.8   GLUCOSE 83 97 110*  --  85   CALCIUM 8.2* 8.1* 8.3*  --  9.1   MAGNESIUM 1.7 1.8 2.0 2.2 1.5*   TSH  --   --   --   --  1.750         Lab 04/02/24  0645 03/31/24  0513 03/30/24  0841 03/29/24  1241   TOTAL PROTEIN 4.6* 4.6* 4.7* 6.1   ALBUMIN 2.7* 2.8* 2.8* 3.4*   GLOBULIN 1.9 1.8 1.9 2.7   ALT (SGPT) 50* 40 34 36   AST (SGOT) 59* 79* 73* 74*   BILIRUBIN 1.0 0.7 0.8 1.4*   ALK PHOS 58 58 62 70         Lab 03/31/24  0513 03/29/24  1528 03/29/24  1241   PROBNP  --   --  1,270.0   HSTROP T  --  58* 68*   PROTIME 15.2*  --   --    INR 1.19*  --   --                  Brief Urine Lab Results  (Last result in the past 365 days)        Color   Clarity   Blood   Leuk Est   Nitrite   Protein   CREAT   Urine HCG        03/29/24 1236 Dark Yellow   Cloudy   Negative   Negative   Negative   Trace                   Microbiology Results Abnormal       None            No radiology results from the last 24 hrs    Results for orders placed during the hospital encounter of 03/29/24    Adult Transthoracic Echo Complete W/ Cont if Necessary Per Protocol    Interpretation  Summary    Left ventricular ejection fraction appears to be 56 - 60%.    The right ventricular cavity is mildly dilated.    The left atrial cavity is mildly dilated.    Estimated right ventricular systolic pressure from tricuspid regurgitation is normal (<35 mmHg). Calculated right ventricular systolic pressure from tricuspid regurgitation is 9 mmHg.      Current medications:  Scheduled Meds:amLODIPine, 5 mg, Oral, Q24H  aspirin, 81 mg, Oral, Daily  atorvastatin, 40 mg, Oral, Nightly  cetirizine, 5 mg, Oral, Daily  cycloSPORINE, 1 drop, Both Eyes, Q12H  dexAMETHasone, 6 mg, Oral, Daily   Or  dexAMETHasone, 6 mg, Intravenous, Daily  enoxaparin, 40 mg, Subcutaneous, Q12H  famotidine, 20 mg, Oral, Q12H  ferrous sulfate, 325 mg, Oral, Daily  furosemide, 20 mg, Oral, Daily  guaiFENesin, 1,200 mg, Oral, Q12H  hydrALAZINE, 25 mg, Oral, Q8H  miconazole, 1 Application, Topical, Q12H  sodium chloride, 10 mL, Intravenous, Q12H  spironolactone, 25 mg, Oral, Daily  valsartan, 160 mg, Oral, Daily      Continuous Infusions:   PRN Meds:.  acetaminophen **OR** acetaminophen **OR** acetaminophen    albuterol sulfate HFA    benzonatate    Calcium Replacement - Follow Nurse / BPA Driven Protocol    dextromethorphan polistirex ER    hydrALAZINE    Magnesium Standard Dose Replacement - Follow Nurse / BPA Driven Protocol    nitroglycerin    ondansetron ODT **OR** ondansetron    Phosphorus Replacement - Follow Nurse / BPA Driven Protocol    Potassium Replacement - Follow Nurse / BPA Driven Protocol    sodium chloride    sodium chloride    sodium chloride    Assessment & Plan   Assessment & Plan     Active Hospital Problems    Diagnosis  POA    **Acute hypoxemic respiratory failure due to COVID-19 [U07.1, J96.01]  Yes    Bradycardia, drug induced [R00.1, T50.905A]  Unknown    Hypomagnesemia [E83.42]  Yes    CAD (coronary artery disease) [I25.10]  Yes    Mixed hyperlipidemia [E78.2]  Yes    Primary hypertension [I10]  Yes    Chronic kidney  disease, stage 3 [N18.30]  Yes      Resolved Hospital Problems   No resolved problems to display.        Brief Hospital Course to date:  Tho Vasquez is a 83 y.o. male with past medical history significant for CAD, HTN, HLD, and cataracts who presents to the ED due to increased weakness and fatigue for the past 3 days with concern for positive at-home COVID-19 test. Respiratory PCR was positive for COVID-19.    This patient's problems and plans were partially entered by my partner and updated as appropriate by me 04/04/24.    Assessment/plan:  Patient is new to me today     COVID-19 PNA   -Symptoms started 3/27, tested positive 3/29.  Continue COVID isolation through tomorrow/5/2024.  -CTA negative for PE with small patchy airspace opacities in the right upper lobe, lateral right lower lobe, and inferior lingula suggestive of mild multifocal pneumonia  -Given remdesivir initially but stopped due to bradycardia.    -Cont Decadron   -Lovenox BID 40mg  -Mucinex BID   -Albuterol, Delsym, Tylenol PRN, oxygen as needed    Bradycardia  -ECG with 1st degree to 2nd degree with 2:1 conduction AV block.   -Cardiology input appreciated  -Holding coreg.  Stable.     Recent falls  Generalized weakness  -CT head revealed no acute intracranial abnormality   -suspect d/t above  -PT/OT consults  -Fall precautions.  Await[facility].     Bilateral lower extremity edema  -PT wound consulted for compression wraps  -cont home Lasix 20 mg daily   -Echo EF of 55 to 60%  -Strict I&O, daily weight      Poor appetite  -Nutrition consulted, improved     Hypomagnesemia   -Replace per protocol      Abnormal CT  -CT chest revealed slightly nodular liver contours concerning for cirrhosis  -hepatitis panel neg, albumin is slightly low but this could be related to acute illness.  AST is slightly high as well.  Platelets improving.  Will continue to follow.  Pt would benefit from outpatient follow-up with GI for further  evaluation.     HTN  HLD  Hx CAD  -continue ASA, Valsartan, Hydralazine, Lipitor  -cont current meds.  May need to titrate up hydralazine for better BP control  -- BP currently stable, 134/59 after morning meds.    Expected Discharge Location and Transportation:  Awaiting transfer to [facility] when bed available.  Expected Discharge   Expected Discharge Date: 4/5/2024; Expected Discharge Time:      DVT prophylaxis:  Medical DVT prophylaxis orders are present.         AM-PAC 6 Clicks Score (PT): 17 (04/04/24 0700)    CODE STATUS:   Code Status and Medical Interventions:   Ordered at: 03/29/24 1801     Level Of Support Discussed With:    Patient     Code Status (Patient has no pulse and is not breathing):    CPR (Attempt to Resuscitate)     Medical Interventions (Patient has pulse or is breathing):    Full Support       Johanne Platt, APRN  04/04/24

## 2024-04-04 NOTE — THERAPY TREATMENT NOTE
Patient Name: Tho Vasquez  : 1940    MRN: [id number]                              Today's Date: 2024       Admit Date: 3/29/2024    Visit Dx:     ICD-10-CM ICD-9-CM   1. COVID-19  U07.1 079.89   2. Hypoxia  R09.02 799.02   3. Generalized weakness  R53.1 780.79     Patient Active Problem List   Diagnosis    Mixed hyperlipidemia    Abnormal glucose level    Abrasion    Adult BMI 37.0-37.9 kg/sq m    Annual physical exam    ASHD (arteriosclerotic heart disease)    Primary hypertension    Chronic kidney disease, stage 3    Medicare annual wellness visit, subsequent    Morbid obesity    Obstructive sleep apnea    Old myocardial infarction    Vitamin D deficiency    Venous insufficiency of both lower extremities    Transaminitis    Acute hypoxemic respiratory failure due to COVID-19    Hypomagnesemia    CAD (coronary artery disease)    Bradycardia, drug induced     Past Medical History:   Diagnosis Date    Allergic ?    Cataract     Coronary artery disease     Hyperlipidemia     Hypertension     Myocardial infarction     Pneumonia of both lower lobes due to infectious organism 10/03/2019    Positive PPD     Respiratory failure 2005    requiring tracheostomy     Past Surgical History:   Procedure Laterality Date    CARDIAC SURGERY      HERNIA REPAIR        General Information       Row Name 24 1525          Physical Therapy Time and Intention    Document Type therapy note (daily note)  -AB     Mode of Treatment physical therapy  -AB       Row Name 24 1525          General Information    Patient Profile Reviewed yes  -AB     Existing Precautions/Restrictions fall;other (see comments);oxygen therapy device and L/min  COVID  -AB     Barriers to Rehab medically complex;previous functional deficit  -AB       Row Name 24 1525          Cognition    Orientation Status (Cognition) oriented x 4  -AB       Row Name 24 1525          Safety Issues, Functional Mobility    Safety Issues Affecting  Function (Mobility) awareness of need for assistance;insight into deficits/self-awareness;safety precaution awareness;safety precautions follow-through/compliance;sequencing abilities;friction/shear risk  -AB     Impairments Affecting Function (Mobility) balance;endurance/activity tolerance;shortness of breath;strength  -AB               User Key  (r) = Recorded By, (t) = Taken By, (c) = Cosigned By      Initials Name Provider Type    AB Loida Edward PT Physical Therapist                   Mobility       Row Name 04/04/24 1525          Bed Mobility    Comment, (Bed Mobility) UIC  -AB       Row Name 04/04/24 1525          Transfers    Comment, (Transfers) Cues for hand placement and sequencing.  -AB       Row Name 04/04/24 1525          Sit-Stand Transfer    Sit-Stand [place] (Transfers) minimum assist (75% patient effort);verbal cues;1 person assist  -AB     Assistive Device (Sit-Stand Transfers) walker, front-wheeled  -AB     Comment, (Sit-Stand Transfer) Cues to push up from chair.  -AB       Row Name 04/04/24 1525          Gait/Stairs (Locomotion)    [place] Level (Gait) minimum assist (75% patient effort);1 person assist;verbal cues  -AB     Assistive Device (Gait) walker, front-wheeled  -AB     Distance in Feet (Gait) 40  -AB     Deviations/Abnormal Patterns (Gait) bilateral deviations;base of support, wide;davon decreased;gait speed decreased;stride length decreased  -AB     Bilateral Gait Deviations forward flexed posture;heel strike decreased  -AB     Comment, (Gait/Stairs) Pt ambulated with step through gait pattern, slowed davon, and forward flexed posture. Cues for upright posture and walker positioning. No overt LOB or knee buckling. Min A for improved stability. Further activity limited by weakness and fatigue.  -AB               User Key  (r) = Recorded By, (t) = Taken By, (c) = Cosigned By      Initials Name Provider Type    AB Loida Edward PT Physical Therapist                    Obj/Interventions       Row Name 04/04/24 1526          Motor Skills    Therapeutic Exercise hip;knee;ankle  -AB       Row Name 04/04/24 1526          Hip (Therapeutic Exercise)    Hip (Therapeutic Exercise) isometric exercises  -AB     Hip Isometrics (Therapeutic Exercise) bilateral;gluteal sets;10 repetitions  -AB     Hip Strengthening (Therapeutic Exercise) bilateral;aBduction;aDduction;10 repetitions  -AB       Row Name 04/04/24 1526          Knee (Therapeutic Exercise)    Knee Isometrics (Therapeutic Exercise) bilateral;quad sets;10 repetitions  -AB     Knee Strengthening (Therapeutic Exercise) bilateral;SLR (straight leg raise);LAQ (long arc quad);10 repetitions  -AB       Row Name 04/04/24 1526          Ankle (Therapeutic Exercise)    Ankle (Therapeutic Exercise) AROM (active range of motion)  -AB     Ankle AROM (Therapeutic Exercise) bilateral;dorsiflexion;plantarflexion;10 repetitions  -AB       Row Name 04/04/24 1526          Balance    Balance Assessment sitting static balance;sitting dynamic balance;standing static balance;standing dynamic balance  -AB     Static Sitting Balance standby assist  -AB     Dynamic Sitting Balance standby assist  -AB     Position, Sitting Balance unsupported;sitting in chair  -AB     Static Standing Balance contact guard  -AB     Dynamic Standing Balance minimal assist;1-person assist;verbal cues  -AB     Position/Device Used, Standing Balance supported;walker, front-wheeled  -AB     Balance Interventions sitting;standing;sit to stand;supported;static;dynamic;occupation based/functional task  -AB     Comment, Balance Min A to steady  -AB               User Key  (r) = Recorded By, (t) = Taken By, (c) = Cosigned By      Initials Name Provider Type    AB Loida Edward PT Physical Therapist                   Goals/Plan    No documentation.                  Clinical Impression       Row Name 04/04/24 1528          Pain    Pretreatment Pain Rating 0/10 - no pain  -AB      Posttreatment Pain Rating 0/10 - no pain  -AB       Row Name 04/04/24 1528          Plan of Care Review    Progress improving  -AB     Outcome Evaluation Pt with good effort and increased ambulation distance to 40' with min Ax1 and RW. Pt continues to present below his functional baseline with weakness, impaired endurance, and balance deficits. Further IPPT is warrented. PT will progress as able per POC.  -AB       Row Name 04/04/24 1528          Vital Signs    Pre Treatment Diastolic BP 76  -AB     O2 Delivery Pre Treatment nasal cannula  -AB     O2 Delivery Intra Treatment nasal cannula  -AB     O2 Delivery Post Treatment nasal cannula  -AB     Pre Patient Position Sitting  -AB     Intra Patient Position Standing  -AB     Post Patient Position Sitting  -AB       Row Name 04/04/24 1528          Positioning and Restraints    Pre-Treatment Position sitting in chair/recliner  -AB     Post Treatment Position chair  -AB     In Chair notified nsg;reclined;sitting;call light within reach;encouraged to call for assist;exit alarm on;waffle cushion;with family/caregiver  Pt deferred legs being elevated  -AB               User Key  (r) = Recorded By, (t) = Taken By, (c) = Cosigned By      Initials Name Provider Type    AB Loida Edward, PT Physical Therapist                   Outcome Measures       Row Name 04/04/24 1532 04/04/24 0700       How much help from another person do you currently need...    Turning from your back to your side while in flat bed without using bedrails? 3  -AB 3  -TY    Moving from lying on back to sitting on the side of a flat bed without bedrails? 3  -AB 3  -TY    Moving to and from a bed to a chair (including a wheelchair)? 3  -AB 3  -TY    Standing up from a chair using your arms (e.g., wheelchair, bedside chair)? 3  -AB 3  -TY    Climbing 3-5 steps with a railing? 2  -AB 2  -TY    To walk in hospital room? 3  -AB 3  -TY    AM-PAC 6 Clicks Score (PT) 17  -AB 17  -TY    Highest Level of Mobility  Goal 5 --> Static standing  -AB 5 --> Static standing  -TY      Row Name 04/04/24 1532          Functional Assessment    Outcome Measure Options AM-PAC 6 Clicks Basic Mobility (PT)  -AB               User Key  (r) = Recorded By, (t) = Taken By, (c) = Cosigned By      Initials Name Provider Type    AB Loida Edward, PT Physical Therapist    TY Rae Erickson, RN Registered Nurse                                 Physical Therapy Education       Title: PT OT SLP Therapies (In Progress)       Topic: Physical Therapy (Done)       Point: Mobility training (Done)       Learning Progress Summary             Patient Acceptance, E, VU,NR by AB at 4/4/2024 1532    Acceptance, E,D, VU,NR by AB at 4/3/2024 1542    Acceptance, E, VU by CK at 4/1/2024 1318   Family Acceptance, E, VU by CK at 4/1/2024 1318                         Point: Home exercise program (Done)       Learning Progress Summary             Patient Acceptance, E, VU,NR by AB at 4/4/2024 1532    Acceptance, E,D, VU,NR by AB at 4/3/2024 1542                         Point: Body mechanics (Done)       Learning Progress Summary             Patient Acceptance, E, VU,NR by AB at 4/4/2024 1532    Acceptance, E,D, VU,NR by AB at 4/3/2024 1542    Acceptance, E, VU by CK at 4/1/2024 1318   Family Acceptance, E, VU by CK at 4/1/2024 1318                         Point: Precautions (Done)       Learning Progress Summary             Patient Acceptance, E, VU,NR by AB at 4/4/2024 1532    Acceptance, E,D, VU,NR by AB at 4/3/2024 1542    Acceptance, E, VU by CK at 4/1/2024 1318   Family Acceptance, E, VU by CK at 4/1/2024 1318                                         User Key       Initials Effective Dates Name Provider Type Discipline    AB 09/22/22 -  Loida Edward, PT Physical Therapist PT    CK 02/06/24 -  Jayne He PT Physical Therapist PT                  PT Recommendation and Plan     Plan of Care Reviewed With: patient, daughter  Progress: improving  Outcome  Evaluation: Pt with good effort and increased ambulation distance to 40' with min Ax1 and RW. Pt continues to present below his functional baseline with weakness, impaired endurance, and balance deficits. Further IPPT is warrented. PT will progress as able per POC.     Time Calculation:         PT Charges       Row Name 04/04/24 1533 04/04/24 0933          Time Calculation    Start Time 1425  -AB 0851  -LH     PT Received On 04/04/24  -AB --     PT Goal Re-Cert Due Date -- 04/11/24  -        Timed Charges    61958 - PT Therapeutic Exercise Minutes 10  -AB --     27342 - Gait Training Minutes  8  -AB --     69501 - PT Therapeutic Activity Minutes 5  -AB --        Untimed Charges    85175-Kvroczaszd comp below knee -- 20  -LH        Total Minutes    Timed Charges Total Minutes 23  -AB --     Untimed Charges Total Minutes -- 20  -LH      Total Minutes 23  -AB 20  -LH               User Key  (r) = Recorded By, (t) = Taken By, (c) = Cosigned By      Initials Name Provider Type     Donald Bolden, PT Physical Therapist    AB Loida Edward, PT Physical Therapist                  Therapy Charges for Today       Code Description Service Date Service Provider Modifiers Qty    51009906724 HC PT THER PROC EA 15 MIN 4/3/2024 Loida Edward, PT GP 1    11245252504 HC GAIT TRAINING EA 15 MIN 4/3/2024 Loida Edward, PT GP 1    19011438081 HC PT THER PROC EA 15 MIN 4/4/2024 Loida Edward, PT GP 1    05683976926 HC GAIT TRAINING EA 15 MIN 4/4/2024 Loida Edward, PT GP 1            PT G-Codes  Outcome Measure Options: AM-PAC 6 Clicks Basic Mobility (PT)  AM-PAC 6 Clicks Score (PT): 17  AM-PAC 6 Clicks Score (OT): 13  PT Discharge Summary  Anticipated Discharge Disposition (PT): inpatient rehabilitation facility    Loida Edward PT  4/4/2024

## 2024-04-04 NOTE — PLAN OF CARE
Patient alert and oriented throughout the night, pleasant, compliant with treatment regimen. Slightly tachycardic at times, placed on 2L around MN due to desaturation with sleep. Brigette Kumar RN   Problem: Adult Inpatient Plan of Care  Goal: Plan of Care Review  Outcome: Ongoing, Progressing  Goal: Patient-Specific Goal (Individualized)  Outcome: Ongoing, Progressing  Goal: Absence of Hospital-Acquired Illness or Injury  Outcome: Ongoing, Progressing  Intervention: Identify and Manage Fall Risk  Description: Perform standard risk assessment on admission using a validated tool or comprehensive approach appropriate to the patient; reassess fall risk frequently, with change in status or transfer to another level of care.  Communicate fall injury risk to interprofessional healthcare team.  Determine need for increased observation, equipment and environmental modification, such as low bed, signage and supportive, nonskid footwear.  Adjust safety measures to individual developmental age, stage and identified risk factors.  Reinforce the importance of safety and physical activity with patient and family.  Perform regular intentional rounding to assess need for position change, pain assessment and personal needs, including assistance with toileting.  Recent Flowsheet Documentation  Taken 4/4/2024 0400 by Brigette Kumar RN  Safety Promotion/Fall Prevention:   activity supervised   safety round/check completed   assistive device/personal items within reach   fall prevention program maintained  Taken 4/4/2024 0200 by Brigette Kumar RN  Safety Promotion/Fall Prevention: activity supervised  Taken 4/4/2024 0000 by Brigette Kumar, RN  Safety Promotion/Fall Prevention:   activity supervised   safety round/check completed   assistive device/personal items within reach   fall prevention program maintained  Taken 4/3/2024 2200 by Brigette Kumar, RN  Safety Promotion/Fall Prevention:   activity supervised    assistive device/personal items within reach   lighting adjusted   mobility aid in reach   nonskid shoes/slippers when out of bed   safety round/check completed   toileting scheduled   room organization consistent  Taken 4/3/2024 2123 by Brigette Kumar RN  Safety Promotion/Fall Prevention:   activity supervised   assistive device/personal items within reach   mobility aid in reach   lighting adjusted   fall prevention program maintained   nonskid shoes/slippers when out of bed   room organization consistent   safety round/check completed   toileting scheduled  Taken 4/3/2024 2100 by Brigette Kumar RN  Safety Promotion/Fall Prevention: safety round/check completed  Intervention: Prevent Skin Injury  Description: Perform a screening for skin injury risk, such as pressure or moisture associated skin damage on admission and at regular intervals throughout hospital stay.  Keep all areas of skin (especially folds) clean and dry.  Maintain adequate skin hydration.  Relieve and redistribute pressure and protect bony prominences; implement measures based on patient-specific risk factors.  Match turning and repositioning schedule to clinical condition.  Encourage weight shift frequently; assist with reposition if unable to complete independently.  Float heels off bed; avoid pressure on the Achilles tendon.  Keep skin free from extended contact with medical devices.  Encourage functional activity and mobility, as early as tolerated.  Use aids (e.g., slide boards, mechanical lift) during transfer.  Recent Flowsheet Documentation  Taken 4/4/2024 0400 by Brigette Kumar RN  Skin Protection:   adhesive use limited   skin-to-device areas padded   skin-to-skin areas padded   tubing/devices free from skin contact  Taken 4/4/2024 0200 by Brigette Kumar RN  Body Position: supine, legs elevated  Skin Protection: adhesive use limited  Taken 4/4/2024 0000 by Brigette Kumar RN  Body Position: supine, legs  elevated  Skin Protection:   adhesive use limited   skin-to-device areas padded   skin-to-skin areas padded   tubing/devices free from skin contact  Taken 4/3/2024 2200 by Brigette Kumar RN  Body Position: supine, legs elevated  Skin Protection:   adhesive use limited   skin-to-device areas padded   skin-to-skin areas padded   transparent dressing maintained   tubing/devices free from skin contact  Taken 4/3/2024 2123 by Brigette Kumar RN  Body Position: supine, legs elevated  Skin Protection:   adhesive use limited   skin-to-device areas padded   skin-to-skin areas padded   transparent dressing maintained   tubing/devices free from skin contact  Taken 4/3/2024 2100 by Brigette Kumar RN  Body Position: supine, legs elevated  Intervention: Prevent and Manage VTE (Venous Thromboembolism) Risk  Description: Assess for VTE (venous thromboembolism) risk.  Encourage and assist with early ambulation.  Initiate and maintain compression or other therapy, as indicated, based on identified risk in accordance with organizational protocol and provider order.  Encourage both active and passive leg exercises while in bed, if unable to ambulate.  Recent Flowsheet Documentation  Taken 4/4/2024 0400 by Brigette Kumar RN  Activity Management: activity encouraged  VTE Prevention/Management:   bilateral   sequential compression devices on  Taken 4/4/2024 0200 by Brigette Kumar RN  Activity Management: up in chair  VTE Prevention/Management:   sequential compression devices on   bilateral  Taken 4/4/2024 0000 by Brigette Kumar RN  Activity Management: up in chair  VTE Prevention/Management:   bilateral   sequential compression devices on  Taken 4/3/2024 2200 by Brigette Kumar RN  Activity Management: up in chair  Taken 4/3/2024 2123 by Brigette Kumar RN  Activity Management: up in chair  Taken 4/3/2024 2100 by Brigette Kumar RN  Activity Management: up in chair  VTE Prevention/Management:  (lovenox, SCDs off, up in chair) other (see comments)  Range of Motion: active ROM (range of motion) encouraged  Intervention: Prevent Infection  Description: Maintain skin and mucous membrane integrity; promote hand, oral and pulmonary hygiene.  Optimize fluid balance, nutrition, sleep and glycemic control to maximize infection resistance.  Identify potential sources of infection early to prevent or mitigate progression of infection (e.g., wound, lines, devices).  Evaluate ongoing need for invasive devices; remove promptly when no longer indicated.  Recent Flowsheet Documentation  Taken 4/4/2024 0400 by Brigette Kumar RN  Infection Prevention:   personal protective equipment utilized   rest/sleep promoted   single patient room provided  Taken 4/4/2024 0200 by Brigette Kumar RN  Infection Prevention:   environmental surveillance performed   personal protective equipment utilized   rest/sleep promoted   single patient room provided   visitors restricted/screened  Taken 4/4/2024 0000 by Brigette Kumar RN  Infection Prevention:   personal protective equipment utilized   rest/sleep promoted   single patient room provided  Taken 4/3/2024 2200 by Brigette Kumar RN  Infection Prevention:   environmental surveillance performed   single patient room provided  Taken 4/3/2024 2123 by Brigette Kumar RN  Infection Prevention:   environmental surveillance performed   single patient room provided  Taken 4/3/2024 2100 by Brigette Kumar RN  Infection Prevention:   single patient room provided   visitors restricted/screened  Goal: Optimal Comfort and Wellbeing  Outcome: Ongoing, Progressing  Intervention: Provide Person-Centered Care  Description: Use a family-focused approach to care.  Develop trust and rapport by proactively providing information, encouraging questions, addressing concerns and offering reassurance.  Acknowledge emotional response to hospitalization.  Recognize and utilize personal  coping strategies.  [place] spiritual and cultural preferences.  Recent Flowsheet Documentation  Taken 4/3/2024 2100 by Brigette Kumar RN  Trust Relationship/Rapport:   reassurance provided   thoughts/feelings acknowledged  Goal: Readiness for Transition of Care  Outcome: Ongoing, Progressing     Problem: Fall Injury Risk  Goal: Absence of Fall and Fall-Related Injury  Outcome: Ongoing, Progressing  Intervention: Identify and Manage Contributors  Description: Develop a fall prevention plan with the patient and caregiver/family.  Provide reorientation, appropriate sensory stimulation and routines with changes in mental status to decrease risk of fall.  Promote use of personal vision and auditory aids.  Assess assistance level required for safe and effective self-care; provide support as needed, such as toileting, mobilization. For age 65 and older, implement timed toileting with assistance.  Encourage physical activity, such as performance of mobility and self-care at highest level of patient ability, multicomponent exercise program and provision of appropriate assistive devices.  If fall occurs, assess the severity of injury; implement fall injury protocol. Determine the cause and revise fall injury prevention plan.  Regularly review medication contribution to fall risk; adjust medication administration times to minimize risk of falling.  Consider risk related to polypharmacy and age.  Balance adequate pain management with potential for oversedation.  Recent Flowsheet Documentation  Taken 4/4/2024 0400 by Brigette Kumar RN  Medication Review/Management: medications reviewed  Taken 4/4/2024 0200 by Brigette Kumar RN  Medication Review/Management: medications reviewed  Taken 4/4/2024 0000 by Brigette Kumar RN  Medication Review/Management: medications reviewed  Taken 4/3/2024 2200 by Brigette Kumar RN  Medication Review/Management: medications reviewed  Taken 4/3/2024 2123 by Brigette Kumar,  RN  Medication Review/Management: medications reviewed  Taken 4/3/2024 2100 by Brigette Kumar RN  Medication Review/Management: medications reviewed  Intervention: Promote Injury-Free Environment  Description: Provide a safe, barrier-free environment that encourages independent activity.  Keep care area uncluttered and well-lighted.  Determine need for increased observation or monitoring.  Avoid use of devices that minimize mobility, such as restraints or indwelling urinary catheter.  Recent Flowsheet Documentation  Taken 4/4/2024 0400 by Brigette Kumar RN  Safety Promotion/Fall Prevention:   activity supervised   safety round/check completed   assistive device/personal items within reach   fall prevention program maintained  Taken 4/4/2024 0200 by Brigette Kumar RN  Safety Promotion/Fall Prevention: activity supervised  Taken 4/4/2024 0000 by Brigette Kumar RN  Safety Promotion/Fall Prevention:   activity supervised   safety round/check completed   assistive device/personal items within reach   fall prevention program maintained  Taken 4/3/2024 2200 by Brigette Kumar RN  Safety Promotion/Fall Prevention:   activity supervised   assistive device/personal items within reach   lighting adjusted   mobility aid in reach   nonskid shoes/slippers when out of bed   safety round/check completed   toileting scheduled   room organization consistent  Taken 4/3/2024 2123 by Brigette Kumar RN  Safety Promotion/Fall Prevention:   activity supervised   assistive device/personal items within reach   mobility aid in reach   lighting adjusted   fall prevention program maintained   nonskid shoes/slippers when out of bed   room organization consistent   safety round/check completed   toileting scheduled  Taken 4/3/2024 2100 by Brigette Kumar RN  Safety Promotion/Fall Prevention: safety round/check completed     Problem: Skin Injury Risk Increased  Goal: Skin Health and Integrity  Outcome: Ongoing,  Progressing  Intervention: Optimize Skin Protection  Description: Perform a full pressure injury risk assessment, as indicated by screening, upon admission to care unit.  Reassess skin (injury risk, full inspection) frequently (e.g., scheduled interval, with change in condition) to provide optimal early detection and prevention.  Maintain adequate tissue perfusion (e.g., encourage fluid balance; avoid crossing legs, constrictive clothing or devices) to promote tissue oxygenation.  Maintain head of bed at lowest degree of elevation tolerated, considering medical condition and other restrictions.  Avoid positioning onto an area that remains reddened.  Minimize incontinence and moisture (e.g., toileting schedule; moisture-wicking pad, diaper or incontinence collection device; skin moisture barrier).  Cleanse skin promptly and gently when soiled utilizing a pH-balanced cleanser.  Relieve and redistribute pressure (e.g., scheduled position changes, weight shifts, use of support surface, medical device repositioning, protective dressing application, use of positioning device, microclimate control, use of pressure-injury-monitor  Encourage increased activity, such as sitting in a chair at the bedside or early mobilization, when able to tolerate.  Recent Flowsheet Documentation  Taken 4/4/2024 0400 by Brigette Kumar RN  Pressure Reduction Techniques: weight shift assistance provided  Head of Bed (HOB) Positioning: HOB elevated  Pressure Reduction Devices:   heel offloading device utilized   specialty bed utilized  Skin Protection:   adhesive use limited   skin-to-device areas padded   skin-to-skin areas padded   tubing/devices free from skin contact  Taken 4/4/2024 0200 by Brigette Kumar RN  Pressure Reduction Techniques: frequent weight shift encouraged  Head of Bed (HOB) Positioning: HOB at 30-45 degrees  Pressure Reduction Devices: specialty bed utilized  Skin Protection: adhesive use limited  Taken 4/4/2024  0000 by Brigette Kumar RN  Pressure Reduction Techniques: weight shift assistance provided  Head of Bed ([facility]) Positioning: [facility] elevated  Pressure Reduction Devices:   heel offloading device utilized   specialty bed utilized  Skin Protection:   adhesive use limited   skin-to-device areas padded   skin-to-skin areas padded   tubing/devices free from skin contact  Taken 4/3/2024 2200 by Brigette Kumar RN  Pressure Reduction Techniques: weight shift assistance provided  Head of Bed ([facility]) Positioning: [facility] elevated  Pressure Reduction Devices: specialty bed utilized  Skin Protection:   adhesive use limited   skin-to-device areas padded   skin-to-skin areas padded   transparent dressing maintained   tubing/devices free from skin contact  Taken 4/3/2024 2123 by Brigette Kumar RN  Pressure Reduction Techniques:   weight shift assistance provided   heels elevated off bed  Head of Bed ([facility]) Positioning: [facility] elevated  Pressure Reduction Devices: specialty bed utilized  Skin Protection:   adhesive use limited   skin-to-device areas padded   skin-to-skin areas padded   transparent dressing maintained   tubing/devices free from skin contact  Taken 4/3/2024 2100 by Brigette Kumar RN  Head of Bed ([facility]) Positioning: [facility] elevated   Goal Outcome Evaluation:

## 2024-04-04 NOTE — CASE MANAGEMENT/SOCIAL WORK
Continued Stay Note  [facility]     Patient Name: Tho Vasquez  MRN: [id number]  Today's Date: 4/4/2024    Admit Date: 3/29/2024    Plan: Possible rehab vs home with family   Discharge Plan       Row Name 04/04/24 1038       Plan    Plan Comments Pt has been accepted to [facility] for rehab for 4/5. [facility] has been arranged for 1130.  has updated pts daughter who is in agreement with plan                   Discharge Codes    No documentation.                 Expected Discharge Date and Time       Expected Discharge Date Expected Discharge Time    Apr 8, 2024               Roma Gonzalez RN

## 2024-04-04 NOTE — PLAN OF CARE
Goal Outcome Evaluation:  Plan of Care Reviewed With: patient, daughter        Progress: improving  Outcome Evaluation: Pt demonstrating good response to current MLW as pt with improving BLE edema and skin integrity. Pt would continue to benefit from further BLE compression wrap changes every 2-3 days to further promote venous return, improve skin integrity, and improve BLE function.

## 2024-04-05 VITALS
DIASTOLIC BLOOD PRESSURE: 70 MMHG | OXYGEN SATURATION: 91 % | TEMPERATURE: 98 F | WEIGHT: 248.24 LBS | RESPIRATION RATE: 16 BRPM | SYSTOLIC BLOOD PRESSURE: 156 MMHG | BODY MASS INDEX: 35.54 KG/M2 | HEIGHT: 70 IN | HEART RATE: 90 BPM

## 2024-04-05 PROCEDURE — 63710000001 DEXAMETHASONE PER 0.25 MG: Performed by: NURSE PRACTITIONER

## 2024-04-05 PROCEDURE — 99239 HOSP IP/OBS DSCHRG MGMT >30: CPT | Performed by: NURSE PRACTITIONER

## 2024-04-05 PROCEDURE — 25010000002 ENOXAPARIN PER 10 MG: Performed by: PEDIATRICS

## 2024-04-05 RX ORDER — SPIRONOLACTONE 25 MG/1
25 TABLET ORAL DAILY
Start: 2024-04-05

## 2024-04-05 RX ORDER — GUAIFENESIN 600 MG/1
1200 TABLET, EXTENDED RELEASE ORAL EVERY 12 HOURS SCHEDULED
Start: 2024-04-05

## 2024-04-05 RX ORDER — ATORVASTATIN CALCIUM 40 MG/1
40 TABLET, FILM COATED ORAL NIGHTLY
Start: 2024-04-05

## 2024-04-05 RX ORDER — BENZONATATE 100 MG/1
100 CAPSULE ORAL 3 TIMES DAILY PRN
Start: 2024-04-05

## 2024-04-05 RX ORDER — HYDRALAZINE HYDROCHLORIDE 25 MG/1
25 TABLET, FILM COATED ORAL EVERY 8 HOURS SCHEDULED
Start: 2024-04-05

## 2024-04-05 RX ORDER — ACETAMINOPHEN 325 MG/1
650 TABLET ORAL EVERY 4 HOURS PRN
Start: 2024-04-05

## 2024-04-05 RX ORDER — DEXAMETHASONE 6 MG/1
6 TABLET ORAL DAILY
Start: 2024-04-05 | End: 2024-04-09

## 2024-04-05 RX ORDER — ALBUTEROL SULFATE 90 UG/1
2 AEROSOL, METERED RESPIRATORY (INHALATION) EVERY 6 HOURS PRN
Start: 2024-04-05

## 2024-04-05 RX ORDER — ENOXAPARIN SODIUM 100 MG/ML
40 INJECTION SUBCUTANEOUS
Start: 2024-04-05 | End: 2024-04-08

## 2024-04-05 RX ORDER — ASPIRIN 81 MG/1
81 TABLET ORAL DAILY
Start: 2024-04-05

## 2024-04-05 RX ORDER — CETIRIZINE HYDROCHLORIDE 5 MG/1
5 TABLET ORAL DAILY
Start: 2024-04-05

## 2024-04-05 RX ORDER — AMLODIPINE BESYLATE 5 MG/1
5 TABLET ORAL
Start: 2024-04-05

## 2024-04-05 RX ADMIN — SPIRONOLACTONE 25 MG: 25 TABLET ORAL at 11:01

## 2024-04-05 RX ADMIN — ENOXAPARIN SODIUM 40 MG: 100 INJECTION SUBCUTANEOUS at 05:25

## 2024-04-05 RX ADMIN — VALSARTAN 160 MG: 160 TABLET, FILM COATED ORAL at 11:01

## 2024-04-05 RX ADMIN — DEXAMETHASONE 6 MG: 4 TABLET ORAL at 11:02

## 2024-04-05 RX ADMIN — CYCLOSPORINE 1 DROP: 0.5 EMULSION OPHTHALMIC at 11:01

## 2024-04-05 RX ADMIN — CETIRIZINE HYDROCHLORIDE 5 MG: 10 TABLET, FILM COATED ORAL at 11:02

## 2024-04-05 RX ADMIN — ASPIRIN 81 MG: 81 TABLET, COATED ORAL at 11:01

## 2024-04-05 RX ADMIN — FERROUS SULFATE TAB 325 MG (65 MG ELEMENTAL FE) 325 MG: 325 (65 FE) TAB at 11:02

## 2024-04-05 RX ADMIN — ACETAMINOPHEN 650 MG: 325 TABLET ORAL at 11:01

## 2024-04-05 RX ADMIN — FAMOTIDINE 20 MG: 20 TABLET, FILM COATED ORAL at 11:02

## 2024-04-05 RX ADMIN — AMLODIPINE BESYLATE 5 MG: 5 TABLET ORAL at 11:02

## 2024-04-05 RX ADMIN — FUROSEMIDE 20 MG: 20 TABLET ORAL at 11:01

## 2024-04-05 RX ADMIN — MICONAZOLE NITRATE 1 APPLICATION: 20 POWDER TOPICAL at 11:04

## 2024-04-05 RX ADMIN — GUAIFENESIN 1200 MG: 600 TABLET, EXTENDED RELEASE ORAL at 11:02

## 2024-04-05 RX ADMIN — HYDRALAZINE HYDROCHLORIDE 25 MG: 25 TABLET ORAL at 05:25

## 2024-04-05 RX ADMIN — Medication 10 ML: at 11:03

## 2024-04-05 NOTE — DISCHARGE PLACEMENT REQUEST
"Case Management  [phone]    Tho Vasquez (83 y.o. Male)       Date of Birth   1940    Social Security Number       Address   [address] [place] [address]    Home Phone   [phone]    MRN   [id number]       Gnosticist   Restorationism    Marital Status                               Admission Date   3/29/24    Admission Type   Emergency    Admitting Provider   Sena Hartley DO    Attending Provider   Sena Hartley DO    Department, Room/Bed   [facility] 3H, S381/1       Discharge Date       Discharge Disposition   Rehab Facility or Unit (DC - External)    Discharge Destination                                 Attending Provider: Sena Hartley DO    Allergies: Prednisone    Isolation: Contact, Airborne   Infection: COVID (confirmed) (03/29/24)   Code Status: CPR    Ht: 177.8 cm (70\")   Wt: 113 kg (248 lb 3.8 oz)    Admission Cmt: None   Principal Problem: Acute hypoxemic respiratory failure due to COVID-19 [U07.1,J96.01]                   Active Insurance as of 3/29/2024       Primary Coverage       Payor Plan Insurance Group Employer/Plan Group    MEDICARE MEDICARE A & B        Payor Plan Address Payor Plan Phone Number Payor Plan Fax Number Effective Dates    [address] [phone]  4/1/2005 - None Entered    [place] [id number]         Subscriber Name Subscriber Birth Date Member ID       THO VASQUEZ 1940 [id number]               Secondary Coverage       Payor Plan Insurance Group Employer/Plan Group    HUMANA HUMANA  SUP              [id number]       Payor Plan Address Payor Plan Phone Number Payor Plan Fax Number Effective Dates    [address] [address]   2/1/2018 - None Entered    [place] [id number]         Subscriber Name Subscriber Birth Date Member ID       THO VASQUEZ 1940 [id number]                     Emergency Contacts        (Rel.) Home Phone Work Phone Mobile Phone    CY VASQUEZ (Daughter) -- -- " [phone]    tanya lord (Daughter) -- -- [phone]              Discharge Summary    No notes of this type exist for this encounter.        Johanne Platt APRN   Nurse Practitioner  Medicine     Discharge Summary      Cosign Needed     Date of Service: 24  Creation Time: 24     Cosign Needed       Expand All Collapse All         [facility] Medicine Services  TRANSFER SUMMARY     Patient Name: Tho Vasquez  : 1940  MRN: [id number]     Date of Admission: 3/29/2024  Date of Discharge:  2024  Length of Stay: 6  Primary Care Physician: Ja Baldwin MD     Consults         Date and Time Order Name Status Description     3/30/2024  2:50 AM Inpatient Cardiology Consult Completed                  Hospital Course      Presenting Problem:   Acute hypoxemic respiratory failure due to COVID-19 [U07.1, J96.01]           Active Hospital Problems     Diagnosis   POA    **Acute hypoxemic respiratory failure due to COVID-19 [U07.1, J96.01]   Yes    Bradycardia, drug induced [R00.1, T50.905A]   Unknown    Hypomagnesemia [E83.42]   Yes    CAD (coronary artery disease) [I25.10]   Yes    Mixed hyperlipidemia [E78.2]   Yes    Primary hypertension [I10]   Yes    Chronic kidney disease, stage 3 [N18.30]   Yes       Resolved Hospital Problems   No resolved problems to display.            Hospital Course:  Tho Vasquez is a 83 y.o. male  with past medical history significant for CAD, HTN, HLD, and cataracts who presents to the ED due to increased weakness and fatigue for the past 3 days with concern for positive at-home COVID-19 test. Respiratory PCR was positive for COVID-19.     COVID-19 PNA   -Symptoms started 3/27, tested positive 3/29.  Continue COVID isolation through today 2024.  -CTA negative for PE with small patchy airspace opacities in the right upper lobe, lateral right lower lobe, and inferior lingula suggestive of mild  "multifocal pneumonia  -Given remdesivir initially but stopped due to bradycardia.    -Cont Decadron po to complete full 10-day course.  -s/p Lovenox BID 40mg initially.  After discussion with pharmacy today feels safe due to BMI to decrease to 40 mg once daily.  Recommend continuing daily prophylaxis if remains stable through 4/8, then defer to provider at [facility] to further management  -Mucinex BID   -Albuterol, Delsym, Tylenol PRN, oxygen as needed     Bradycardia  -ECG with 1st degree to 2nd degree with 2:1 conduction AV block.   -Cardiology input appreciated. Dr Haider 4/3 note reflects \"Patient is still having Mobitz type I block and I do not believe that needs any further investigation at this time. Patient is recovering from his COVID and we might do a stress test on an outpatient once he fully recovered  He is not symptomatic at all with these episodes we will keep watching off-and-on\"  -Holding coreg.  Stable.  --Will continue to hold Coreg and continue current cardiology ordered medication regimen.  Will arrange for heart valve center follow-up in 1-2 weeks.  Cardiology follow-up in 4-6 weeks.     Recent falls  Generalized weakness  -CT head revealed no acute intracranial abnormality   -suspect d/t above  -PT/OT consults  -Fall precautions.  Transferring to [facility] for rehab today.     Bilateral lower extremity edema  -PT wound consulted for compression wraps  -cont home Lasix 20 mg daily   -Echo EF of 55 to 60%  -Strict I&O, daily weight.  Improved.  Continue compression wraps per PT at [facility].     Poor appetite  -Nutrition consulted, improved     Hypomagnesemia   -Replace per protocol      Abnormal CT  -CT chest revealed slightly nodular liver contours concerning for cirrhosis  -hepatitis panel neg, albumin is slightly low but this could be related to acute illness.  AST is slightly high as well.  Platelets improving.  Will continue to follow.    --Pt would benefit from outpatient follow-up with GI for " further evaluation.  As patient is going to acute rehab, will defer to PCP to arrange outpatient GI evaluation     HTN  HLD  Hx CAD  -continue ASA, Valsartan, Hydralazine, Lipitor  -cont current meds.  May need to titrate up hydralazine for better BP control  -- BP currently stable, 134/59 after morning meds.  Continue current regimen.  -- Cardiology did start patient on spironolactone along with other scheduled medicines, which included valsartan.  Recommend recheck BMP to monitor electrolytes at [facility] and 1-2 days.     Was seen resting up in the chair in no acute distress.  Awake and alert.  Oriented x 3.  Hemodynamically stable and afebrile.  Denies shortness of breath.  Cleared for transfer to rehab today bowel services.  Going on medications as below with follow-ups as noted.           Discharge Follow Up Recommendations for outpatient labs/diagnostics:  Patient is cleared for transfer to [facility] today by all services.  Going on medications as below with follow-up as noted.     -- To be seen by provider at [facility] on arrival, PCP 1 week of discharge  -- To be seen by [facility] in 1-2 weeks of discharge  -- Follow-up [facility] in 4-6 weeks     Cardiology started patient on spironolactone (and already on Losartan among other meds for cards hx). I recommend recheck BMP to monitor electrolytes at [facility] and 1-2 days.     Day of Discharge      HPI:   Patient was seen resting up in the chair no acute distress.  Awake and alert.  Daughter at bedside.  Denies shortness of breath.  Still having occasional congested cough.  Clearing secretions.  No chest pain or palpitations.  Eager to get to [facility] today as planned.     Review of Systems  Gen- No fevers, chills  CV- No chest pain, palpitations  Resp- as above   GI- No N/V/D, abd pain        Vital Signs:   Temp:  [97.6 °F (36.4 °C)-98.4 °F (36.9 °C)] 97.6 °F (36.4 °C)  Heart Rate:  [63-89] 81  Resp:  [16-18] 16  BP:  "(98191)/() 176/70      Physical Exam:  Constitutional: No acute distress, awake, alert, resting up in bed.  Elderly chronically ill-appearing gentleman.  Daughter at bedside.  HENT: NCAT, mucous membranes moist  Respiratory: respiratory effort normal--coarse BS scattered, decreased at bases.  When placed on room air with sats 92%.  Cardiovascular: Irreg rate and rhythm, no murmurs, rubs, or gallops  Gastrointestinal: Positive bowel sounds, soft, nontender, nondistended.  Obese.  Musculoskeletal: 1+ bilateral ankle edema- wraps in place bilaterally, stable.  Psychiatric: Appropriate affect, cooperative  Neurologic: Oriented x 3, hard of hearing. strength symmetric in all extremities, Cranial Nerves grossly intact to confrontation, speech clear.  Follows commands.  Skin: No rashes     Pertinent Results               Results from last 7 days   Lab Units 04/02/24  0645 03/31/24  0513 03/30/24  0841 03/29/24  1241 03/29/24  1158   WBC 10*3/mm3 11.64* 10.14 6.35  --  7.34   HEMOGLOBIN g/dL 12.0* 11.1* 11.2*  --  13.2   HEMATOCRIT % 35.4* 33.8* 33.9*  --  39.4   PLATELETS 10*3/mm3 105* 77* 70*  --  97*   SODIUM mmol/L 140 143 142 141  --    POTASSIUM mmol/L 4.3 4.3 3.9 3.8  --    CHLORIDE mmol/L 103 106 105 103  --    CO2 mmol/L 32.0* 30.0* 31.0* 31.0*  --    BUN mg/dL 29* 25* 23 23  --    CREATININE mg/dL 0.92 0.86 0.84 1.01  --    GLUCOSE mg/dL 83 97 110* 85  --    CALCIUM mg/dL 8.2* 8.1* 8.3* 9.1  --               Results from last 7 days   Lab Units 04/02/24  0645 03/31/24  0513 03/30/24  0841 03/29/24  1241   BILIRUBIN mg/dL 1.0 0.7 0.8 1.4*   ALK PHOS U/L 58 58 62 70   ALT (SGPT) U/L 50* 40 34 36   AST (SGOT) U/L 59* 79* 73* 74*   PROTIME Seconds  --  15.2*  --   --    INR    --  1.19*  --   --              Invalid input(s): \"TG\", \"LDLCALC\", \"LDLREALC\"       Results from last 7 days   Lab Units 03/29/24  1241   TSH uIU/mL 1.750      Brief Urine Lab Results  (Last result in the past 365 days)          Color   " Clarity   Blood   Leuk Est   Nitrite   Protein   CREAT   Urine HCG         03/29/24 1236 Dark Yellow    Cloudy    Negative    Negative    Negative    Trace                             Microbiology Results Abnormal         None                Imaging Results (All)         Procedure Component Value Units Date/Time     CT Angiogram Chest [[id number]] Collected: 03/29/24 1625       Updated: 03/29/24 1634     Narrative:       CT ANGIOGRAM CHEST     Date of Exam: 3/29/2024 4:20 PM EDT     Indication: hypoxia, elevated dimer, COVID +.     Comparison: None available.     Technique: CTA of the chest was performed after the uneventful intravenous administration of Isovue-370, 75 mL. Reconstructed coronal and sagittal images were also obtained. In addition, a 3-D volume rendered image was created for interpretation.   Automated exposure control and iterative reconstruction methods were used.        Findings:     Pulmonary arteries: Adequate opacification of the pulmonary arteries. No evidence of acute pulmonary embolism.     Thoracic aorta: The thoracic aorta is normal in caliber and contour. Scattered atheromatous calcification of the thoracic aorta visualized.     Thyroid: The visualized portion of the thyroid is unremarkable.     Lungs and Pleura: Small scattered patchy airspace opacities are visualized in the right upper lobe, near the apex, posterior right upper lobe, lateral right lower lobe, and inferior lingula. No pleural effusion. There is suggestion of small volume round   atelectasis in the left lateral costophrenic sulcus. No pneumothorax.     Mediastinum/Sherry: No mediastinal or hilar lymphadenopathy. Calcifications are visualized throughout the mediastinal lymph nodes and are most prominent in the right subcarinal lymph nodes.     Lymph nodes: No axillary or supraclavicular lymphadenopathy.     Cardiovascular: The heart is normal in size.No evidence of pericardial effusion. Extensive coronary calcifications are  visualized.     Upper Abdomen: Gallbladder is filled with radiopaque calculi. Liver is slightly nodular in contour. Spleen is normal in size. Coarse calcification in the anterior spleen measures 1.7 cm possibly presenting granuloma..     Bones and Soft Tissue: No acute fracture, aggressive osseous lesions, or soft tissue process. Mild degenerative changes of the thoracic spine are visualized.           Impression:       Impression:     No evidence of pulmonary embolism.     No evidence of thoracic aortic aneurysm or dissection.     Small patchy airspace opacities in the right upper lobe, lateral right lower lobe, and inferior lingula suggestive of mild multifocal pneumonia.     Extensive coronary calcifications.     Cholelithiasis.     Slightly nodular liver contour. Correlate with cirrhosis.           Electronically Signed: Kavon Lock MD    3/29/2024 4:31 PM EDT    Workstation ID: [id number]     CT Head Without Contrast [[id number]] Collected: 03/29/24 1304       Updated: 03/29/24 1309     Narrative:       CT HEAD WO CONTRAST     Date of Exam: 3/29/2024 12:56 PM EDT     Indication: gen weakness.     Comparison: MRI brain 10/3/2019     Technique: Axial CT images were obtained of the head without contrast administration.  Automated exposure control and iterative construction methods were used.        Findings:  There is no evidence of acute territorial infarction.     There is no acute intracranial hemorrhage. There are no extra-axial collections.     Ventricles and CSF spaces are symmetric. No mass effect nor hydrocephalus.     There is moderate white matter hypoattenuation without mass effect, typical for age. Brain parenchyma is otherwise unremarkable.     Paranasal sinuses and mastoid air cells are adequately aerated.     Osseous structures and orbits appear intact.        Impression:       Impression:  1.No acute process identified.           Electronically Signed: Shankar Espinoza MD    3/29/2024 1:06 PM EDT     Workstation ID: [id number]     XR Chest 1 View [[id number]] Collected: 03/29/24 1254       Updated: 03/29/24 1259     Narrative:       XR CHEST 1 VW     Date of Exam: 3/29/2024 12:03 PM EDT     Indication: Weak/Dizzy/AMS triage protocol     Comparison: Chest radiograph 10/25/2019.     Findings:  Cardiomediastinal silhouette is unchanged. Low lung volumes. Chronic small left pleural effusion/thickening with adjacent atelectasis/scarring. No new focal consolidation or enlarging pleural effusion. No pneumothorax. Osseous structures are unchanged.        Impression:       Impression:  Similar chronic findings without evidence of acute cardiopulmonary disease.         Electronically Signed: Dale Darby MD    3/29/2024 12:56 PM EDT    Workstation ID: [id number]                Results for orders placed during the hospital encounter of 03/29/24     Adult Transthoracic Echo Complete W/ Cont if Necessary Per Protocol     Interpretation Summary    Left ventricular ejection fraction appears to be 56 - 60%.    The right ventricular cavity is mildly dilated.    The left atrial cavity is mildly dilated.    Estimated right ventricular systolic pressure from tricuspid regurgitation is normal (<35 mmHg). Calculated right ventricular systolic pressure from tricuspid regurgitation is 9 mmHg.              Discharge Details          Discharge Medications          New Medications         Instructions Start Date   acetaminophen 325 MG tablet  Commonly known as: TYLENOL  Replaces: Tylenol 325 MG capsule    650 mg, Oral, Every 4 Hours PRN        albuterol sulfate  (90 Base) MCG/ACT inhaler  Commonly known as: PROVENTIL HFA;VENTOLIN HFA;PROAIR HFA    2 puffs, Inhalation, Every 6 Hours PRN        amLODIPine 5 MG tablet  Commonly known as: NORVASC    5 mg, Oral, Every 24 Hours Scheduled        aspirin 81 MG EC tablet  Replaces: aspirin 81 MG tablet    81 mg, Oral, Daily        benzonatate 100 MG capsule  Commonly known as: TESSALON    100  mg, Oral, 3 Times Daily PRN        cetirizine 5 MG tablet  Commonly known as: zyrTEC  Replaces: Claritin 10 MG capsule    5 mg, Oral, Daily        Enoxaparin Sodium 40 MG/0.4ML solution prefilled syringe syringe  Commonly known as: LOVENOX    40 mg, Subcutaneous, Every 24 Hours Scheduled        guaiFENesin 600 MG 12 hr tablet  Commonly known as: MUCINEX    1,200 mg, Oral, Every 12 Hours Scheduled        hydrALAZINE 25 MG tablet  Commonly known as: APRESOLINE    25 mg, Oral, Every 8 Hours Scheduled        miconazole 2 % powder  Commonly known as: MICOTIN  Replaces: nystatin 615125 UNIT/GM powder    1 Application, Topical, Every 12 Hours Scheduled        spironolactone 25 MG tablet  Commonly known as: ALDACTONE    25 mg, Oral, Daily                  Changes to Medications         Instructions Start Date   atorvastatin 40 MG tablet  Commonly known as: LIPITOR  What changed: when to take this    40 mg, Oral, Nightly                  Continue These Medications         Instructions Start Date   famotidine 20 MG tablet  Commonly known as: PEPCID    1 tablet, Oral, Every 12 Hours Scheduled        fenofibrate 145 MG tablet  Commonly known as: TRICOR    TAKE 1 TABLET DAILY        ferrous sulfate 325 (65 Fe) MG tablet    1 tablet, Oral, Daily        furosemide 20 MG tablet  Commonly known as: LASIX    TAKE 1 TABLET DAILY        Restasis 0.05 % ophthalmic emulsion  Generic drug: cycloSPORINE    1 drop, Both Eyes, Every 12 Hours        valsartan 160 MG tablet  Commonly known as: DIOVAN    TAKE 1 TABLET DAILY                  Stop These Medications       aspirin 81 MG tablet  Replaced by: aspirin 81 MG EC tablet      carvedilol 12.5 MG tablet  Commonly known as: COREG      Claritin 10 MG capsule  Generic drug: Loratadine  Replaced by: cetirizine 5 MG tablet      CORICIDIN HBP CONGESTION/COUGH PO      erythromycin 5 MG/GM ophthalmic ointment  Commonly known as: ROMYCIN      Nirmatrelvir & Ritonavir (300mg/100mg)  Commonly known  as: PAXLOVID      NON FORMULARY      nystatin 039460 UNIT/GM powder  Commonly known as: MYCOSTATIN  Replaced by: miconazole 2 % powder      Tylenol 325 MG capsule  Generic drug: Acetaminophen  Replaced by: acetaminophen 325 MG tablet                   Allergies        Allergies   Allergen Reactions    Prednisone Rash               Discharge Disposition:  Rehab Facility or Unit (DC - External)     Discharge Diet:      Diet Order   Procedures    Diet: Cardiac; Healthy Heart (2-3 Na+); Fluid Consistency: Thin (IDDSI 0)         Discharge Activity:  Activity Instructions         Activity as Tolerated       Measure Blood Pressure     Additional Activity Instructions:              ACTIVITY AS TOLERATED                  CODE STATUS:        Code Status and Medical Interventions:   Ordered at: 03/29/24 1801     Level Of Support Discussed With:     Patient     Code Status (Patient has no pulse and is not breathing):     CPR (Attempt to Resuscitate)     Medical Interventions (Patient has pulse or is breathing):     Full Support                   Future Appointments   Date Time Provider Department Center   12/12/2024  8:00 AM Ja Baldwin MD [facility]         Additional Instructions for the Follow-ups that You Need to Schedule         Discharge Follow-up with PCP   As directed         Currently Documented PCP:    Ja Baldwin MD    PCP Phone Number:    [phone]      Follow Up Details: To be seen by provider at [facility] on arrival, PCP 1 week of discharge           Discharge Follow-up with Specialty: Seen by [facility] in 1-2 weeks of discharge (please make appointment prior to DC)   As directed        Specialty: Seen by [facility] in 1-2 weeks of discharge (please make appointment prior to DC)           Discharge Follow-up with Specified Provider: Follow-up [facility] in 4-6 weeks (please schedule appointment prior to DC)   As directed        To: Follow-up [facility] in 4-6 weeks (please schedule appointment prior to DC)                      Electronically signed by Johanne Platt, ADALBERTO, 04/05/24, 9:58 AM EDT.        Time Spent on Discharge: I spent 50 minutes on this discharge activity which included: face-to-face encounter with the patient, reviewing the data in the system, coordination of the care with the nursing staff as well as consultants, documentation, and entering orders.

## 2024-04-05 NOTE — CASE MANAGEMENT/SOCIAL WORK
Case Management Discharge Note      Final Note: Mr. Vasquez is being discharged today.   will fax the discharge summary to [phone].  Nurse to please call report to [facility] @[phone].  Please have Mr. Vasquez at the [address] at 1120am for [facility] wheelchair  at 1130am.         Selected Continued Care - Admitted Since 3/29/2024       Destination Coordination complete.      Service Provider Selected Services Address Phone Fax Patient Preferred    [facility] [address] [address] [phone] --              Durable Medical Equipment    No services have been selected for the patient.                Dialysis/Infusion    No services have been selected for the patient.                Home Medical Care    No services have been selected for the patient.                Therapy    No services have been selected for the patient.                Community Resources    No services have been selected for the patient.                Community & DME    No services have been selected for the patient.                    Transportation Services  Ambulance: [facility]    Final Discharge Disposition Code: 62 - inpatient rehab facility

## 2024-04-05 NOTE — DISCHARGE SUMMARY
[facility] Medicine Services  TRANSFER SUMMARY    Patient Name: Tho Vasquez  : 1940  MRN: [id number]    Date of Admission: 3/29/2024  Date of Discharge:  2024  Length of Stay: 6  Primary Care Physician: Ja Baldwin MD    Consults       Date and Time Order Name Status Description    3/30/2024  2:50 AM Inpatient Cardiology Consult Completed             Hospital Course     Presenting Problem:   Acute hypoxemic respiratory failure due to COVID-19 [U07.1, J96.01]    Active Hospital Problems    Diagnosis  POA   • **Acute hypoxemic respiratory failure due to COVID-19 [U07.1, J96.01]  Yes   • Bradycardia, drug induced [R00.1, T50.905A]  Unknown   • Hypomagnesemia [E83.42]  Yes   • CAD (coronary artery disease) [I25.10]  Yes   • Mixed hyperlipidemia [E78.2]  Yes   • Primary hypertension [I10]  Yes   • Chronic kidney disease, stage 3 [N18.30]  Yes      Resolved Hospital Problems   No resolved problems to display.          Hospital Course:  Tho Vasquez is a 83 y.o. male  with past medical history significant for CAD, HTN, HLD, and cataracts who presents to the ED due to increased weakness and fatigue for the past 3 days with concern for positive at-home COVID-19 test. Respiratory PCR was positive for COVID-19.     COVID-19 PNA   -Symptoms started 3/27, tested positive 3/29.  Continue COVID isolation through today 2024.  -CTA negative for PE with small patchy airspace opacities in the right upper lobe, lateral right lower lobe, and inferior lingula suggestive of mild multifocal pneumonia  -Given remdesivir initially but stopped due to bradycardia.    -Cont Decadron po to complete full 10-day course.  -s/p Lovenox BID 40mg initially.  After discussion with pharmacy today feels safe due to BMI to decrease to 40 mg once daily.  Recommend continuing daily prophylaxis if remains stable through , then defer to provider at [facility] to further management  -Mucinex BID  "  -Albuterol, Delsym, Tylenol PRN, oxygen as needed     Bradycardia  -ECG with 1st degree to 2nd degree with 2:1 conduction AV block.   -Cardiology input appreciated. Dr Haider 4/3 note reflects \"Patient is still having Mobitz type I block and I do not believe that needs any further investigation at this time. Patient is recovering from his COVID and we might do a stress test on an outpatient once he fully recovered  He is not symptomatic at all with these episodes we will keep watching off-and-on\"  -Holding coreg.  Stable.  --Will continue to hold Coreg and continue current cardiology ordered medication regimen.  Will arrange for heart valve center follow-up in 1-2 weeks.  Cardiology follow-up in 4-6 weeks.     Recent falls  Generalized weakness  -CT head revealed no acute intracranial abnormality   -suspect d/t above  -PT/OT consults  -Fall precautions.  Transferring to [facility] for rehab today.     Bilateral lower extremity edema  -PT wound consulted for compression wraps  -cont home Lasix 20 mg daily   -Echo EF of 55 to 60%  -Strict I&O, daily weight.  Improved.  Continue compression wraps per PT at [facility].     Poor appetite  -Nutrition consulted, improved     Hypomagnesemia   -Replace per protocol      Abnormal CT  -CT chest revealed slightly nodular liver contours concerning for cirrhosis  -hepatitis panel neg, albumin is slightly low but this could be related to acute illness.  AST is slightly high as well.  Platelets improving.  Will continue to follow.    --Pt would benefit from outpatient follow-up with GI for further evaluation.  As patient is going to acute rehab, will defer to PCP to arrange outpatient GI evaluation     HTN  HLD  Hx CAD  -continue ASA, Valsartan, Hydralazine, Lipitor  -cont current meds.  May need to titrate up hydralazine for better BP control  -- BP currently stable, 134/59 after morning meds.  Continue current regimen.  -- Cardiology did start patient on spironolactone along with " other scheduled medicines, which included valsartan.  Recommend recheck BMP to monitor electrolytes at [facility] and 1-2 days.    Was seen resting up in the chair in no acute distress.  Awake and alert.  Oriented x 3.  Hemodynamically stable and afebrile.  Denies shortness of breath.  Cleared for transfer to rehab today bowel services.  Going on medications as below with follow-ups as noted.        Discharge Follow Up Recommendations for outpatient labs/diagnostics:  Patient is cleared for transfer to [facility] today by all services.  Going on medications as below with follow-up as noted.    -- To be seen by provider at [facility] on arrival, PCP 1 week of discharge  -- To be [facility] by [facility] in 1-2 weeks of discharge  -- Follow-up [facility] in 4-6 weeks    Cardiology started patient on spironolactone (and already on Losartan among other meds for cards hx). I recommend recheck BMP to monitor electrolytes at [facility] and 1-2 days.    Day of Discharge     HPI:   Patient was seen resting up in the chair no acute distress.  Awake and alert.  Daughter at bedside.  Denies shortness of breath.  Still having occasional congested cough.  Clearing secretions.  No chest pain or palpitations.  Eager to get to [facility] today as planned.    Review of Systems  Gen- No fevers, chills  CV- No chest pain, palpitations  Resp- as above   GI- No N/V/D, abd pain       Vital Signs:   Temp:  [97.6 °F (36.4 °C)-98.4 °F (36.9 °C)] 97.6 °F (36.4 °C)  Heart Rate:  [63-89] 81  Resp:  [16-18] 16  BP: ()/() 176/70     Physical Exam:  Constitutional: No acute distress, awake, alert, resting up in bed.  Elderly chronically ill-appearing gentleman.  Daughter at bedside.  HENT: NCAT, mucous membranes moist  Respiratory: respiratory effort normal--coarse BS scattered, decreased at bases.  When placed on room air with sats 92%.  Cardiovascular: Irreg rate and rhythm, no murmurs, rubs, or  "gallops  Gastrointestinal: Positive bowel sounds, soft, nontender, nondistended.  Obese.  Musculoskeletal: 1+ bilateral ankle edema- wraps in place bilaterally, stable.  Psychiatric: Appropriate affect, cooperative  Neurologic: Oriented x 3, hard of hearing. strength symmetric in all extremities, Cranial Nerves grossly intact to confrontation, speech clear.  Follows commands.  Skin: No rashes    Pertinent Results     Results from last 7 days   Lab Units 04/02/24  0645 03/31/24  0513 03/30/24  0841 03/29/24  1241 03/29/24  1158   WBC 10*3/mm3 11.64* 10.14 6.35  --  7.34   HEMOGLOBIN g/dL 12.0* 11.1* 11.2*  --  13.2   HEMATOCRIT % 35.4* 33.8* 33.9*  --  39.4   PLATELETS 10*3/mm3 105* 77* 70*  --  97*   SODIUM mmol/L 140 143 142 141  --    POTASSIUM mmol/L 4.3 4.3 3.9 3.8  --    CHLORIDE mmol/L 103 106 105 103  --    CO2 mmol/L 32.0* 30.0* 31.0* 31.0*  --    BUN mg/dL 29* 25* 23 23  --    CREATININE mg/dL 0.92 0.86 0.84 1.01  --    GLUCOSE mg/dL 83 97 110* 85  --    CALCIUM mg/dL 8.2* 8.1* 8.3* 9.1  --      Results from last 7 days   Lab Units 04/02/24  0645 03/31/24  0513 03/30/24  0841 03/29/24  1241   BILIRUBIN mg/dL 1.0 0.7 0.8 1.4*   ALK PHOS U/L 58 58 62 70   ALT (SGPT) U/L 50* 40 34 36   AST (SGOT) U/L 59* 79* 73* 74*   PROTIME Seconds  --  15.2*  --   --    INR   --  1.19*  --   --            Invalid input(s): \"TG\", \"LDLCALC\", \"LDLREALC\"  Results from last 7 days   Lab Units 03/29/24  1241   TSH uIU/mL 1.750     Brief Urine Lab Results  (Last result in the past 365 days)        Color   Clarity   Blood   Leuk Est   Nitrite   Protein   CREAT   Urine HCG        03/29/24 1236 Dark Yellow   Cloudy   Negative   Negative   Negative   Trace                   Microbiology Results Abnormal       None            Imaging Results (All)       Procedure Component Value Units Date/Time    CT Angiogram Chest [[id number]] Collected: 03/29/24 1625     Updated: 03/29/24 1634    Narrative:      CT ANGIOGRAM CHEST    Date of Exam: " 3/29/2024 4:20 PM EDT    Indication: hypoxia, elevated dimer, COVID +.    Comparison: None available.    Technique: CTA of the chest was performed after the uneventful intravenous administration of Isovue-370, 75 mL. Reconstructed coronal and sagittal images were also obtained. In addition, a 3-D volume rendered image was created for interpretation.   Automated exposure control and iterative reconstruction methods were used.      Findings:    Pulmonary arteries: Adequate opacification of the pulmonary arteries. No evidence of acute pulmonary embolism.    Thoracic aorta: The thoracic aorta is normal in caliber and contour. Scattered atheromatous calcification of the thoracic aorta visualized.    Thyroid: The visualized portion of the thyroid is unremarkable.    Lungs and Pleura: Small scattered patchy airspace opacities are visualized in the right upper lobe, near the apex, posterior right upper lobe, lateral right lower lobe, and inferior lingula. No pleural effusion. There is suggestion of small volume round   atelectasis in the left lateral costophrenic sulcus. No pneumothorax.    Mediastinum/Sherry: No mediastinal or hilar lymphadenopathy. Calcifications are visualized throughout the mediastinal lymph nodes and are most prominent in the right subcarinal lymph nodes.    Lymph nodes: No axillary or supraclavicular lymphadenopathy.    Cardiovascular: The heart is normal in size.No evidence of pericardial effusion. Extensive coronary calcifications are visualized.    Upper Abdomen: Gallbladder is filled with radiopaque calculi. Liver is slightly nodular in contour. Spleen is normal in size. Coarse calcification in the anterior spleen measures 1.7 cm possibly presenting granuloma..    Bones and Soft Tissue: No acute fracture, aggressive osseous lesions, or soft tissue process. Mild degenerative changes of the thoracic spine are visualized.        Impression:      Impression:    No evidence of pulmonary embolism.    No  evidence of thoracic aortic aneurysm or dissection.    Small patchy airspace opacities in the right upper lobe, lateral right lower lobe, and inferior lingula suggestive of mild multifocal pneumonia.    Extensive coronary calcifications.    Cholelithiasis.    Slightly nodular liver contour. Correlate with cirrhosis.        Electronically Signed: Kavon Lock MD    3/29/2024 4:31 PM EDT    Workstation ID: [id number]    CT Head Without Contrast [[id number]] Collected: 03/29/24 1304     Updated: 03/29/24 1309    Narrative:      CT HEAD WO CONTRAST    Date of Exam: 3/29/2024 12:56 PM EDT    Indication: gen weakness.    Comparison: MRI brain 10/3/2019    Technique: Axial CT images were obtained of the head without contrast administration.  Automated exposure control and iterative construction methods were used.      Findings:  There is no evidence of acute territorial infarction.    There is no acute intracranial hemorrhage. There are no extra-axial collections.    Ventricles and CSF spaces are symmetric. No mass effect nor hydrocephalus.    There is moderate white matter hypoattenuation without mass effect, typical for age. Brain parenchyma is otherwise unremarkable.     Paranasal sinuses and mastoid air cells are adequately aerated.     Osseous structures and orbits appear intact.      Impression:      Impression:  1.No acute process identified.        Electronically Signed: Shankar Espinoza MD    3/29/2024 1:06 PM EDT    Workstation ID: [id number]    XR Chest 1 View [[id number]] Collected: 03/29/24 1254     Updated: 03/29/24 1259    Narrative:      XR CHEST 1 VW    Date of Exam: 3/29/2024 12:03 PM EDT    Indication: Weak/Dizzy/AMS triage protocol    Comparison: Chest radiograph 10/25/2019.    Findings:  Cardiomediastinal silhouette is unchanged. Low lung volumes. Chronic small left pleural effusion/thickening with adjacent atelectasis/scarring. No new focal consolidation or enlarging pleural effusion. No pneumothorax.  Osseous structures are unchanged.      Impression:      Impression:  Similar chronic findings without evidence of acute cardiopulmonary disease.       Electronically Signed: Dale Darby MD    3/29/2024 12:56 PM EDT    Workstation ID: [id number]            Results for orders placed during the hospital encounter of 03/29/24    Adult Transthoracic Echo Complete W/ Cont if Necessary Per Protocol    Interpretation Summary  •  Left ventricular ejection fraction appears to be 56 - 60%.  •  The right ventricular cavity is mildly dilated.  •  The left atrial cavity is mildly dilated.  •  Estimated right ventricular systolic pressure from tricuspid regurgitation is normal (<35 mmHg). Calculated right ventricular systolic pressure from tricuspid regurgitation is 9 mmHg.          Discharge Details        Discharge Medications        New Medications        Instructions Start Date   acetaminophen 325 MG tablet  Commonly known as: TYLENOL  Replaces: Tylenol 325 MG capsule   650 mg, Oral, Every 4 Hours PRN      albuterol sulfate  (90 Base) MCG/ACT inhaler  Commonly known as: PROVENTIL HFA;VENTOLIN HFA;PROAIR HFA   2 puffs, Inhalation, Every 6 Hours PRN      amLODIPine 5 MG tablet  Commonly known as: NORVASC   5 mg, Oral, Every 24 Hours Scheduled      aspirin 81 MG EC tablet  Replaces: aspirin 81 MG tablet   81 mg, Oral, Daily      benzonatate 100 MG capsule  Commonly known as: TESSALON   100 mg, Oral, 3 Times Daily PRN      cetirizine 5 MG tablet  Commonly known as: zyrTEC  Replaces: Claritin 10 MG capsule   5 mg, Oral, Daily      dexAMETHasone 6 MG tablet  Commonly known as: DECADRON   6 mg, Oral, Daily      Enoxaparin Sodium 40 MG/0.4ML solution prefilled syringe syringe  Commonly known as: LOVENOX   40 mg, Subcutaneous, Every 24 Hours Scheduled      guaiFENesin 600 MG 12 hr tablet  Commonly known as: MUCINEX   1,200 mg, Oral, Every 12 Hours Scheduled      hydrALAZINE 25 MG tablet  Commonly known as: APRESOLINE   25 mg,  Oral, Every 8 Hours Scheduled      miconazole 2 % powder  Commonly known as: MICOTIN  Replaces: nystatin 298970 UNIT/GM powder   1 Application, Topical, Every 12 Hours Scheduled      spironolactone 25 MG tablet  Commonly known as: ALDACTONE   25 mg, Oral, Daily             Changes to Medications        Instructions Start Date   atorvastatin 40 MG tablet  Commonly known as: LIPITOR  What changed: when to take this   40 mg, Oral, Nightly             Continue These Medications        Instructions Start Date   famotidine 20 MG tablet  Commonly known as: PEPCID   1 tablet, Oral, Every 12 Hours Scheduled      fenofibrate 145 MG tablet  Commonly known as: TRICOR   TAKE 1 TABLET DAILY      ferrous sulfate 325 (65 Fe) MG tablet   1 tablet, Oral, Daily      furosemide 20 MG tablet  Commonly known as: LASIX   TAKE 1 TABLET DAILY      Restasis 0.05 % ophthalmic emulsion  Generic drug: cycloSPORINE   1 drop, Both Eyes, Every 12 Hours      valsartan 160 MG tablet  Commonly known as: DIOVAN   TAKE 1 TABLET DAILY             Stop These Medications      aspirin 81 MG tablet  Replaced by: aspirin 81 MG EC tablet     carvedilol 12.5 MG tablet  Commonly known as: COREG     Claritin 10 MG capsule  Generic drug: Loratadine  Replaced by: cetirizine 5 MG tablet     CORICIDIN HBP CONGESTION/COUGH PO     erythromycin 5 MG/GM ophthalmic ointment  Commonly known as: ROMYCIN     Nirmatrelvir & Ritonavir (300mg/100mg)  Commonly known as: PAXLOVID     NON FORMULARY     nystatin 400435 UNIT/GM powder  Commonly known as: MYCOSTATIN  Replaced by: miconazole 2 % powder     Tylenol 325 MG capsule  Generic drug: Acetaminophen  Replaced by: acetaminophen 325 MG tablet              Allergies   Allergen Reactions   • Prednisone Rash         Discharge Disposition:  Rehab Facility or Unit (DC - External)    Discharge Diet:  Diet Order   Procedures   • Diet: Cardiac; Healthy Heart (2-3 Na+); Fluid Consistency: Thin (IDDSI 0)       Discharge  Activity:  Activity Instructions       Activity as Tolerated      Measure Blood Pressure     Additional Activity Instructions:    ACTIVITY AS TOLERATED             CODE STATUS:    Code Status and Medical Interventions:   Ordered at: 03/29/24 1801     Level Of Support Discussed With:    Patient     Code Status (Patient has no pulse and is not breathing):    CPR (Attempt to Resuscitate)     Medical Interventions (Patient has pulse or is breathing):    Full Support         Future Appointments   Date Time Provider Department Center   4/12/2024  9:15 AM Padmini Proctor APRN [facility]   5/16/2024  8:30 AM Cristhian Haider MD [facility]   12/12/2024  8:00 AM Ja Baldwin MD [facility]       Additional Instructions for the Follow-ups that You Need to Schedule       Discharge Follow-up with PCP   As directed       Currently Documented PCP:    Ja Baldwin MD    PCP Phone Number:    [phone]     Follow Up Details: To be seen by provider at [facility] on arrival, PCP 1 week of discharge        Discharge Follow-up with Specialty: Seen by [facility] in 1-2 weeks of discharge (please make appointment prior to DC)   As directed      Specialty: Seen by [facility] in 1-2 weeks of discharge (please make appointment prior to DC)        Discharge Follow-up with Specified Provider: Follow-up [facility] in 4-6 weeks (please schedule appointment prior to DC)   As directed      To: Follow-up [facility] in 4-6 weeks (please schedule appointment prior to DC)                Electronically signed by ADALBERTO Marquis, 04/05/24, 9:58 AM EDT.      Time Spent on Discharge: I spent 50 minutes on this discharge activity which included: face-to-face encounter with the patient, reviewing the data in the system, coordination of the care with the nursing staff as well as consultants, documentation, and entering orders.

## 2024-04-05 NOTE — PLAN OF CARE
Patient is alert and oriented X4, pleasant, compliant with treatment regimen. Bath given overnight. VSS. Plan is to discharge today to [facility]. Brigette Kumar RN   Problem: Adult Inpatient Plan of Care  Goal: Plan of Care Review  Outcome: Ongoing, Progressing  Goal: Patient-Specific Goal (Individualized)  Outcome: Ongoing, Progressing  Goal: Absence of Hospital-Acquired Illness or Injury  Outcome: Ongoing, Progressing  Intervention: Identify and Manage Fall Risk  Description: Perform standard risk assessment on admission using a validated tool or comprehensive approach appropriate to the patient; reassess fall risk frequently, with change in status or transfer to another level of care.  Communicate fall injury risk to interprofessional healthcare team.  Determine need for increased observation, equipment and environmental modification, such as low bed, signage and supportive, nonskid footwear.  Adjust safety measures to individual developmental age, stage and identified risk factors.  Reinforce the importance of safety and physical activity with patient and family.  Perform regular intentional rounding to assess need for position change, pain assessment and personal needs, including assistance with toileting.  Recent Flowsheet Documentation  Taken 4/5/2024 0400 by Brigette Kumar, RN  Safety Promotion/Fall Prevention:   activity supervised   assistive device/personal items within reach   clutter free environment maintained   fall prevention program maintained   gait belt   lighting adjusted   mobility aid in reach   nonskid shoes/slippers when out of bed   safety round/check completed   toileting scheduled  Taken 4/5/2024 0200 by Brigette Kumar, RN  Safety Promotion/Fall Prevention:   activity supervised   assistive device/personal items within reach   clutter free environment maintained   fall prevention program maintained   gait belt   lighting adjusted   mobility aid in reach   nonskid shoes/slippers when  out of bed   safety round/check completed   toileting scheduled  Taken 4/5/2024 0030 by Brigette Kumar RN  Safety Promotion/Fall Prevention:   activity supervised   assistive device/personal items within reach   clutter free environment maintained   fall prevention program maintained   gait belt   lighting adjusted   mobility aid in reach   nonskid shoes/slippers when out of bed   safety round/check completed   toileting scheduled  Taken 4/4/2024 2200 by Brigette Kumar RN  Safety Promotion/Fall Prevention:   activity supervised   safety round/check completed   assistive device/personal items within reach   fall prevention program maintained  Taken 4/4/2024 2003 by Brigette Kumar RN  Safety Promotion/Fall Prevention:   assistive device/personal items within reach   clutter free environment maintained   fall prevention program maintained   lighting adjusted   mobility aid in reach   nonskid shoes/slippers when out of bed   room organization consistent   safety round/check completed  Intervention: Prevent Skin Injury  Description: Perform a screening for skin injury risk, such as pressure or moisture associated skin damage on admission and at regular intervals throughout hospital stay.  Keep all areas of skin (especially folds) clean and dry.  Maintain adequate skin hydration.  Relieve and redistribute pressure and protect bony prominences; implement measures based on patient-specific risk factors.  Match turning and repositioning schedule to clinical condition.  Encourage weight shift frequently; assist with reposition if unable to complete independently.  Float heels off bed; avoid pressure on the Achilles tendon.  Keep skin free from extended contact with medical devices.  Encourage functional activity and mobility, as early as tolerated.  Use aids (e.g., slide boards, mechanical lift) during transfer.  Recent Flowsheet Documentation  Taken 4/5/2024 0400 by Brigette Kumar RN  Body Position:    weight shifting   legs elevated  Taken 4/5/2024 0200 by Brigette Kumar RN  Body Position:   weight shifting   legs elevated  Taken 4/5/2024 0030 by Brigette Kumar RN  Body Position:   weight shifting   legs elevated  Taken 4/4/2024 2200 by Brigette Kumar RN  Skin Protection:   adhesive use limited   skin-to-device areas padded   skin-to-skin areas padded   tubing/devices free from skin contact  Taken 4/4/2024 2003 by Brigette Kumar RN  Body Position: legs elevated  Intervention: Prevent and Manage VTE (Venous Thromboembolism) Risk  Description: Assess for VTE (venous thromboembolism) risk.  Encourage and assist with early ambulation.  Initiate and maintain compression or other therapy, as indicated, based on identified risk in accordance with organizational protocol and provider order.  Encourage both active and passive leg exercises while in bed, if unable to ambulate.  Recent Flowsheet Documentation  Taken 4/5/2024 0400 by Brigette Kumar RN  Activity Management: up in chair  VTE Prevention/Management:   sequential compression devices on   bilateral  Taken 4/5/2024 0200 by Brigette Kumar RN  Activity Management: up in chair  VTE Prevention/Management:   bilateral   sequential compression devices on  Taken 4/5/2024 0030 by Brigette Kumar RN  Activity Management: up in chair  VTE Prevention/Management:   bilateral   sequential compression devices on  Taken 4/4/2024 2200 by Brigette Kumar RN  Activity Management: up in chair  VTE Prevention/Management:   bilateral   sequential compression devices on  Taken 4/4/2024 2003 by Brigette Kumar RN  Activity Management: activity encouraged  VTE Prevention/Management:   sequential compression devices on   bilateral  Range of Motion: active ROM (range of motion) encouraged  Intervention: Prevent Infection  Description: Maintain skin and mucous membrane integrity; promote hand, oral and pulmonary hygiene.  Optimize fluid balance,  nutrition, sleep and glycemic control to maximize infection resistance.  Identify potential sources of infection early to prevent or mitigate progression of infection (e.g., wound, lines, devices).  Evaluate ongoing need for invasive devices; remove promptly when no longer indicated.  Recent Flowsheet Documentation  Taken 4/5/2024 0400 by Brigette Kumar RN  Infection Prevention:   environmental surveillance performed   single patient room provided  Taken 4/5/2024 0200 by Brigette Kumar RN  Infection Prevention:   environmental surveillance performed   single patient room provided  Taken 4/5/2024 0030 by Brigette Kumar RN  Infection Prevention:   environmental surveillance performed   single patient room provided  Taken 4/4/2024 2200 by Brigette Kumar RN  Infection Prevention:   personal protective equipment utilized   rest/sleep promoted   single patient room provided  Goal: Optimal Comfort and Wellbeing  Outcome: Ongoing, Progressing  Goal: Readiness for Transition of Care  Outcome: Ongoing, Progressing     Problem: Fall Injury Risk  Goal: Absence of Fall and Fall-Related Injury  Outcome: Ongoing, Progressing  Intervention: Identify and Manage Contributors  Description: Develop a fall prevention plan with the patient and caregiver/family.  Provide reorientation, appropriate sensory stimulation and routines with changes in mental status to decrease risk of fall.  Promote use of personal vision and auditory aids.  Assess assistance level required for safe and effective self-care; provide support as needed, such as toileting, mobilization. For age 65 and older, implement timed toileting with assistance.  Encourage physical activity, such as performance of mobility and self-care at highest level of patient ability, multicomponent exercise program and provision of appropriate assistive devices.  If fall occurs, assess the severity of injury; implement fall injury protocol. Determine the cause and  revise fall injury prevention plan.  Regularly review medication contribution to fall risk; adjust medication administration times to minimize risk of falling.  Consider risk related to polypharmacy and age.  Balance adequate pain management with potential for oversedation.  Recent Flowsheet Documentation  Taken 4/5/2024 0400 by Brigette Kumar RN  Medication Review/Management: medications reviewed  Taken 4/5/2024 0200 by Brigette Kumar RN  Medication Review/Management: medications reviewed  Taken 4/5/2024 0030 by Brigette Kumar RN  Medication Review/Management: medications reviewed  Taken 4/4/2024 2200 by Brigette Kumar RN  Medication Review/Management: medications reviewed  Taken 4/4/2024 2003 by Brigette Kumar RN  Medication Review/Management: medications reviewed  Intervention: Promote Injury-Free Environment  Description: Provide a safe, barrier-free environment that encourages independent activity.  Keep care area uncluttered and well-lighted.  Determine need for increased observation or monitoring.  Avoid use of devices that minimize mobility, such as restraints or indwelling urinary catheter.  Recent Flowsheet Documentation  Taken 4/5/2024 0400 by Brigette Kumar RN  Safety Promotion/Fall Prevention:   activity supervised   assistive device/personal items within reach   clutter free environment maintained   fall prevention program maintained   gait belt   lighting adjusted   mobility aid in reach   nonskid shoes/slippers when out of bed   safety round/check completed   toileting scheduled  Taken 4/5/2024 0200 by Brigette Kumar RN  Safety Promotion/Fall Prevention:   activity supervised   assistive device/personal items within reach   clutter free environment maintained   fall prevention program maintained   gait belt   lighting adjusted   mobility aid in reach   nonskid shoes/slippers when out of bed   safety round/check completed   toileting scheduled  Taken 4/5/2024 0030 by  Kumar, Brigette, RN  Safety Promotion/Fall Prevention:   activity supervised   assistive device/personal items within reach   clutter free environment maintained   fall prevention program maintained   gait belt   lighting adjusted   mobility aid in reach   nonskid shoes/slippers when out of bed   safety round/check completed   toileting scheduled  Taken 4/4/2024 2200 by Brigette Kumar RN  Safety Promotion/Fall Prevention:   activity supervised   safety round/check completed   assistive device/personal items within reach   fall prevention program maintained  Taken 4/4/2024 2003 by Brigette Kumar RN  Safety Promotion/Fall Prevention:   assistive device/personal items within reach   clutter free environment maintained   fall prevention program maintained   lighting adjusted   mobility aid in reach   nonskid shoes/slippers when out of bed   room organization consistent   safety round/check completed     Problem: Skin Injury Risk Increased  Goal: Skin Health and Integrity  Outcome: Ongoing, Progressing  Intervention: Optimize Skin Protection  Description: Perform a full pressure injury risk assessment, as indicated by screening, upon admission to care unit.  Reassess skin (injury risk, full inspection) frequently (e.g., scheduled interval, with change in condition) to provide optimal early detection and prevention.  Maintain adequate tissue perfusion (e.g., encourage fluid balance; avoid crossing legs, constrictive clothing or devices) to promote tissue oxygenation.  Maintain head of bed at lowest degree of elevation tolerated, considering medical condition and other restrictions.  Avoid positioning onto an area that remains reddened.  Minimize incontinence and moisture (e.g., toileting schedule; moisture-wicking pad, diaper or incontinence collection device; skin moisture barrier).  Cleanse skin promptly and gently when soiled utilizing a pH-balanced cleanser.  Relieve and redistribute pressure (e.g.,  scheduled position changes, weight shifts, use of support surface, medical device repositioning, protective dressing application, use of positioning device, microclimate control, use of pressure-injury-monitor  Encourage increased activity, such as sitting in a chair at the bedside or early mobilization, when able to tolerate.  Recent Flowsheet Documentation  Taken 4/5/2024 0400 by Brigette Kumar RN  Head of Bed ([facility]) Positioning: HOB elevated  Taken 4/5/2024 0200 by Brigette Kumar RN  Head of Bed ([facility]) Positioning: HOB elevated  Taken 4/5/2024 0030 by Brigette Kumar RN  Head of Bed ([facility]) Positioning: HOB elevated  Taken 4/4/2024 2200 by Brigette Kumar RN  Pressure Reduction Techniques: weight shift assistance provided  Head of Bed ([facility]) Positioning: [facility] elevated  Pressure Reduction Devices:   heel offloading device utilized   specialty bed utilized  Skin Protection:   adhesive use limited   skin-to-device areas padded   skin-to-skin areas padded   tubing/devices free from skin contact  Taken 4/4/2024 2003 by Brigette Kumar RN  Head of Bed ([facility]) Positioning: [facility] elevated   Goal Outcome Evaluation:

## 2024-04-17 ENCOUNTER — TELEPHONE (OUTPATIENT)
Dept: INTERNAL MEDICINE | Facility: CLINIC | Age: 84
End: 2024-04-17
Payer: MEDICARE

## 2024-04-17 ENCOUNTER — READMISSION MANAGEMENT (OUTPATIENT)
Dept: CALL CENTER | Facility: HOSPITAL | Age: 84
End: 2024-04-17
Payer: MEDICARE

## 2024-04-17 NOTE — TELEPHONE ENCOUNTER
Axel with [facility] is requesting verbal orders to see if Dr. Baldwin will follow patient for Home Health orders.   Pt will discharge from [facility] on 4/18/24.

## 2024-04-17 NOTE — OUTREACH NOTE
Prep Survey      Flowsheet Row Responses   [facility]   Is LACE score < 7 ? Non-BH Discharge   Eligibility [facility]   Date of Discharge 04/18/24   Discharge diagnosis ACUTE RESPITORARY FAILURE DUE TO COVID   Does the patient have one of the following disease processes/diagnoses(primary or secondary)? Other   Prep survey completed? Yes            Dinora EATON - Registered Nurse

## 2024-04-17 NOTE — TELEPHONE ENCOUNTER
Caller: [facility]    Relationship:     Best call back number: [phone]     What orders are you requesting (i.e. lab or imaging): WILL WILL BE FOLLOWING FOR HOME HEALTH ORDERS    In what timeframe would the patient need to come in: ASAP    Where will you receive your lab/imaging services: HOME    Additional notes: VERBAL ORDERS

## 2024-04-17 NOTE — TELEPHONE ENCOUNTER
Caller: [facility]    Relationship to patient: Other    Best call back number: [phone]    New or established patient?  [] New  [x] Established    Date of discharge: *[id number]    Facility [facility] from: [facility]    Diagnosis/Symptoms: ACUTE RESPITORARY FAILURE DUE TO COVID    Length of stay (If applicable): 12 DAYS    Specialty Only: Did you see a [facility] health provider?    [] Yes  [x] No  If so, who?

## 2024-04-19 ENCOUNTER — TRANSITIONAL CARE MANAGEMENT TELEPHONE ENCOUNTER (OUTPATIENT)
Dept: CALL CENTER | Facility: HOSPITAL | Age: 84
End: 2024-04-19
Payer: MEDICARE

## 2024-04-19 NOTE — OUTREACH NOTE
Call Center TCM Note      Flowsheet Row Responses   [facility] patient discharged from? Non-BH   Does the patient have one of the following disease processes/diagnoses(primary or secondary)? Other   TCM attempt successful? No  [Lives with daughter Mariah. Has a hospital followup scheduled on 4/24/2024 with Dr. Baldwin.]   Unsuccessful attempts Attempt 1   Call Status Left message            Wolfgang Morrell RN    4/19/2024, 10:25 EDT

## 2024-04-19 NOTE — OUTREACH NOTE
Call Center TCM Note      Flowsheet Row Responses   [facility] patient discharged from? Non-BH   Does the patient have one of the following disease processes/diagnoses(primary or secondary)? Other   TCM attempt successful? No   Unsuccessful attempts Attempt 2            Wolfgang Morrell RN    4/19/2024, 10:52 EDT

## 2024-04-20 ENCOUNTER — TRANSITIONAL CARE MANAGEMENT TELEPHONE ENCOUNTER (OUTPATIENT)
Dept: CALL CENTER | Facility: HOSPITAL | Age: 84
End: 2024-04-20
Payer: MEDICARE

## 2024-04-20 NOTE — OUTREACH NOTE
Call Center TCM Note      Flowsheet Row Responses   [facility] patient discharged from? Non-   Does the patient have one of the following disease processes/diagnoses(primary or secondary)? Other   TCM attempt successful? Yes   Call start time 1131   Call end time 1139   Discharge diagnosis ACUTE RESPITORARY FAILURE DUE TO COVID   Person spoke with today (if not patient) and relationship Mallory/daughter   Meds reviewed with patient/caregiver? Yes   Is the patient having any side effects they believe may be caused by any medication additions or changes? No   Does the patient have all medications ordered at discharge? Yes   Is the patient taking all medications as directed (includes completed medication regime)? Yes   Comments Hospital f/u with PCP 4/24/24 @ 2pm.   Does the patient have an appointment with their PCP within 7-14 days of discharge? Yes   [facility]   Has home health visited the patient within 72 hours of discharge? Yes   Psychosocial issues? No   Did the patient receive a copy of their discharge instructions? Yes   Nursing interventions Reviewed instructions with patient   What is the patient's perception of their health status since discharge? Improving  [Improving medically but generalized weakness per family.]   Is the patient/caregiver able to teach back signs and symptoms related to disease process for when to call PCP? Yes   Is the patient/caregiver able to teach back signs and symptoms related to disease process for when to call 911? Yes   Is the patient/caregiver able to teach back the hierarchy of who to call/visit for symptoms/problems? PCP, Specialist, Home health nurse, Urgent Care, ED, 911 Yes   If the patient is a current smoker, are they able to teach back resources for cessation? Not a smoker   TCM call completed? Yes   Wrap up additional comments Daughter/Mallory reports pt. should not have been discharged from [facility], She  reports he is severely weak and unable to get out of bed, and in need of additional caregiving support. Advised pt. return to ED if worsening, f/u with JITENDRA Mills, PCP. Dtr. inquirng of any assistance this weekend.   Call end time 1139   Would this patient benefit from a Referral to [facility] Social Work? Yes   Reason for Social Work Referral Caregiving/Support  [Daughter reports pt. extremely weak, needing assitance this weekend. Informed by  no one available until Monday.]   Is the patient interested in additional calls from an ambulatory ? No            Barbie Hudson, RN    4/20/2024, 11:50 EDT

## 2024-04-22 ENCOUNTER — TELEPHONE (OUTPATIENT)
Dept: INTERNAL MEDICINE | Facility: CLINIC | Age: 84
End: 2024-04-22
Payer: MEDICARE

## 2024-04-22 NOTE — TELEPHONE ENCOUNTER
----- Message from Ruthy Elias MA sent at 4/22/2024  8:21 AM EDT -----  Regarding: FW: Appointment on 4/24  Contact: [phone]    ----- Message -----  From: Tho Vasquez  Sent: 4/22/2024   7:55 AM EDT  To: [facility] [address]  Subject: Appointment on 4/24                              [facility] - can the visit on 4/24 be virtual?  Tho is having trouble getting around and also having trouble getting up.  I’m not sure how I’ll get him in and out of the car and in and out of a wheelchair to attend the appointment in person.

## 2024-04-23 ENCOUNTER — TELEPHONE (OUTPATIENT)
Dept: INTERNAL MEDICINE | Facility: CLINIC | Age: 84
End: 2024-04-23
Payer: MEDICARE

## 2024-04-23 NOTE — TELEPHONE ENCOUNTER
Caller: [facility]    Relationship: Home Health    Best call back number: [phone]     What was the call regarding: [facility] IS CALLING AND WOULD LIKE TO GET VERBAL ORDERS TO SEE THE PATIENT FOR OCCUPATIONAL THERAPY ONCE A WEEK FOR 4 WEEKS.

## 2024-04-23 NOTE — TELEPHONE ENCOUNTER
MINDY CALLED BACK TO ADDEND HER REQUEST.    SHE WOULD LIKE TO REQUEST VERBAL ORDERS FOR OCCUPATIONAL THERAPY TWICE (2) PER WEEK FOR FOUR (4) WEEKS.    PLEASE ADVISE

## 2024-04-24 ENCOUNTER — TELEMEDICINE (OUTPATIENT)
Dept: INTERNAL MEDICINE | Facility: CLINIC | Age: 84
End: 2024-04-24
Payer: MEDICARE

## 2024-04-24 DIAGNOSIS — J12.82 PNEUMONIA DUE TO COVID-19 VIRUS: Primary | ICD-10-CM

## 2024-04-24 DIAGNOSIS — J96.01 ACUTE HYPOXEMIC RESPIRATORY FAILURE DUE TO COVID-19: ICD-10-CM

## 2024-04-24 DIAGNOSIS — I10 PRIMARY HYPERTENSION: ICD-10-CM

## 2024-04-24 DIAGNOSIS — U07.1 PNEUMONIA DUE TO COVID-19 VIRUS: Primary | ICD-10-CM

## 2024-04-24 DIAGNOSIS — I87.2 VENOUS INSUFFICIENCY OF BOTH LOWER EXTREMITIES: ICD-10-CM

## 2024-04-24 DIAGNOSIS — U07.1 ACUTE HYPOXEMIC RESPIRATORY FAILURE DUE TO COVID-19: ICD-10-CM

## 2024-04-24 NOTE — PROGRESS NOTES
Transitional Care Follow Up Visit  Subjective     Tho Vasquez is a 84 y.o. male who presents for a transitional care management visit.    Within 48 business hours after discharge our office contacted him via telephone to coordinate his care and needs.      I reviewed and discussed the details of that call along with the discharge summary, hospital problems, inpatient lab results, inpatient diagnostic studies, and consultation reports with Tho.     Current outpatient and discharge medications have been reconciled for the patient.  Reviewed by: Ja Baldwin MD    You have chosen to receive care through a telehealth visit.  Do you consent to use a video/audio connection for your medical care today? Yes    Video visit was performed from my office in [place], [place], and patient in his home in [place], [place].        4/17/2024    11:04 AM   Date of TCM Phone Call   formerly [facility]   Date of Discharge 4/18/2024     Risk for Readmission (LACE) No data recorded    History of Present Illness   Course During Hospital Stay:      The patient is an 84-year-old male who is seen for transitional care management visit.    COVID-19 pneumonia  He was admitted to the hospital on 3/29/2024, subsequently found to have COVID-19 pneumonia. He also had bradycardia with a 1st-2nd degree AV block. Cardiology held his carvedilol. He was taken off other antihypertensives. He did have a history of recent falls, but the CT scan of the head was unremarkable, and PT and OT were consulted. He also was treated for edema with compression wraps. He was treated with Decadron and inhaled bronchodilators. On 4/05/2024, he was transferred to [facility] where he stayed until the 4/19/2024. He is now home with home health. He is doing reasonably well, but he is very weak. He can get up from his easy chair with the help of his daughter and a walker. She admits that he is not eating much and mostly wants ice  cream. It was noted on CT angio of the chest to have a slightly nodular liver contour consistent with possible cirrhosis. He denies shortness of breath. He still has edema and they have been trying to contact his podiatrist to order compression wraps. Today on 4/24/2024, he has some edema of the left upper extremity and appears to be improving.     The following portions of the patient's history were reviewed and updated as appropriate: allergies, current medications, past family history, past medical history, past social history, past surgical history, and problem list.    Review of Systems  A review of systems was performed, and positive findings are noted in the HPI    Objective   Physical Exam  Constitutional:       General: He is not in acute distress.     Appearance: Normal appearance.   HENT:      Head: Normocephalic and atraumatic.   Eyes:      Extraocular Movements: Extraocular movements intact.      Pupils: Pupils are equal, round, and reactive to light.   Pulmonary:      Effort: Pulmonary effort is normal. No respiratory distress.   Neurological:      Mental Status: He is alert and oriented to person, place, and time.      Cranial Nerves: No cranial nerve deficit.   Psychiatric:         Mood and Affect: Mood normal.         Behavior: Behavior normal.       There is 2+ pitting edema from the knees down bilaterally, with the right side being worse than the left. Ecchymoses are present over the extensor surfaces of both forearms and hands. There is puffiness in the left hand and forearm.    Imaging  CT angio of the chest showed a slightly nodular liver contour consistent with possible cirrhosis. CT scan of the head was unremarkable.    Assessment & Plan   Diagnoses and all orders for this visit:    1. Pneumonia due to COVID-19 virus (Primary)    2. Acute hypoxemic respiratory failure due to COVID-19    3. Primary hypertension    4. Venous insufficiency of both lower extremities    Other orders  -      sertraline (Zoloft) 50 MG tablet; Take 1 tablet by mouth Daily.  Dispense: 90 tablet; Refill: 1        1. COVID-19 pneumonia.   He is recovering reasonably well. Unfortunately, he is not eating well. I discussed with his daughter using Ensure plus with ice cream to make a milk shake that perhaps will be more palatable to him. He will continue PT and OT and his daughter gets him up several times a day.    2. Hypertension.  Blood pressure currently is well controlled at 132/71 mmHg. He will likely require the antihypertensives that were discontinued once systolic blood pressure is consistently 140 or above. We will gradually resume spironolactone, amlodipine, and hydralazine in that order.     3. Venous insufficiency.  This remains a chronic problem. It is difficult for him to elevate his feet. I have asked his daughter to direct orders for compression wraps to me. Addendum vital signs taken by his daughter today, O2 sat 97 percent on room air, blood pressure 132/71 mmHg, and pulse 54 bpm.    Follow-up as scheduled.          Transcribed from ambient dictation for Ja Baldwin MD by Moriah Maldonado.  04/24/24   15:58 EDT    Patient or patient representative verbalized consent to the visit recording.  I have personally performed the services described in this document as transcribed by the above individual, and it is both accurate and complete.

## 2024-04-25 NOTE — TELEPHONE ENCOUNTER
Caller: BLAYNE BECK    Relationship: Home Health    Best call back number:[phone]     What orders are you requesting (i.e. lab or imaging): WOUND CARE    In what timeframe would the patient need to come in: AS SOON AS POSSIBLE    Where will you receive your lab/imaging services: AT HOME    Additional notes: CLEANING WITH SALINE AND GUAZE WRAP WITH COLEX AND ACE WRAP ON BOTH LEGS. SEEING THE PATIENT ON 04/26/24.

## 2024-04-26 ENCOUNTER — TELEPHONE (OUTPATIENT)
Dept: INTERNAL MEDICINE | Facility: CLINIC | Age: 84
End: 2024-04-26
Payer: MEDICARE

## 2024-04-26 NOTE — TELEPHONE ENCOUNTER
VO given. Mick stated that the pt needs JOHANNA's done. Order faxed to [facility] #[phone]

## 2024-04-29 ENCOUNTER — OUTSIDE FACILITY SERVICE (OUTPATIENT)
Dept: INTERNAL MEDICINE | Facility: CLINIC | Age: 84
End: 2024-04-29
Payer: MEDICARE

## 2024-04-29 PROCEDURE — G0180 MD CERTIFICATION HHA PATIENT: HCPCS | Performed by: INTERNAL MEDICINE

## 2024-05-08 RX ORDER — FENOFIBRATE 145 MG/1
TABLET, COATED ORAL
Qty: 90 TABLET | Refills: 3 | Status: SHIPPED | OUTPATIENT
Start: 2024-05-08

## 2024-05-08 RX ORDER — CARVEDILOL 12.5 MG/1
12.5 TABLET ORAL 2 TIMES DAILY WITH MEALS
Qty: 180 TABLET | Refills: 3 | Status: SHIPPED | OUTPATIENT
Start: 2024-05-08

## 2024-05-08 RX ORDER — ATORVASTATIN CALCIUM 40 MG/1
40 TABLET, FILM COATED ORAL DAILY
Qty: 90 TABLET | Refills: 3 | Status: SHIPPED | OUTPATIENT
Start: 2024-05-08

## 2024-05-14 NOTE — PROGRESS NOTES
Follow-up Visit      Date: 2024  Patient Name: Tho Vasquez  : 1940   MRN: [id number]     Chief Complaint:    Chief Complaint   Patient presents with    Slow Heart Rate       History of Present Illness: Tho Vasquez is a 84 y.o. male who is here today for follow-up on his hospital admission.  He has been in nursing home recovering slowly.  He is mostly drowsy he is not able to do a whole lot activities he is getting better with his edema and breathing.  His Coreg was stopped initially.  His heart rate has started going up.  He is trying to participate in the care.      Problem List     CARDIAC  Coronary Artery Disease:   Reports stent by Dr. Connell over 10 years ago    Myocardium:   Echo 3/30/2024: EF 56-60%    Valvular:   No significant valvular disease    Electrical:   Bradycardia    Percardium:   Normal      CARDIAC RISK FACTORS  Hypertension  Hyperlipidemia  2023   HDL 40 LDL 65  Physical inactivity  Obesity  TERRIE    NON-CARDIAC  COVID-19 infection 3/2024    SURGERIES  Hernia repair      Subjective      Review of Systems:   Review of Systems   Respiratory:  Positive for apnea and shortness of breath. Negative for cough, choking, chest tightness, wheezing and stridor.    Cardiovascular:  Positive for leg swelling. Negative for chest pain and palpitations.       Medications:     Current Outpatient Medications:     acetaminophen (TYLENOL) 325 MG tablet, Take 2 tablets by mouth Every 4 (Four) Hours As Needed for Mild Pain., Disp: , Rfl:     albuterol sulfate  (90 Base) MCG/ACT inhaler, Inhale 2 puffs Every 6 (Six) Hours As Needed for Shortness of Air., Disp: , Rfl:     aspirin 81 MG EC tablet, Take 1 tablet by mouth Daily., Disp: , Rfl:     atorvastatin (LIPITOR) 40 MG tablet, TAKE 1 TABLET DAILY, Disp: 90 tablet, Rfl: 3    benzonatate (TESSALON) 100 MG capsule, Take 1 capsule by mouth 3 (Three) Times a Day As Needed for Cough., Disp: , Rfl:     carvedilol (COREG) 12.5  "MG tablet, Take 0.5 tablets by mouth 2 (Two) Times a Day With Meals., Disp: 180 tablet, Rfl: 3    Cholecalciferol (Vitamin D3) 1.25 MG (51639 UT) capsule, , Disp: , Rfl:     DM-APAP-CPM (Coricidin HBP) -2 MG tablet, , Disp: , Rfl:     famotidine (PEPCID) 20 MG tablet, Take 1 tablet by mouth Every 12 (Twelve) Hours., Disp: , Rfl:     fenofibrate (TRICOR) 145 MG tablet, TAKE 1 TABLET DAILY, Disp: 90 tablet, Rfl: 3    Ferrous Sulfate (IRON) 325 (65 Fe) MG tablet, Take 1 tablet by mouth Daily., Disp: , Rfl:     furosemide (LASIX) 20 MG tablet, TAKE 1 TABLET DAILY, Disp: 90 tablet, Rfl: 3    guaiFENesin (MUCINEX) 600 MG 12 hr tablet, Take 2 tablets by mouth Every 12 (Twelve) Hours., Disp: , Rfl:     miconazole (MICOTIN) 2 % powder, Apply 1 Application topically to the appropriate area as directed Every 12 (Twelve) Hours., Disp: , Rfl:     midodrine (PROAMATINE) 5 MG tablet, , Disp: , Rfl:     Restasis 0.05 % ophthalmic emulsion, Administer 1 drop to both eyes Every 12 (Twelve) Hours., Disp: , Rfl:     sertraline (Zoloft) 50 MG tablet, Take 1 tablet by mouth Daily., Disp: 90 tablet, Rfl: 1    valsartan (DIOVAN) 160 MG tablet, TAKE 1 TABLET DAILY, Disp: 90 tablet, Rfl: 3    Zinc 220 (50 Zn) MG capsule, Take  by mouth., Disp: , Rfl:     cetirizine (zyrTEC) 5 MG tablet, Take 1 tablet by mouth Daily. (Patient not taking: Reported on 5/16/2024), Disp: , Rfl:     Allergies:   Allergies   Allergen Reactions    Prednisone Rash       Objective     Physical Exam:  Vitals:    05/16/24 0839   BP: 134/78   BP Location: Right arm   Patient Position: Sitting   Cuff Size: Adult   Pulse: 101   SpO2: 90%   Weight: 104 kg (230 lb)   Height: 177.8 cm (70\")     Body mass index is 33 kg/m².    Constitutional:       General: Not in acute distress.     Appearance: Healthy appearance. Not in distress.     Neck:     JVP:Not elevated     Carotid artery: Normal    Pulmonary:      Effort: Pulmonary effort is normal.      Breath sounds: Normal " breath sounds. No wheezing. No rhonchi. No rales.     Cardiovascular:      Fast rate. Regular rhythm. Normal S1. Normal S2.      Murmurs: There is mild systolic murmur.      No gallop. No click. No rub.     Abdominal:      General: Bowel sounds are normal.      Palpations: Abdomen is soft.      Tenderness: There is no abdominal tenderness.    Extremities:     Pulses:Normal radial and pedal pulses     Edema:no edema    Smoking Cessation:   Tobacco Product History : Patient quit smoking in 2000 after 40 pack years     Lab Review:   Lab Results   Component Value Date    GLUCOSE 83 04/02/2024    BUN 29 (H) 04/02/2024    CREATININE 0.92 04/02/2024    EGFRIFNONA 62 11/24/2021    BCR 31.5 (H) 04/02/2024    K 4.3 04/02/2024    CO2 32.0 (H) 04/02/2024    CALCIUM 8.2 (L) 04/02/2024    ALBUMIN 2.7 (L) 04/02/2024    AST 59 (H) 04/02/2024    ALT 50 (H) 04/02/2024     Lab Results   Component Value Date    WBC 11.64 (H) 04/02/2024    HGB 12.0 (L) 04/02/2024    HCT 35.4 (L) 04/02/2024    MCV 92.7 04/02/2024     (L) 04/02/2024     Lab Results   Component Value Date    TSH 1.750 03/29/2024           ECG 12 Lead    Date/Time: 5/16/2024 9:06 AM  Performed by: Cristhian Haider MD    Authorized by: Cristhian Haider MD  Comparison: compared with previous ECG from 4/3/2024  Rhythm: sinus tachycardia           Assessment / Plan      Assessment:   Diagnosis Plan   1. Coronary artery disease involving native coronary artery of native heart without angina pectoris  ECG 12 Lead      2. Primary hypertension             Plan:  Patient cardiac status has been stable we will continue him on the current medications.  His heart rate has been running fast.  We will restart him on Coreg 3.125 twice daily.  He needs to participate in his activities as he is getting more deconditioned.      Follow Up:       Return in about 6 months (around 11/16/2024).    Cristhian Haider MD

## 2024-05-15 NOTE — TELEPHONE ENCOUNTER
I have called in low dose furosemide for him to take every morning in addition to his other diuretic.  He should come by the lab nonfasting to have potassium checked in two weeks.  I will place order.    Improved.

## 2024-05-16 ENCOUNTER — OFFICE VISIT (OUTPATIENT)
Dept: CARDIOLOGY | Facility: CLINIC | Age: 84
End: 2024-05-16
Payer: MEDICARE

## 2024-05-16 VITALS
BODY MASS INDEX: 32.93 KG/M2 | DIASTOLIC BLOOD PRESSURE: 78 MMHG | SYSTOLIC BLOOD PRESSURE: 134 MMHG | HEART RATE: 101 BPM | OXYGEN SATURATION: 90 % | HEIGHT: 70 IN | WEIGHT: 230 LBS

## 2024-05-16 DIAGNOSIS — I10 PRIMARY HYPERTENSION: ICD-10-CM

## 2024-05-16 DIAGNOSIS — I25.10 CORONARY ARTERY DISEASE INVOLVING NATIVE CORONARY ARTERY OF NATIVE HEART WITHOUT ANGINA PECTORIS: Primary | ICD-10-CM

## 2024-05-16 RX ORDER — MIDODRINE HYDROCHLORIDE 5 MG/1
2.5 TABLET ORAL 2 TIMES DAILY
Status: ON HOLD | COMMUNITY
Start: 2024-04-18

## 2024-05-16 RX ORDER — ZINC SULFATE 50(220)MG
1 CAPSULE ORAL EVERY MORNING
Status: ON HOLD | COMMUNITY

## 2024-05-16 RX ORDER — CARVEDILOL 12.5 MG/1
6.25 TABLET ORAL 2 TIMES DAILY WITH MEALS
Qty: 180 TABLET | Refills: 3 | Status: ON HOLD | OUTPATIENT
Start: 2024-05-16

## 2024-05-20 PROBLEM — I25.10 ASHD (ARTERIOSCLEROTIC HEART DISEASE): Chronic | Status: ACTIVE | Noted: 2019-05-01

## 2024-05-20 PROBLEM — G47.33 OBSTRUCTIVE SLEEP APNEA: Chronic | Status: ACTIVE | Noted: 2019-05-01

## 2024-05-20 PROBLEM — N18.30 CHRONIC KIDNEY DISEASE, STAGE 3: Chronic | Status: ACTIVE | Noted: 2019-05-01

## 2024-05-20 PROBLEM — L89.154 PRESSURE INJURY OF SACRAL REGION, STAGE 4: Status: ACTIVE | Noted: 2024-05-20

## 2024-05-20 PROBLEM — N17.9 ACUTE ON CHRONIC KIDNEY FAILURE: Status: ACTIVE | Noted: 2024-01-01

## 2024-05-20 PROBLEM — J18.9 PNEUMONIA: Status: ACTIVE | Noted: 2024-05-20

## 2024-05-20 PROBLEM — I95.9 SEPSIS ASSOCIATED HYPOTENSION: Status: ACTIVE | Noted: 2024-01-01

## 2024-05-20 PROBLEM — I25.10 CAD (CORONARY ARTERY DISEASE): Chronic | Status: ACTIVE | Noted: 2024-01-01

## 2024-05-20 PROBLEM — E66.01 MORBID OBESITY: Chronic | Status: ACTIVE | Noted: 2019-05-01

## 2024-05-20 PROBLEM — E78.2 MIXED HYPERLIPIDEMIA: Chronic | Status: ACTIVE | Noted: 2019-05-01

## 2024-05-20 PROBLEM — A41.9 SEPSIS: Status: ACTIVE | Noted: 2024-01-01

## 2024-05-20 PROBLEM — A41.9 SEPSIS ASSOCIATED HYPOTENSION: Status: ACTIVE | Noted: 2024-05-20

## 2024-05-20 PROBLEM — N18.9 ACUTE ON CHRONIC KIDNEY FAILURE: Status: ACTIVE | Noted: 2024-01-01

## 2024-05-20 NOTE — OUTREACH NOTE
Prep Survey      Flowsheet Row Responses   [facility] facility patient discharged from? Non-BH   Is LACE score < 7 ? Non-BH Discharge   Eligibility Not Eligible   What are the reasons patient is not eligible? Other  [ED admit currently]   Does the patient have one of the following disease processes/diagnoses(primary or secondary)? Other   Prep survey completed? Yes            Ruby BRADSHAW - Registered Nurse

## 2024-05-20 NOTE — CONSULTS
Stroke Consult Note    Patient Name: Tho Vasquez      MRN: [id number]  Age: 84 y.o.     Sex: male     : 1940  Primary Care Physician: Ja Baldwin MD  Referring Physician: Dr. Roche, [facility] ED  Handedness: Right  Race:   Time Stroke Team Called: 1050        Time Patient Seen: 1052  Chief Complaint/Reason for Consultation: Dysarthria    HPI  Last Known Normal Date/Time: 2024    This patient is an 84-year-old male with past medical history significant for chronic kidney disease, CAD (s/p remote stents), hypertension (currently on midodrine for hypotension), and hyperlipidemia who presented to [facility] ED from SNF via EMS with report of slurred speech since .  EMS reports no other neurological deficits on their assessment.  Stroke team was consulted by ED provider for dysarthria.  Patient was seen and examined immediately on arrival.  On exam, NIH is 6 for moderate to severe dysarthria and drift in all extremities with no gross unilateral deficits.  Patient speech is very difficult to understand but he answers simple questions appropriately.  Per EMS, SNF staff reports dysarthria present for 4 days.  Per daughter at bedside, patient has had progressively worsening speech for approximately 2 weeks.  Of note, patient presented hypotensive at 89/51 with a white count of 17.84.  Of additional note, patient recently admitted to [facility] approximately 6 weeks ago for COVID-pneumonia.  Code stroke CTh with no evidence of acute intracranial abnormality.  CTA and CTP were deferred in the setting of elevated creatinine and last known well greater than 24 hours.  Patient will be admitted to the general medicine service for ongoing management and further stroke workup.    Review of Systems   Constitutional:  Negative for chills and fever.   HENT:  Negative for trouble swallowing.    Eyes:  Negative for visual disturbance.   Respiratory:  Negative for shortness of breath.    Cardiovascular:  Negative for  chest pain.   Gastrointestinal:  Negative for abdominal pain.   Musculoskeletal:  Negative for myalgias.   Neurological:  Positive for speech difficulty. Negative for tremors, facial asymmetry, weakness, light-headedness, numbness and headaches.   Psychiatric/Behavioral:  Negative for behavioral problems. The patient is not nervous/anxious.       Objective  Neurological Exam  Mental Status  Alert. Oriented to person, place, time and situation. Moderate dysarthria present. Language is fluent with no aphasia. Attention and concentration are normal.    Cranial Nerves  CN II: Visual fields full to confrontation.  CN III, IV, VI: Extraocular movements intact bilaterally. Pupils equal round and reactive to light bilaterally.  CN V:  Right: Facial sensation is normal.  Left: Facial sensation is normal on the left.  CN VII:  Right: There is no facial weakness.  Left: There is no facial weakness.  CN VIII: Hearing grossly intact bilaterally.  CN IX, X: Palate elevates symmetrically  CN XII: Tongue midline without atrophy or fasciculations.    Motor  Normal muscle bulk throughout. Normal muscle tone.  All extremities with some antigravity strength.  All limbs fall to bed with no gross unilateral deficits appreciated..    Sensory  Light touch is normal in upper and lower extremities.     Coordination  Right: Rapid alternating movement normal.Left: Rapid alternating movement normal.  Difficult to assess in the setting of weakness in all extremities..    Physical Exam  Constitutional:       General: He is not in acute distress.  HENT:      Head: Normocephalic and atraumatic.   Eyes:      Extraocular Movements: Extraocular movements intact.      Pupils: Pupils are equal, round, and reactive to light.   Cardiovascular:      Rate and Rhythm: Normal rate.   Pulmonary:      Effort: Pulmonary effort is normal.   Musculoskeletal:         General: No swelling.   Skin:     General: Skin is warm and dry.   Neurological:      Mental  Status: He is alert.      Cranial Nerves: Dysarthria present.   Psychiatric:         Mood and Affect: Mood normal.         Behavior: Behavior normal.   Temp:  [97.3 °F (36.3 °C)] 97.3 °F (36.3 °C)  Heart Rate:  [57-81] 57  Resp:  [18] 18  BP: (81-89)/(51-60) 89/51    Past Medical History:   Diagnosis Date    Allergic ?    Cataract     Chronic kidney disease     Coronary artery disease     Hyperlipidemia     Hypertension     Myocardial infarction     Pneumonia of both lower lobes due to infectious organism 10/03/2019    Positive PPD     Respiratory failure 2005    requiring tracheostomy     Past Surgical History:   Procedure Laterality Date    CARDIAC CATHETERIZATION      CARDIAC SURGERY      HERNIA REPAIR       Family History   Problem Relation Age of Onset    Arthritis Mother     Heart attack Father     Stroke Father     Diabetes Father     Heart attack Brother     Cancer Paternal Uncle     Obesity Other      Social History     Socioeconomic History    Marital status:    Tobacco Use    Smoking status: Former     Types: Cigars, Cigarettes     Start date:      Quit date:      Years since quittin.4     Passive exposure: Past    Smokeless tobacco: Never   Vaping Use    Vaping status: Never Used   Substance and Sexual Activity    Alcohol use: No    Drug use: Never    Sexual activity: Not Currently     Allergies   Allergen Reactions    Dexamethasone Unknown - High Severity    Prednisone Rash     Prior to Admission medications    Medication Sig Start Date End Date Taking? Authorizing Provider   acetaminophen (TYLENOL) 325 MG tablet Take 2 tablets by mouth Every 4 (Four) Hours As Needed for Mild Pain. 24   Johanne Platt APRN   albuterol sulfate  (90 Base) MCG/ACT inhaler Inhale 2 puffs Every 6 (Six) Hours As Needed for Shortness of Air. 24   Johanne Platt APRN   aspirin 81 MG EC tablet Take 1 tablet by mouth Daily. 24   Johanne Platt APRN    atorvastatin (LIPITOR) 40 MG tablet TAKE 1 TABLET DAILY 5/8/24   Ja Baldwin MD   benzonatate (TESSALON) 100 MG capsule Take 1 capsule by mouth 3 (Three) Times a Day As Needed for Cough. 4/5/24   Johanne Platt APRN   carvedilol (COREG) 12.5 MG tablet Take 0.5 tablets by mouth 2 (Two) Times a Day With Meals. 5/16/24   Cristhian Haider MD   cetirizine (zyrTEC) 5 MG tablet Take 1 tablet by mouth Daily.  Patient not taking: Reported on 5/16/2024 4/5/24   Johanne Platt APRN   Cholecalciferol (Vitamin D3) 1.25 MG (31389 UT) capsule     Farida Cheatham MD   DM-APAP-CPM (Coricidin HBP) -2 MG tablet  4/24/24   Farida Cheatham MD   famotidine (PEPCID) 20 MG tablet Take 1 tablet by mouth Every 12 (Twelve) Hours. 11/3/16   Farida Cheatham MD   fenofibrate (TRICOR) 145 MG tablet TAKE 1 TABLET DAILY 5/8/24   Ja Baldwin MD   Ferrous Sulfate (IRON) 325 (65 Fe) MG tablet Take 1 tablet by mouth Daily.    Farida Cheatham MD   furosemide (LASIX) 20 MG tablet TAKE 1 TABLET DAILY 12/20/23   Ja Baldwin MD   guaiFENesin (MUCINEX) 600 MG 12 hr tablet Take 2 tablets by mouth Every 12 (Twelve) Hours. 4/5/24   Johanne Platt APRN   miconazole (MICOTIN) 2 % powder Apply 1 Application topically to the appropriate area as directed Every 12 (Twelve) Hours. 4/5/24   Johanne Platt APRN   midodrine (PROAMATINE) 5 MG tablet  4/18/24   Farida Cheatham MD   Restasis 0.05 % ophthalmic emulsion Administer 1 drop to both eyes Every 12 (Twelve) Hours. 11/15/23   Farida Cheatham MD   sertraline (Zoloft) 50 MG tablet Take 1 tablet by mouth Daily. 4/24/24   Ja Baldwin MD   valsartan (DIOVAN) 160 MG tablet TAKE 1 TABLET DAILY 11/27/23   Ja Baldwin MD   Zinc 220 (50 Zn) MG capsule Take  by mouth.    Provider, MD Farida     Acute Stroke Data  Alteplase (tPA) Inclusion / Exclusion Criteria  Person  Administering Scale: Prince Manrique PA-C    Inclusion Criteria  [x]   18 years of age or greater   []   Onset of symptoms < 4.5 hours before beginning treatment (stroke onset = time patient was last seen well or without symptoms).   []   Diagnosis of acute ischemic stroke causing measurable disabling deficit (Complete Hemianopia, Any Aphasia, Visual or Sensory Extinction, Any weakness limiting sustained effort against gravity)   []   Any remaining deficit considered potentially disabling in view of patient and practitioner   Exclusion criteria (Do not proceed with Alteplase if any are checked under exclusion criteria)  [x]   Onset unknown or GREATER than 4.5 hours   []   ICH on CT/MRI   []   CT demonstrates hypodensity representing acute or subacute infarct   []   Significant head trauma or prior stroke in the previous 3 months   []   Symptoms suggestive of subarachnoid hemorrhage   []   History of un-ruptured intracranial aneurysm GREATER than 10 mm   []   Recent intracranial or intraspinal surgery within the last 3 months   []   Arterial puncture at a non-compressible site in the previous 7 days   []   Active internal bleeding   []   Acute bleeding tendency   []   Platelet count LESS than 100,000 for known hematological diseases such as leukemia, thrombocytopenia or chronic cirrhosis   []   Current use of anticoagulant with INR GREATER than 1.7 or PT GREATER than 15 seconds, aPTT GREATER than 40 seconds   []   Heparin received within 48 hours, resulting in abnormally elevated aPTT GREATER than upper limit of normal   []   Current use of direct thrombin inhibitors or direct factor Xa inhibitors in the past 48 hours   []   Elevated blood pressure refractory to treatment (systolic GREATER than 185 mm/Hg or diastolic  GREATER than 110 mm/Hg   []   Suspected infective endocarditis and aortic arch dissection   []   Current use of therapeutic treatment dose of low-molecular-weight heparin (LMWH) within the previous 24  hours   []   Structural GI malignancy or bleed   Relative exclusion for all patients  []   Only minor non-disabling symptoms   []   Pregnancy   []   Seizure at onset with postictal residual neurological impairments   []   Major surgery or previous trauma within past 14 days   []   History of previous spontaneous ICH, intracranial neoplasm, or AV malformation   []   Postpartum (within previous 14 days)   []   Recent GI or urinary tract hemorrhage (within previous 21 days)   []   Recent acute MI (within previous 3 months)   []   History of un-ruptured intracranial aneurysm LESS than 10 mm   []   History of ruptured intracranial aneurysm   []   Blood glucose LESS than 50 mg/dL (2.7 mmol/L)   []   Dural puncture within the last 7 days   []   Known GREATER than 10 cerebral microbleeds   Additional exclusions for patients with symptoms onset between 3 and 4.5 hours.  []   Age > 80.   []   On any anticoagulants regardless of INR  >>> Warfarin (Coumadin), Heparin, Enoxaparin (Lovenox), fondaparinux (Arixtra), bivalirudin (Angiomax), Argatroban, dabigatran (Pradaxa), rivaroxaban (Xarelto), or apixaban (Eliquis)   []   Severe stroke (NIHSS > 25).   []   History of BOTH diabetes and previous ischemic stroke.   []   The risks and benefits have been discussed with the patient or family related to the administration of IV Alteplase for stroke symptoms.   []   I have discussed and reviewed the patient's case and imaging with the attending prior to IV Alteplase.   N/A Time Alteplase administered     MODIFIED JOAQUIM SCALE (to be assessed for each patient having history of stroke) []Stroke history but not assessed  []0: No symptoms at all  []1: No significant disability despite symptoms  []2: Slight disability  [x]3: Moderate disability  []4: Moderately severe disability  []5: Severe disability  []6: Death      NIH Stroke Scale  Time: 1100  Person Administering Scale: VLADIMIR Leonard  Level of consciousness: 0=alert;  keenly responsive   1b. LOC questions:  0=Performs both tasks correctly   1c. LOC commands: 0=Performs both tasks correctly   2.  Best Gaze: 0=normal   3.  Visual: 0=No visual loss   4. Facial Palsy: 0=Normal symmetric movement   5a.  Motor left arm: 1=Drift, limb holds 90 (or 45) degrees but drifts down before full 10 seconds: does not hit bed   5b.  Motor right arm: 1=Drift, limb holds 90 (or 45) degrees but drifts down before full 10 seconds: does not hit bed   6a. motor left le=Drift, limb holds 90 (or 45) degrees but drifts down before full 10 seconds: does not hit bed   6b  Motor right le=Drift, limb holds 90 (or 45) degrees but drifts down before full 10 seconds: does not hit bed   7. Limb Ataxia: 0=Absent   8.  Sensory: 0=Normal; no sensory loss   9. Best Language:  0=No aphasia, normal   10. Dysarthria: 2=Severe; patient speech is so slurred as to be unintelligible in the absence of or our of proportion to any dysphagia, or is mute/anarthric   11. Extinction and Inattention: 0=No abnormality    Total:    6     Hospital Meds  Scheduled- iopamidol, 100 mL, Intravenous, Once in imaging  piperacillin-tazobactam, 3.375 g, Intravenous, Once  sepsis fluid NS, 30 mL/kg (Adjusted), Intravenous, Once  vancomycin, 20 mg/kg, Intravenous, Once    Infusions-     PRNs-   sodium chloride    Results Reviewed  I have personally reviewed current lab, radiology, and data and agree with results.    CT Head Without Contrast Stroke Protocol    Result Date: 2024  Impression: No evidence of acute intracranial abnormality, within the confines of motion degradation. Similar chronic/age-related findings findings, as above. Electronically Signed: Dale Darby MD  2024 11:02 AM EDT  Workstation ID: [id number]      Results for orders placed during the hospital encounter of 24    Adult Transthoracic Echo Complete W/ Cont if Necessary Per Protocol    Interpretation Summary    Left ventricular ejection fraction  appears to be 56 - 60%.    The right ventricular cavity is mildly dilated.    The left atrial cavity is mildly dilated.    Estimated right ventricular systolic pressure from tricuspid regurgitation is normal (<35 mmHg). Calculated right ventricular systolic pressure from tricuspid regurgitation is 9 mmHg.    Significant Labs  Sodium: 139  Creatinine: 2.6 0  Glucose: 91  Lactate: 2.7  WBC: 17.84  Hgb/HCT: 11.3/33.7  Platelets: 84    Assessment and Plan  This patient is an 84-year-old male with risk factors significant for CKD, CAD (s/p remote stents), HTN (recently on midodrine for hypotension), HLD, and recent admission for COVID-pneumonia who presented to [facility] ED 5/20/2024 via EMS from SNF with concern for 4 days of worsening dysarthria.  NIH 6 on exam for moderate to severe dysarthria and weakness in all extremities with no gross unilateral deficits.  Patient presented hypotensive with elevated creatinine and elevated white count.  CTh with no evidence of acute intracranial abnormality.  CTA and CTP deferred 2/2 elevated creatinine and last known well greater than 24 hours.    Antiplatelet PTA: Aspirin  Anticoagulant PTA: None    Dysarthria, moderate to severe  -Differentials include infectious etiology/sepsis, acute stroke/TIA  -Code stroke CTh with no evidence of acute intracranial abnormality  -CTa H/N deferred 2/2 creatinine of 2.6  -CT perfusion deferred in setting of LKW 5/16  -MRI brain without pending  -Continue home dose aspirin 81 mg daily  -Hold additional antiplatelet therapy 2/2 platelets of 84  -Allow permissive hypertension, SBP goal < 200  -NPO pending bedside swallow eval  -TTE 3/2024 with mild LA dilation, EF 56 to 60%.  No repeat TTE ordered  -LDL pending, start atorvastatin 80 mg nightly  -A1c pending  -Serial neurochecks per policy, stat Cth for any acute neurological change  -PT/OT/SLP as appropriate    Disposition: Admit to general medicine      Case discussed with the patient, Dr. Roche, and  Dr. Seymour  Thank you for the consult. Stroke neurology will continue to follow.       Prince Manrique PA-C  [facility] [place] [facility]

## 2024-05-20 NOTE — PROGRESS NOTES
Pulmonary/Critical Care Follow-up     LOS: 0 days   Patient Care Team:  Ja Baldwin MD as PCP - General (Internal Medicine)  Danny Rdz Jr., MD as Consulting Physician (Ophthalmology)    Chief Complaint: Weakness and garbled speech      Subjective     Interval History:     Tho Vasquez is an 84 year-old male with CAD, hypotension on chronic midodrine, hyperlipidemia, ?CKD III, and recent admission at [facility] from 3/29/24 - 4/5/24 for Covid that was discharged to [facility] for rehab and subsequently improved for d/c home. Family has been unable to care for patient and he was moved to [facility] a few weeks ago.     Patient presented to the ED today with weakness, confusion, and dysarthria. LKW Thursday. Stroke team met patient in the ED and CT head negative for acute changes / CTA and CTP deferred 2* FARIHA.     Labs showed multiple abnormalities with BUN/creatinine 55/2.35 (baseline ~ .9), elevated liver enzymes, elevated procalcitonin, elevated WBCs, lactate 2.7, and elevated WBCs. Does have a stage IV coccyx pressure injury.     Patient hypotensive upon admission and required initiation of vasopressor support in the ED after receiving the sepsis bolus. Vanc/Zosyn given. He was originally admitted to hospital medicine but now requires ICU 2* vasopressor support.     Time spent: 3 minutes  Electronically signed by ADALBERTO Simpson, 05/20/24, 4:21 PM EDT.    History taken from:     PMH/FH/Social History were reviewed and updated appropriately in the electronic medical record.     Review of Systems:    Review of 14 systems was completed with positives and pertinent negatives noted in the subjective section.  All other systems reviewed and are negative.  Exceptions are noted below:    Unable to obtain secondary to confusion.    Objective     Vital Signs  Temp:  [97.3 °F (36.3 °C)] 97.3 °F (36.3 °C)  Heart Rate:  [54-81] 73  Resp:  [18] 18  BP: ()/(43-90) 105/77  No intake/output data  recorded.  Body mass index is 33 kg/m².     Physical Exam:     Constitutional:  Drowsy but arousable, cooperative but confused, in no acute distress.  Chronically ill-appearing.   Head:    Normocephalic, without obvious abnormality, atraumatic   Eyes:            Lids and lashes normal, conjunctivae and sclerae normal, PER   ENMT:   Ears appear intact with no abnormalities noted      No oral lesions, no thrush, oral mucosa dry   Neck: Trachea midline, no thyromegaly, no JVD, no lymphadenopathy.       Lungs/Resp:     Normal effort, symmetric chest rise, no crepitus, clear to      auscultation bilaterally, no chest wall tenderness                Heart/CV:    Regular rhythm and normal rate, normal S1 and S2, no            murmur   Abdomen/GI:     Normal bowel sounds, no masses, no organomegaly, soft        non-tender, non-distended   :     Deferred   Extremities/MSK:   No clubbing or cyanosis.  2+ bilateral lower extremity edema.  Normal tone.  No deformities.  Wounds on the dorsum of the right foot.   Pulses:   Pulses palpable and equal bilaterally   Skin:   No bleeding, bruising or rash   Heme/Lymph:   No cervical or supraclavicular adenopathy.    Neurologic:      Psychiatric:     Drowsy but arousable, moves all extremities with no obvious focal motor deficit.  Cranial nerves 2 - 12 grossly intact except decreased vision right eye.  Nonagitated.             Results Review:     I reviewed the patient's new clinical results.   Results from last 7 days   Lab Units 05/20/24  1107 05/20/24  1105   SODIUM mmol/L  --  140   POTASSIUM mmol/L  --  4.8   CHLORIDE mmol/L  --  105   CO2 mmol/L  --  25.0   BUN mg/dL  --  55*   CREATININE mg/dL 2.60* 2.35*   CALCIUM mg/dL  --  8.2*   BILIRUBIN mg/dL  --  1.8*   ALK PHOS U/L  --  142*   ALT (SGPT) U/L  --  49*   AST (SGOT) U/L  --  64*   GLUCOSE mg/dL  --  95     Results from last 7 days   Lab Units 05/20/24  1107 05/20/24  1105   WBC 10*3/mm3  --  17.84*   HEMOGLOBIN g/dL  --   11.3*   HEMOGLOBIN, POC g/dL 11.9*  --    HEMATOCRIT %  --  33.7*   HEMATOCRIT POC % 35*  --    PLATELETS 10*3/mm3  --  84*         Results from last 7 days   Lab Units 05/20/24  1105   MAGNESIUM mg/dL 2.1       I reviewed the patient's new imaging including images and reports.    CT chest done today shows patchy mild bilateral pulmonary infiltrates with a small left pleural effusion.  Radiologist's impression follows:    Impression   Multifocal bilateral pneumonia as described. Very small partially loculated left basilar pleural effusion.     CT of the abdomen pelvis reviewed.  Radiologist impression below:  Impression:  1. Liver morphology consistent with cirrhosis, and relatively mild upper abdominal ascites.     2. Sigmoid diverticulosis without evidence of diverticulitis.     3. Contracted gallbladder with numerous gallstones, including several small stones that appear to be in the cystic duct. No visible gallbladder inflammation or biliary ductal dilatation.     4. No evidence of an acute inflammatory focus is seen in the abdomen or pelvis.    Medication Review:   [START ON 5/21/2024] ascorbic acid, 500 mg, Oral, QAM  aspirin, 81 mg, Oral, Daily   Or  aspirin, 300 mg, Rectal, Daily  atorvastatin, 80 mg, Oral, Nightly  cetirizine, 10 mg, Oral, Daily  cycloSPORINE, 1 drop, Both Eyes, Q12H  famotidine, 20 mg, Oral, BID  ferrous sulfate, 325 mg, Oral, Daily  [Held by provider] furosemide, 20 mg, Oral, Daily  iopamidol, 100 mL, Intravenous, Once in imaging  midodrine, 2.5 mg, Oral, TID AC  [Held by provider] mirtazapine, 15 mg, Oral, Nightly  piperacillin-tazobactam, 3.375 g, Intravenous, Q8H  sertraline, 50 mg, Oral, Daily  sodium chloride, 10 mL, Intravenous, Q12H  sodium chloride, 10 mL, Intravenous, Q12H      norepinephrine, 0.02-0.3 mcg/kg/min, Last Rate: 0.1 mcg/kg/min (05/20/24 1702)        Assessment & Plan       Sepsis associated hypotension    Mixed hyperlipidemia    ASHD (arteriosclerotic heart  disease)    Primary hypertension    Chronic kidney disease, stage 3    Morbid obesity    Obstructive sleep apnea    Transaminitis    CAD (coronary artery disease)    Pneumonia    Pressure injury of sacral region, stage 4    Acute on chronic kidney failure    Sepsis    84 y.o. former smoker who quit in 2000 with history of hypertension in the past but now recently hypotensive on chronic midodrine, hyperlipidemia, recent admission for COVID-pneumonia (3/29/2024 through 4/5/2024), subsequently discharged to [facility] and ultimately transferred to [facility] a few weeks ago.    Patient presented to [facility] ED on 5/20/2024 with worsening/progressive weakness, confusion, and dysarthria.  This apparently had been present per the patient's daughter since his prior admission but has become increasingly progressive over the week prior to presentation.  He was evaluated for possible stroke in the emergency department with a negative CT head.  CTA of the head and neck were deferred secondary to acute renal failure.  CT scan of the chest abdomen and pelvis was significant for evidence of cirrhosis, mild patchy bilateral airspace opacities and a partially loculated small left pleural effusion.    The patient was however hypotensive at presentation.  Labs showed acute kidney injury, elevated procalcitonin, leukocytosis, lactate of 2.7 consistent with sepsis.  Ammonia level was 29.  Patient has a stage IV sacral decubitus ulcer.  Patient was given a 30 mL/kg sepsis fluid bolus in the emergency department started on broad-spectrum antibiotics.  He was still hypotensive after fluid bolus and was started on norepinephrine.  Patient will be admitted to the ICU for ongoing management of sepsis.    Plan:  1.  For septic shock/pneumonia: Admit to ICU.  Continue fluids.  Follow-up cultures.  Broad-spectrum antibiotics.  Follow lactic acid level.  May require central venous catheter placement.  2.  For acute kidney  injury: Fluid resuscitation.  Monitor urine output and renal function.  Renally dose medications and avoid/minimize nephrotoxic medications when possible.  3.  For sacral decubitus ulcer/foot wound: Wound care consult.  4.  For confusion: Neurology stroke service evaluated and following.  MRI pending.  Ammonia level was within normal limits.  I will check an arterial blood gas.  5.  For possible cirrhosis/chronic transaminitis: Mildly increased LFTs.  Ammonia level within normal limits.  INR mildly elevated at 1.4.  6.  Goals of care: Daughter who is present reports that patient has always said he wanted to be full code and full support.  Need to consider potential palliative care consultation given the patient's advanced age, chronic debility, evidence of cirrhosis, stage IV decubitus ulcer, etc.  It is unlikely that the patient's overall quality of life years could be extended in any meaningful way by interventions such as mechanical ventilation or CPR in the event of cardiac arrest.  For now the patient will remain full code.  I will place a palliative care consult.  7.  DVT prophylaxis: Mechanical.    The patient is critically ill secondary to septic shock and renal failure with encephalopathy and has high risk of life-threatening decline in condition.  As such, the patient requires continuous monitoring frequent reassessment for consideration of adjustment management to minimize this risk.  I personally reassessed multiple times today.    Electronically signed by:    Faustino Rashid MD  05/20/24  19:32 EDT      Please note that portions of this note were completed with a voice recognition program.

## 2024-05-20 NOTE — ED NOTES
Tho Vasquez    Nursing Report ED to Floor:  Mental status: a&Ox4, lethargic  Ambulatory status: bedbound  Oxygen Therapy:  room air  Cardiac Rhythm: NSR  Ad[facility]  Safety Concerns:  n/a  Social Issues: n/a  ED Room #:  5    ED Nurse Phone Extension - [phone] or may call [phone].      HPI:   Chief Complaint   Patient presents with    Stroke       Past Medical History:  Past Medical History:   Diagnosis Date    Allergic ?    Cataract     Chronic kidney disease     Coronary artery disease     Hyperlipidemia     Hypertension     Myocardial infarction     Pneumonia of both lower lobes due to infectious organism 10/03/2019    Positive PPD     Respiratory failure 2005    requiring tracheostomy        Past Surgical History:  Past Surgical History:   Procedure Laterality Date    CARDIAC CATHETERIZATION      CARDIAC SURGERY      HERNIA REPAIR          Admitting Doctor:   No admitting provider for patient encounter.    Consulting Provider(s):  Consults       Date and Time Order Name Status Description    5/20/2024 10:50 AM Inpatient Neurology Consult Stroke Completed              Admitting Diagnosis:   There were no encounter diagnoses.    Most Recent Vitals:   Vitals:    05/20/24 1234 05/20/24 1243 05/20/24 1246 05/20/24 1300   BP:    97/62   BP Location:       Patient Position:       Pulse: 76 78 78 79   Resp:       Temp:       TempSrc:       SpO2: 93% 94% 93% 92%   Weight:       Height:           Active LDAs/IV Access:   Lines, Drains & Airways       Active LDAs       Name Placement date Placement time Site Days    Peripheral IV 05/20/24 1052 Anterior;Right Forearm 05/20/24  1052  Forearm  less than 1    Peripheral IV 05/20/24 1103 Left Antecubital 05/20/24  1103  Antecubital  less than 1    External Urinary Catheter 04/02/24  0800  --  48                    Labs (abnormal labs have a star):   Labs Reviewed   APTT - Abnormal; Notable for the following components:       Result Value    PTT 45.6 (*)      All other components within normal limits    Narrative:     PTT = The equivalent PTT values for the therapeutic range of heparin levels at 0.3 to 0.5 U/ml are 60 to 70 seconds.   CBC WITH AUTO DIFFERENTIAL - Abnormal; Notable for the following components:    WBC 17.84 (*)     RBC 3.58 (*)     Hemoglobin 11.3 (*)     Hematocrit 33.7 (*)     RDW 19.9 (*)     RDW-SD 67.0 (*)     MPV 12.4 (*)     Platelets 84 (*)     Neutrophil % 96.2 (*)     Lymphocyte % 1.9 (*)     Monocyte % 0.9 (*)     Eosinophil % 0.0 (*)     Immature Grans % 0.9 (*)     Neutrophils, Absolute 17.16 (*)     Lymphocytes, Absolute 0.34 (*)     Immature Grans, Absolute 0.16 (*)     All other components within normal limits   COMPREHENSIVE METABOLIC PANEL - Abnormal; Notable for the following components:    BUN 55 (*)     Creatinine 2.35 (*)     Calcium 8.2 (*)     Total Protein 4.8 (*)     Albumin 2.0 (*)     ALT (SGPT) 49 (*)     AST (SGOT) 64 (*)     Alkaline Phosphatase 142 (*)     Total Bilirubin 1.8 (*)     eGFR 26.6 (*)     All other components within normal limits    Narrative:     GFR Normal >60  Chronic Kidney Disease <60  Kidney Failure <15    The GFR formula is only valid for adults with stable renal function between ages 18 and 70.   LACTIC ACID, PLASMA - Abnormal; Notable for the following components:    Lactate 2.7 (*)     All other components within normal limits   PROCALCITONIN - Abnormal; Notable for the following components:    Procalcitonin 5.62 (*)     All other components within normal limits    Narrative:     As a Marker for Sepsis (Non-Neonates):    1. <0.5 ng/mL represents a low risk of severe sepsis and/or septic shock.  2. >2 ng/mL represents a high risk of severe sepsis and/or septic shock.    As a Marker for Lower Respiratory Tract Infections that require antibiotic therapy:    PCT on Admission    Antibiotic Therapy       6-12 Hrs later    >0.5                Strongly Recommended  >0.25 - <0.5        Recommended   0.1 - 0.25  "         Discouraged              Remeasure/reassess PCT  <0.1                Strongly Discouraged     Remeasure/reassess PCT    As 28 day mortality risk marker: \"Change in Procalcitonin Result\" (>80% or <=80%) if Day 0 (or Day 1) and Day 4 values are available. Refer to http://www.[facility] [place], [place] Guardpct-calculator.com    Change in PCT <=80%  A decrease of PCT levels below or equal to 80% defines a positive change in PCT test result representing a higher risk for 28-day all-cause mortality of patients diagnosed with severe sepsis for septic shock.    Change in PCT >80%  A decrease of PCT levels of more than 80% defines a negative change in PCT result representing a lower risk for 28-day all-cause mortality of patients diagnosed with severe sepsis or septic shock.      TSH - Abnormal; Notable for the following components:    TSH 4.430 (*)     All other components within normal limits    Narrative:     Due to abnormal TSH results, suggest ordering Free T4.   POCT CHEM 8 - Abnormal; Notable for the following components:    BUN 72 (*)     Creatinine 2.60 (*)     POC Potassium 5.0 (*)     Hemoglobin 11.9 (*)     Hematocrit 35 (*)     Ionized Calcium 1.07 (*)     eGFR 23.6 (*)     All other components within normal limits   POCT PROTIME - INR - Abnormal; Notable for the following components:    Protime 17.0 (*)     INR 1.4 (*)     All other components within normal limits   COVID-19 AND FLU A/B, NP SWAB IN TRANSPORT MEDIA 1 HR TAT - Normal    Narrative:     Fact sheet for providers: https://www.fda.gov/media/280827/download    Fact sheet for patients: https://www.fda.gov/media/457994/download    Test performed by PCR.   MRSA SCREEN, PCR - Normal    Narrative:     The negative predictive value of this diagnostic test is high and should only be used to consider de-escalating anti-MRSA therapy. A positive result may indicate colonization with MRSA and must be correlated clinically.  MRSA Negative   T4, FREE - Normal   MAGNESIUM - Normal "   AMMONIA - Normal   CK - Normal   ACETAMINOPHEN LEVEL - Normal   ETHANOL - Normal    Narrative:     Elevated lactic acid concentration and lactate dehydrogenase(LD) activity may falsely elevate enzymatically determined ethanol levels. Not for legal purposes.    SALICYLATE LEVEL - Normal   BLOOD CULTURE   BLOOD CULTURE   RAINBOW DRAW    Narrative:     The following orders were created for panel order Alpha Draw.  Procedure                               Abnormality         Status                     ---------                               -----------         ------                     Green Top (Gel)[[id number]]                                  Final result               Lavender Top[[id number]]                                     Final result               Gold Top - SST[[id number]]                                   Final result               Dover Top[[id number]]                                         Final result               Light Blue Top[[id number]]                                   Final result                 Please view results for these tests on the individual orders.   SINGLE HS TROPONIN T   URINE DRUG SCREEN   URINALYSIS W/ CULTURE IF INDICATED   LACTIC ACID, REFLEX   POCT CHEM 8   POCT PROTIME - INR   CBC AND DIFFERENTIAL    Narrative:     The following orders were created for panel order CBC & Differential.  Procedure                               Abnormality         Status                     ---------                               -----------         ------                     CBC Auto Differential[[id number]]        Abnormal            Final result               Scan Slide[[id number]]                                                                    Please view results for these tests on the individual orders.   GREEN TOP   LAVENDER TOP   GOLD TOP - SST   GRAY TOP   LIGHT BLUE TOP       Meds Given in ED:   Medications   sodium chloride 0.9 % flush 10 mL (has no administration in time range)   iopamidol  (ISOVUE-370) 76 % injection 100 mL (has no administration in time range)   aspirin chewable tablet 81 mg (81 mg Oral Given 5/20/24 1354)     Or   aspirin suppository 300 mg ( Rectal Not Given:  See Alt 5/20/24 1354)   sepsis fluid NS 0.9 % bolus 2,562 mL (2,562 mL Intravenous New Bag 5/20/24 1142)   piperacillin-tazobactam (ZOSYN) 3.375 g IVPB in 100 mL NS MBP (CD) (0 g Intravenous Stopped 5/20/24 1353)           Last NIH score:                                                          Dysphagia screening results:  Patient Factors Component (Dysphagia:Stroke or Rule-out)  Best Eye Response: 3-->(E3) to speech (05/20/24 1353)  Best Motor Response: 6-->(M6) obeys commands (05/20/24 1353)  Best Verbal Response: 5-->(V5) oriented (05/20/24 1353)  Joanie Coma Scale Score: 14 (05/20/24 1353)  Is there Facial Asymmetry/Weakness?: No (05/20/24 1353)  Is there Tongue Asymmetry/Weakness?: No (05/20/24 1353)  Is there Palatal Asymmetry/Weakness?: No (05/20/24 1353)  Patient Assessment Result: Pass - Proceed to Water Test (05/20/24 1353)     Joanie Coma Scale:  No data recorded     CIWA:        Restraint Type:            Isolation Status:  No active isolations

## 2024-05-20 NOTE — CASE MANAGEMENT/SOCIAL WORK
Discharge Planning Assessment  [facility]     Patient Name: Tho Vasquez  MRN: [id number]  Today's Date: 5/20/2024    Admit Date: 5/20/2024    Plan: IDP   Discharge Needs Assessment       Row Name 05/20/24 1241       Living Environment    People in Home child(juan a), adult    Current Living Arrangements home    Potentially Unsafe Housing Conditions unable to assess    Primary Care Provided by child(juan a)    Provides Primary Care For no one, unable/limited ability to care for self    Family Caregiver if Needed child(juan a), adult    Family Caregiver Names Mary Vasquez and Mallory Mullins--adult daughters    Quality of Family Relationships involved;helpful    Able to Return to Prior Arrangements yes       Transition Planning    Patient/Family Anticipates Transition to inpatient rehabilitation facility;other (see comments)  [facility] for rehab    Transportation Anticipated other (see comments)  may need assistance       Discharge Needs Assessment    Readmission Within the Last 30 Days unable to assess    Equipment Currently Used at Home wheelchair;lift device;cane, quad;rollator;hospital bed                   Discharge Plan       Row Name 05/20/24 1245       Plan    Plan IDP    Plan Comments MSW met with pt. and his daughter Mary Vasquez at bedside. Pt. is currently at [facility] for rehab in [facility]. Pt.'s PCP is Ja Baldwin. Pt.'s pharmacy is [facility] mail order. Pt.'s insurance is Medicare and [employer]. Pt. is dependent at baseline. Pt.'s daughter reports a decline the last time pt. had Covid (March 2024). Pt. has a rollator, wheelchair, quad cane, elevated toilet seat, hospital bed, lift chair, half step, and sock aid. Pt. doesn't have O2, or HH currently. Pt.'s daughter reports goal is to return back to rehab at [facility]. Pt. may need assistance with transportation back to rehab when medically ready to d/c. Pt. has an advanced directive and ACP documentation on file. JOSÉ MIGUEL will  continue to follow pt. throughout his stay.    Final Discharge Disposition Code 30 - still a patient                  Continued Care and Services - Admitted Since 5/20/2024    No active coordination exists for this encounter.       Selected Continued Care - Prior Encounters Includes continued care and service providers with selected services from prior encounters from 2/20/2024 to 5/20/2024      Discharged on 4/5/2024 Admission date: 3/29/2024 - Discharge disposition: Rehab Facility or Unit (DC - External)      Destination       Service Provider Selected Services Address Phone Fax Patient Preferred    [facility] [address] [address] [phone] --                             Demographic Summary       Row Name 05/20/24 1239       General Information    Arrived From other (see comments)  [facility] (rehab)    Referral Source admission list;emergency department    Reason for Consult discharge planning    Preferred Language English                   Functional Status       Row Name 05/20/24 1240       Functional Status, IADL    Medications completely dependent    Meal Preparation completely dependent    Housekeeping completely dependent    Laundry completely dependent    Shopping completely dependent       Mental Status Summary    Recent Changes in Mental Status/Cognitive Functioning unable to assess       Employment/    Employment Status retired                   Psychosocial    No documentation.                  Abuse/Neglect    No documentation.                  Legal    No documentation.                  Substance Abuse    No documentation.                  Patient Forms    No documentation.                     VIKRAM Hairston

## 2024-05-20 NOTE — Clinical Note
Level of Care: Telemetry [5]   Diagnosis: Pneumonia [615313]   Certification: I Certify That Inpatient Hospital Services Are Medically Necessary For Greater Than 2 Midnights

## 2024-05-20 NOTE — H&P
[facility] Medicine Services  HISTORY AND PHYSICAL    Patient Name: Tho Vasquez  : 1940  MRN: [id number]  Primary Care Physician: Ja Baldwin MD  Date of admission: 2024      Subjective   Subjective     Chief Complaint:  Generalized Weakness, confusion    HPI:  Tho Vasquez is a 84 y.o. male with PMHx CAD, HTN, HLD, Hx COVID-19 requiring admission to [facility] 3/29- and was discharged to [facility] for rehab. Daughter present in room and provides history. States patient went home from rehab and it required her and her sister to take care of him and so they moved him into [facility] and he has been there for the past few weeks.   She tells me he has been increasingly weak. He has also had confusion and dysarthria. Per EMS, last known well was on Thursday. Patient went to see his primary Cardiologist and his Coreg was restarted. In review of that office note, patient was lethargic at that visit as well.   Stroke team saw on arrival to ED. CT head negative for stroke. CTA and CTP deferred due to elevated creatinine.   Patient has not had any specific complaints per daughter but he is not one to complain. BP low but per daughter, this is not new for him. ED staff attempting to obtain UA. WBC elevated, procal elevated. LFTs elevated but not new. Also new FARIHA.  Hospital Medicine asked to admit.     Personal History     Past Medical History:   Diagnosis Date    Allergic ?    Cataract     Chronic kidney disease     Coronary artery disease     Hyperlipidemia     Hypertension     Myocardial infarction     Pneumonia of both lower lobes due to infectious organism 10/03/2019    Positive PPD     Respiratory failure 2005    requiring tracheostomy           Past Surgical History:   Procedure Laterality Date    CARDIAC CATHETERIZATION      CARDIAC SURGERY      HERNIA REPAIR         Family History: family history includes Arthritis in his mother; Cancer in his paternal uncle;  Diabetes in his father; Heart attack in his brother and father; Obesity in an other family member; Stroke in his father.     Social History:  reports that he quit smoking about 24 years ago. His smoking use included cigars and cigarettes. He started smoking about 39 years ago. He has been exposed to tobacco smoke. He has never used smokeless tobacco. He reports that he does not drink alcohol and does not use drugs.  Social History     Social History Narrative    Not on file       Medications:  Available home medication information reviewed.  DM-APAP-CPM, OcuSoft Lid Scrub Plus, Zinc, acetaminophen, ascorbic acid, aspirin, atorvastatin, carvedilol, cycloSPORINE, erythromycin, famotidine, fenofibrate, ferrous sulfate, furosemide, guaiFENesin, lactulose, loratadine, magnesium hydroxide, midodrine, mirtazapine, sertraline, sodium hypochlorite, valsartan, and vitamin D3    Allergies   Allergen Reactions    Dexamethasone Unknown - High Severity    Prednisone Rash       Objective   Objective     Vital Signs:   Temp:  [97.3 °F (36.3 °C)] 97.3 °F (36.3 °C)  Heart Rate:  [57-81] 79  Resp:  [18] 18  BP: ()/(51-62) 97/62  Total (NIH Stroke Scale): 7    Physical Exam   Constitutional: Awake, alert, NAD, elderly male, chronically ill appearing   Eyes: PERRLA, sclerae anicteric, no conjunctival injection  HENT: NCAT, mucous membranes dry   Neck: Supple, no thyromegaly, no lymphadenopathy, trachea midline  Respiratory: Decreased in bases bilaterally, nonlabored respirations RA  Cardiovascular: RRR, no murmurs, rubs, or gallops, palpable pedal pulses bilaterally  Gastrointestinal: Positive bowel sounds, soft, nontender, nondistended  Musculoskeletal: Trace-+1 bilateral ankle edema, no clubbing or cyanosis to extremities  Psychiatric: Appropriate affect, cooperative  Neurologic: Dysarthria present   Skin: No rashes, skin tears noted     Result Review:  I have personally reviewed the results from the time of this admission to  5/20/2024 14:14 EDT and agree with these findings:  [x]  Laboratory list / accordion  [x]  Microbiology  [x]  Radiology  [x]  EKG/Telemetry   [x]  Cardiology/Vascular   []  Pathology  [x]  Old records  []  Other:        LAB RESULTS:      Lab 05/20/24  1107 05/20/24  1106 05/20/24  1105   WBC  --   --  17.84*   HEMOGLOBIN  --   --  11.3*   HEMOGLOBIN, POC 11.9*  --   --    HEMATOCRIT  --   --  33.7*   HEMATOCRIT POC 35*  --   --    PLATELETS  --   --  84*   NEUTROS ABS  --   --  17.16*   IMMATURE GRANS (ABS)  --   --  0.16*   LYMPHS ABS  --   --  0.34*   MONOS ABS  --   --  0.16   EOS ABS  --   --  0.00   MCV  --   --  94.1   PROCALCITONIN  --   --  5.62*   LACTATE  --   --  2.7*   PROTIME  --  17.0*  --    INR  --  1.4*  --    APTT  --   --  45.6*         Lab 05/20/24  1107 05/20/24  1105   SODIUM  --  140   POTASSIUM  --  4.8   CHLORIDE  --  105   CO2  --  25.0   ANION GAP  --  10.0   BUN  --  55*   CREATININE 2.60* 2.35*   EGFR 23.6* 26.6*   GLUCOSE  --  95   CALCIUM  --  8.2*   MAGNESIUM  --  2.1   TSH  --  4.430*         Lab 05/20/24  1105   TOTAL PROTEIN 4.8*   ALBUMIN 2.0*   GLOBULIN 2.8   ALT (SGPT) 49*   AST (SGOT) 64*   BILIRUBIN 1.8*   ALK PHOS 142*                     UA          3/29/2024    12:36   Urinalysis   Squamous Epithelial Cells, UA 3-6    Specific Gravity, UA 1.024    Ketones, UA Trace    Blood, UA Negative    Leukocytes, UA Negative    Nitrite, UA Negative    RBC, UA 0-2    WBC, UA 0-2    Bacteria, UA None Seen        Microbiology Results (last 10 days)       Procedure Component Value - Date/Time    MRSA Screen, PCR (Inpatient) - Swab, Nares [[id number]]  (Normal) Collected: 05/20/24 1143    Lab Status: Final result Specimen: Swab from Nares Updated: 05/20/24 1322     MRSA PCR Negative    Narrative:      The negative predictive value of this diagnostic test is high and should only be used to consider de-escalating anti-MRSA therapy. A positive result may indicate colonization with MRSA and must  be correlated clinically.  MRSA Negative    COVID-19 and FLU A/B PCR, 1 HR TAT - Swab, Nasopharynx [[id number]]  (Normal) Collected: 05/20/24 1113    Lab Status: Final result Specimen: Swab from Nasopharynx Updated: 05/20/24 1143     COVID19 Not Detected     Influenza A PCR Not Detected     Influenza B PCR Not Detected    Narrative:      Fact sheet for providers: https://www.fda.gov/media/956201/download    Fact sheet for patients: https://www.fda.gov/media/026936/download    Test performed by PCR.            XR Chest 1 View    Result Date: 5/20/2024  XR CHEST 1 VW Date of Exam: 5/20/2024 11:52 AM EDT Indication: Acute Stroke Protocol (onset < 12 hrs) Comparison: Chest radiograph dated 3/29/2024 Findings: The cardiomediastinal silhouette is within normal limits for there is aortic arch atherosclerotic calcification. There are low lung volumes with streaky left basilar airspace opacity representing atelectasis or mild pneumonia. There is no pleural effusion or pneumothorax. There are degenerative changes of the thoracic spine.     Impression: Impression: 1. Low lung volumes with streaky left basilar airspace opacity representing atelectasis or early pneumonia. Electronically Signed: Kendrick Souza  5/20/2024 12:11 PM EDT  Workstation ID: [id number]    CT Head Without Contrast Stroke Protocol    Result Date: 5/20/2024  CT HEAD WO CONTRAST STROKE PROTOCOL Date of Exam: 5/20/2024 10:51 AM EDT Indication: Neuro deficit, acute, stroke suspected Neuro Deficit, acute, Stroke suspected. Comparison: Head CT 3/29/2024. Technique: Axial CT images were obtained of the head without contrast administration.  Reconstructed coronal images were also obtained. Automated exposure control and iterative construction methods were used. Scan Time: 10:52 a.m. Results discussed with the stroke team via telephone at 10:58 a.m. Findings: Please note this is a motion-degraded examination. No evidence of acute intracranial hemorrhage or mass  effect. No extra-axial collection. The gray white matter differentiation is preserved. Global parenchymal atrophy. Periventricular and subcortical white matter hypodensity, nonspecific, but likely sequela of chronic small vessel ischemic disease. The mastoid air cells are well aerated. Mucosal thickening in the right maxillary sinus. Globes and extraocular muscles are unremarkable. No acute or suspicious osseous abnormality. Soft tissues within normal limits.     Impression: Impression: No evidence of acute intracranial abnormality, within the confines of motion degradation. Similar chronic/age-related findings findings, as above. Electronically Signed: Dale Darby MD  5/20/2024 11:02 AM EDT  Workstation ID: [id number]     Results for orders placed during the hospital encounter of 03/29/24    Adult Transthoracic Echo Complete W/ Cont if Necessary Per Protocol    Interpretation Summary    Left ventricular ejection fraction appears to be 56 - 60%.    The right ventricular cavity is mildly dilated.    The left atrial cavity is mildly dilated.    Estimated right ventricular systolic pressure from tricuspid regurgitation is normal (<35 mmHg). Calculated right ventricular systolic pressure from tricuspid regurgitation is 9 mmHg.      Assessment & Plan   Assessment & Plan       Pneumonia    Mixed hyperlipidemia    ASHD (arteriosclerotic heart disease)    Primary hypertension    Chronic kidney disease, stage 3    Morbid obesity    Obstructive sleep apnea    Transaminitis    CAD (coronary artery disease)    Sepsis associated hypotension    Sepsis (leukocytosis, lactic acidosis)  Concern for L basilar PNA  -Received Sepsis bolus IVF in ED. Continue NS 75cc/hr x 24 hours   -Blood Cx pending  -UA pending   -CT chest pending  -CT A/P pending   -Procal elevated at 5.62  -Continue Zosyn   -Check strep pneumo, legionella urinary antigen  -SLP eval     Generalized Weakness  Dysarthria   -Stroke team evaluated in ED  -CT head  negative  -CTP and CTA's deferred due to Cr   -MRI brain without contrast, MRA H/N pending  -PT/OT/SLP  -Infectious work-up per above   -Continue ASA/Statin     Hypotension  -On chronic Midodrine   -Recently restarted on Coreg 5/16/24 per Dr Haider due to sinus tachycardia in the office  -His BB was held on prior on admission due to bradycardia (also thought to be 2/2 Remdesivir use)   -IVF per above     Elevated LFTs   Questionable Cirrhosis   -Elevated from prior labs   -Prior imaging of chest with concern for cirrhosis   -Obtain CT A/P   -Repeat CMP in AM   -INR 1.4   -PLT low at 84   -Ammonia normal   -Consider Liver US   -Hepatitis panel negative in March   -On Lipitor 40mg at home, stroke team has increased to 80mg, monitor LFTs     FARIHA  -Hx in chart indicative of CKD 3 but Cr has been normal on prior admits  -Hold home Lasix and Diovan   -IVF per above  -Bladder scan to ensure not retaining   -Repeat labs in AM   -Vancomycin ordered in ED but I discontinued as MRSA PCR negative     CAD  HTN  HLD  -Continue ASA  -Hold BP meds due to hypotension     DVT prophylaxis:  Mechanical DVT prophylaxis orders are signed and held.            CODE STATUS:    There are no questions and answers to display.       Expected Discharge Back to [facility]   Expected Discharge Date: 5/24/2024; Expected Discharge Time:      Sena Hartley DO  05/20/24

## 2024-05-21 PROBLEM — E83.42 HYPOMAGNESEMIA: Status: RESOLVED | Noted: 2024-01-01 | Resolved: 2024-01-01

## 2024-05-21 PROBLEM — J96.01 ACUTE HYPOXEMIC RESPIRATORY FAILURE DUE TO COVID-19: Status: RESOLVED | Noted: 2024-01-01 | Resolved: 2024-01-01

## 2024-05-21 PROBLEM — T14.8XXA ABRASION: Status: RESOLVED | Noted: 2019-05-01 | Resolved: 2024-01-01

## 2024-05-21 PROBLEM — J18.9 PNEUMONIA: Status: RESOLVED | Noted: 2024-01-01 | Resolved: 2024-01-01

## 2024-05-21 PROBLEM — K74.69 OTHER CIRRHOSIS OF LIVER: Status: ACTIVE | Noted: 2024-01-01

## 2024-05-21 PROBLEM — U07.1 ACUTE HYPOXEMIC RESPIRATORY FAILURE DUE TO COVID-19: Status: RESOLVED | Noted: 2024-01-01 | Resolved: 2024-01-01

## 2024-05-21 NOTE — CONSULTS
Order noted for diabetes education per stroke protocol. A1c 4.5%. No history of diabetes per chart review. Does not qualify for OP diabetes/stroke class.

## 2024-05-21 NOTE — PLAN OF CARE
Goal Outcome Evaluation:           Progress: no change     Pt still lethargic but does respond to verbal stimuli and follows commands. D/O to time, situation, sometimes place. Speech is garbled but clear at times. NIHSS 8. Unable to do ordered MRI d/t restlessness and inability to lie still. Very decreased UOP. Arms and legs very edematous. HR and rhythm are concerning as HR drops frequently to the 30's but does rebound to the 50's and 60's, sinus rhythm/carla. Levo still infusing to maintain BP. WOC consult ordered.

## 2024-05-21 NOTE — NURSING NOTE
"                             Wound, Ostomy and Continence (WOC) Note    Reason for WOC Consultation: Pressure injury to sacrum    Patient confused, garbled speech.  Family/support person at bedside. CPR/Full support at this time.     Skin/Wound Assessment:     Wound Type: Stage IV pressure injury   Location: Sacrum  Measurements: 5 x 3 x 3 cm.  Undermines 2 and half centimeters circumferentially.  Wound Bed: exposed structure-bone, slough, nonviable tissue  Wound Edges: Irregular and Open  Periwound Skin: non-blanchable and redness, possible deep tissue injury near the left coccyx, inferior to sacral wound  Drainage Characteristics/Odor: brown and malodorous   Drainage Amount: scant  Pain: Yes   Care provided: The wound was cleansed with normal saline.  Wound culture obtained.  Packed wound loosely with Vashe moistened Curlex.  Covered with an Allevyn foam dressing.  Image:       Wound Type: trauma vs ruptured blisters?  Location: right foot-dorsum  Wound Bed: dermis, pink, red, and scab  Wound Edges: Irregular and Open  Periwound Skin: intact and blanchable   Drainage Characteristics/Odor: none  Drainage Amount: none  Pain: No   Image:       Summary and Recommendation(s):     Wound culture obtained.  Bone visible and palpable at sacrum.  Small tunnel noted towards right gluteal.  2.   Will consult PT to evaluate for wound VAC therapy to stage IV pressure injury at the sacrum. If a wound vac is not possible, will plan for BID Vashe/kerlix packing of wound.   3.   Maintain Allevyn sacral and bilateral heel dressings for duration of hospital stay. Bilateral fee/heels assessed. Scattered bruising noted   Turn patient using foam wedge. Patient is at high risk for pressure injury development and worsening of current wounds.  4.   Refer to wound care instructions in the \"Orders\" tab  5.   Specialty support surface in place: Isolibrium         Pressure Injury Prevention Protocol (initiate for a Jr Scale Score of <18): "     Most recent Jr Scale score:  Sensory Perception: 2-->very limited  Moisture: 2-->very moist  Activity: 1-->bedfast  Mobility: 2-->very limited  Nutrition: 2-->probably inadequate  Friction and Shear: 1-->problem  Jr Score: 10 (05/20/24 2000)       -Apply Allevyn foam dressing to sacrum/coccyx and heels.     -Turn q 2 hr. using an offloading foam wedge/pillow.    -Apply moisture barrier cream to bottom BID & PRN, if incontinent.      Thank you for consulting the WOC Nurse.  WOC Team will continue to follow.  Please re consult if the wound(s) worsens.     Roberto Parikh RN, BSN, CCRN, CWOCN  Wound, Ostomy and Continence (WOC) Department  [facility]

## 2024-05-21 NOTE — SIGNIFICANT NOTE
05/21/24 0945   SLP Deferred Reason   SLP Deferred Reason Routine  (SLP consult received. Pt not appropriate for evaluation today, per RN. Will f/u tomorrow as indicated.)

## 2024-05-21 NOTE — PLAN OF CARE
Problem: Palliative Care  Goal: Enhanced Quality of Life  Intervention: Optimize Psychosocial Wellbeing  Flowsheets (Taken 5/21/2024 1446)  Supportive Measures:   active listening utilized   verbalization of feelings encouraged   decision-making supported   goal-setting facilitated  Family/Support System Care:   presence promoted   self-care encouraged   support provided     Problem: Adjustment to Illness (Stroke, Ischemic/Transient Ischemic Attack)  Goal: Optimal Coping  Intervention: Support Psychosocial Response to Stroke  Recent Flowsheet Documentation  Taken 5/21/2024 1446 by Lindsay Maldonado, RN  Supportive Measures:   active listening utilized   verbalization of feelings encouraged   decision-making supported   goal-setting facilitated  Family/Support System Care:   presence promoted   self-care encouraged   support provided   Goal Outcome Evaluation:  Plan of Care Reviewed With: daughter        Progress: no change  Outcome Evaluation: new palliative care consult from Dr Rashid for [facility].  2 of pt's 3 dtrs present. Pt also has 1 son in [place].  They have good communication amongst the group to make decisions. Pt's poa are Mallory and her  Roger.  Pt has been in multiple locations over the past few months including [facility], [place], [facility] and [facility].  Pt has survived a code 18 years ago and has told his children that he wants everything done. Pt's wife was aggressive care until the last week of her life.  Dr Rashid discussed with children that pt may not be liberated from a vent if he was go on it again. Pt is currently on nasal 2 L pnc and restless at times.  Pt has a wound on his bottom that may be contributing to pain.Pt is currently npo and on pressor support.  Continue full code for now and continue goals of care discussions.             Palliative Team Meeting 1300  MD, APRN, SW, RN   Please call Palliative after hours at [phone]

## 2024-05-21 NOTE — PROGRESS NOTES
"          Clinical Nutrition Assessment     Patient Name: Tho Vasquez  YOB: 1940  MRN: [id number]  Date of Encounter: 05/21/24 14:58 EDT  Admission date: 5/20/2024  Reason for Visit: MST score 2+, Nonhealing wound or pressure ulcer, Unintentional weight loss, Reduced oral intake    Assessment   Nutrition Assessment   Admission Diagnosis:  Pneumonia [J18.9]  Sepsis [A41.9]    Problem List:    Sepsis associated hypotension    Mixed hyperlipidemia    Primary hypertension    Chronic kidney disease, stage 3    Morbid obesity    Obstructive sleep apnea    Transaminitis    CAD (coronary artery disease)    Pressure injury of sacral region, stage 4    Acute on chronic kidney failure    Sepsis      PMH:   He  has a past medical history of Allergic (?), Cataract, Chronic kidney disease, Coronary artery disease, Hyperlipidemia, Hypertension, Myocardial infarction, Pneumonia of both lower lobes due to infectious organism (10/03/2019), Positive PPD, and Respiratory failure (2005).    PSH:  He  has a past surgical history that includes Cardiac surgery; Hernia repair; and Cardiac catheterization.    Applicable Nutrition History:       Anthropometrics     Height: Height: 177.8 cm (70\")  Last Filed Weight: Weight: 104 kg (230 lb) (05/20/24 1114)  Method: Weight Method: Stated  BMI: BMI (Calculated): 33    UBW:     Weight       Weight (kg) Weight (lbs) Weight Method Visit Report   11/29/2022 102.694 kg  226 lb 6.4 oz   --    12/7/2023 106.595 kg  235 lb   --    3/29/2024 104.327 kg  230 lb      3/30/2024 104 kg  229 lb 4.5 oz      3/31/2024 108.047 kg  238 lb 3.2 oz  Bed scale     4/1/2024 112.6 kg  248 lb 3.8 oz      5/16/2024 104.327 kg  230 lb   --    5/20/2024 104.327 kg  230 lb  Stated         Weight change:  ?need current measured weight to assess    Nutrition Focused Physical Exam    Date:  5/21       Unable to perform due to Pt unable to participate at time of visit     Subjective "   Reported/Observed/Food/Nutrition Related History:     Pt getting wound care at time visit, discussed care with dtr who is active caregiver outside room. She informs pt with significant nutritional decline since having COVID in 4/24. She reports pt with taste changes and loss appetite but main barrier is extreme lethargy and weakness. States at NH getting assistance with nsg having to wake him up between bites. He was just recently started on appetite stimulant without improvement yet seen. Pt has a stage IV DU. MD has broached subject feeding tube which they are considering. She understands pt is not safe to eat if not alert. Not appropriate for SLP today. RN to place NG for meds. Currently on vasopressor support. Very edematous. Little UOP. NKFA.     Current Nutrition Prescription   PO: NPO Diet NPO Type: Strict NPO  Oral Nutrition Supplement:   Intake: N/A    Assessment & Plan   Nutrition Diagnosis   Date:  5/21            Updated:    Problem Malnutrition  severe/chronic   Etiology Energy needs>energy intake in setting of CKD3   Signs/Symptoms PO intakes <75% EEN >/=1m and severe fluid retention(3-4+ anasarca) likely masking weight loss.    Status: New    Goal:   Nutrition to support treatment, Advance diet as medically feasible/appropriate, and N/A-EN currently contraindicated, recommend initiating when medically appropriate     Nutrition Intervention      Follow treatment progress, Care plan reviewed, Advise alternate selection, Advised available snacks, Interview for preferences, Encourage intake, Education provided to dtr for malnutrition    Pt meets criteria Malnutrition, see MSA.  Significant decline in the past couple months, barely eating with encouragement and often not alert enough to eat.     Recently started on appetite stimulant. Dtr agreeable to EN if needed.     Currently on vasopressor support with EN contraindicated. Unable to participate with SLP.    Addm 5/22- Nsg unable to place NG.  Palliative now following.      Monitoring/Evaluation:   Per protocol, I&O, Pertinent labs, Weight, GI status, Symptoms, POC/GOC, Swallow function, Hemodynamic stability    Ashley Contreras RD, CNSC  Time Spent: 30m

## 2024-05-21 NOTE — CONSULTS
[facility] Electrophyiology        Date of Hospital Visit: 24      Place of Service: [facility]    Patient Name: Tho Vasquez  :1940    Referral Provider: Intensivist  Primary Care Provider: Ja Baldwin MD      Cheif Complaint EP: Bradycardia           Problem List:  Active Hospital Problems    Diagnosis  POA    **Sepsis associated hypotension [A41.9, I95.9]  Yes    CAD (coronary artery disease) [I25.10]  Yes     Priority: High    Primary hypertension [I10]  Yes     Priority: Low    Pressure injury of sacral region, stage 4 [L89.154]  Yes    Acute on chronic kidney failure [N17.9, N18.9]  Yes    Sepsis [A41.9]  Yes    Transaminitis [R74.01]  Yes    Mixed hyperlipidemia [E78.2]  Yes    Chronic kidney disease, stage 3 [N18.30]  Yes    Morbid obesity [E66.01]  Yes    Obstructive sleep apnea [G47.33]  Yes         History of Present Illness:  84-year-old male whose cardiac history is significant for coronary artery disease.  He was recently admitted to [facility] for COVID-19.  He was discharged and transferred to [facility] for rehab.  He was brought into [facility] emergency department yesterday by his family for confusion dysarthria and weakness.  He was initially evaluated by the stroke team.  Head CT was unremarkable.  He has been admitted to the intensive care unit for sepsis.  Cardiology has been consulted for evaluation of bradycardia and suspected tach and degree AV block.  The patient is a poor historian and cannot provide history.          Past Surgical History:   Procedure Laterality Date    CARDIAC CATHETERIZATION      CARDIAC SURGERY      HERNIA REPAIR         Allergies   Allergen Reactions    Dexamethasone Unknown - High Severity    Prednisone Rash       Current Outpatient Medications   Medication Instructions    acetaminophen (TYLENOL) 1,000 mg, Oral, 4 Times Daily PRN    ascorbic acid (VITAMIN C) 500 mg, Oral, Every  Morning    aspirin 81 mg, Oral, Daily    atorvastatin (LIPITOR) 40 mg, Oral, Daily    carvedilol (COREG) 6.25 mg, Oral, 2 Times Daily With Meals    Cholecalciferol (vitamin D3) 125 MCG (5000 UT) tablet tablet Take 1 tablet by mouth Every Morning.    DM-APAP-CPM (CORICIDIN HBP PO) Take 4-30 mg by mouth Every Morning. For cough    erythromycin (ROMYCIN) 5 MG/GM ophthalmic ointment Instill 1 ribbon in both eyes at bedtime    Eyelid Cleansers (OcuSoft Lid Scrub Plus) foam Apply to both eyes topically as needed for dry eyes/irritation twice a day.    famotidine (PEPCID) 20 MG tablet 1 tablet, Oral, 2 Times Daily    fenofibrate (TRICOR) 145 MG tablet TAKE 1 TABLET DAILY    Ferrous Sulfate (IRON) 325 (65 Fe) MG tablet 1 tablet, Oral, Daily    furosemide (LASIX) 20 MG tablet TAKE 1 TABLET DAILY    guaiFENesin (MUCINEX) 1,200 mg, Oral, Every 12 Hours Scheduled    lactulose (CHRONULAC) 10 g, Oral, 2 Times Daily PRN    loratadine (CLARITIN) 10 mg, Oral, Nightly    magnesium hydroxide (MILK OF MAGNESIA) 2400 MG/10ML suspension suspension Give 30 ml by mouth every 72 hours as needed.    midodrine (PROAMATINE) 5 MG tablet Take 0.5 tablets by mouth 2 (Two) Times a Day.    mirtazapine (REMERON) 15 mg, Oral, Nightly    Restasis 0.05 % ophthalmic emulsion 1 drop, Both Eyes, Every 12 Hours    sertraline (ZOLOFT) 50 mg, Oral, Daily    sodium hypochlorite 0.25 % solution topical solution Apply to sacrum topically everyday shift for wound healing cleanse area to sacrum with Dakins, apply santyl to slough/eschar areas, fill in with calcium alginate to and cover with silicone foam,    valsartan (DIOVAN) 160 MG tablet TAKE 1 TABLET DAILY    Zinc 220 (50 Zn) MG capsule 1 capsule, Oral, Every Morning          Scheduled Meds:  ascorbic acid, 500 mg, Oral, QAM  aspirin, 81 mg, Oral, Daily   Or  aspirin, 300 mg, Rectal, Daily  atorvastatin, 80 mg, Oral, Nightly  cetirizine, 10 mg, Oral, Daily  cycloSPORINE, 1 drop, Both Eyes, Q12H  famotidine,  "20 mg, Oral, BID  ferrous sulfate, 325 mg, Oral, Daily  iopamidol, 100 mL, Intravenous, Once in imaging  midodrine, 2.5 mg, Oral, TID AC  piperacillin-tazobactam, 3.375 g, Intravenous, Q8H  sertraline, 50 mg, Oral, Daily  sodium chloride, 10 mL, Intravenous, Q12H  sodium chloride, 10 mL, Intravenous, Q12H      Continuous Infusions:  DOPamine, 2-20 mcg/kg/min, Last Rate: 2 mcg/kg/min (24 1045)  norepinephrine, 0.02-0.3 mcg/kg/min, Last Rate: 0.24 mcg/kg/min (24 0910)            Social History     Socioeconomic History    Marital status:    Tobacco Use    Smoking status: Former     Types: Cigars, Cigarettes     Start date:      Quit date:      Years since quittin.     Passive exposure: Past    Smokeless tobacco: Never   Vaping Use    Vaping status: Never Used   Substance and Sexual Activity    Alcohol use: No    Drug use: Never    Sexual activity: Not Currently       Family History   Problem Relation Age of Onset    Arthritis Mother     Heart attack Father     Stroke Father     Diabetes Father     Heart attack Brother     Cancer Paternal Uncle     Obesity Other        REVIEW OF SYSTEMS:   Review of Systems   Unable to perform ROS: Patient unresponsive            Objective:  Vitals:    24 0945 24 0954 24 1000 24 1015   BP: 109/65 109/65 104/73 131/96   BP Location:   Right arm    Patient Position:   Lying    Pulse: (!) 48 (!) 44 68 (!) 36   Resp:       Temp:       TempSrc:       SpO2: 100%  98% 94%   Weight:       Height:         Body mass index is 33 kg/m².  Flowsheet Rows      Flowsheet Row First Filed Value   Admission Height 177.8 cm (70\") Documented at 2024 1114   Admission Weight 104 kg (230 lb) Documented at 2024 1114            Intake/Output Summary (Last 24 hours) at 2024 1051  Last data filed at 2024 1000  Gross per 24 hour   Intake 1282.03 ml   Output 335 ml   Net 947.03 ml       Vitals reviewed.   Constitutional:       General: " Sleeping.      Appearance: Normal weight. Acutely ill-appearing.   Eyes:      General: Lids are normal. No scleral icterus.        Right eye: No discharge.         Left eye: No discharge.      Pupils: Pupils are equal, round, and reactive to light.   HENT:      Head: Normocephalic and atraumatic.   Neck:      Thyroid: No thyromegaly.      Vascular: No JVD.   Pulmonary:      Effort: Pulmonary effort is normal.      Breath sounds: Normal breath sounds. No wheezing. No rales.   Cardiovascular:      Normal rate. Regular rhythm.      Murmurs: There is no murmur.      No gallop.  No click. No rub.   Pulses:     Intact distal pulses.   Edema:     Peripheral edema absent.   Abdominal:      General: Bowel sounds are normal. There is no distension.      Palpations: Abdomen is soft. There is no abdominal mass.   Musculoskeletal:         General: No deformity.      Extremities: No clubbing present.     Cervical back: Neck supple. Skin:     General: Skin is warm and dry.   Neurological:      Mental Status: Unresponsive.               Lab Review:                CBC:      Lab 05/21/24  0631 05/20/24  1107 05/20/24  1105   WBC 26.46*  --  17.84*   HEMOGLOBIN 12.0*  --  11.3*   HEMOGLOBIN, POC  --    < >  --    HEMATOCRIT 35.4*  --  33.7*   HEMATOCRIT POC  --    < >  --    PLATELETS 92*  --  84*   NEUTROS ABS  --   --  17.16*   IMMATURE GRANS (ABS)  --   --  0.16*   LYMPHS ABS  --   --  0.34*   MONOS ABS  --   --  0.16   EOS ABS  --   --  0.00   MCV 95.7  --  94.1    < > = values in this interval not displayed.     CMP:        Lab 05/21/24  0631 05/20/24  1107 05/20/24  1105   SODIUM 143  --  140   POTASSIUM 4.0  --  4.8   CHLORIDE 109*  --  105   CO2 23.0  --  25.0   ANION GAP 11.0  --  10.0   BUN 52*  --  55*   CREATININE 1.92* 2.60* 2.35*   EGFR 33.9* 23.6* 26.6*   GLUCOSE 114*  --  95   CALCIUM 7.9*  --  8.2*   MAGNESIUM  --   --  2.1   TOTAL PROTEIN  --   --  4.8*   ALBUMIN  --   --  2.0*   GLOBULIN  --   --  2.8   ALT (SGPT)   --   --  49*   AST (SGOT)  --   --  64*   BILIRUBIN  --   --  1.8*   ALK PHOS  --   --  142*     Results from last 7 days   Lab Units 05/20/24  1105   MAGNESIUM mg/dL 2.1     Estimated Creatinine Clearance: 34.6 mL/min (A) (by C-G formula based on SCr of 1.92 mg/dL (H)).    CARDIAC LABS:      Lab 05/20/24  1914 05/20/24  1352 05/20/24  1106   HSTROP T 94* 106*  --    PROTIME  --   --  17.0*   INR  --   --  1.4*           EKG/ Telemetry:         Result Review:  I have personally reviewed the results from the time of admission and agree with these findings:  [x]  Laboratory  [x]  Radiology  [x]  EKG/Telemetry   []  Pathology  [x]  Old records  []  Other:        Assessment and Plan:     Asymptomatic bradycardia  Agree with holding Coreg  Likely demand bradycardia  PAC's with block QRS  Consider restarting Coreg when bradycardia resolves  No 2nd degree AVB        Electronically signed by SELMA Meza, 05/21/24, 1:26 PM EDT.

## 2024-05-21 NOTE — PROGRESS NOTES
Stroke Progress Note       Chief Complaint:   AMS    Subjective    Subjective     Subjective:  Neurological status remains the same    Objective      Temp:  [93.8 °F (34.3 °C)-97.5 °F (36.4 °C)] 97.1 °F (36.2 °C)  Heart Rate:  [] 88  Resp:  [20-24] 22  BP: ()/() 100/79          Patient on levophed and dopamine gtt  Sluggish pupillary reaction  BUE 3/5  BLE 1/5  Generalized tremulousness        Results Review:    I reviewed the patient's new clinical results.    Lab Results (last 24 hours)       Procedure Component Value Units Date/Time    POC Glucose Once [[id number]]  (Normal) Collected: 05/21/24 1741    Specimen: Blood Updated: 05/21/24 1742     Glucose 100 mg/dL     CANDIDA AURIS SCREEN - Swab, Axilla Right, Axilla Left and Groin [[id number]] Collected: 05/21/24 1430    Specimen: Swab from Axilla Right, Axilla Left and Groin Updated: 05/21/24 1458    Wound Culture - Wound, Coccyx [[id number]] Collected: 05/21/24 1030    Specimen: Wound from Coccyx Updated: 05/21/24 1457     Gram Stain Many (4+) WBCs seen      Moderate (3+) Gram negative bacilli      Moderate (3+) Gram positive cocci in pairs    High Sensitivity Troponin T [[id number]]  (Abnormal) Collected: 05/21/24 1322    Specimen: Blood Updated: 05/21/24 1413     HS Troponin T 75 ng/L     Narrative:      High Sensitive Troponin T Reference Range:  <14.0 ng/L- Negative Female for AMI  <22.0 ng/L- Negative Male for AMI  >=14 - Abnormal Female indicating possible myocardial injury.  >=22 - Abnormal Male indicating possible myocardial injury.   Clinicians would have to utilize clinical acumen, EKG, Troponin, and serial changes to determine if it is an Acute Myocardial Infarction or myocardial injury due to an underlying chronic condition.         POC Glucose Once [[id number]]  (Abnormal) Collected: 05/21/24 1138    Specimen: Blood Updated: 05/21/24 1155     Glucose 141 mg/dL     Urine Culture - Urine, Straight Cath [[id number]]  (Abnormal) Collected:  05/20/24 1403    Specimen: Urine from Straight Cath Updated: 05/21/24 1054     Urine Culture 25,000 CFU/mL Gram Negative Bacilli    Narrative:      Colonization of the urinary tract without infection is common. Treatment is discouraged unless the patient is symptomatic, pregnant, or undergoing an invasive urologic procedure.    Basic Metabolic Panel [[id number]]  (Abnormal) Collected: 05/21/24 0631    Specimen: Blood Updated: 05/21/24 0739     Glucose 114 mg/dL      BUN 52 mg/dL      Creatinine 1.92 mg/dL      Sodium 143 mmol/L      Potassium 4.0 mmol/L      Chloride 109 mmol/L      CO2 23.0 mmol/L      Calcium 7.9 mg/dL      BUN/Creatinine Ratio 27.1     Anion Gap 11.0 mmol/L      eGFR 33.9 mL/min/1.73     Narrative:      GFR Normal >60  Chronic Kidney Disease <60  Kidney Failure <15    The GFR formula is only valid for adults with stable renal function between ages 18 and 70.    Lipid Panel [[id number]]  (Abnormal) Collected: 05/21/24 0631    Specimen: Blood Updated: 05/21/24 0739     Total Cholesterol 57 mg/dL      Triglycerides 80 mg/dL      HDL Cholesterol 18 mg/dL      LDL Cholesterol  22 mg/dL      VLDL Cholesterol 17 mg/dL      LDL/HDL Ratio 1.28    Narrative:      Cholesterol Reference Ranges  (U.S. Department of Health and Human Services ATP III Classifications)    Desirable          <200 mg/dL  Borderline High    200-239 mg/dL  High Risk          >240 mg/dL      Triglyceride Reference Ranges  (U.S. Department of Health and Human Services ATP III Classifications)    Normal           <150 mg/dL  Borderline High  150-199 mg/dL  High             200-499 mg/dL  Very High        >500 mg/dL    HDL Reference Ranges  (U.S. Department of Health and Human Services ATP III Classifications)    Low     <40 mg/dl (major risk factor for CHD)  High    >60 mg/dl ('negative' risk factor for CHD)        LDL Reference Ranges  (U.S. Department of Health and Human Services ATP III Classifications)    Optimal          <100  mg/dL  Near Optimal     100-129 mg/dL  Borderline High  130-159 mg/dL  High             160-189 mg/dL  Very High        >189 mg/dL    Hemoglobin A1c [[id number]]  (Abnormal) Collected: 05/21/24 0631    Specimen: Blood Updated: 05/21/24 0721     Hemoglobin A1C 4.50 %     Narrative:      Hemoglobin A1C Ranges:    Increased Risk for Diabetes  5.7% to 6.4%  Diabetes                     >= 6.5%  Diabetic Goal                < 7.0%    CBC (No Diff) [[id number]]  (Abnormal) Collected: 05/21/24 0631    Specimen: Blood Updated: 05/21/24 0711     WBC 26.46 10*3/mm3      RBC 3.70 10*6/mm3      Hemoglobin 12.0 g/dL      Hematocrit 35.4 %      MCV 95.7 fL      MCH 32.4 pg      MCHC 33.9 g/dL      RDW 20.0 %      RDW-SD 69.1 fl      MPV 12.2 fL      Platelets 92 10*3/mm3     POC Glucose Once [[id number]]  (Normal) Collected: 05/21/24 0507    Specimen: Blood Updated: 05/21/24 0518     Glucose 100 mg/dL     Blood Culture - Blood, Arm, Left [[id number]]  (Abnormal) Collected: 05/20/24 1105    Specimen: Blood from Arm, Left Updated: 05/21/24 0411     Blood Culture Abnormal Stain     Gram Stain Anaerobic Bottle Gram negative bacilli      Aerobic Bottle Gram negative bacilli    Blood Culture - Blood, Arm, Right [[id number]]  (Abnormal) Collected: 05/20/24 1105    Specimen: Blood from Arm, Right Updated: 05/21/24 0410     Blood Culture Abnormal Stain     Gram Stain Anaerobic Bottle Gram negative bacilli      Aerobic Bottle Gram negative bacilli    Blood Culture ID, PCR - Blood, Arm, Left [[id number]]  (Abnormal) Collected: 05/20/24 1105    Specimen: Blood from Arm, Left Updated: 05/21/24 0311     BCID, PCR Proteus spp. Identification by BCID2 PCR.     BOTTLE TYPE Anaerobic Bottle    Narrative:      No resistance genes detected.    POC Glucose Once [[id number]]  (Normal) Collected: 05/20/24 2332    Specimen: Blood Updated: 05/20/24 2333     Glucose 98 mg/dL     Blood Gas, Arterial With Co-Ox [[id number]]  (Abnormal) Collected: 05/20/24 2009     Specimen: Arterial Blood Updated: 05/20/24 2010     Site Right Radial     Jai's Test N/A     pH, Arterial 7.370 pH units      pCO2, Arterial 43.3 mm Hg      pO2, Arterial 115.0 mm Hg      Comment: 83 Value above reference range        HCO3, Arterial 25.0 mmol/L      Base Excess, Arterial -0.4 mmol/L      Hemoglobin, Blood Gas 11.4 g/dL      Comment: 84 Value below reference range        Hematocrit, Blood Gas 34.8 %      Oxyhemoglobin 96.8 %      Methemoglobin 0.20 %      Carboxyhemoglobin 1.7 %      CO2 Content 26.3 mmol/L      Temperature 37.0     Barometric Pressure for Blood Gas --     Comment: N/A        Modality Nasal Cannula     FIO2 28 %      Rate 0 Breaths/minute      PIP 0 cmH2O      Comment: Meter: [id number]     :  [id number]        IPAP 0     EPAP 0     pH, Temp Corrected 7.370 pH Units      pCO2, Temperature Corrected 43.3 mm Hg      pO2, Temperature Corrected 115 mm Hg     High Sensitivity Troponin T 2Hr [[id number]]  (Abnormal) Collected: 05/20/24 1914    Specimen: Blood Updated: 05/20/24 2000     HS Troponin T 94 ng/L      Troponin T Delta -12 ng/L     Narrative:      High Sensitive Troponin T Reference Range:  <14.0 ng/L- Negative Female for AMI  <22.0 ng/L- Negative Male for AMI  >=14 - Abnormal Female indicating possible myocardial injury.  >=22 - Abnormal Male indicating possible myocardial injury.   Clinicians would have to utilize clinical acumen, EKG, Troponin, and serial changes to determine if it is an Acute Myocardial Infarction or myocardial injury due to an underlying chronic condition.               CT Chest Without Contrast Diagnostic    Result Date: 5/20/2024  Multifocal bilateral pneumonia as described. Very small partially loculated left basilar pleural effusion. CT SCAN OF THE ABDOMEN PELVIS WITHOUT CONTRAST: There is new relatively mild ascites not present on 3/29/2024 chest CT scan, with a moderate amount of fluid adjacent the liver and spleen but only minimal ascites  elsewhere. Liver morphology is consistent with cirrhosis. Gallbladder is again noted to be contracted around numerous densely calcified gallstones. There appear to be gallstones in the cystic duct but no visible gallbladder inflammation. No common duct stone or biliary is appreciated. Spleen is normal in size and contains dense benign calcification. Pancreas and adrenal glands appear within normal limits for age. There are multiple bilateral renal lesions which are sharply defined, rounded, and water or fluid density, most likely incidental renal cysts. No upper abdominal free air or adenopathy is seen. Bowel loops are normal in caliber. Stomach and duodenum appear grossly normal. Regarding the lower abdomen/pelvis, bladder is decompressed and normal in appearance. There are numerous sigmoid diverticuli without evidence of diverticulitis. There is a small amount of intrapelvic ascites. Terminal ileum and cecum appear normal. Appendix is not identified. Bony structures appear to be intact. There is moderate to advanced multilevel lumbar degenerative disc disease. . Impression: 1. Liver morphology consistent with cirrhosis, and relatively mild upper abdominal ascites. 2. Sigmoid diverticulosis without evidence of diverticulitis. 3. Contracted gallbladder with numerous gallstones, including several small stones that appear to be in the cystic duct. No visible gallbladder inflammation or biliary ductal dilatation. 4. No evidence of an acute inflammatory focus is seen in the abdomen or pelvis. Electronically Signed: Cristóbal Knight MD  5/20/2024 4:05 PM EDT  Workstation ID: [id number]    CT Abdomen Pelvis Without Contrast    Result Date: 5/20/2024  Multifocal bilateral pneumonia as described. Very small partially loculated left basilar pleural effusion. CT SCAN OF THE ABDOMEN PELVIS WITHOUT CONTRAST: There is new relatively mild ascites not present on 3/29/2024 chest CT scan, with a moderate amount of fluid adjacent the liver  and spleen but only minimal ascites elsewhere. Liver morphology is consistent with cirrhosis. Gallbladder is again noted to be contracted around numerous densely calcified gallstones. There appear to be gallstones in the cystic duct but no visible gallbladder inflammation. No common duct stone or biliary is appreciated. Spleen is normal in size and contains dense benign calcification. Pancreas and adrenal glands appear within normal limits for age. There are multiple bilateral renal lesions which are sharply defined, rounded, and water or fluid density, most likely incidental renal cysts. No upper abdominal free air or adenopathy is seen. Bowel loops are normal in caliber. Stomach and duodenum appear grossly normal. Regarding the lower abdomen/pelvis, bladder is decompressed and normal in appearance. There are numerous sigmoid diverticuli without evidence of diverticulitis. There is a small amount of intrapelvic ascites. Terminal ileum and cecum appear normal. Appendix is not identified. Bony structures appear to be intact. There is moderate to advanced multilevel lumbar degenerative disc disease. . Impression: 1. Liver morphology consistent with cirrhosis, and relatively mild upper abdominal ascites. 2. Sigmoid diverticulosis without evidence of diverticulitis. 3. Contracted gallbladder with numerous gallstones, including several small stones that appear to be in the cystic duct. No visible gallbladder inflammation or biliary ductal dilatation. 4. No evidence of an acute inflammatory focus is seen in the abdomen or pelvis. Electronically Signed: Cristóbal Knight MD  5/20/2024 4:05 PM EDT  Workstation ID: [id number]    XR Chest 1 View    Result Date: 5/20/2024  Impression: 1. Low lung volumes with streaky left basilar airspace opacity representing atelectasis or early pneumonia. Electronically Signed: Kendrick Souza  5/20/2024 12:11 PM EDT  Workstation ID: [id number]    CT Head Without Contrast Stroke Protocol    Result  Date: 5/20/2024  Impression: No evidence of acute intracranial abnormality, within the confines of motion degradation. Similar chronic/age-related findings findings, as above. Electronically Signed: Dale Darby MD  5/20/2024 11:02 AM EDT  Workstation ID: [id number]   Results for orders placed during the hospital encounter of 03/29/24    Adult Transthoracic Echo Complete W/ Cont if Necessary Per Protocol    Interpretation Summary    Left ventricular ejection fraction appears to be 56 - 60%.    The right ventricular cavity is mildly dilated.    The left atrial cavity is mildly dilated.    Estimated right ventricular systolic pressure from tricuspid regurgitation is normal (<35 mmHg). Calculated right ventricular systolic pressure from tricuspid regurgitation is 9 mmHg.            Assessment/Plan     Assessment/Plan:  An 84-year-old male with complex vascular risk factors was brought from a skilled nursing facility due to slurred speech and altered mental status. On examination, he is non-focal, and after reviewing his chart, it appears that sepsis is the likely cause of his symptoms.       Neurology will be available for any questions. We will defer all advanced neuroimaging for now, but it is okay to repeat the CT head tomorrow if patient is stable for the scans.         Yahir Galvan MD  05/21/24  19:22 EDT

## 2024-05-21 NOTE — CONSULTS
" visited patient per palliative spiritual care consult; patient unable to communicate, spoke with two daughters at bedside.  They shared that their father's primary reagan is his family, especially his \"grandbabies\"; that he enjoys being at home and with family.  Grew up [employer], watches services on TV.  Will continue to follow for both patient and family support.  "

## 2024-05-21 NOTE — ED PROVIDER NOTES
"Subjective   History of Present Illness  84-year-old male presents via EMS for evaluation of \"slurred speech.\"  Of note, the patient resides at [facility].  His last known well was apparently 4 days ago per EMS personnel.  Since that time, the patient has reportedly been exhibiting slurred speech and gradually worsening generalized weakness when compared to his baseline.  As a result of his ongoing symptoms, EMS was called today and he was brought here to our facility as a \"stroke alert.\"  He is not hypoglycemic.  He is a poor historian at this time.  Awaiting arrival of family to further corroborate his history.      Review of Systems   Unable to perform ROS: Mental status change   Neurological:  Positive for speech difficulty and weakness.       Past Medical History:   Diagnosis Date    Allergic ?    Cataract     Chronic kidney disease     Coronary artery disease     Hyperlipidemia     Hypertension     Myocardial infarction     Pneumonia of both lower lobes due to infectious organism 10/03/2019    Positive PPD     Respiratory failure 2005    requiring tracheostomy       Allergies   Allergen Reactions    Dexamethasone Unknown - High Severity    Prednisone Rash       Past Surgical History:   Procedure Laterality Date    CARDIAC CATHETERIZATION      CARDIAC SURGERY      HERNIA REPAIR         Family History   Problem Relation Age of Onset    Arthritis Mother     Heart attack Father     Stroke Father     Diabetes Father     Heart attack Brother     Cancer Paternal Uncle     Obesity Other        Social History     Socioeconomic History    Marital status:    Tobacco Use    Smoking status: Former     Types: Cigars, Cigarettes     Start date:      Quit date: 2000     Years since quittin.4     Passive exposure: Past    Smokeless tobacco: Never   Vaping Use    Vaping status: Never Used   Substance and Sexual Activity    Alcohol use: No    Drug use: Never    Sexual activity: Not Currently "           Objective   Physical Exam  Vitals and nursing note reviewed.   Constitutional:       General: He is not in acute distress.     Appearance: He is well-developed. He is not diaphoretic.      Comments: Chronically ill-appearing elderly male, somnolent but arousable   HENT:      Head: Normocephalic and atraumatic.   Eyes:      Pupils: Pupils are equal, round, and reactive to light.   Neck:      Vascular: No JVD.      Comments: No meningeal signs or nuchal rigidity  Cardiovascular:      Rate and Rhythm: Normal rate and regular rhythm.      Heart sounds: Normal heart sounds. No murmur heard.     No friction rub. No gallop.   Pulmonary:      Effort: Pulmonary effort is normal. No respiratory distress.      Breath sounds: Normal breath sounds. No wheezing or rales.   Abdominal:      General: Bowel sounds are normal. There is no distension.      Palpations: Abdomen is soft. There is no mass.      Tenderness: There is no abdominal tenderness. There is no guarding.      Comments: No focal abdominal tenderness, no peritoneal signs, no pain out of proportion to exam   Musculoskeletal:         General: No signs of injury.      Cervical back: Normal range of motion.   Skin:     General: Skin is warm and dry.      Coloration: Skin is not pale.      Findings: No erythema or rash.   Neurological:      Comments: Speech is slurred, appears globally weak, following simple commands, answering questions appropriately   Psychiatric:      Comments: Unable to adequately evaluate         Critical Care    Performed by: Don Roche MD  Authorized by: Don Roche MD    Critical care provider statement:     Critical care time (minutes):  40    Critical care was necessary to treat or prevent imminent or life-threatening deterioration of the following conditions:  Sepsis and shock    Critical care was time spent personally by me on the following activities:  Development of treatment plan with patient or surrogate,  "discussions with consultants, evaluation of patient's response to treatment, examination of patient, obtaining history from patient or surrogate, ordering and performing treatments and interventions, ordering and review of laboratory studies, ordering and review of radiographic studies, pulse oximetry, re-evaluation of patient's condition and review of old charts             ED Course  ED Course as of 05/20/24 2058   Mon May 20, 2024   1139 84-year-old male presents via EMS for evaluation of \"slurred speech.\"  Of note, the patient resides at [facility].  His last known well was apparently 4 days ago.  Since that time, the patient has been exhibiting slurred speech and gradually worsening generalized weakness when compared to baseline.  As result of his ongoing symptoms, EMS was called today and he was brought to our facility as a \"stroke alert.\"  On arrival, the patient was taken directly to CT where I was met by our stroke navigator.  The patient appears globally weak.  He has slurred speech.  CT head negative for intracranial bleed.  He is clearly outside of TNK window.  Given the patient's poor renal function, contrasted studies were deferred.  Brought differential diagnosis.  We will obtain labs and further imaging, and we will reassess following initial interventions. [DD]   1140 Labs remarkable for white blood cell count of 17,000 as well as for acute renal failure when compared to prior labs.  Broad-spectrum antibiotics initiated.  30 cc/kg crystalloid bolus administered. [DD]   1224 I personally and independently viewed the patient's x-ray images myself, and I am in agreement with the radiologist's reading for final interpretation, particularly regarding atelectasis versus early pneumonia. [DD]   1324 I am still awaiting urinalysis results at this time.  However, with we have already initiated broad-spectrum antibiotics, and I do not feel that it should affect the patient's disposition.  His " blood pressure is trending in the right direction.  Current blood pressure is 107/62.  I feel that the patient is stable for the floor at this point.  I discussed his case with our hospitalist, Dr. Andrade, and the patient will be admitted under his care for further evaluation and treatment.  The patient is hemodynamically stable at this time and family is aware/agreeable with the plan. [DD]   1432 While boarding in the emergency department, the patient became increasingly hypotensive with a systolic blood pressure in the 70s.  Levophed initiated.  Dr. Hartley, the admitting hospitalist, did not feel that the patient was stable for the floor and is requesting that the patient be admitted to the ICU.  I discussed the patient's case with our intensivist, Dr. Rashid, the patient will be admitted under his care for further evaluation and treatment. [DD]      ED Course User Index  [DD] Don Roche MD                          Total (NIH Stroke Scale): 7             Recent Results (from the past 24 hour(s))   aPTT    Collection Time: 05/20/24 11:05 AM    Specimen: Blood   Result Value Ref Range    PTT 45.6 (H) 22.0 - 39.0 seconds   Green Top (Gel)    Collection Time: 05/20/24 11:05 AM   Result Value Ref Range    Extra Tube Hold for add-ons.    Lavender Top    Collection Time: 05/20/24 11:05 AM   Result Value Ref Range    Extra Tube hold for add-on    Gold Top - SST    Collection Time: 05/20/24 11:05 AM   Result Value Ref Range    Extra Tube Hold for add-ons.    Gray Top    Collection Time: 05/20/24 11:05 AM   Result Value Ref Range    Extra Tube Hold for add-ons.    Light Blue Top    Collection Time: 05/20/24 11:05 AM   Result Value Ref Range    Extra Tube Hold for add-ons.    CBC Auto Differential    Collection Time: 05/20/24 11:05 AM    Specimen: Blood   Result Value Ref Range    WBC 17.84 (H) 3.40 - 10.80 10*3/mm3    RBC 3.58 (L) 4.14 - 5.80 10*6/mm3    Hemoglobin 11.3 (L) 13.0 - 17.7 g/dL    Hematocrit 33.7  (L) 37.5 - 51.0 %    MCV 94.1 79.0 - 97.0 fL    MCH 31.6 26.6 - 33.0 pg    MCHC 33.5 31.5 - 35.7 g/dL    RDW 19.9 (H) 12.3 - 15.4 %    RDW-SD 67.0 (H) 37.0 - 54.0 fl    MPV 12.4 (H) 6.0 - 12.0 fL    Platelets 84 (L) 140 - 450 10*3/mm3    Neutrophil % 96.2 (H) 42.7 - 76.0 %    Lymphocyte % 1.9 (L) 19.6 - 45.3 %    Monocyte % 0.9 (L) 5.0 - 12.0 %    Eosinophil % 0.0 (L) 0.3 - 6.2 %    Basophil % 0.1 0.0 - 1.5 %    Immature Grans % 0.9 (H) 0.0 - 0.5 %    Neutrophils, Absolute 17.16 (H) 1.70 - 7.00 10*3/mm3    Lymphocytes, Absolute 0.34 (L) 0.70 - 3.10 10*3/mm3    Monocytes, Absolute 0.16 0.10 - 0.90 10*3/mm3    Eosinophils, Absolute 0.00 0.00 - 0.40 10*3/mm3    Basophils, Absolute 0.02 0.00 - 0.20 10*3/mm3    Immature Grans, Absolute 0.16 (H) 0.00 - 0.05 10*3/mm3    nRBC 0.1 0.0 - 0.2 /100 WBC   Comprehensive Metabolic Panel    Collection Time: 05/20/24 11:05 AM    Specimen: Blood   Result Value Ref Range    Glucose 95 65 - 99 mg/dL    BUN 55 (H) 8 - 23 mg/dL    Creatinine 2.35 (H) 0.76 - 1.27 mg/dL    Sodium 140 136 - 145 mmol/L    Potassium 4.8 3.5 - 5.2 mmol/L    Chloride 105 98 - 107 mmol/L    CO2 25.0 22.0 - 29.0 mmol/L    Calcium 8.2 (L) 8.6 - 10.5 mg/dL    Total Protein 4.8 (L) 6.0 - 8.5 g/dL    Albumin 2.0 (L) 3.5 - 5.2 g/dL    ALT (SGPT) 49 (H) 1 - 41 U/L    AST (SGOT) 64 (H) 1 - 40 U/L    Alkaline Phosphatase 142 (H) 39 - 117 U/L    Total Bilirubin 1.8 (H) 0.0 - 1.2 mg/dL    Globulin 2.8 gm/dL    A/G Ratio 0.7 g/dL    BUN/Creatinine Ratio 23.4 7.0 - 25.0    Anion Gap 10.0 5.0 - 15.0 mmol/L    eGFR 26.6 (L) >60.0 mL/min/1.73   Lactic Acid, Plasma    Collection Time: 05/20/24 11:05 AM    Specimen: Blood   Result Value Ref Range    Lactate 2.7 (C) 0.5 - 2.0 mmol/L   Procalcitonin    Collection Time: 05/20/24 11:05 AM    Specimen: Blood   Result Value Ref Range    Procalcitonin 5.62 (H) 0.00 - 0.25 ng/mL   T4, Free    Collection Time: 05/20/24 11:05 AM    Specimen: Blood   Result Value Ref Range    Free T4 1.01  0.92 - 1.68 ng/dL   TSH    Collection Time: 05/20/24 11:05 AM    Specimen: Blood   Result Value Ref Range    TSH 4.430 (H) 0.270 - 4.200 uIU/mL   Magnesium    Collection Time: 05/20/24 11:05 AM    Specimen: Blood   Result Value Ref Range    Magnesium 2.1 1.6 - 2.4 mg/dL   CK    Collection Time: 05/20/24 11:05 AM    Specimen: Blood   Result Value Ref Range    Creatine Kinase 167 20 - 200 U/L   Acetaminophen Level    Collection Time: 05/20/24 11:05 AM    Specimen: Blood   Result Value Ref Range    Acetaminophen <5.0 0.0 - 30.0 mcg/mL   Ethanol    Collection Time: 05/20/24 11:05 AM    Specimen: Blood   Result Value Ref Range    Ethanol <10 0 - 10 mg/dL   Salicylate Level    Collection Time: 05/20/24 11:05 AM    Specimen: Blood   Result Value Ref Range    Salicylate <0.3 <=30.0 mg/dL   POC Protime / INR    Collection Time: 05/20/24 11:06 AM    Specimen: Blood   Result Value Ref Range    Protime 17.0 (H) 12.8 - 15.2 seconds    INR 1.4 (H) 0.8 - 1.2   POC CHEM 8    Collection Time: 05/20/24 11:07 AM    Specimen: Blood   Result Value Ref Range    Glucose 91 70 - 130 mg/dL    BUN 72 (H) 8 - 26 mg/dL    Creatinine 2.60 (H) 0.60 - 1.30 mg/dL    Sodium 139 138 - 146 mmol/L    POC Potassium 5.0 (H) 3.5 - 4.9 mmol/L    Chloride 104 98 - 109 mmol/L    Total CO2 26 24 - 29 mmol/L    Hemoglobin 11.9 (L) 12.0 - 17.0 g/dL    Hematocrit 35 (L) 38 - 51 %    Ionized Calcium 1.07 (L) 1.20 - 1.32 mmol/L    eGFR 23.6 (L) >60.0 mL/min/1.73   Ammonia    Collection Time: 05/20/24 11:08 AM    Specimen: Blood   Result Value Ref Range    Ammonia 29 16 - 60 umol/L   COVID-19 and FLU A/B PCR, 1 HR TAT - Swab, Nasopharynx    Collection Time: 05/20/24 11:13 AM    Specimen: Nasopharynx; Swab   Result Value Ref Range    COVID19 Not Detected Not Detected - Ref. Range    Influenza A PCR Not Detected Not Detected    Influenza B PCR Not Detected Not Detected   ECG 12 Lead ED Triage Standing Order; Acute Stroke (Onset <24 hrs)    Collection Time: 05/20/24  11:30 AM   Result Value Ref Range    QT Interval 396 ms    QTC Interval 408 ms   MRSA Screen, PCR (Inpatient) - Swab, Nares    Collection Time: 05/20/24 11:43 AM    Specimen: Nares; Swab   Result Value Ref Range    MRSA PCR Negative Negative   Single High Sensitivity Troponin T    Collection Time: 05/20/24  1:52 PM    Specimen: Blood   Result Value Ref Range    HS Troponin T 106 (C) <22 ng/L   STAT Lactic Acid, Reflex    Collection Time: 05/20/24  1:52 PM    Specimen: Blood   Result Value Ref Range    Lactate 2.0 0.5 - 2.0 mmol/L   Urine Drug Screen - Straight Cath    Collection Time: 05/20/24  2:03 PM    Specimen: Straight Cath; Urine   Result Value Ref Range    THC, Screen, Urine Negative Negative    Phencyclidine (PCP), Urine Negative Negative    Cocaine Screen, Urine Negative Negative    Methamphetamine, Ur Negative Negative    Opiate Screen Negative Negative    Amphetamine Screen, Urine Negative Negative    Benzodiazepine Screen, Urine Negative Negative    Tricyclic Antidepressants Screen Negative Negative    Methadone Screen, Urine Negative Negative    Barbiturates Screen, Urine Negative Negative    Oxycodone Screen, Urine Negative Negative    Buprenorphine, Screen, Urine Negative Negative   Urinalysis With Culture If Indicated - Straight Cath    Collection Time: 05/20/24  2:03 PM    Specimen: Straight Cath; Urine   Result Value Ref Range    Color, UA Dark Yellow (A) Yellow, Straw    Appearance, UA Cloudy (A) Clear    pH, UA <=5.0 5.0 - 8.0    Specific Gravity, UA 1.019 1.001 - 1.030    Glucose, UA Negative Negative    Ketones, UA Trace (A) Negative    Bilirubin, UA Small (1+) (A) Negative    Blood, UA Negative Negative    Protein, UA Negative Negative    Leuk Esterase, UA Trace (A) Negative    Nitrite, UA Negative Negative    Urobilinogen, UA 1.0 E.U./dL 0.2 - 1.0 E.U./dL   Fentanyl, Urine - Straight Cath    Collection Time: 05/20/24  2:03 PM    Specimen: Straight Cath; Urine   Result Value Ref Range     Fentanyl, Urine Negative Negative   Urinalysis, Microscopic Only - Straight Cath    Collection Time: 05/20/24  2:03 PM    Specimen: Straight Cath; Urine   Result Value Ref Range    RBC, UA 3-5 (A) None Seen, 0-2 /HPF    WBC, UA 6-10 (A) None Seen, 0-2 /HPF    Bacteria, UA 1+ (A) None Seen, Trace /HPF    Squamous Epithelial Cells, UA 7-12 (A) None Seen, 0-2 /HPF    Hyaline Casts, UA None Seen 0 - 6 /LPF    Methodology Manual Light Microscopy    POC Glucose Once    Collection Time: 05/20/24  6:18 PM    Specimen: Blood   Result Value Ref Range    Glucose 87 70 - 130 mg/dL   High Sensitivity Troponin T 2Hr    Collection Time: 05/20/24  7:14 PM    Specimen: Blood   Result Value Ref Range    HS Troponin T 94 (C) <22 ng/L    Troponin T Delta -12 (L) >=-4 - <+4 ng/L   Blood Gas, Arterial With Co-Ox    Collection Time: 05/20/24  8:09 PM    Specimen: Arterial Blood   Result Value Ref Range    Site Right Radial     Jai's Test N/A     pH, Arterial 7.370 7.350 - 7.450 pH units    pCO2, Arterial 43.3 35.0 - 45.0 mm Hg    pO2, Arterial 115.0 (H) 83.0 - 108.0 mm Hg    HCO3, Arterial 25.0 20.0 - 26.0 mmol/L    Base Excess, Arterial -0.4 (L) 0.0 - 2.0 mmol/L    Hemoglobin, Blood Gas 11.4 (L) 13.5 - 17.5 g/dL    Hematocrit, Blood Gas 34.8 (L) 38.0 - 51.0 %    Oxyhemoglobin 96.8 94 - 99 %    Methemoglobin 0.20 0.00 - 1.50 %    Carboxyhemoglobin 1.7 0 - 2 %    CO2 Content 26.3 22 - 33 mmol/L    Temperature 37.0     Barometric Pressure for Blood Gas      Modality Nasal Cannula     FIO2 28 %    Rate 0 Breaths/minute    PIP 0 cmH2O    IPAP 0     EPAP 0     pH, Temp Corrected 7.370 pH Units    pCO2, Temperature Corrected 43.3 35 - 48 mm Hg    pO2, Temperature Corrected 115 (H) 83 - 108 mm Hg     Note: In addition to lab results from this visit, the labs listed above may include labs taken at another facility or during a different encounter within the last 24 hours. Please correlate lab times with ED admission and discharge times for  further clarification of the services performed during this visit.    CT Chest Without Contrast Diagnostic   Final Result   Multifocal bilateral pneumonia as described. Very small partially loculated left basilar pleural effusion.            CT SCAN OF THE ABDOMEN PELVIS WITHOUT CONTRAST: There is new relatively mild ascites not present on 3/29/2024 chest CT scan, with a moderate amount of fluid adjacent the liver and spleen but only minimal ascites elsewhere. Liver morphology is consistent    with cirrhosis. Gallbladder is again noted to be contracted around numerous densely calcified gallstones. There appear to be gallstones in the cystic duct but no visible gallbladder inflammation. No common duct stone or biliary is appreciated.      Spleen is normal in size and contains dense benign calcification. Pancreas and adrenal glands appear within normal limits for age. There are multiple bilateral renal lesions which are sharply defined, rounded, and water or fluid density, most likely    incidental renal cysts. No upper abdominal free air or adenopathy is seen. Bowel loops are normal in caliber. Stomach and duodenum appear grossly normal.      Regarding the lower abdomen/pelvis, bladder is decompressed and normal in appearance. There are numerous sigmoid diverticuli without evidence of diverticulitis. There is a small amount of intrapelvic ascites. Terminal ileum and cecum appear normal.    Appendix is not identified. Bony structures appear to be intact. There is moderate to advanced multilevel lumbar degenerative disc disease.      .      Impression:   1. Liver morphology consistent with cirrhosis, and relatively mild upper abdominal ascites.      2. Sigmoid diverticulosis without evidence of diverticulitis.      3. Contracted gallbladder with numerous gallstones, including several small stones that appear to be in the cystic duct. No visible gallbladder inflammation or biliary ductal dilatation.      4. No evidence  of an acute inflammatory focus is seen in the abdomen or pelvis.            Electronically Signed: Cristóbal Knight MD     5/20/2024 4:05 PM EDT     Workstation ID: [id number]      CT Abdomen Pelvis Without Contrast   Final Result   Multifocal bilateral pneumonia as described. Very small partially loculated left basilar pleural effusion.            CT SCAN OF THE ABDOMEN PELVIS WITHOUT CONTRAST: There is new relatively mild ascites not present on 3/29/2024 chest CT scan, with a moderate amount of fluid adjacent the liver and spleen but only minimal ascites elsewhere. Liver morphology is consistent    with cirrhosis. Gallbladder is again noted to be contracted around numerous densely calcified gallstones. There appear to be gallstones in the cystic duct but no visible gallbladder inflammation. No common duct stone or biliary is appreciated.      Spleen is normal in size and contains dense benign calcification. Pancreas and adrenal glands appear within normal limits for age. There are multiple bilateral renal lesions which are sharply defined, rounded, and water or fluid density, most likely    incidental renal cysts. No upper abdominal free air or adenopathy is seen. Bowel loops are normal in caliber. Stomach and duodenum appear grossly normal.      Regarding the lower abdomen/pelvis, bladder is decompressed and normal in appearance. There are numerous sigmoid diverticuli without evidence of diverticulitis. There is a small amount of intrapelvic ascites. Terminal ileum and cecum appear normal.    Appendix is not identified. Bony structures appear to be intact. There is moderate to advanced multilevel lumbar degenerative disc disease.      .      Impression:   1. Liver morphology consistent with cirrhosis, and relatively mild upper abdominal ascites.      2. Sigmoid diverticulosis without evidence of diverticulitis.      3. Contracted gallbladder with numerous gallstones, including several small stones that appear to be in  the cystic duct. No visible gallbladder inflammation or biliary ductal dilatation.      4. No evidence of an acute inflammatory focus is seen in the abdomen or pelvis.            Electronically Signed: Cristóbal Knight MD     5/20/2024 4:05 PM EDT     Workstation ID: [id number]      XR Chest 1 View   Final Result   Impression:   1. Low lung volumes with streaky left basilar airspace opacity representing atelectasis or early pneumonia.         Electronically Signed: Kendrick Souza     5/20/2024 12:11 PM EDT     Workstation ID: [id number]      CT Head Without Contrast Stroke Protocol   Final Result   Impression:      No evidence of acute intracranial abnormality, within the confines of motion degradation.      Similar chronic/age-related findings findings, as above.         Electronically Signed: Dale Darby MD     5/20/2024 11:02 AM EDT     Workstation ID: [id number]      MRI Brain Without Contrast    (Results Pending)   MRI Angiogram Head Without Contrast    (Results Pending)   MRI Angiogram Neck Without Contrast    (Results Pending)     Vitals:    05/20/24 1705 05/20/24 1800 05/20/24 1900 05/20/24 2040   BP: 110/55 109/90 105/77    BP Location:       Patient Position:       Pulse: 76 79 73 61   Resp:       Temp:       TempSrc:       SpO2: 93% 94% 97% 98%   Weight:       Height:         Medications   sodium chloride 0.9 % flush 10 mL (has no administration in time range)   iopamidol (ISOVUE-370) 76 % injection 100 mL (100 mL Intravenous Not Given 5/20/24 1435)   sodium chloride 0.9 % flush 10 mL (has no administration in time range)   sodium chloride 0.9 % flush 10 mL (has no administration in time range)   sodium chloride 0.9 % infusion 40 mL (has no administration in time range)   atorvastatin (LIPITOR) tablet 80 mg (has no administration in time range)   aspirin chewable tablet 81 mg (81 mg Oral Given 5/20/24 1354)     Or   aspirin suppository 300 mg ( Rectal Not Given:  See Alt 5/20/24 1354)   sodium chloride 0.9 % flush  10 mL (has no administration in time range)   sodium chloride 0.9 % flush 10 mL (has no administration in time range)   sodium chloride 0.9 % infusion 40 mL (has no administration in time range)   nitroglycerin (NITROSTAT) SL tablet 0.4 mg (has no administration in time range)   acetaminophen (TYLENOL) tablet 650 mg (has no administration in time range)     Or   acetaminophen (TYLENOL) 160 MG/5ML oral solution 650 mg (has no administration in time range)     Or   acetaminophen (TYLENOL) suppository 650 mg (has no administration in time range)   sennosides-docusate (PERICOLACE) 8.6-50 MG per tablet 2 tablet (has no administration in time range)     And   polyethylene glycol (MIRALAX) packet 17 g (has no administration in time range)     And   bisacodyl (DULCOLAX) EC tablet 5 mg (has no administration in time range)     And   bisacodyl (DULCOLAX) suppository 10 mg (has no administration in time range)   ondansetron ODT (ZOFRAN-ODT) disintegrating tablet 4 mg (has no administration in time range)     Or   ondansetron (ZOFRAN) injection 4 mg (has no administration in time range)   piperacillin-tazobactam (ZOSYN) 3.375 g IVPB in 100 mL NS MBP (CD) (has no administration in time range)   ascorbic acid (VITAMIN C) tablet 500 mg (has no administration in time range)   famotidine (PEPCID) tablet 20 mg (has no administration in time range)   ferrous sulfate tablet 325 mg (325 mg Oral Not Given 5/20/24 1854)   furosemide (LASIX) tablet 20 mg ( Oral Dose Auto Held 5/28/24 0900)   cetirizine (zyrTEC) tablet 10 mg (10 mg Oral Not Given 5/20/24 1854)   midodrine (PROAMATINE) tablet 2.5 mg (2.5 mg Oral Not Given 5/20/24 1855)   cycloSPORINE (RESTASIS) 0.05 % ophthalmic emulsion 1 drop (has no administration in time range)   sertraline (ZOLOFT) tablet 50 mg (50 mg Oral Not Given 5/20/24 1855)   mirtazapine (REMERON) tablet 15 mg ( Oral Dose Auto Held 5/28/24 2100)   norepinephrine (LEVOPHED) 8 mg in 250 mL NS infusion (premix) (0.1  mcg/kg/min × 104 kg Intravenous Rate/Dose Change 5/20/24 1702)   atropine sulfate injection 0.5 mg (has no administration in time range)   sepsis fluid NS 0.9 % bolus 2,562 mL (2,562 mL Intravenous New Bag 5/20/24 1142)   piperacillin-tazobactam (ZOSYN) 3.375 g IVPB in 100 mL NS MBP (CD) (0 g Intravenous Stopped 5/20/24 1353)   sodium chloride 0.9 % bolus 500 mL (0 mL Intravenous Stopped 5/20/24 1453)     ECG/EMG Results (last 24 hours)       Procedure Component Value Units Date/Time    ECG 12 Lead ED Triage Standing Order; Acute Stroke (Onset <24 hrs) [[id number]] Collected: 05/20/24 1130     Updated: 05/20/24 1916     QT Interval 396 ms      QTC Interval 408 ms     Narrative:      Test Reason : ED Triage Standing Order~  Blood Pressure :   */*   mmHG  Vent. Rate :  64 BPM     Atrial Rate :  64 BPM     P-R Int :   * ms          QRS Dur :  56 ms      QT Int : 396 ms       P-R-T Axes :   *   1  28 degrees     QTc Int : 408 ms    poor data quality  sinus rhythm with sinus arrhythmia  Low voltage QRS  Septal infarct , age undetermined  Abnormal ECG  Confirmed by MD Roche Michael ([id number]) on 5/20/2024 7:16:42 PM    Referred By: EDMD           Confirmed By: Abhijit Roche MD          ECG 12 Lead ED Triage Standing Order; Acute Stroke (Onset <24 hrs)   Final Result   Test Reason : ED Triage Standing Order~   Blood Pressure :   */*   mmHG   Vent. Rate :  64 BPM     Atrial Rate :  64 BPM      P-R Int :   * ms          QRS Dur :  56 ms       QT Int : 396 ms       P-R-T Axes :   *   1  28 degrees      QTc Int : 408 ms      poor data quality   sinus rhythm with sinus arrhythmia   Low voltage QRS   Septal infarct , age undetermined   Abnormal ECG   Confirmed by MD Roche Michael ([id number]) on 5/20/2024 7:16:42 PM      Referred By: EDMD           Confirmed By: Abhijit Roche MD                 Medical Decision Making  Amount and/or Complexity of Data Reviewed  Labs: ordered.  Radiology: ordered.  ECG/medicine tests:  ordered.    Risk  Prescription drug management.  Decision regarding hospitalization.        Final diagnoses:   Septic shock   Multifocal pneumonia   Leukocytosis, unspecified type   FARIHA (acute kidney injury)   Thrombocytopenia       ED Disposition  ED Disposition       ED Disposition   Decision to Admit    Condition   --    Comment   Level of Care: Critical Care [6]   Admitting Physician: MARIALUISA FELDER [[id number]]   Attending Physician: MARIALUISA FELDER [[id number]]                 No follow-up provider specified.       Medication List        ASK your doctor about these medications      acetaminophen 500 MG tablet  Commonly known as: TYLENOL  Ask about: Which instructions should I use?                 Don Roche MD  05/20/24 2885

## 2024-05-21 NOTE — CONSULTS
Palliative Care Initial Consult   Attending Physician: Cleopatra Kelly MD  Referring Provider: Faustino Rashid      Reason for Referral:  assistance with clarification of goals of care    Code Status:   Code Status and Medical Interventions:   Ordered at: 05/20/24 0220     Level Of Support Discussed With:    Health Care Surrogate     Code Status (Patient has no pulse and is not breathing):    CPR (Attempt to Resuscitate)     Medical Interventions (Patient has pulse or is breathing):    Full Support      Advanced Directives: Advance Directive Status: Patient does not have advance directive   Family/Support: children     Goals of Care:    Goals of Care/Treatment Preferences:    treat       HPI:   83 yo male with Covid in March of this year requiring rehab at [facility].  He discharged home but was not successful and subsequently placed in facility.  Presented to ED for 1 week hx of increasing weakness and AMS.  Noted with increased Cr. Has had issues with hypotension on midodrine since covid though dtr reports hypertension previously.  Perior to covid would walk himself to weakly podiatry appointments.  CT findings c/w poss pna and cirrhosis.  INR mildly elevated at 1.4.  Dtr?JUAN Smithdy bedside.    ROS:   Pt did not answer questions but deferred to dtr.  Appeared to be resting.        Past Medical History:   Diagnosis Date    Allergic ?    Cataract     Chronic kidney disease     Coronary artery disease     Hyperlipidemia     Hypertension     Myocardial infarction     Pneumonia of both lower lobes due to infectious organism 10/03/2019    Positive PPD     Respiratory failure 2005    requiring tracheostomy     Past Surgical History:   Procedure Laterality Date    CARDIAC CATHETERIZATION      CARDIAC SURGERY      HERNIA REPAIR       Social History     Socioeconomic History    Marital status:    Tobacco Use    Smoking status: Former     Types: Cigars, Cigarettes     Start date: 1985     Quit date: 2000     Years  since quittin.4     Passive exposure: Past    Smokeless tobacco: Never   Vaping Use    Vaping status: Never Used   Substance and Sexual Activity    Alcohol use: No    Drug use: Never    Sexual activity: Not Currently     Family History   Problem Relation Age of Onset    Arthritis Mother     Heart attack Father     Stroke Father     Diabetes Father     Heart attack Brother     Cancer Paternal Uncle     Obesity Other        Allergies   Allergen Reactions    Dexamethasone Unknown - High Severity    Prednisone Rash         Current Facility-Administered Medications:     acetaminophen (TYLENOL) tablet 650 mg, 650 mg, Oral, Q4H PRN **OR** acetaminophen (TYLENOL) 160 MG/5ML oral solution 650 mg, 650 mg, Oral, Q4H PRN **OR** acetaminophen (TYLENOL) suppository 650 mg, 650 mg, Rectal, Q4H PRN, Sena Hartley DO    ascorbic acid (VITAMIN C) tablet 500 mg, 500 mg, Oral, QAM, Sena Hartley DO    aspirin chewable tablet 81 mg, 81 mg, Oral, Daily, 81 mg at 24 1354 **OR** aspirin suppository 300 mg, 300 mg, Rectal, Daily, Prince Manrique PA-C    atorvastatin (LIPITOR) tablet 80 mg, 80 mg, Oral, Nightly, Prince Manrique PA-C    atropine sulfate injection 0.5 mg, 0.5 mg, Intravenous, Once PRN, Tu Caban, ADALBERTO    sennosides-docusate (PERICOLACE) 8.6-50 MG per tablet 2 tablet, 2 tablet, Oral, BID PRN **AND** polyethylene glycol (MIRALAX) packet 17 g, 17 g, Oral, Daily PRN **AND** bisacodyl (DULCOLAX) EC tablet 5 mg, 5 mg, Oral, Daily PRN **AND** bisacodyl (DULCOLAX) suppository 10 mg, 10 mg, Rectal, Daily PRN, Sena Hartley DO    cetirizine (zyrTEC) tablet 10 mg, 10 mg, Oral, Daily, Sena Hartley DO    cycloSPORINE (RESTASIS) 0.05 % ophthalmic emulsion 1 drop, 1 drop, Both Eyes, Q12H, Sena Hartley DO, 1 drop at 24 0639    DOPamine 400 mg in 250 mL D5W infusion, 2-20 mcg/kg/min, Intravenous, Titrated, Cleopatra Kelly MD, Last Rate: 15.6 mL/hr at 24  "1013, 4 mcg/kg/min at 05/21/24 1013    famotidine (PEPCID) tablet 20 mg, 20 mg, Oral, BID, Sena Hartley DO    ferrous sulfate tablet 325 mg, 325 mg, Oral, Daily, Sena Hartley,     iopamidol (ISOVUE-370) 76 % injection 100 mL, 100 mL, Intravenous, Once in imaging, Don Roche MD    midodrine (PROAMATINE) tablet 2.5 mg, 2.5 mg, Oral, TID AC, Sena Hartley DO    nitroglycerin (NITROSTAT) SL tablet 0.4 mg, 0.4 mg, Sublingual, Q5 Min PRN, Sena Hartley DO    norepinephrine (LEVOPHED) 8 mg in 250 mL NS infusion (premix), 0.02-0.3 mcg/kg/min, Intravenous, Titrated, Don Roche MD, Last Rate: 46.8 mL/hr at 05/21/24 0910, 0.24 mcg/kg/min at 05/21/24 0910    ondansetron ODT (ZOFRAN-ODT) disintegrating tablet 4 mg, 4 mg, Oral, Q6H PRN **OR** ondansetron (ZOFRAN) injection 4 mg, 4 mg, Intravenous, Q6H PRN, Sena Hartley DO    piperacillin-tazobactam (ZOSYN) 3.375 g IVPB in 100 mL NS MBP (CD), 3.375 g, Intravenous, Q8H, Sena Hartley, DO, 3.375 g at 05/21/24 0437    sertraline (ZOLOFT) tablet 50 mg, 50 mg, Oral, Daily, Sena Hartley DO    sodium chloride 0.9 % flush 10 mL, 10 mL, Intravenous, PRN, Don Roche MD    sodium chloride 0.9 % flush 10 mL, 10 mL, Intravenous, Q12H, Prince Manrique PA-C, 10 mL at 05/21/24 0347    sodium chloride 0.9 % flush 10 mL, 10 mL, Intravenous, PRN, Prince Manrique PA-C    sodium chloride 0.9 % flush 10 mL, 10 mL, Intravenous, Q12H, Sena Hartley, , 10 mL at 05/21/24 0347    sodium chloride 0.9 % flush 10 mL, 10 mL, Intravenous, PRN, Sena Hartley,     sodium chloride 0.9 % infusion 40 mL, 40 mL, Intravenous, PRN, Prince Manrique, VLADIMIR    sodium chloride 0.9 % infusion 40 mL, 40 mL, Intravenous, PRN, Sena Hartley,      Palliative Performance Scale Score:  40    /65   Pulse (!) 44   Temp 93.8 °F (34.3 °C) (Rectal)   Resp 20   Ht 177.8 cm (70\")   Wt 104 kg (230 lb)   " SpO2 100%   BMI 33.00 kg/m²     Intake/Output Summary (Last 24 hours) at 5/21/2024 1015  Last data filed at 5/21/2024 0800  Gross per 24 hour   Intake 1199.23 ml   Output 315 ml   Net 884.23 ml       Physical Exam:    General Appearance:    Alert, cooperative, NAD   HEENT:    NC/AT, EOMI, anicteric, MMM, face relaxed   Neck:   supple, trachea midline, no JVD   Lungs:     CTA bilat, diminished in bases; respirations regular, even     and unlabored    Heart:    RRR, normal S1 and S2, no M/R/G   Abdomen:     Normal bowel sounds, soft, nontender, nondistended   G/U:   Deferred   MSK/Extremities:   No clubbing , cyanosis, +edema, No wasting   Pulses:   Pulses palpable and equal bilaterally   Skin:   Warm, dry, no mottling   Neurologic:   A/Ox2, cooperative, moves extremities x 4, no tremor, nl     tone   Psych:   Calm, appropriate         Labs:   Results from last 7 days   Lab Units 05/21/24  0631   WBC 10*3/mm3 26.46*   HEMOGLOBIN g/dL 12.0*   HEMATOCRIT % 35.4*   PLATELETS 10*3/mm3 92*     Results from last 7 days   Lab Units 05/21/24  0631 05/20/24  1107 05/20/24  1105   SODIUM mmol/L 143  --  140   POTASSIUM mmol/L 4.0  --  4.8   CHLORIDE mmol/L 109*  --  105   CO2 mmol/L 23.0  --  25.0   BUN mg/dL 52*  --  55*   CREATININE mg/dL 1.92*   < > 2.35*   CALCIUM mg/dL 7.9*  --  8.2*   BILIRUBIN mg/dL  --   --  1.8*   ALK PHOS U/L  --   --  142*   ALT (SGPT) U/L  --   --  49*   AST (SGOT) U/L  --   --  64*   GLUCOSE mg/dL 114*  --  95    < > = values in this interval not displayed.     Imaging Results (Last 72 Hours)       Procedure Component Value Units Date/Time    MRI Brain Without Contrast [[id number]] Resulted: 05/20/24 2208     Updated: 05/20/24 2223    CT Chest Without Contrast Diagnostic [[id number]] Collected: 05/20/24 1536     Updated: 05/20/24 1608    Narrative:      CT CHEST WO CONTRAST DIAGNOSTIC, CT ABDOMEN PELVIS WO CONTRAST    Date of Exam: 5/20/2024 2:11 PM EDT    Indication: Sepsis, poss PNA on  CXR.    Comparison: Angiographic chest CT scan 3/21/2024. No previous abdominal CT scan    Technique: Axial CT images were obtained of the chest without contrast administration.  Reconstructed coronal and sagittal images were also obtained. Automated exposure control and iterative construction methods were used.      Findings:  CT SCAN OF THE CHEST WITHOUT CONTRAST: No significant mediastinal, hilar, axillary, or lower cervical lymphadenopathy is seen. There is no evidence of pericardial effusion. There is a very small partially loculated left pleural effusion, new from   3/29/2024. There is extensive, dense coronary calcification. Patchy multifocal groundglass disease has changed in pattern from 3/29/2024. There are several new areas of peribronchial thickening and focal atelectasis in the lung bases, a little more   extensive than on the prior study. Findings would be consistent with a bronchopneumonia. Bony structures appear to be intact. Advanced lower cervical spine degenerative disc disease appears stable.      Impression:      Multifocal bilateral pneumonia as described. Very small partially loculated left basilar pleural effusion.        CT SCAN OF THE ABDOMEN PELVIS WITHOUT CONTRAST: There is new relatively mild ascites not present on 3/29/2024 chest CT scan, with a moderate amount of fluid adjacent the liver and spleen but only minimal ascites elsewhere. Liver morphology is consistent   with cirrhosis. Gallbladder is again noted to be contracted around numerous densely calcified gallstones. There appear to be gallstones in the cystic duct but no visible gallbladder inflammation. No common duct stone or biliary is appreciated.    Spleen is normal in size and contains dense benign calcification. Pancreas and adrenal glands appear within normal limits for age. There are multiple bilateral renal lesions which are sharply defined, rounded, and water or fluid density, most likely   incidental renal cysts. No  upper abdominal free air or adenopathy is seen. Bowel loops are normal in caliber. Stomach and duodenum appear grossly normal.    Regarding the lower abdomen/pelvis, bladder is decompressed and normal in appearance. There are numerous sigmoid diverticuli without evidence of diverticulitis. There is a small amount of intrapelvic ascites. Terminal ileum and cecum appear normal.   Appendix is not identified. Bony structures appear to be intact. There is moderate to advanced multilevel lumbar degenerative disc disease.    .    Impression:  1. Liver morphology consistent with cirrhosis, and relatively mild upper abdominal ascites.    2. Sigmoid diverticulosis without evidence of diverticulitis.    3. Contracted gallbladder with numerous gallstones, including several small stones that appear to be in the cystic duct. No visible gallbladder inflammation or biliary ductal dilatation.    4. No evidence of an acute inflammatory focus is seen in the abdomen or pelvis.        Electronically Signed: Cristóbal Knight MD    5/20/2024 4:05 PM EDT    Workstation ID: [id number]    CT Abdomen Pelvis Without Contrast [[id number]] Collected: 05/20/24 1536     Updated: 05/20/24 1608    Narrative:      CT CHEST WO CONTRAST DIAGNOSTIC, CT ABDOMEN PELVIS WO CONTRAST    Date of Exam: 5/20/2024 2:11 PM EDT    Indication: Sepsis, poss PNA on CXR.    Comparison: Angiographic chest CT scan 3/21/2024. No previous abdominal CT scan    Technique: Axial CT images were obtained of the chest without contrast administration.  Reconstructed coronal and sagittal images were also obtained. Automated exposure control and iterative construction methods were used.      Findings:  CT SCAN OF THE CHEST WITHOUT CONTRAST: No significant mediastinal, hilar, axillary, or lower cervical lymphadenopathy is seen. There is no evidence of pericardial effusion. There is a very small partially loculated left pleural effusion, new from   3/29/2024. There is extensive, dense  coronary calcification. Patchy multifocal groundglass disease has changed in pattern from 3/29/2024. There are several new areas of peribronchial thickening and focal atelectasis in the lung bases, a little more   extensive than on the prior study. Findings would be consistent with a bronchopneumonia. Bony structures appear to be intact. Advanced lower cervical spine degenerative disc disease appears stable.      Impression:      Multifocal bilateral pneumonia as described. Very small partially loculated left basilar pleural effusion.        CT SCAN OF THE ABDOMEN PELVIS WITHOUT CONTRAST: There is new relatively mild ascites not present on 3/29/2024 chest CT scan, with a moderate amount of fluid adjacent the liver and spleen but only minimal ascites elsewhere. Liver morphology is consistent   with cirrhosis. Gallbladder is again noted to be contracted around numerous densely calcified gallstones. There appear to be gallstones in the cystic duct but no visible gallbladder inflammation. No common duct stone or biliary is appreciated.    Spleen is normal in size and contains dense benign calcification. Pancreas and adrenal glands appear within normal limits for age. There are multiple bilateral renal lesions which are sharply defined, rounded, and water or fluid density, most likely   incidental renal cysts. No upper abdominal free air or adenopathy is seen. Bowel loops are normal in caliber. Stomach and duodenum appear grossly normal.    Regarding the lower abdomen/pelvis, bladder is decompressed and normal in appearance. There are numerous sigmoid diverticuli without evidence of diverticulitis. There is a small amount of intrapelvic ascites. Terminal ileum and cecum appear normal.   Appendix is not identified. Bony structures appear to be intact. There is moderate to advanced multilevel lumbar degenerative disc disease.    .    Impression:  1. Liver morphology consistent with cirrhosis, and relatively mild upper  abdominal ascites.    2. Sigmoid diverticulosis without evidence of diverticulitis.    3. Contracted gallbladder with numerous gallstones, including several small stones that appear to be in the cystic duct. No visible gallbladder inflammation or biliary ductal dilatation.    4. No evidence of an acute inflammatory focus is seen in the abdomen or pelvis.        Electronically Signed: Cristóbal Knight MD    5/20/2024 4:05 PM EDT    Workstation ID: [id number]    XR Chest 1 View [[id number]] Collected: 05/20/24 1206     Updated: 05/20/24 1214    Narrative:      XR CHEST 1 VW    Date of Exam: 5/20/2024 11:52 AM EDT    Indication: Acute Stroke Protocol (onset < 12 hrs)    Comparison: Chest radiograph dated 3/29/2024    Findings:  The cardiomediastinal silhouette is within normal limits for there is aortic arch atherosclerotic calcification. There are low lung volumes with streaky left basilar airspace opacity representing atelectasis or mild pneumonia. There is no pleural   effusion or pneumothorax. There are degenerative changes of the thoracic spine.      Impression:      Impression:  1. Low lung volumes with streaky left basilar airspace opacity representing atelectasis or early pneumonia.      Electronically Signed: Kendrick Souza    5/20/2024 12:11 PM EDT    Workstation ID: [id number]    CT Head Without Contrast Stroke Protocol [[id number]] Collected: 05/20/24 1058     Updated: 05/20/24 1105    Narrative:      CT HEAD WO CONTRAST STROKE PROTOCOL    Date of Exam: 5/20/2024 10:51 AM EDT    Indication: Neuro deficit, acute, stroke suspected  Neuro Deficit, acute, Stroke suspected.    Comparison: Head CT 3/29/2024.    Technique: Axial CT images were obtained of the head without contrast administration.  Reconstructed coronal images were also obtained. Automated exposure control and iterative construction methods were used.    Scan Time: 10:52 a.m.  Results discussed with the stroke team via telephone at 10:58  a.m.    Findings:    Please note this is a motion-degraded examination.    No evidence of acute intracranial hemorrhage or mass effect. No extra-axial collection. The gray white matter differentiation is preserved. Global parenchymal atrophy. Periventricular and subcortical white matter hypodensity, nonspecific, but likely   sequela of chronic small vessel ischemic disease.    The mastoid air cells are well aerated. Mucosal thickening in the right maxillary sinus. Globes and extraocular muscles are unremarkable. No acute or suspicious osseous abnormality. Soft tissues within normal limits.      Impression:      Impression:    No evidence of acute intracranial abnormality, within the confines of motion degradation.    Similar chronic/age-related findings findings, as above.      Electronically Signed: Dale Darby MD    5/20/2024 11:02 AM EDT    Workstation ID: [id number]              Lab 05/21/24  0631   HEMOGLOBIN A1C 4.50*       Diagnostics:   No valid procedures specified.    A:     Sepsis associated hypotension    Mixed hyperlipidemia    ASHD (arteriosclerotic heart disease)    Primary hypertension    Chronic kidney disease, stage 3    Morbid obesity    Obstructive sleep apnea    Transaminitis    CAD (coronary artery disease)    Pneumonia    Pressure injury of sacral region, stage 4    Acute on chronic kidney failure    Sepsis         Impression:   Sepsis/shock  A/CKD  Obesity  Transaminitis/cirrhosis  PNA  Stg 4 sacral ulcer  Bradycardia on monitor to 30's    Symptoms:  Debility         P:      Advance Care Planning     Date of Shared Decision Making Discussion: 05/21/24    Dtr/POA was/were present for the discussion.    Decision Maker: POA    SDMSI SUMMARY:    Patient/family describes current medical condition as weaker than 2 months ago    Patient/family identified the following as being most important to living well: ability to assist in care for self      Explored understanding, benefits and burdens, goals  for treatment, fears and concerns of  interventions as resuscitation and feeding tube    Reviewed goals of care for additional medical interventions using decision making framework:      MOST form, Living Will and EMS DNR  introduced if not previously completed.    DECISIONS/RECOMMENDATIONS for FOLLOW-UP:   Decision made to continue full code status at this time based on ptprior experience and discussions with dtr.  Also agreeable to feeding tube.  RN updated.    Time started: 0918    Time ended: 1934    Total time: 16 minutes             Thank you for this consult and allowing us to participate in patient's plan of care. Palliative Care Team will continue to follow patient.       Clary Rashid MD, 5/21/2024, 10:15 EDT

## 2024-05-21 NOTE — PROGRESS NOTES
"Critical Care Note     LOS: 1 day   Patient Care Team:  Ja Baldwin MD as PCP - General (Internal Medicine)  Danny Rdz Jr., MD as Consulting Physician (Ophthalmology)    Chief Complaint/Reason for visit:    Chief Complaint   Patient presents with    Stroke   Septic shock  Acute on chronic kidney disease  Pneumonia  Stage IV pressure injury sacrum, present on admission  Morbid obesity  Obstructive sleep apnea  Coronary artery disease    Subjective     Interval History:     Remains afebrile.  Continue required norepinephrine 0.13 mics per kilogram per minute.  Remains in sinus rhythm but has episodes of severe bradycardia with heart rates dropping into the 30s.  Rhythm strip appears to have a Mobitz type II AV block.  This morning he was on BiPAP 12 over 628% FiO2.  He was transition to 2 L and saturation is 99%.  He is oliguric with urine output of 260 MLS yesterday.  Creatinine improved to 1.92 compared to 2.35.    Review of Systems:    All systems were reviewed and negative except as noted in subjective.    Medical history, surgical history, social history, family history reviewed    Objective     Intake/Output:    Intake/Output Summary (Last 24 hours) at 5/21/2024 1609  Last data filed at 5/21/2024 1400  Gross per 24 hour   Intake 899.5 ml   Output 390 ml   Net 509.5 ml       Nutrition:  NPO Diet NPO Type: Strict NPO    Infusions:  DOPamine, 2-20 mcg/kg/min, Last Rate: 2 mcg/kg/min (05/21/24 1324)  norepinephrine, 0.02-0.3 mcg/kg/min, Last Rate: 0.26 mcg/kg/min (05/21/24 1431)        Mechanical Ventilator Settings:                                                Telemetry: Sinus rhythm with episodes of AV block heart rate of 30             Vital Signs  Blood pressure (!) 91/21, pulse 68, temperature 96.5 °F (35.8 °C), temperature source Axillary, resp. rate 20, height 177.8 cm (70\"), weight 104 kg (230 lb), SpO2 (!) 88%.    Physical Exam:  General Appearance:  Obese elderly gentleman wearing BiPAP "   Head:  Alopecia   Eyes:          Conjunctiva pink   Ears:  Extremely hard of hearing   Throat: Unable to assess   Neck: Trachea midline   Back:      Lungs:   Breath sounds are bilateral, clear anteriorly    Heart:  Slow rate, regular, S1, S2 auscultated   Abdomen:   Obese, hypoactive bowel sounds, nontender   Rectal:   Deferred   Extremities: 2+ pitting edema   Pulses:    Skin: Stage IV decubitus sacrum   Lymph nodes: No cervical adenopathy   Neurologic: Extremely hard of hearing, will arouse and follow commands with generalized weakness      Results Review:     I reviewed the patient's new clinical results.   Results from last 7 days   Lab Units 05/21/24  0631 05/20/24  1107 05/20/24  1105   SODIUM mmol/L 143  --  140   POTASSIUM mmol/L 4.0  --  4.8   CHLORIDE mmol/L 109*  --  105   CO2 mmol/L 23.0  --  25.0   BUN mg/dL 52*  --  55*   CREATININE mg/dL 1.92* 2.60* 2.35*   CALCIUM mg/dL 7.9*  --  8.2*   BILIRUBIN mg/dL  --   --  1.8*   ALK PHOS U/L  --   --  142*   ALT (SGPT) U/L  --   --  49*   AST (SGOT) U/L  --   --  64*   GLUCOSE mg/dL 114*  --  95     Results from last 7 days   Lab Units 05/21/24  0631 05/20/24  1107 05/20/24  1105   WBC 10*3/mm3 26.46*  --  17.84*   HEMOGLOBIN g/dL 12.0*  --  11.3*   HEMOGLOBIN, POC g/dL  --  11.9*  --    HEMATOCRIT % 35.4*  --  33.7*   HEMATOCRIT POC %  --  35*  --    PLATELETS 10*3/mm3 92*  --  84*     Results from last 7 days   Lab Units 05/20/24 2009   PH, ARTERIAL pH units 7.370   PO2 ART mm Hg 115.0*   PCO2, ARTERIAL mm Hg 43.3   HCO3 ART mmol/L 25.0     Lab Results   Component Value Date    BLOODCX Abnormal Stain (C) 05/20/2024    BLOODCX Abnormal Stain (C) 05/20/2024     Lab Results   Component Value Date    URINECX 25,000 CFU/mL Gram Negative Bacilli (A) 05/20/2024       I reviewed the patient's new imaging including images and reports.    CT CHEST WO CONTRAST DIAGNOSTIC, CT ABDOMEN PELVIS WO CONTRAST    Date of Exam: 5/20/2024 2:11 PM EDT    Indication: Sepsis,  poss PNA on CXR.    Comparison: Angiographic chest CT scan 3/21/2024. No previous abdominal CT scan    Technique: Axial CT images were obtained of the chest without contrast administration.  Reconstructed coronal and sagittal images were also obtained. Automated exposure control and iterative construction methods were used.      Findings:  CT SCAN OF THE CHEST WITHOUT CONTRAST: No significant mediastinal, hilar, axillary, or lower cervical lymphadenopathy is seen. There is no evidence of pericardial effusion. There is a very small partially loculated left pleural effusion, new from  3/29/2024. There is extensive, dense coronary calcification. Patchy multifocal groundglass disease has changed in pattern from 3/29/2024. There are several new areas of peribronchial thickening and focal atelectasis in the lung bases, a little more  extensive than on the prior study. Findings would be consistent with a bronchopneumonia. Bony structures appear to be intact. Advanced lower cervical spine degenerative disc disease appears stable.   Impression:     Multifocal bilateral pneumonia as described. Very small partially loculated left basilar pleural effusion.        CT SCAN OF THE ABDOMEN PELVIS WITHOUT CONTRAST: There is new relatively mild ascites not present on 3/29/2024 chest CT scan, with a moderate amount of fluid adjacent the liver and spleen but only minimal ascites elsewhere. Liver morphology is consistent  with cirrhosis. Gallbladder is again noted to be contracted around numerous densely calcified gallstones. There appear to be gallstones in the cystic duct but no visible gallbladder inflammation. No common duct stone or biliary is appreciated.    Spleen is normal in size and contains dense benign calcification. Pancreas and adrenal glands appear within normal limits for age. There are multiple bilateral renal lesions which are sharply defined, rounded, and water or fluid density, most likely  incidental renal cysts. No  upper abdominal free air or adenopathy is seen. Bowel loops are normal in caliber. Stomach and duodenum appear grossly normal.    Regarding the lower abdomen/pelvis, bladder is decompressed and normal in appearance. There are numerous sigmoid diverticuli without evidence of diverticulitis. There is a small amount of intrapelvic ascites. Terminal ileum and cecum appear normal.  Appendix is not identified. Bony structures appear to be intact. There is moderate to advanced multilevel lumbar degenerative disc disease.    .    Impression:  1. Liver morphology consistent with cirrhosis, and relatively mild upper abdominal ascites.    2. Sigmoid diverticulosis without evidence of diverticulitis.    3. Contracted gallbladder with numerous gallstones, including several small stones that appear to be in the cystic duct. No visible gallbladder inflammation or biliary ductal dilatation.    4. No evidence of an acute inflammatory focus is seen in the abdomen or pelvis.        Electronically Signed: Cristóbal Knight MD   5/20/2024 4:05 PM EDT     All medications reviewed.   ascorbic acid, 500 mg, Oral, QAM  aspirin, 81 mg, Oral, Daily   Or  aspirin, 300 mg, Rectal, Daily  atorvastatin, 80 mg, Oral, Nightly  cetirizine, 10 mg, Oral, Daily  cycloSPORINE, 1 drop, Both Eyes, Q12H  erythromycin, , Both Eyes, Nightly  famotidine, 20 mg, Oral, BID  ferrous sulfate, 325 mg, Oral, Daily  iopamidol, 100 mL, Intravenous, Once in imaging  midodrine, 2.5 mg, Oral, TID AC  piperacillin-tazobactam, 3.375 g, Intravenous, Q8H  sertraline, 50 mg, Oral, Daily  sodium chloride, 10 mL, Intravenous, Q12H  sodium chloride, 10 mL, Intravenous, Q12H  sodium chloride, 10 mL, Intravenous, Q12H          Assessment & Plan       Sepsis associated hypotension    Mixed hyperlipidemia    Primary hypertension    Chronic kidney disease, stage 3    Morbid obesity    Obstructive sleep apnea    Transaminitis    CAD (coronary artery disease)    Pressure injury of sacral  region, stage 4    Acute on chronic kidney failure    Sepsis    84-year-old gentleman, remote smoker with a history of hypertension, chronic kidney disease, coronary artery disease who was hospitalized March 29 through April 5 with COVID pneumonia and discharged to truck to [facility].  He was hypotensive and required chronic midodrine to maintain an adequate blood pressure.  He will was transferred from [facility] to [facility] 2 weeks prior to admission.  He then presented May 20 with worsening weakness confusion and hypotension.  He was evaluated for possible stroke in the emergency room with negative imaging.  Contrast imaging was not performed secondary to renal function.  He had an elevated white count and imaging revealed bilateral patchy pneumonia.  He had evidence of cirrhosis on his abdominal scan with gallstones including some gallstones in the cystic duct but no inflammation of the gallbladder noted.  He was placed on norepinephrine for blood pressure support and Siev the loading dose of vancomycin 2 g and was placed on Zosyn.  Serum creatinine is slightly better at 1.92.  His baseline creatinine is 1.01.  Source of sepsis is presumed to the multifocal pneumonia although it may actually be his stage IV decubitus to the bone which is purulent.  He has some gram-negative rods in urine but only 25,000 CFU's likely not an acute infection.  Blood cultures are growing a gram-negative bacilli.    Today he was spontaneously dropping his heart rate 30.  EKG revealed nonconducted P waves consistent with Mobitz type II.  He was placed on dopamine 5 mics per kilogram per minute with improvement in his heart rate.  Blood pressure still is marginal at 91 systolic.    He remains on BiPAP 12/6 alternating with nasal cannula.    Clearly this gentleman has been minimally mobile for a significant amount of time and has acquired a stage IV sacral decubitus.  He has multiple chronic diseases, renal insufficiency,  coronary artery disease, obesity, cirrhosis.  Given his advanced age, poor underlying physical condition, I think it is unwise to continue all heroic measures.  However, it is documented by palliative care that he has expressed his children that he wishes all heroic measures and therefore we will proceed with intubation if he requires it, dialysis if he requires it.    PLAN:    I asked for ID to weigh in on antibiotic management, source of sepsis possible is decubitus versus pneumonia  Obtain vancomycin levels  Continue Zosyn    Support blood pressure with norepinephrine  Continue midodrine and his home dose    WOC evaluated his sacral decubitus and placed a wound VAC    Remains NPO    Aspirin, statin for his known history of coronary disease    Start subcutaneous heparin      VTE Prophylaxis: none    Stress Ulcer Prophylaxis: Uche Kelly MD  05/21/24  16:09 EDT      Time: Critical care 35 min  I personally provided care to this critically ill patient as documented above.  Critical care time does not include time spent on separately billed procedures.  None of my critical care time was concurrent with other critical care providers.

## 2024-05-21 NOTE — PROGRESS NOTES
Malnutrition Severity Assessment    Patient Name:  Tho Vasquez  YOB: 1940  MRN: [id number]  Admit Date:  5/20/2024    Patient meets criteria for : Severe Malnutrition (PO intakes <75% EEN >/=1m and severe fluid retention(3-4+ anasarca) likely masking weight loss.)    Malnutrition Severity Assessment  Malnutrition Type: Chronic Disease - Related Malnutrition  Malnutrition Type (Last 8 Hours)       Malnutrition Severity Assessment       Row Name 05/21/24 1517       Malnutrition Severity Assessment    Malnutrition Type Chronic Disease - Related Malnutrition      Row Name 05/21/24 1517       Insufficient Energy Intake     Insufficient Energy Intake Findings Severe    Insufficient Energy Intake  <75% of est. energy requirement for > or equal to 1 month      Row Name 05/21/24 1517       Unintentional Weight Loss     Unintentional Weight Loss Findings --  STEPHANE fluid likely masking      Row Name 05/21/24 1517       Fluid Accumulation (Edema)    Fluid Acumulation Findings Severe    Fluid Accumulation  Severe equals 3+ or 4+ pitting edema      Row Name 05/21/24 1517       Criteria Met (Must meet criteria for severity in at least 2 of these categories: M Wasting, Fat Loss, Fluid, Secondary Signs, Wt. Status, Intake)    Patient meets criteria for  Severe Malnutrition  PO intakes <75% EEN >/=1m and severe fluid retention(3-4+ anasarca) likely masking weight loss.                    Electronically signed by:  Ashley Contreras RD  05/21/24 15:18 EDT

## 2024-05-21 NOTE — NURSING NOTE
Attempted ordered MRI scans, but patient was unable to lie still and became agitated and restless while on the MRI table. Contacted ADALBERTO Sanchez with neuro/stroke who said to hold off on scans until the patient is able to be still.

## 2024-05-22 NOTE — PLAN OF CARE
"  Problem: Palliative Care  Goal: Enhanced Quality of Life  Outcome: Ongoing, Progressing   Goal Outcome Evaluation:                 Palliative RN visited with pt at bedside; dtr Mallory SMITH) present at visit. Dtr reports that pt has been waking up more today and is easier to understand. States that feeding tube placement was unsuccessful yesterday and they are revisiting that today. She was asking in staff can probably give pt \"something to relax him\" so they can insert feeding tube.    Pt aroused to dtr's voice and earlier to the BP cuff inflating. Pt easily went back to sleep. Dr. Kelly entered the room during this visit and talked to dtr about pt's cultures. They were discussing TF when I left the room.    Palliative will continue to follow patient for symptom management and support.    Discussed pt in Palliative IDT with MD, ADALBERTO, RN's, SW and .                             "

## 2024-05-22 NOTE — PROGRESS NOTES
"Palliative Care Daily Progress Note     Referring:  Faustino Rashid  C/C: none per pt    S: Medical record reviewed.  Events noted.  Staff unsuccessful placing keofeed.  RN reports discussion re: pt potentially declining placement and TF with potential for comfort measures.  BC have returned + for proteus.  Dopamine drip has helped with heart rate.  ID to see.  Now in R?O for candida auris.      ROS: denied pain or SOA    O: Code Status:   Code Status and Medical Interventions:   Ordered at: 05/20/24 1730     Level Of Support Discussed With:    Health Care Surrogate     Code Status (Patient has no pulse and is not breathing):    CPR (Attempt to Resuscitate)     Medical Interventions (Patient has pulse or is breathing):    Full Support      Advanced Directives: Advance Directive Status: Patient has advance directive, copy in chart   Goals of Care: Ongoing.   Palliative Performance Scale Score: 20%     BP 98/86   Pulse 70   Temp 97.4 °F (36.3 °C) (Axillary)   Resp 18   Ht 177.8 cm (70\")   Wt 104 kg (230 lb)   SpO2 94%   BMI 33.00 kg/m²     Intake/Output Summary (Last 24 hours) at 5/22/2024 1054  Last data filed at 5/22/2024 0800  Gross per 24 hour   Intake 1625.66 ml   Output 357 ml   Net 1268.66 ml       Physical Exam:    General Appearance:    Alert, cooperative, NAD   HEENT:    NC/AT, EOMI, anicteric, MMM, face relaxed   Neck:   supple, trachea midline, no JVD   Lungs:     CTA bilat, diminished in bases; respirations regular, even     and unlabored    Heart:    RRR, normal S1 and S2, no M/R/G   Abdomen:     Normal bowel sounds, soft, nontender, nondistended   G/U:   Deferred   MSK/Extremities:   No clubbing , cyanosis or edema, No wasting   Pulses:   Pulses palpable and equal bilaterally   Skin:   Warm, dry, no mottling   Neurologic:   A/Ox3, cooperative, moves extremities x 4, no tremor, nl     tone   Psych:   Calm, appropriate       Current Medications:      Current Facility-Administered Medications:     " acetaminophen (TYLENOL) tablet 650 mg, 650 mg, Oral, Q4H PRN **OR** acetaminophen (TYLENOL) 160 MG/5ML oral solution 650 mg, 650 mg, Oral, Q4H PRN **OR** acetaminophen (TYLENOL) suppository 650 mg, 650 mg, Rectal, Q4H PRN, Sena Hartley DO    ascorbic acid (VITAMIN C) tablet 500 mg, 500 mg, Oral, QAM, Sena Hartley DO    aspirin chewable tablet 81 mg, 81 mg, Oral, Daily, 81 mg at 05/20/24 1354 **OR** aspirin suppository 300 mg, 300 mg, Rectal, Daily, Prince Manrique PA-C, 300 mg at 05/22/24 0849    atorvastatin (LIPITOR) tablet 80 mg, 80 mg, Oral, Nightly, Prince Manrique PA-C    atropine sulfate injection 0.5 mg, 0.5 mg, Intravenous, Once PRN, Tu Caban, ADALBERTO    sennosides-docusate (PERICOLACE) 8.6-50 MG per tablet 2 tablet, 2 tablet, Oral, BID PRN **AND** polyethylene glycol (MIRALAX) packet 17 g, 17 g, Oral, Daily PRN **AND** bisacodyl (DULCOLAX) EC tablet 5 mg, 5 mg, Oral, Daily PRN **AND** bisacodyl (DULCOLAX) suppository 10 mg, 10 mg, Rectal, Daily PRN, Sena Hartley DO    cetirizine (zyrTEC) tablet 10 mg, 10 mg, Oral, Daily, Sena Hartley DO    cycloSPORINE (RESTASIS) 0.05 % ophthalmic emulsion 1 drop, 1 drop, Both Eyes, Q12H, Sena Hartley DO, 1 drop at 05/22/24 0533    DOPamine 400 mg in 250 mL D5W infusion, 2-20 mcg/kg/min, Intravenous, Titrated, Cleopatra Kelly MD, Last Rate: 13.65 mL/hr at 05/22/24 0848, 3.5 mcg/kg/min at 05/22/24 0848    erythromycin (ROMYCIN) ophthalmic ointment, , Both Eyes, Nightly, Syeda Rothman, APRN, 1 Application at 05/21/24 2124    famotidine (PEPCID) tablet 20 mg, 20 mg, Oral, BID, Sena Hartley,     ferrous sulfate tablet 325 mg, 325 mg, Oral, Daily, Sena Hartley,     heparin (porcine) 5000 UNIT/ML injection 5,000 Units, 5,000 Units, Subcutaneous, Q12H, Cleopatra Kelly MD, 5,000 Units at 05/22/24 0850    iopamidol (ISOVUE-370) 76 % injection 100 mL, 100 mL, Intravenous, Once in imaging,  Don Roche MD    ipratropium-albuterol (DUO-NEB) nebulizer solution 3 mL, 3 mL, Nebulization, Q4H PRN, Syeda Rothman APRN    midodrine (PROAMATINE) tablet 2.5 mg, 2.5 mg, Oral, TID AC, Sena Hartley DO    nitroglycerin (NITROSTAT) SL tablet 0.4 mg, 0.4 mg, Sublingual, Q5 Min PRN, eSna Hartley DO    norepinephrine (LEVOPHED) 8 mg in 250 mL NS infusion (premix), 0.02-0.3 mcg/kg/min, Intravenous, Titrated, Don Roche MD, Last Rate: 23.4 mL/hr at 05/22/24 0906, 0.12 mcg/kg/min at 05/22/24 0906    ondansetron ODT (ZOFRAN-ODT) disintegrating tablet 4 mg, 4 mg, Oral, Q6H PRN **OR** ondansetron (ZOFRAN) injection 4 mg, 4 mg, Intravenous, Q6H PRN, Sena Hartley DO    piperacillin-tazobactam (ZOSYN) 3.375 g IVPB in 100 mL NS MBP (CD), 3.375 g, Intravenous, Q8H, Sena Hartley DO, 3.375 g at 05/22/24 0348    Polyvinyl Alcohol-Povidone PF (HYPOTEARS) 1.4-0.6 % ophthalmic solution 1 drop, 1 drop, Both Eyes, Q1H PRN, Tu Caban APRN    sertraline (ZOLOFT) tablet 50 mg, 50 mg, Oral, Daily, Sena Hartley DO    sodium chloride 0.9 % flush 10 mL, 10 mL, Intravenous, Q12H, Cleopatra Kelly MD, 10 mL at 05/22/24 0846    sodium chloride 0.9 % flush 10 mL, 10 mL, Intravenous, PRN, Cleopatra Kelly MD    sodium chloride 0.9 % infusion 40 mL, 40 mL, Intravenous, PRN, Sena Hartley DO     Labs:   Results from last 7 days   Lab Units 05/22/24  0842   WBC 10*3/mm3 13.89*   HEMOGLOBIN g/dL 11.7*   HEMATOCRIT % 35.2*   PLATELETS 10*3/mm3 80*     Results from last 7 days   Lab Units 05/22/24  0842 05/20/24  1107 05/20/24  1105   SODIUM mmol/L 142   < > 140   POTASSIUM mmol/L 5.0   < > 4.8   CHLORIDE mmol/L 109*   < > 105   CO2 mmol/L 24.0   < > 25.0   BUN mg/dL 60*   < > 55*   CREATININE mg/dL 1.86*   < > 2.35*   CALCIUM mg/dL 7.9*   < > 8.2*   BILIRUBIN mg/dL  --   --  1.8*   ALK PHOS U/L  --   --  142*   ALT (SGPT) U/L  --   --  49*   AST (SGOT) U/L  --   --  64*    GLUCOSE mg/dL 110*   < > 95    < > = values in this interval not displayed.     Imaging Results (Last 72 Hours)       Procedure Component Value Units Date/Time    MRI Brain Without Contrast [[id number]] Resulted: 05/20/24 2208     Updated: 05/20/24 2223    CT Chest Without Contrast Diagnostic [[id number]] Collected: 05/20/24 1536     Updated: 05/20/24 1608    Narrative:      CT CHEST WO CONTRAST DIAGNOSTIC, CT ABDOMEN PELVIS WO CONTRAST    Date of Exam: 5/20/2024 2:11 PM EDT    Indication: Sepsis, poss PNA on CXR.    Comparison: Angiographic chest CT scan 3/21/2024. No previous abdominal CT scan    Technique: Axial CT images were obtained of the chest without contrast administration.  Reconstructed coronal and sagittal images were also obtained. Automated exposure control and iterative construction methods were used.      Findings:  CT SCAN OF THE CHEST WITHOUT CONTRAST: No significant mediastinal, hilar, axillary, or lower cervical lymphadenopathy is seen. There is no evidence of pericardial effusion. There is a very small partially loculated left pleural effusion, new from   3/29/2024. There is extensive, dense coronary calcification. Patchy multifocal groundglass disease has changed in pattern from 3/29/2024. There are several new areas of peribronchial thickening and focal atelectasis in the lung bases, a little more   extensive than on the prior study. Findings would be consistent with a bronchopneumonia. Bony structures appear to be intact. Advanced lower cervical spine degenerative disc disease appears stable.      Impression:      Multifocal bilateral pneumonia as described. Very small partially loculated left basilar pleural effusion.        CT SCAN OF THE ABDOMEN PELVIS WITHOUT CONTRAST: There is new relatively mild ascites not present on 3/29/2024 chest CT scan, with a moderate amount of fluid adjacent the liver and spleen but only minimal ascites elsewhere. Liver morphology is consistent   with  cirrhosis. Gallbladder is again noted to be contracted around numerous densely calcified gallstones. There appear to be gallstones in the cystic duct but no visible gallbladder inflammation. No common duct stone or biliary is appreciated.    Spleen is normal in size and contains dense benign calcification. Pancreas and adrenal glands appear within normal limits for age. There are multiple bilateral renal lesions which are sharply defined, rounded, and water or fluid density, most likely   incidental renal cysts. No upper abdominal free air or adenopathy is seen. Bowel loops are normal in caliber. Stomach and duodenum appear grossly normal.    Regarding the lower abdomen/pelvis, bladder is decompressed and normal in appearance. There are numerous sigmoid diverticuli without evidence of diverticulitis. There is a small amount of intrapelvic ascites. Terminal ileum and cecum appear normal.   Appendix is not identified. Bony structures appear to be intact. There is moderate to advanced multilevel lumbar degenerative disc disease.    .    Impression:  1. Liver morphology consistent with cirrhosis, and relatively mild upper abdominal ascites.    2. Sigmoid diverticulosis without evidence of diverticulitis.    3. Contracted gallbladder with numerous gallstones, including several small stones that appear to be in the cystic duct. No visible gallbladder inflammation or biliary ductal dilatation.    4. No evidence of an acute inflammatory focus is seen in the abdomen or pelvis.        Electronically Signed: Cristóbal Knight MD    5/20/2024 4:05 PM EDT    Workstation ID: [id number]    CT Abdomen Pelvis Without Contrast [[id number]] Collected: 05/20/24 1536     Updated: 05/20/24 1608    Narrative:      CT CHEST WO CONTRAST DIAGNOSTIC, CT ABDOMEN PELVIS WO CONTRAST    Date of Exam: 5/20/2024 2:11 PM EDT    Indication: Sepsis, poss PNA on CXR.    Comparison: Angiographic chest CT scan 3/21/2024. No previous abdominal CT scan    Technique:  Axial CT images were obtained of the chest without contrast administration.  Reconstructed coronal and sagittal images were also obtained. Automated exposure control and iterative construction methods were used.      Findings:  CT SCAN OF THE CHEST WITHOUT CONTRAST: No significant mediastinal, hilar, axillary, or lower cervical lymphadenopathy is seen. There is no evidence of pericardial effusion. There is a very small partially loculated left pleural effusion, new from   3/29/2024. There is extensive, dense coronary calcification. Patchy multifocal groundglass disease has changed in pattern from 3/29/2024. There are several new areas of peribronchial thickening and focal atelectasis in the lung bases, a little more   extensive than on the prior study. Findings would be consistent with a bronchopneumonia. Bony structures appear to be intact. Advanced lower cervical spine degenerative disc disease appears stable.      Impression:      Multifocal bilateral pneumonia as described. Very small partially loculated left basilar pleural effusion.        CT SCAN OF THE ABDOMEN PELVIS WITHOUT CONTRAST: There is new relatively mild ascites not present on 3/29/2024 chest CT scan, with a moderate amount of fluid adjacent the liver and spleen but only minimal ascites elsewhere. Liver morphology is consistent   with cirrhosis. Gallbladder is again noted to be contracted around numerous densely calcified gallstones. There appear to be gallstones in the cystic duct but no visible gallbladder inflammation. No common duct stone or biliary is appreciated.    Spleen is normal in size and contains dense benign calcification. Pancreas and adrenal glands appear within normal limits for age. There are multiple bilateral renal lesions which are sharply defined, rounded, and water or fluid density, most likely   incidental renal cysts. No upper abdominal free air or adenopathy is seen. Bowel loops are normal in caliber. Stomach and duodenum  appear grossly normal.    Regarding the lower abdomen/pelvis, bladder is decompressed and normal in appearance. There are numerous sigmoid diverticuli without evidence of diverticulitis. There is a small amount of intrapelvic ascites. Terminal ileum and cecum appear normal.   Appendix is not identified. Bony structures appear to be intact. There is moderate to advanced multilevel lumbar degenerative disc disease.    .    Impression:  1. Liver morphology consistent with cirrhosis, and relatively mild upper abdominal ascites.    2. Sigmoid diverticulosis without evidence of diverticulitis.    3. Contracted gallbladder with numerous gallstones, including several small stones that appear to be in the cystic duct. No visible gallbladder inflammation or biliary ductal dilatation.    4. No evidence of an acute inflammatory focus is seen in the abdomen or pelvis.        Electronically Signed: Cristóbal Knight MD    5/20/2024 4:05 PM EDT    Workstation ID: [id number]    XR Chest 1 View [[id number]] Collected: 05/20/24 1206     Updated: 05/20/24 1214    Narrative:      XR CHEST 1 VW    Date of Exam: 5/20/2024 11:52 AM EDT    Indication: Acute Stroke Protocol (onset < 12 hrs)    Comparison: Chest radiograph dated 3/29/2024    Findings:  The cardiomediastinal silhouette is within normal limits for there is aortic arch atherosclerotic calcification. There are low lung volumes with streaky left basilar airspace opacity representing atelectasis or mild pneumonia. There is no pleural   effusion or pneumothorax. There are degenerative changes of the thoracic spine.      Impression:      Impression:  1. Low lung volumes with streaky left basilar airspace opacity representing atelectasis or early pneumonia.      Electronically Signed: Kendrick Souza    5/20/2024 12:11 PM EDT    Workstation ID: [id number]    CT Head Without Contrast Stroke Protocol [[id number]] Collected: 05/20/24 1058     Updated: 05/20/24 1105    Narrative:      CT HEAD WO  CONTRAST STROKE PROTOCOL    Date of Exam: 5/20/2024 10:51 AM EDT    Indication: Neuro deficit, acute, stroke suspected  Neuro Deficit, acute, Stroke suspected.    Comparison: Head CT 3/29/2024.    Technique: Axial CT images were obtained of the head without contrast administration.  Reconstructed coronal images were also obtained. Automated exposure control and iterative construction methods were used.    Scan Time: 10:52 a.m.  Results discussed with the stroke team via telephone at 10:58 a.m.    Findings:    Please note this is a motion-degraded examination.    No evidence of acute intracranial hemorrhage or mass effect. No extra-axial collection. The gray white matter differentiation is preserved. Global parenchymal atrophy. Periventricular and subcortical white matter hypodensity, nonspecific, but likely   sequela of chronic small vessel ischemic disease.    The mastoid air cells are well aerated. Mucosal thickening in the right maxillary sinus. Globes and extraocular muscles are unremarkable. No acute or suspicious osseous abnormality. Soft tissues within normal limits.      Impression:      Impression:    No evidence of acute intracranial abnormality, within the confines of motion degradation.    Similar chronic/age-related findings findings, as above.      Electronically Signed: Dale Darby MD    5/20/2024 11:02 AM EDT    Workstation ID: [id number]              Lab 05/21/24  0631   HEMOGLOBIN A1C 4.50*         Diagnostics: Reviewed    A:     Sepsis associated hypotension    Mixed hyperlipidemia    Primary hypertension    Chronic kidney disease, stage 3    Morbid obesity    Obstructive sleep apnea    Transaminitis    CAD (coronary artery disease)    Pressure injury of sacral region, stage 4    Acute on chronic kidney failure    Sepsis    Other cirrhosis of liver       Impression:  Sepsis/shock  A/CKD  Obesity  Transaminitis/cirrhosis  PNA  Stg 4 sacral ulcer  Bradycardia on monitor to 30's    Symptoms:    Debility    P:   Offered dtr ongoing support.  If unable to place feeding tube will need to  consider transition to comfort focused plan of care. Palliative Care Team will continue to follow patient.     Clary Rashid MD, 5/22/2024, 10:54 EDT

## 2024-05-22 NOTE — THERAPY EVALUATION
Patient Name: Tho Vasquez  : 1940    MRN: [id number]                              Today's Date: 2024       Admit Date: 2024    Visit Dx:     ICD-10-CM ICD-9-CM   1. Septic shock  A41.9 038.9    R65.21 785.52     995.92   2. Multifocal pneumonia  J18.9 486   3. Leukocytosis, unspecified type  D72.829 288.60   4. FARIHA (acute kidney injury)  N17.9 584.9   5. Thrombocytopenia  D69.6 287.5     Patient Active Problem List   Diagnosis    Mixed hyperlipidemia    Abnormal glucose level    Adult BMI 37.0-37.9 kg/sq m    Primary hypertension    Chronic kidney disease, stage 3    Morbid obesity    Obstructive sleep apnea    Old myocardial infarction    Vitamin D deficiency    Venous insufficiency of both lower extremities    Transaminitis    CAD (coronary artery disease)    Bradycardia, drug induced    Sepsis associated hypotension    Pressure injury of sacral region, stage 4    Acute on chronic kidney failure    Sepsis    Other cirrhosis of liver     Past Medical History:   Diagnosis Date    Allergic ?    Cataract     Chronic kidney disease     Coronary artery disease     Hyperlipidemia     Hypertension     Myocardial infarction     Pneumonia of both lower lobes due to infectious organism 10/03/2019    Positive PPD     Respiratory failure 2005    requiring tracheostomy     Past Surgical History:   Procedure Laterality Date    CARDIAC CATHETERIZATION      CARDIAC SURGERY      HERNIA REPAIR        General Information       Row Name 24 1349          Physical Therapy Time and Intention    Document Type evaluation  -ES     Mode of Treatment physical therapy  -ES       Row Name 24 1349          General Information    Patient Profile Reviewed yes  -ES     Prior Level of Function --  Per dtr - recently moved to [facility]. ~2 weeks ago, pt able to ambulate with assistance  -ES     Existing Precautions/Restrictions fall;other (see comments)  sacral wound  -ES     Barriers to Rehab medically  complex;previous functional deficit;cognitive status  -ES       Row Name 05/22/24 1349          Living Environment    People in Home facility resident  -ES       Row Name 05/22/24 1349          Cognition    Orientation Status (Cognition) oriented to;person;verbal cues/prompts needed for orientation  -ES       Row Name 05/22/24 1349          Safety Issues, Functional Mobility    Safety Issues Affecting Function (Mobility) ability to follow commands;awareness of need for assistance;insight into deficits/self-awareness;judgment;problem-solving;safety precaution awareness;safety precautions follow-through/compliance;sequencing abilities  -ES     Impairments Affecting Function (Mobility) balance;cognition;coordination;endurance/activity tolerance;pain;strength  -ES     Cognitive Impairments, Mobility Safety/Performance attention;awareness, need for assistance;insight into deficits/self-awareness;problem-solving/reasoning;safety precaution follow-through;sequencing abilities;safety precaution awareness  -ES               User Key  (r) = Recorded By, (t) = Taken By, (c) = Cosigned By      Initials Name Provider Type    ES Pema Martin PT Physical Therapist                   Mobility       Row Name 05/22/24 1350          Bed Mobility    Bed Mobility supine-sit;sit-supine;scooting/bridging  -ES     Scooting/Bridging [place] (Bed Mobility) dependent (less than 25% patient effort);2 person assist  -ES     Supine-Sit [place] (Bed Mobility) dependent (less than 25% patient effort);2 person assist;verbal cues  -ES     Sit-Supine [place] (Bed Mobility) maximum assist (25% patient effort);2 person assist;verbal cues  -ES     Assistive Device (Bed Mobility) bed rails;draw sheet;head of bed elevated  -ES       Row Name 05/22/24 1350          Transfers    Comment, (Transfers) Further mobility deferred 2/2 fatigue and increased pain  -       Row Name 05/22/24 1350          Bed-Chair Transfer    Bed-Chair  [place] (Transfers) unable to assess  -ES       Row Name 05/22/24 1350          Sit-Stand Transfer    Sit-Stand [place] (Transfers) unable to assess  -ES       Row Name 05/22/24 1350          Gait/Stairs (Locomotion)    [place] Level (Gait) unable to assess  -ES               User Key  (r) = Recorded By, (t) = Taken By, (c) = Cosigned By      Initials Name Provider Type    ES Pema Martin, PT Physical Therapist                   Obj/Interventions       Row Name 05/22/24 1351          Range of Motion Comprehensive    General Range of Motion lower extremity range of motion deficits identified  -ES     Comment, General Range of Motion Unable to formally assess due to limited command following. pain noted with BLE ROM  -ES       Row Name 05/22/24 1351          Strength Comprehensive (MMT)    General Manual Muscle Testing (MMT) Assessment lower extremity strength deficits identified  -ES     Comment, General Manual Muscle Testing (MMT) Assessment Unable to formally assess due to limited command following.  -ES       Row Name 05/22/24 1351          Balance    Balance Assessment sitting static balance;sitting dynamic balance  -ES     Static Sitting Balance moderate assist;2-person assist  -ES     Dynamic Sitting Balance maximum assist;2-person assist  -ES     Position, Sitting Balance supported;sitting edge of bed  -ES     Balance Interventions sitting;supported;static;occupation based/functional task  -ES       Row Name 05/22/24 1351          Sensory Assessment (Somatosensory)    Sensory Assessment (Somatosensory) unable/difficult to assess  -ES               User Key  (r) = Recorded By, (t) = Taken By, (c) = Cosigned By      Initials Name Provider Type    ES Pema Martin, PT Physical Therapist                   Goals/Plan       Row Name 05/22/24 1353          Bed Mobility Goal 1 (PT)    Activity/Assistive Device (Bed Mobility Goal 1, PT) sit to supine/supine to sit  -ES     [place] Level/Cues  Needed (Bed Mobility Goal 1, PT) minimum assist (75% or more patient effort)  -ES     Time Frame (Bed Mobility Goal 1, PT) long term goal (LTG);10 days  -ES       Row Name 05/22/24 1353          Transfer Goal 1 (PT)    Activity/Assistive Device (Transfer Goal 1, PT) sit-to-stand/stand-to-sit;bed-to-chair/chair-to-bed;walker, rolling  -ES     [place] Level/Cues Needed (Transfer Goal 1, PT) maximum assist (25-49% patient effort)  -ES     Time Frame (Transfer Goal 1, PT) long term goal (LTG);10 days  -ES       Row Name 05/22/24 1353          Gait Training Goal 1 (PT)    Activity/Assistive Device (Gait Training Goal 1, PT) gait (walking locomotion);assistive device use;decrease fall risk;increase endurance/gait distance;walker, rolling  -ES     [place] Level (Gait Training Goal 1, PT) maximum assist (25-49% patient effort)  -ES     Distance (Gait Training Goal 1, PT) 15  -ES     Time Frame (Gait Training Goal 1, PT) long term goal (LTG);10 days  -ES       Row Name 05/22/24 3542          Problem Specific Goal 1 (PT)    Problem Specific Goal 1 (PT) Pt will be able to maintain static sitting EOB for 5 mins with [place]  -ES     Time Frame (Problem Specific Goal 1, PT) short-term goal (STG);5 days  -ES       Row Name 05/22/24 8243          Therapy Assessment/Plan (PT)    Planned Therapy Interventions (PT) balance training;bed mobility training;gait training;home exercise program;neuromuscular re-education;patient/family education;strengthening;stair training;transfer training;postural re-education  -ES               User Key  (r) = Recorded By, (t) = Taken By, (c) = Cosigned By      Initials Name Provider Type    ES Pema Martin, PT Physical Therapist                   Clinical Impression       Row Name 05/22/24 9771          Pain    Pain Intervention(s) Repositioned;Ambulation/increased activity  -ES     Additional Documentation Pain Scale: FACES Pre/Post-Treatment (Group)  -ES       Row Name 05/22/24 9800           Pain Scale: FACES Pre/Post-Treatment    Pain: FACES Scale, Pretreatment 2-->hurts little bit  -ES     Posttreatment Pain Rating 4-->hurts little more  -ES     Pain Location generalized  -ES     Pre/Posttreatment Pain Comment Increased pain noted with mobility, philomena at B shoulders  -ES       Row Name 05/22/24 1351          Plan of Care Review    Plan of Care Reviewed With patient;family  -ES     Progress no change  PT IE  -ES     Outcome Evaluation PT eval complete. Pt presents with generalized weakness, decreased activity tolerance, and impaired core control warranting skilled IPPT services. Pt required maxA x2 for bed mobility and was limited by fatigue and generalized pain, especially at B shoulders. PT rec SNF at d/c.  -ES       Row Name 05/22/24 1351          Therapy Assessment/Plan (PT)    Rehab Potential (PT) fair, will monitor progress closely  -ES     Criteria for Skilled Interventions Met (PT) yes;meets criteria;skilled treatment is necessary  -ES     Therapy Frequency (PT) daily  -ES       Row Name 05/22/24 1351          Vital Signs    Pre Systolic BP Rehab 111  -ES     Pre Treatment Diastolic BP 51  -ES     Post Systolic BP Rehab 120  -ES     Post Treatment Diastolic BP 64  -ES     Posttreatment Heart Rate (beats/min) 84  -ES     O2 Delivery Pre Treatment nasal cannula  -ES     O2 Delivery Intra Treatment nasal cannula  -ES     Post SpO2 (%) 100  -ES     O2 Delivery Post Treatment nasal cannula  -ES     Pre Patient Position Supine  -ES     Intra Patient Position Sitting  -ES     Post Patient Position Supine  -ES       Row Name 05/22/24 1351          Positioning and Restraints    Pre-Treatment Position in bed  -ES     Post Treatment Position bed  -ES     In Bed notified nsg;side lying right;call light within reach;encouraged to call for assist;exit alarm on;with family/caregiver;side rails up x3;pillow between legs;waffle boots/both  -ES               User Key  (r) = Recorded By, (t) = Taken By, (c) =  Cosigned By      Initials Name Provider Type    Pema Penaloza PT Physical Therapist                   Outcome Measures       Row Name 05/22/24 1354          How much help from another person do you currently need...    Turning from your back to your side while in flat bed without using bedrails? 2  -ES     Moving from lying on back to sitting on the side of a flat bed without bedrails? 1  -ES     Moving to and from a bed to a chair (including a wheelchair)? 1  -ES     Standing up from a chair using your arms (e.g., wheelchair, bedside chair)? 1  -ES     Climbing 3-5 steps with a railing? 1  -ES     To walk in hospital room? 1  -ES     AM-PAC 6 Clicks Score (PT) 7  -ES     Highest Level of Mobility Goal 2 --> Bed activities/dependent transfer  -ES       Row Name 05/22/24 1354          Modified [place] Scale    Pre-Stroke Modified [place] Scale 6 - Unable to determine (UTD) from the medical record documentation  -ES     Modified [place] Scale 4 - Moderately severe disability.  Unable to walk without assistance, and unable to attend to own bodily needs without assistance.  -ES       Row Name 05/22/24 1353          Functional Assessment    Outcome Measure Options AM-PAC 6 Clicks Basic Mobility (PT);Modified [place]  -ES               User Key  (r) = Recorded By, (t) = Taken By, (c) = Cosigned By      Initials Name Provider Type    Pema Penaloza PT Physical Therapist                                 Physical Therapy Education       Title: PT OT SLP Therapies (In Progress)       Topic: Physical Therapy (In Progress)       Point: Mobility training (In Progress)       Learning Progress Summary             Patient Acceptance, E,TB, NR by LINDY at 5/22/2024 1354   Family Acceptance, E,TB, NR by LINDY at 5/22/2024 1354    Acceptance, E, VU by FELY at 5/21/2024 0503                         Point: Home exercise program (Done)       Learning Progress Summary             Family Acceptance, E, VU by RL at 5/21/2024 0500                          Point: Body mechanics (In Progress)       Learning Progress Summary             Patient Acceptance, E,TB, NR by ES at 5/22/2024 1354   Family Acceptance, E,TB, NR by ES at 5/22/2024 1354    Acceptance, E, VU by RL at 5/21/2024 0503                         Point: Precautions (In Progress)       Learning Progress Summary             Patient Acceptance, E,TB, NR by ES at 5/22/2024 1354   Family Acceptance, E,TB, NR by ES at 5/22/2024 1354    Acceptance, E, VU by RL at 5/21/2024 0503                                         User Key       Initials Effective Dates Name Provider Type Discipline    RL 06/16/21 -  Christy Prasad RN Registered Nurse Nurse    ES 08/11/22 -  Pema Martin PT Physical Therapist PT                  PT Recommendation and Plan  Planned Therapy Interventions (PT): balance training, bed mobility training, gait training, home exercise program, neuromuscular re-education, patient/family education, strengthening, stair training, transfer training, postural re-education  Plan of Care Reviewed With: patient, family  Progress: no change (PT IE)  Outcome Evaluation: PT eval complete. Pt presents with generalized weakness, decreased activity tolerance, and impaired core control warranting skilled IPPT services. Pt required maxA x2 for bed mobility and was limited by fatigue and generalized pain, especially at B shoulders. PT rec SNF at d/c.     Time Calculation:   PT Evaluation Complexity  History, PT Evaluation Complexity: 1-2 personal factors and/or comorbidities  Examination of Body Systems (PT Eval Complexity): total of 3 or more elements  Clinical Presentation (PT Evaluation Complexity): evolving  Clinical Decision Making (PT Evaluation Complexity): moderate complexity  Overall Complexity (PT Evaluation Complexity): moderate complexity     PT Charges       Row Name 05/22/24 1354             Time Calculation    Start Time 0945  -ES      PT Received On 05/22/24  -      PT Goal  Re-Cert Due Date 06/01/24  -ES         Untimed Charges    PT Eval/Re-eval Minutes 46  -ES         Total Minutes    Untimed Charges Total Minutes 46  -ES       Total Minutes 46  -ES                User Key  (r) = Recorded By, (t) = Taken By, (c) = Cosigned By      Initials Name Provider Type    Pema Penaloza PT Physical Therapist                  Therapy Charges for Today       Code Description Service Date Service Provider Modifiers Qty    30989140005 HC PT EVAL MOD COMPLEXITY 4 5/22/2024 Pema Martin PT GP 1            PT G-Codes  Outcome Measure Options: AM-PAC 6 Clicks Basic Mobility (PT), Modified [place]  AM-PAC 6 Clicks Score (PT): 7  Modified [place] Scale: 4 - Moderately severe disability.  Unable to walk without assistance, and unable to attend to own bodily needs without assistance.  PT Discharge Summary  Anticipated Discharge Disposition (PT): skilled nursing facility    Pema Martin PT  5/22/2024

## 2024-05-22 NOTE — PROGRESS NOTES
"Critical Care Note     LOS: 2 days   Patient Care Team:  Ja Baldwin MD as PCP - General (Internal Medicine)  Danny Rdz Jr., MD as Consulting Physician (Ophthalmology)    Chief Complaint/Reason for visit:    Chief Complaint   Patient presents with    Stroke   Septic shock  Acute on chronic kidney disease  Pneumonia  Stage IV pressure injury sacrum, present on admission  Morbid obesity  Obstructive sleep apnea  Coronary artery disease    Subjective     Interval History:     Remains afebrile.  Sinus rhythm, sinus bradycardia.  Currently on 2 L with a saturation of 100%.  Oliguric with urine output of 300.  Attempted a nasogastric tube for oral medications and unsuccessful and patient refused further attempts.  He required max assist of 2 just to sit on the edge of the bed.    Review of Systems:    All systems were reviewed and negative except as noted in subjective.    Medical history, surgical history, social history, family history reviewed    Objective     Intake/Output:    Intake/Output Summary (Last 24 hours) at 5/22/2024 1525  Last data filed at 5/22/2024 1303  Gross per 24 hour   Intake 1608.7 ml   Output 382 ml   Net 1226.7 ml       Nutrition:  NPO Diet NPO Type: Strict NPO    Infusions:  DOPamine, 2-20 mcg/kg/min, Last Rate: 3.5 mcg/kg/min (05/22/24 0848)  norepinephrine, 0.02-0.3 mcg/kg/min, Last Rate: 0.12 mcg/kg/min (05/22/24 1303)        Mechanical Ventilator Settings:                                                Telemetry: Sinus rhythm with episodes of AV block heart rate of 30             Vital Signs  Blood pressure 117/76, pulse 56, temperature 97.2 °F (36.2 °C), temperature source Axillary, resp. rate 18, height 177.8 cm (70\"), weight 104 kg (230 lb), SpO2 100%.    Physical Exam:  General Appearance:  Obese elderly gentleman supine in bed resting in no acute distress   Head:  Alopecia   Eyes:          Conjunctiva pink   Ears:  Extremely hard of hearing   Throat: No thrush   Neck: " Trachea midline   Back:      Lungs:   Breath sounds are bilateral, clear anteriorly    Heart:  Slow rate, regular, S1, S2 auscultated   Abdomen:   Obese, hypoactive bowel sounds, nontender   Rectal:   Deferred   Extremities: 2+ pitting edema   Pulses:    Skin: Stage IV decubitus sacrum   Lymph nodes: No cervical adenopathy   Neurologic: Extremely hard of hearing, will arouse and follow commands with generalized weakness, more verbal today, able to tell me his name and that he is in the hospital      Results Review:     I reviewed the patient's new clinical results.   Results from last 7 days   Lab Units 05/22/24  0842 05/21/24  0631 05/20/24  1107 05/20/24  1105   SODIUM mmol/L 142 143  --  140   POTASSIUM mmol/L 5.0 4.0  --  4.8   CHLORIDE mmol/L 109* 109*  --  105   CO2 mmol/L 24.0 23.0  --  25.0   BUN mg/dL 60* 52*  --  55*   CREATININE mg/dL 1.86* 1.92* 2.60* 2.35*   CALCIUM mg/dL 7.9* 7.9*  --  8.2*   BILIRUBIN mg/dL  --   --   --  1.8*   ALK PHOS U/L  --   --   --  142*   ALT (SGPT) U/L  --   --   --  49*   AST (SGOT) U/L  --   --   --  64*   GLUCOSE mg/dL 110* 114*  --  95     Results from last 7 days   Lab Units 05/22/24  0842 05/21/24  0631 05/20/24  1107 05/20/24  1105   WBC 10*3/mm3 13.89* 26.46*  --  17.84*   HEMOGLOBIN g/dL 11.7* 12.0*  --  11.3*   HEMOGLOBIN, POC g/dL  --   --  11.9*  --    HEMATOCRIT % 35.2* 35.4*  --  33.7*   HEMATOCRIT POC %  --   --  35*  --    PLATELETS 10*3/mm3 80* 92*  --  84*     Results from last 7 days   Lab Units 05/20/24 2009   PH, ARTERIAL pH units 7.370   PO2 ART mm Hg 115.0*   PCO2, ARTERIAL mm Hg 43.3   HCO3 ART mmol/L 25.0     Lab Results   Component Value Date    BLOODCX Proteus species (C) 05/20/2024    BLOODCX Proteus species (C) 05/20/2024     Lab Results   Component Value Date    URINECX 25,000 CFU/mL Proteus mirabilis (A) 05/20/2024       I reviewed the patient's new imaging including images and reports.    CT CHEST WO CONTRAST DIAGNOSTIC, CT ABDOMEN PELVIS WO  CONTRAST    Date of Exam: 5/20/2024 2:11 PM EDT    Indication: Sepsis, poss PNA on CXR.    Comparison: Angiographic chest CT scan 3/21/2024. No previous abdominal CT scan    Technique: Axial CT images were obtained of the chest without contrast administration.  Reconstructed coronal and sagittal images were also obtained. Automated exposure control and iterative construction methods were used.      Findings:  CT SCAN OF THE CHEST WITHOUT CONTRAST: No significant mediastinal, hilar, axillary, or lower cervical lymphadenopathy is seen. There is no evidence of pericardial effusion. There is a very small partially loculated left pleural effusion, new from  3/29/2024. There is extensive, dense coronary calcification. Patchy multifocal groundglass disease has changed in pattern from 3/29/2024. There are several new areas of peribronchial thickening and focal atelectasis in the lung bases, a little more  extensive than on the prior study. Findings would be consistent with a bronchopneumonia. Bony structures appear to be intact. Advanced lower cervical spine degenerative disc disease appears stable.   Impression:     Multifocal bilateral pneumonia as described. Very small partially loculated left basilar pleural effusion.        CT SCAN OF THE ABDOMEN PELVIS WITHOUT CONTRAST: There is new relatively mild ascites not present on 3/29/2024 chest CT scan, with a moderate amount of fluid adjacent the liver and spleen but only minimal ascites elsewhere. Liver morphology is consistent  with cirrhosis. Gallbladder is again noted to be contracted around numerous densely calcified gallstones. There appear to be gallstones in the cystic duct but no visible gallbladder inflammation. No common duct stone or biliary is appreciated.    Spleen is normal in size and contains dense benign calcification. Pancreas and adrenal glands appear within normal limits for age. There are multiple bilateral renal lesions which are sharply defined,  rounded, and water or fluid density, most likely  incidental renal cysts. No upper abdominal free air or adenopathy is seen. Bowel loops are normal in caliber. Stomach and duodenum appear grossly normal.    Regarding the lower abdomen/pelvis, bladder is decompressed and normal in appearance. There are numerous sigmoid diverticuli without evidence of diverticulitis. There is a small amount of intrapelvic ascites. Terminal ileum and cecum appear normal.  Appendix is not identified. Bony structures appear to be intact. There is moderate to advanced multilevel lumbar degenerative disc disease.    .    Impression:  1. Liver morphology consistent with cirrhosis, and relatively mild upper abdominal ascites.    2. Sigmoid diverticulosis without evidence of diverticulitis.    3. Contracted gallbladder with numerous gallstones, including several small stones that appear to be in the cystic duct. No visible gallbladder inflammation or biliary ductal dilatation.    4. No evidence of an acute inflammatory focus is seen in the abdomen or pelvis.        Electronically Signed: Cristóbal Knight MD   5/20/2024 4:05 PM EDT     All medications reviewed.   ascorbic acid, 500 mg, Oral, QAM  aspirin, 81 mg, Oral, Daily   Or  aspirin, 300 mg, Rectal, Daily  atorvastatin, 80 mg, Oral, Nightly  cetirizine, 10 mg, Oral, Daily  cycloSPORINE, 1 drop, Both Eyes, Q12H  erythromycin, , Both Eyes, Nightly  famotidine, 20 mg, Oral, BID  ferrous sulfate, 325 mg, Oral, Daily  heparin (porcine), 5,000 Units, Subcutaneous, Q12H  iopamidol, 100 mL, Intravenous, Once in imaging  midodrine, 2.5 mg, Oral, TID AC  piperacillin-tazobactam, 3.375 g, Intravenous, Q8H  sertraline, 50 mg, Oral, Daily  sodium chloride, 10 mL, Intravenous, Q12H          Assessment & Plan       Sepsis associated hypotension    Mixed hyperlipidemia    Primary hypertension    Chronic kidney disease, stage 3    Morbid obesity    Obstructive sleep apnea    Transaminitis    CAD (coronary  artery disease)    Pressure injury of sacral region, stage 4    Acute on chronic kidney failure    Sepsis    Other cirrhosis of liver    84-year-old gentleman, remote smoker with a history of hypertension, chronic kidney disease, coronary artery disease, cirrhosis who was hospitalized March 29 through April 5 with COVID pneumonia and discharged to truck to [facility].  He was hypotensive and required chronic midodrine to maintain an adequate blood pressure.  He will was transferred from [facility].  Daughter reports that he was then discharged home and was home for 2 weeks.  He was able to ambulate with 1 assist for approximately 8 days and then he became weak and was no longer able to ambulate.  She called the  and he was placed at [facility] 2 weeks ago.  He then presented May 20 with worsening weakness confusion and hypotension.  He was evaluated for possible stroke in the emergency room with negative imaging.  Contrast imaging was not performed secondary to renal function.  He had an elevated white count and imaging revealed bilateral patchy pneumonia.  He had evidence of cirrhosis on his abdominal scan with gallstones including some gallstones in the cystic duct but no inflammation of the gallbladder noted.  He was placed on norepinephrine for blood pressure support and received the loading dose of vancomycin 2 g and was placed on Zosyn.    Serum creatinine was elevated on admission at 2.35 with a baseline of 0.86-1.04.  Creatinine has improved slightly and is 1.86.  He remains oliguric.     Source of sepsis is presumed to be multifocal pneumonia although it may actually be his stage IV decubitus to the bone which is purulent.  He has some gram-negative rods in urine but only 25,000 CFU's likely not an acute infection.  Blood cultures are growing a gram-negative bacilli.  Organism has been identified as a Proteus.  In addition he has gallstones and there is concern for possible gallbladder  etiology of his sepsis.  Admission liver function tests were mildly elevated with an alk phos of 142, AST 64, ALT 49, bilirubin 1.8.  However this could of also been from his hypotension and underlying cirrhosis.    May 21 he was spontaneously dropping his heart rate 30.  EKG revealed nonconducted P waves consistent with Mobitz type II.  He was placed on dopamine 5 mics per kilogram per minute with improvement in his heart rate.  Blood pressure still is marginal at 91 systolic.  Cardiology PA assessed and did not feel he was in Mobitz 2.  MD has not assessed.  Official EKG she reading is a second-degree AV block with 2-1 AV conduction.  As long as he is on dopamine he is holding his heart rate low it is still low at 56.    He remains on BiPAP 12/6 alternating with nasal cannula.    Clearly this gentleman has been minimally mobile for a significant amount of time and has acquired a stage IV sacral decubitus.  He has multiple chronic diseases, renal insufficiency, coronary artery disease, obesity, cirrhosis.  Given his advanced age, poor underlying physical condition, I think it is unwise to continue all heroic measures.  However, it is documented by palliative care that he has expressed his children that he wishes all heroic measures and therefore we will proceed with intubation if he requires it, dialysis if he requires it.  I spoke to his daughter at length today.  She reports that the patient has always had a fear of dying and has always expressed his desire that all heroic measures be performed.  She reports that his appetite was horrible and that he was not eating at home or at the nursing home.    PLAN:      Infectious disease assessed and continued Zosyn    Support blood pressure with norepinephrine  Support heart rate with dopamine  Continue midodrine at his home dose    [facility] evaluated his sacral decubitus     Remains NPO  Attempted [place]    Aspirin, statin for his known history of coronary disease, when we  achieve oral access    Restart maintenance fluids  Like to see his creatinine normalized before we consider consider giving contrast for an MRCP    VTE Prophylaxis: subcutaneous heparin    Stress Ulcer Prophylaxis: Uche Kelly MD  05/22/24  15:25 EDT      Time: Critical care 35 min  I personally provided care to this critically ill patient as documented above.  Critical care time does not include time spent on separately billed procedures.  None of my critical care time was concurrent with other critical care providers.

## 2024-05-22 NOTE — CONSULTS
INFECTIOUS DISEASE CONSULT/INITIAL HOSPITAL VISIT    Tho Vasquez  1940  [id number]    Date of Consult: 5/22/2024    Admission Date: 5/20/2024      Requesting Provider: No ref. provider found  Evaluating Physician: Grant Black MD    Reason for Consultation:  Septic shock/Proteus bacteremia    History of present illness:    Patient is a 84 y.o. male  with a history of coronary artery disease, hypertension, hyperlipidemia, and COVID-19 infection requiring admission here on 3/29/2024 who is seen today for evaluation of septic shock with Proteus bacteremia.  After his admission here, he was transferred to [facility] on 4/5/2024.  He was subsequently  disposition to [facility].  He has developed increasing weakness, confusion, and dysarthria and [facility], prompting an evaluation in the emergency room here on 5/205/20/2024.  He was found to have lactic acidosis, and elevated procalcitonin, bibasilar pulmonary infiltrates, leukocytosis/neutrophilia with a white blood cell count of 17.8, mild pyuria, acute kidney injury with a creatinine of 2 point2.35, and elevated total bilirubin at 1.8 with an alkaline phosphatase of 142,  and hypotension with a blood pressure of 68/49.  Blood cultures have grown Proteus.  He is on intravenous Unasyn.  Chest CT scan and chest x-rays have revealed bibasilar pulmonary infiltrates.  Abdominal/pelvic CT scan reveals cholelithiasis.  Liver morphology is consistent with cirrhosis. He is requiring vasopressor support.    Past Medical History:   Diagnosis Date    Allergic ?    Cataract     Chronic kidney disease     Coronary artery disease     Hyperlipidemia     Hypertension     Myocardial infarction     Pneumonia of both lower lobes due to infectious organism 10/03/2019    Positive PPD     Respiratory failure 2005    requiring tracheostomy       Past Surgical History:   Procedure Laterality Date    CARDIAC CATHETERIZATION      CARDIAC SURGERY      HERNIA  REPAIR         Family History   Problem Relation Age of Onset    Arthritis Mother     Heart attack Father     Stroke Father     Diabetes Father     Heart attack Brother     Cancer Paternal Uncle     Obesity Other        Social History     Socioeconomic History    Marital status:    Tobacco Use    Smoking status: Former     Types: Cigars, Cigarettes     Start date:      Quit date:      Years since quittin.4     Passive exposure: Past    Smokeless tobacco: Never   Vaping Use    Vaping status: Never Used   Substance and Sexual Activity    Alcohol use: No    Drug use: Never    Sexual activity: Not Currently       Allergies   Allergen Reactions    Dexamethasone Unknown - High Severity    Prednisone Rash         Medication:    Current Facility-Administered Medications:     acetaminophen (TYLENOL) tablet 650 mg, 650 mg, Oral, Q4H PRN **OR** acetaminophen (TYLENOL) 160 MG/5ML oral solution 650 mg, 650 mg, Oral, Q4H PRN **OR** acetaminophen (TYLENOL) suppository 650 mg, 650 mg, Rectal, Q4H PRN, Sena Hartley DO, 650 mg at 24 1401    albumin human 5 % solution 500 mL, 500 mL, Intravenous, Once, Cleopatra Kelly MD, 500 mL at 24 1652    ascorbic acid (VITAMIN C) tablet 500 mg, 500 mg, Oral, QAM, Sena Hartley,     aspirin chewable tablet 81 mg, 81 mg, Oral, Daily, 81 mg at 24 1354 **OR** aspirin suppository 300 mg, 300 mg, Rectal, Daily, Prince Manrique PA-C, 300 mg at 24 0849    atorvastatin (LIPITOR) tablet 40 mg, 40 mg, Oral, Nightly, Tessy Benites, ADALBERTO    atropine sulfate injection 0.5 mg, 0.5 mg, Intravenous, Once PRN, Tu Caban, ADALBERTO    sennosides-docusate (PERICOLACE) 8.6-50 MG per tablet 2 tablet, 2 tablet, Oral, BID PRN **AND** polyethylene glycol (MIRALAX) packet 17 g, 17 g, Oral, Daily PRN **AND** bisacodyl (DULCOLAX) EC tablet 5 mg, 5 mg, Oral, Daily PRN **AND** bisacodyl (DULCOLAX) suppository 10 mg, 10 mg, Rectal, Daily PRN,  Sena Hartley DO    cycloSPORINE (RESTASIS) 0.05 % ophthalmic emulsion 1 drop, 1 drop, Both Eyes, Q12H, Sena Hartley DO, 1 drop at 05/22/24 0533    DOPamine 400 mg in 250 mL D5W infusion, 2-20 mcg/kg/min, Intravenous, Titrated, Cleopatra Kelly MD, Last Rate: 13.65 mL/hr at 05/22/24 0848, 3.5 mcg/kg/min at 05/22/24 0848    erythromycin (ROMYCIN) ophthalmic ointment, , Both Eyes, Nightly, Syeda Rothman APRN, 1 Application at 05/21/24 2124    famotidine (PEPCID) tablet 20 mg, 20 mg, Oral, BID, Sena Hartley DO    ferrous sulfate tablet 325 mg, 325 mg, Oral, Daily, Sena Hartley DO    heparin (porcine) 5000 UNIT/ML injection 5,000 Units, 5,000 Units, Subcutaneous, Q12H, Cleopatra Kelly MD, 5,000 Units at 05/22/24 0850    iopamidol (ISOVUE-370) 76 % injection 100 mL, 100 mL, Intravenous, Once in imaging, Don Roche MD    ipratropium-albuterol (DUO-NEB) nebulizer solution 3 mL, 3 mL, Nebulization, Q4H PRN, Syeda Rothman APRN    lactated ringers infusion, 100 mL/hr, Intravenous, Continuous, Cleopatra Kelly MD    midodrine (PROAMATINE) tablet 2.5 mg, 2.5 mg, Oral, TID AC, Sena Hartley DO    nitroglycerin (NITROSTAT) SL tablet 0.4 mg, 0.4 mg, Sublingual, Q5 Min PRN, Sena Hartley DO    norepinephrine (LEVOPHED) 8 mg in 250 mL NS infusion (premix), 0.02-0.3 mcg/kg/min, Intravenous, Titrated, Don Roche MD, Last Rate: 23.4 mL/hr at 05/22/24 1303, 0.12 mcg/kg/min at 05/22/24 1303    ondansetron ODT (ZOFRAN-ODT) disintegrating tablet 4 mg, 4 mg, Oral, Q6H PRN **OR** ondansetron (ZOFRAN) injection 4 mg, 4 mg, Intravenous, Q6H PRN, Sena Hartley DO, 4 mg at 05/22/24 1109    piperacillin-tazobactam (ZOSYN) 3.375 g IVPB in 100 mL NS MBP (CD), 3.375 g, Intravenous, Q8H, Sena Hartley DO, 3.375 g at 05/22/24 1200    Polyvinyl Alcohol-Povidone PF (HYPOTEARS) 1.4-0.6 % ophthalmic solution 1 drop, 1 drop, Both Eyes, Q1H PRN, Arsh  ADALBERTO Chavez    sertraline (ZOLOFT) tablet 50 mg, 50 mg, Oral, Daily, Sena Hartley DO    sodium chloride 0.9 % flush 10 mL, 10 mL, Intravenous, Q12H, Cleopatra Kelly MD, 10 mL at 24 0846    sodium chloride 0.9 % flush 10 mL, 10 mL, Intravenous, PRN, Cleopatra Kelly MD    sodium chloride 0.9 % infusion 40 mL, 40 mL, Intravenous, PRN, Sena Hartley DO    Antibiotics:  Anti-Infectives (From admission, onward)      Ordered     Dose/Rate Route Frequency Start Stop    24 1730  piperacillin-tazobactam (ZOSYN) 3.375 g IVPB in 100 mL NS MBP (CD)        Ordering Provider: Sena Hartley DO    3.375 g  over 4 Hours Intravenous Every 8 Hours 24 [id number] 1129  piperacillin-tazobactam (ZOSYN) 3.375 g IVPB in 100 mL NS MBP (CD)        Ordering Provider: Don Roche MD    3.375 g  over 30 Minutes Intravenous Once 24 1145 24 1353              Review of Systems:    is confused and cannot provide a reliable review of systems    Physical Exam:   Vital Signs  Temp (24hrs), Av.7 °F (36.5 °C), Min:97.2 °F (36.2 °C), Max:98.3 °F (36.8 °C)    Temp  Min: 97.2 °F (36.2 °C)  Max: 98.3 °F (36.8 °C)  BP  Min: 62/50  Max: 148/87  Pulse  Min: 56  Max: 118  Resp  Min: 18  Max: 18  SpO2  Min: 94 %  Max: 100 %    GENERAL: debilitated appearing but in no acute distress  HEENT: Normocephalic, atraumatic.  PERRL. EOMI. No conjunctival injection. No icterus. Oropharynx clear without evidence of thrush or exudate.    NECK: Supple   HEART: No murmur  LUNGS:  few scattered rhonchi  ABDOMEN: Soft, nontender, nondistended  :   Ambrosio catheter not anchored to his leg  MSK: No joint effusions or erythema  SKIN: Warm and dry without cutaneous eruptions on Inspection/palpation.    NEURO:  drowsy but arousable.  He is severely confused and cannot provide a reliable review of systems.  He moves all of his extremities.  Sacrum: He has stage IV sacral decubitus  "ulcer down to  bone.  The ulcer is approximately 6cm in diameter and 3.5-4 cm deep with some undermining and surrounding necrosis with mild erythema.    Laboratory Data    Results from last 7 days   Lab Units 05/22/24  0842 05/21/24  0631 05/20/24  1107 05/20/24  1105   WBC 10*3/mm3 13.89* 26.46*  --  17.84*   HEMOGLOBIN g/dL 11.7* 12.0*  --  11.3*   HEMOGLOBIN, POC g/dL  --   --  11.9*  --    HEMATOCRIT % 35.2* 35.4*  --  33.7*   HEMATOCRIT POC %  --   --  35*  --    PLATELETS 10*3/mm3 80* 92*  --  84*     Results from last 7 days   Lab Units 05/22/24  0842   SODIUM mmol/L 142   POTASSIUM mmol/L 5.0   CHLORIDE mmol/L 109*   CO2 mmol/L 24.0   BUN mg/dL 60*   CREATININE mg/dL 1.86*   GLUCOSE mg/dL 110*   CALCIUM mg/dL 7.9*     Results from last 7 days   Lab Units 05/20/24  1105   ALK PHOS U/L 142*   BILIRUBIN mg/dL 1.8*   ALT (SGPT) U/L 49*   AST (SGOT) U/L 64*             Results from last 7 days   Lab Units 05/20/24  1352   LACTATE mmol/L 2.0     Results from last 7 days   Lab Units 05/20/24  1105   CK TOTAL U/L 167         Estimated Creatinine Clearance: 35.7 mL/min (A) (by C-G formula based on SCr of 1.86 mg/dL (H)).      Microbiology:  Blood Culture   Date Value Ref Range Status   05/20/2024 Proteus species (C)  Preliminary   05/20/2024 Proteus species (C)  Preliminary     BCID, PCR   Date Value Ref Range Status   05/20/2024 Proteus spp. Identification by BCID2 PCR. (A) Negative by BCID PCR. Culture to Follow. Final     No results found for: \"CULTURES\", \"HSVCX\", \"URCX\"  No results found for: \"EYECULTURE\", \"GCCX\", \"HSVCULTURE\", \"LABHSV\"  No results found for: \"LEGIONELLA\", \"MRSACX\", \"MUMPSCX\", \"MYCOPLASCX\"  No results found for: \"NOCARDIACX\", \"STOOLCX\"  Urine Culture   Date Value Ref Range Status   05/20/2024 25,000 CFU/mL Proteus mirabilis (A)  Final     Wound Culture   Date Value Ref Range Status   05/21/2024 Scant growth (1+) Gram Negative Bacilli (A)  Preliminary           Radiology:  Imaging Results (Last 72 " Hours)       Procedure Component Value Units Date/Time    CT Chest Without Contrast Diagnostic [[id number]] Collected: 05/20/24 1536     Updated: 05/20/24 1608    Narrative:      CT CHEST WO CONTRAST DIAGNOSTIC, CT ABDOMEN PELVIS WO CONTRAST    Date of Exam: 5/20/2024 2:11 PM EDT    Indication: Sepsis, poss PNA on CXR.    Comparison: Angiographic chest CT scan 3/21/2024. No previous abdominal CT scan    Technique: Axial CT images were obtained of the chest without contrast administration.  Reconstructed coronal and sagittal images were also obtained. Automated exposure control and iterative construction methods were used.      Findings:  CT SCAN OF THE CHEST WITHOUT CONTRAST: No significant mediastinal, hilar, axillary, or lower cervical lymphadenopathy is seen. There is no evidence of pericardial effusion. There is a very small partially loculated left pleural effusion, new from   3/29/2024. There is extensive, dense coronary calcification. Patchy multifocal groundglass disease has changed in pattern from 3/29/2024. There are several new areas of peribronchial thickening and focal atelectasis in the lung bases, a little more   extensive than on the prior study. Findings would be consistent with a bronchopneumonia. Bony structures appear to be intact. Advanced lower cervical spine degenerative disc disease appears stable.      Impression:      Multifocal bilateral pneumonia as described. Very small partially loculated left basilar pleural effusion.        CT SCAN OF THE ABDOMEN PELVIS WITHOUT CONTRAST: There is new relatively mild ascites not present on 3/29/2024 chest CT scan, with a moderate amount of fluid adjacent the liver and spleen but only minimal ascites elsewhere. Liver morphology is consistent   with cirrhosis. Gallbladder is again noted to be contracted around numerous densely calcified gallstones. There appear to be gallstones in the cystic duct but no visible gallbladder inflammation. No common duct  stone or biliary is appreciated.    Spleen is normal in size and contains dense benign calcification. Pancreas and adrenal glands appear within normal limits for age. There are multiple bilateral renal lesions which are sharply defined, rounded, and water or fluid density, most likely   incidental renal cysts. No upper abdominal free air or adenopathy is seen. Bowel loops are normal in caliber. Stomach and duodenum appear grossly normal.    Regarding the lower abdomen/pelvis, bladder is decompressed and normal in appearance. There are numerous sigmoid diverticuli without evidence of diverticulitis. There is a small amount of intrapelvic ascites. Terminal ileum and cecum appear normal.   Appendix is not identified. Bony structures appear to be intact. There is moderate to advanced multilevel lumbar degenerative disc disease.    .    Impression:  1. Liver morphology consistent with cirrhosis, and relatively mild upper abdominal ascites.    2. Sigmoid diverticulosis without evidence of diverticulitis.    3. Contracted gallbladder with numerous gallstones, including several small stones that appear to be in the cystic duct. No visible gallbladder inflammation or biliary ductal dilatation.    4. No evidence of an acute inflammatory focus is seen in the abdomen or pelvis.        Electronically Signed: Cristóbal Knight MD    5/20/2024 4:05 PM EDT    Workstation ID: [id number]    CT Abdomen Pelvis Without Contrast [[id number]] Collected: 05/20/24 1536     Updated: 05/20/24 1608    Narrative:      CT CHEST WO CONTRAST DIAGNOSTIC, CT ABDOMEN PELVIS WO CONTRAST    Date of Exam: 5/20/2024 2:11 PM EDT    Indication: Sepsis, poss PNA on CXR.    Comparison: Angiographic chest CT scan 3/21/2024. No previous abdominal CT scan    Technique: Axial CT images were obtained of the chest without contrast administration.  Reconstructed coronal and sagittal images were also obtained. Automated exposure control and iterative construction methods were  used.      Findings:  CT SCAN OF THE CHEST WITHOUT CONTRAST: No significant mediastinal, hilar, axillary, or lower cervical lymphadenopathy is seen. There is no evidence of pericardial effusion. There is a very small partially loculated left pleural effusion, new from   3/29/2024. There is extensive, dense coronary calcification. Patchy multifocal groundglass disease has changed in pattern from 3/29/2024. There are several new areas of peribronchial thickening and focal atelectasis in the lung bases, a little more   extensive than on the prior study. Findings would be consistent with a bronchopneumonia. Bony structures appear to be intact. Advanced lower cervical spine degenerative disc disease appears stable.      Impression:      Multifocal bilateral pneumonia as described. Very small partially loculated left basilar pleural effusion.        CT SCAN OF THE ABDOMEN PELVIS WITHOUT CONTRAST: There is new relatively mild ascites not present on 3/29/2024 chest CT scan, with a moderate amount of fluid adjacent the liver and spleen but only minimal ascites elsewhere. Liver morphology is consistent   with cirrhosis. Gallbladder is again noted to be contracted around numerous densely calcified gallstones. There appear to be gallstones in the cystic duct but no visible gallbladder inflammation. No common duct stone or biliary is appreciated.    Spleen is normal in size and contains dense benign calcification. Pancreas and adrenal glands appear within normal limits for age. There are multiple bilateral renal lesions which are sharply defined, rounded, and water or fluid density, most likely   incidental renal cysts. No upper abdominal free air or adenopathy is seen. Bowel loops are normal in caliber. Stomach and duodenum appear grossly normal.    Regarding the lower abdomen/pelvis, bladder is decompressed and normal in appearance. There are numerous sigmoid diverticuli without evidence of diverticulitis. There is a small  amount of intrapelvic ascites. Terminal ileum and cecum appear normal.   Appendix is not identified. Bony structures appear to be intact. There is moderate to advanced multilevel lumbar degenerative disc disease.    .    Impression:  1. Liver morphology consistent with cirrhosis, and relatively mild upper abdominal ascites.    2. Sigmoid diverticulosis without evidence of diverticulitis.    3. Contracted gallbladder with numerous gallstones, including several small stones that appear to be in the cystic duct. No visible gallbladder inflammation or biliary ductal dilatation.    4. No evidence of an acute inflammatory focus is seen in the abdomen or pelvis.        Electronically Signed: Cristóbal Knight MD    5/20/2024 4:05 PM EDT    Workstation ID: [id number]    XR Chest 1 View [[id number]] Collected: 05/20/24 1206     Updated: 05/20/24 1214    Narrative:      XR CHEST 1 VW    Date of Exam: 5/20/2024 11:52 AM EDT    Indication: Acute Stroke Protocol (onset < 12 hrs)    Comparison: Chest radiograph dated 3/29/2024    Findings:  The cardiomediastinal silhouette is within normal limits for there is aortic arch atherosclerotic calcification. There are low lung volumes with streaky left basilar airspace opacity representing atelectasis or mild pneumonia. There is no pleural   effusion or pneumothorax. There are degenerative changes of the thoracic spine.      Impression:      Impression:  1. Low lung volumes with streaky left basilar airspace opacity representing atelectasis or early pneumonia.      Electronically Signed: Kendrick Souza    5/20/2024 12:11 PM EDT    Workstation ID: [id number]    CT Head Without Contrast Stroke Protocol [[id number]] Collected: 05/20/24 1058     Updated: 05/20/24 1105    Narrative:      CT HEAD WO CONTRAST STROKE PROTOCOL    Date of Exam: 5/20/2024 10:51 AM EDT    Indication: Neuro deficit, acute, stroke suspected  Neuro Deficit, acute, Stroke suspected.    Comparison: Head CT 3/29/2024.    Technique:  Axial CT images were obtained of the head without contrast administration.  Reconstructed coronal images were also obtained. Automated exposure control and iterative construction methods were used.    Scan Time: 10:52 a.m.  Results discussed with the stroke team via telephone at 10:58 a.m.    Findings:    Please note this is a motion-degraded examination.    No evidence of acute intracranial hemorrhage or mass effect. No extra-axial collection. The gray white matter differentiation is preserved. Global parenchymal atrophy. Periventricular and subcortical white matter hypodensity, nonspecific, but likely   sequela of chronic small vessel ischemic disease.    The mastoid air cells are well aerated. Mucosal thickening in the right maxillary sinus. Globes and extraocular muscles are unremarkable. No acute or suspicious osseous abnormality. Soft tissues within normal limits.      Impression:      Impression:    No evidence of acute intracranial abnormality, within the confines of motion degradation.    Similar chronic/age-related findings findings, as above.      Electronically Signed: Dale Darby MD    5/20/2024 11:02 AM EDT    Workstation ID: [id number]          I read his radiographic images.      Impression:   1.  Proteus bacteremia-with secondary severe sepsis.  The etiology of his Proteus bacteremia is not entirely clear.  He has multiple potential sources.  He does have Proteus bacteriuria but does not have impressive pyuria.  He has bibasilar pulmonary infiltrates but not a consolidative lobar infiltrate that would usually be seen with a Proteus bacteremia.  He also has an extensive sacral decubitus ulcer with tissue necrosis but no extensive cellulitis or purulent drainage.  On presentation he had hyperbilirubinemia with an elevated alkaline phosphatase and elevated transaminases suggestive of possible biliary disease.  A common bile duct stone/cholangitis is certainly a potential etiology of his Proteus  bacteremia.  I will plan to continue him on intravenous  Zosyn for the time being.  We should consider proceeding with an MRCP  2.  Proteus bacteriuria/UTI  3.  Septic shock-manifested by tachycardia, hypotension, acute kidney injury, leukocytosis/neutrophilia, lactic acidosis, and elevated procalcitonin, toxic/metabolic encephalopathy, and Proteus bacteremia.  4.  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  This will be extremely difficult to heal and is down to bone.  He has some residual tissue necrosis.  5.  Acute renal failure  6.  Lactic acidosis  7.  Leukocytosis/neutrophilia  8.  Toxic/metabolic encephalopathy  9.  Status post COVID-19 infection-3/24  10.  Cirrhosis  11.  Cholelithiasis      PLAN/RECOMMENDATIONS:   Thank you for asking us to see Tho Vasquez, I recommend the followin.  Continue intravenous Zosyn  2.  Continue sacral decubitus ulcer wound care with offloading  3.  Consider MRCP  4.  Palliative care  5.  Consider hospice     He is critically ill with a high mortality risk.  I discussed his complex situation in detail with Dr. Kelly today.  I discussed his complex situation in detail with his daughter today.  I spent over 65 minutes on his complex care today.      Grant Black MD  2024  17:03 EDT

## 2024-05-22 NOTE — PLAN OF CARE
Goal Outcome Evaluation:  Plan of Care Reviewed With: patient, daughter        Progress: no change (eval)         Anticipated Discharge Disposition (SLP): skilled nursing facility    SLP Diagnosis: mild-moderate, communication disorder (05/22/24 1030)  SLP Diagnosis Comments: daughter states that pt not at baseline but near where he has been the past 2 months when decline started; nausea and fatigue impacting completeness of eval, needs further assess during therapy. (05/22/24 1030)  SLP Swallowing Diagnosis: oral dysphagia, suspected pharyngeal dysphagia (05/22/24 1030)

## 2024-05-22 NOTE — THERAPY EVALUATION
Patient Name: Tho Vasquez  : 1940    MRN: [id number]                              Today's Date: 2024       Admit Date: 2024    Visit Dx:     ICD-10-CM ICD-9-CM   1. Septic shock  A41.9 038.9    R65.21 785.52     995.92   2. Multifocal pneumonia  J18.9 486   3. Leukocytosis, unspecified type  D72.829 288.60   4. FARIHA (acute kidney injury)  N17.9 584.9   5. Thrombocytopenia  D69.6 287.5     Patient Active Problem List   Diagnosis    Mixed hyperlipidemia    Abnormal glucose level    Adult BMI 37.0-37.9 kg/sq m    Primary hypertension    Chronic kidney disease, stage 3    Morbid obesity    Obstructive sleep apnea    Old myocardial infarction    Vitamin D deficiency    Venous insufficiency of both lower extremities    Transaminitis    CAD (coronary artery disease)    Bradycardia, drug induced    Sepsis associated hypotension    Pressure injury of sacral region, stage 4    Acute on chronic kidney failure    Sepsis    Other cirrhosis of liver     Past Medical History:   Diagnosis Date    Allergic ?    Cataract     Chronic kidney disease     Coronary artery disease     Hyperlipidemia     Hypertension     Myocardial infarction     Pneumonia of both lower lobes due to infectious organism 10/03/2019    Positive PPD     Respiratory failure 2005    requiring tracheostomy     Past Surgical History:   Procedure Laterality Date    CARDIAC CATHETERIZATION      CARDIAC SURGERY      HERNIA REPAIR        General Information       Row Name 24 1400          OT Time and Intention    Document Type evaluation  -CS     Mode of Treatment occupational therapy  -CS       Row Name 24 1400          General Information    Patient Profile Reviewed yes  -CS     Prior Level of Function min assist:;all household mobility;ADL's  Prior to recent admit to LTC ~2 wks ago per daughter  -CS     Existing Precautions/Restrictions fall;other (see comments);oxygen therapy device and L/min  BUE swelling/weeping, sacral wound   -CS     Barriers to Rehab medically complex;cognitive status;previous functional deficit  -CS       Row Name 05/22/24 1400          Living Environment    People in Home facility resident  -CS       Row Name 05/22/24 1400          Cognition    Orientation Status (Cognition) oriented to;person;verbal cues/prompts needed for orientation;disoriented to;place;situation;time  -CS       Row Name 05/22/24 1400          Safety Issues, Functional Mobility    Impairments Affecting Function (Mobility) balance;cognition;coordination;endurance/activity tolerance;pain;strength;range of motion (ROM);postural/trunk control  -CS     Cognitive Impairments, Mobility Safety/Performance attention;insight into deficits/self-awareness;sequencing abilities  -CS               User Key  (r) = Recorded By, (t) = Taken By, (c) = Cosigned By      Initials Name Provider Type    CS Cydney Fenton OT Occupational Therapist                     Mobility/ADL's       Row Name 05/22/24 1401          Bed Mobility    Bed Mobility supine-sit;sit-supine  -CS     Supine-Sit [place] (Bed Mobility) dependent (less than 25% patient effort);2 person assist;verbal cues  -CS     Sit-Supine [place] (Bed Mobility) 2 person assist;verbal cues;maximum assist (25% patient effort)  -CS     Assistive Device (Bed Mobility) bed rails;head of bed elevated;draw sheet  -CS       Row Name 05/22/24 1401          Transfers    Comment, (Transfers) deferred  -CS       Row Name 05/22/24 1401          Activities of Daily Living    BADL Assessment/Intervention lower body dressing  -CS       Row Name 05/22/24 1401          Lower Body Dressing Assessment/Training    [place] Level (Lower Body Dressing) don;socks;dependent (less than 25% patient effort)  -CS     Position (Lower Body Dressing) supine  -CS               User Key  (r) = Recorded By, (t) = Taken By, (c) = Cosigned By      Initials Name Provider Type    CS Cydney Fenton OT Occupational Therapist                    Obj/Interventions       Row Name 05/22/24 1402          Sensory Assessment (Somatosensory)    Sensory Assessment (Somatosensory) unable/difficult to assess  -CS       Row Name 05/22/24 1402          Vision Assessment/Intervention    Visual Impairment/Limitations unable/difficult to assess  -CS       Row Name 05/22/24 1402          Range of Motion Comprehensive    Comment, General Range of Motion Unable to formally assess d/t cognitive status  -CS       Row Name 05/22/24 1402          Strength Comprehensive (MMT)    Comment, General Manual Muscle Testing (MMT) Assessment Unable to formally assess d/t cognitive status  -CS       Row Name 05/22/24 1402          Balance    Balance Assessment sitting static balance;sitting dynamic balance  -CS     Static Sitting Balance maximum assist  -CS     Dynamic Sitting Balance dependent  -CS     Position, Sitting Balance sitting edge of bed  -CS     Balance Interventions sitting;static;dynamic;weight shifting activity  -CS               User Key  (r) = Recorded By, (t) = Taken By, (c) = Cosigned By      Initials Name Provider Type    CS Cydney Fenton, OT Occupational Therapist                   Goals/Plan       Row Name 05/22/24 1405          Transfer Goal 1 (OT)    Activity/Assistive Device (Transfer Goal 1, OT) sit-to-stand/stand-to-sit  -CS     [place] Level/Cues Needed (Transfer Goal 1, OT) maximum assist (25-49% patient effort)  -CS     Time Frame (Transfer Goal 1, OT) long term goal (LTG);2 weeks  -CS     Progress/Outcome (Transfer Goal 1, OT) goal ongoing  -CS       Row Name 05/22/24 1405          Grooming Goal 1 (OT)    Activity/Device (Grooming Goal 1, OT) hair care;wash face, hands  -CS     [place] (Grooming Goal 1, OT) moderate assist (50-74% patient effort)  -CS     Time Frame (Grooming Goal 1, OT) long term goal (LTG);2 weeks  -CS     Progress/Outcome (Grooming Goal 1, OT) goal ongoing  -CS       Row Name 05/22/24 1405          Problem Specific Goal 1  (OT)    Problem Specific Goal 1 (OT) Pt will demo static sitting with Min A for ~1 min.  -CS     Time Frame (Problem Specific Goal 1, OT) short term goal (STG);5 days  -CS     Progress/Outcome (Problem Specific Goal 1, OT) goal ongoing  -CS       Row Name 05/22/24 4921          Therapy Assessment/Plan (OT)    Planned Therapy Interventions (OT) activity tolerance training;adaptive equipment training;BADL retraining;functional balance retraining;occupation/activity based interventions;ROM/therapeutic exercise;strengthening exercise;transfer/mobility retraining  -CS               User Key  (r) = Recorded By, (t) = Taken By, (c) = Cosigned By      Initials Name Provider Type    CS Cydney Fenton, CASTRO Occupational Therapist                   Clinical Impression       Row Name 05/22/24 1401          Pain Assessment    Pain Intervention(s) Repositioned;Ambulation/increased activity  -CS     Additional Documentation Pain Scale: FACES Pre/Post-Treatment (Group)  -CS       Row Name 05/22/24 9163          Pain Scale: FACES Pre/Post-Treatment    Pain: FACES Scale, Pretreatment 2-->hurts little bit  -CS     Posttreatment Pain Rating 2-->hurts little bit  -CS     Pain Location generalized  -CS     Pre/Posttreatment Pain Comment tolerated  -CS       Row Name 05/22/24 9649          Plan of Care Review    Plan of Care Reviewed With patient;family  -CS     Outcome Evaluation OT eval complete. Pt presents w/ deficits in balance, cognition, and significant generalized weakness warranting cont skilled IPOT POC to promote return to baseline. Recommend pt DC to SNF.  -CS       Row Name 05/22/24 0024          Therapy Assessment/Plan (OT)    Patient/Family Therapy Goal Statement (OT) Return to PLOF  -CS     Rehab Potential (OT) good, to achieve stated therapy goals  -CS     Criteria for Skilled Therapeutic Interventions Met (OT) yes;skilled treatment is necessary  -CS     Therapy Frequency (OT) daily  -CS       Row Name 05/22/24 7009           Therapy Plan Review/Discharge Plan (OT)    Anticipated Discharge Disposition (OT) skilled nursing facility  -       Row Name 05/22/24 1403          Vital Signs    Pre Systolic BP Rehab 111  -CS     Pre Treatment Diastolic BP 51  -CS     Post Systolic BP Rehab 118  -CS     Post Treatment Diastolic BP 90  -CS     Pretreatment Heart Rate (beats/min) 58  -CS     Posttreatment Heart Rate (beats/min) 55  -CS     Pre SpO2 (%) 99  -CS     O2 Delivery Pre Treatment nasal cannula  -CS     Post SpO2 (%) 98  -CS     O2 Delivery Post Treatment nasal cannula  -CS     Pre Patient Position Supine  -CS     Intra Patient Position Standing  -CS     Post Patient Position Supine  -CS       Row Name 05/22/24 1403          Positioning and Restraints    Pre-Treatment Position in bed  -CS     Post Treatment Position bed  -CS     In Bed notified nsg;fowlers;call light within reach;encouraged to call for assist;exit alarm on;side lying right;RUE elevated;LUE elevated;legs elevated;heels elevated;waffle boots/both  -CS               User Key  (r) = Recorded By, (t) = Taken By, (c) = Cosigned By      Initials Name Provider Type    CS Cydney Fenton, OT Occupational Therapist                   Outcome Measures       Row Name 05/22/24 1406          How much help from another is currently needed...    Putting on and taking off regular lower body clothing? 1  -CS     Bathing (including washing, rinsing, and drying) 1  -CS     Toileting (which includes using toilet bed pan or urinal) 1  -CS     Putting on and taking off regular upper body clothing 1  -CS     Taking care of personal grooming (such as brushing teeth) 1  -CS     Eating meals 1  -CS     AM-PAC 6 Clicks Score (OT) 6  -CS       Row Name 05/22/24 1354          How much help from another person do you currently need...    Turning from your back to your side while in flat bed without using bedrails? 2  -ES     Moving from lying on back to sitting on the side of a flat bed without  bedrails? 1  -ES     Moving to and from a bed to a chair (including a wheelchair)? 1  -ES     Standing up from a chair using your arms (e.g., wheelchair, bedside chair)? 1  -ES     Climbing 3-5 steps with a railing? 1  -ES     To walk in hospital room? 1  -ES     AM-PAC 6 Clicks Score (PT) 7  -ES     Highest Level of Mobility Goal 2 --> Bed activities/dependent transfer  -ES       Row Name 05/22/24 1354          Modified [place] Scale    Pre-Stroke Modified [place] Scale 6 - Unable to determine (UTD) from the medical record documentation  -ES     Modified [place] Scale 4 - Moderately severe disability.  Unable to walk without assistance, and unable to attend to own bodily needs without assistance.  -ES       Row Name 05/22/24 1406 05/22/24 1354       Functional Assessment    Outcome Measure Options AM-PAC 6 Clicks Daily Activity (OT)  -CS AM-PAC 6 Clicks Basic Mobility (PT);Modified [place]  -ES              User Key  (r) = Recorded By, (t) = Taken By, (c) = Cosigned By      Initials Name Provider Type    Cydney Hobson OT Occupational Therapist    ES Pema Martin, PT Physical Therapist                    Occupational Therapy Education       Title: PT OT SLP Therapies (In Progress)       Topic: Occupational Therapy (In Progress)       Point: ADL training (In Progress)       Description:   Instruct learner(s) on proper safety adaptation and remediation techniques during self care or transfers.   Instruct in proper use of assistive devices.                  Learning Progress Summary             Patient Acceptance, E, NR by CS at 5/22/2024 1406   Family Acceptance, E, VU by RL at 5/21/2024 0503                         Point: Home exercise program (Done)       Description:   Instruct learner(s) on appropriate technique for monitoring, assisting and/or progressing therapeutic exercises/activities.                  Learning Progress Summary             Family Acceptance, E, VU by RL at 5/21/2024 0507                          Point: Precautions (In Progress)       Description:   Instruct learner(s) on prescribed precautions during self-care and functional transfers.                  Learning Progress Summary             Patient Acceptance, E, NR by CS at 5/22/2024 1406   Family Acceptance, E, VU by RL at 5/21/2024 0503                         Point: Body mechanics (In Progress)       Description:   Instruct learner(s) on proper positioning and spine alignment during self-care, functional mobility activities and/or exercises.                  Learning Progress Summary             Patient Acceptance, E, NR by CS at 5/22/2024 1406   Family Acceptance, E, VU by RL at 5/21/2024 0503                                         User Key       Initials Effective Dates Name Provider Type Discipline    RL 06/16/21 -  Christy Prasad RN Registered Nurse Nurse     09/02/21 -  Cydney Fenton OT Occupational Therapist OT                  OT Recommendation and Plan  Planned Therapy Interventions (OT): activity tolerance training, adaptive equipment training, BADL retraining, functional balance retraining, occupation/activity based interventions, ROM/therapeutic exercise, strengthening exercise, transfer/mobility retraining  Therapy Frequency (OT): daily  Plan of Care Review  Plan of Care Reviewed With: patient, family  Outcome Evaluation: OT eval complete. Pt presents w/ deficits in balance, cognition, and significant generalized weakness warranting cont skilled IPOT POC to promote return to baseline. Recommend pt DC to SNF.     Time Calculation:   Evaluation Complexity (OT)  Review Occupational Profile/Medical/Therapy History Complexity: expanded/moderate complexity  Assessment, Occupational Performance/Identification of Deficit Complexity: 5 or more performance deficits  Clinical Decision Making Complexity (OT): detailed assessment/moderate complexity  Overall Complexity of Evaluation (OT): moderate complexity     Time Calculation- OT        Row Name 05/22/24 1407             Time Calculation- OT    OT Start Time 0950  -CS      OT Received On 05/22/24  -CS      OT Goal Re-Cert Due Date 06/01/24  -CS         Untimed Charges    OT Eval/Re-eval Minutes 46  -CS         Total Minutes    Untimed Charges Total Minutes 46  -CS       Total Minutes 46  -CS                User Key  (r) = Recorded By, (t) = Taken By, (c) = Cosigned By      Initials Name Provider Type    CS Cydney Fenton OT Occupational Therapist                  Therapy Charges for Today       Code Description Service Date Service Provider Modifiers Qty    85868849697 HC OT EVAL MOD COMPLEXITY 4 5/22/2024 Cydney Fenton OT GO 1                 Cydney Fenton OT  5/22/2024

## 2024-05-22 NOTE — PLAN OF CARE
Goal Outcome Evaluation:  Plan of Care Reviewed With: patient, family        Progress: no change (PT IE)  Outcome Evaluation: PT eval complete. Pt presents with generalized weakness, decreased activity tolerance, and impaired core control warranting skilled IPPT services. Pt required maxA x2 for bed mobility and was limited by fatigue and generalized pain, especially at B shoulders. PT rec SNF at d/c.      Anticipated Discharge Disposition (PT): skilled nursing facility

## 2024-05-22 NOTE — PLAN OF CARE
Goal Outcome Evaluation:  Plan of Care Reviewed With: patient, family           Outcome Evaluation: OT eval complete. Pt presents w/ deficits in balance, cognition, and significant generalized weakness warranting cont skilled IPOT POC to promote return to baseline. Recommend pt DC to SNF.      Anticipated Discharge Disposition (OT): skilled nursing facility

## 2024-05-22 NOTE — CASE MANAGEMENT/SOCIAL WORK
Continued Stay Note  [facility]     Patient Name: Tho Vasquez  MRN: [id number]  Today's Date: 5/22/2024    Admit Date: 5/20/2024    Plan: Ongoing   Discharge Plan       Row Name 05/22/24 1351       Plan    Plan Ongoing    Plan Comments Discussed patient in MDR.  Patient currently on Dopamine and Levophed gtts.  Per nursing in MDR, patient declining feeding tube at this time.  Palliative is following and ID has been consulted today.  Patient is from [facility]; April has been updated.  CM will continue to follow.                   Discharge Codes    No documentation.                   Gertrudis Leong RN

## 2024-05-22 NOTE — THERAPY EVALUATION
Acute Care - Speech Language Pathology   Swallow Initial Evaluation [facility]  Clinical Swallow Evaluation  Cognitive-Communication Evaluation       Patient Name: Tho Vasquez  : 1940  MRN: [id number]  Today's Date: 2024               Admit Date: 2024    Visit Dx:     ICD-10-CM ICD-9-CM   1. Septic shock  A41.9 038.9    R65.21 785.52     995.92   2. Multifocal pneumonia  J18.9 486   3. Leukocytosis, unspecified type  D72.829 288.60   4. FARIHA (acute kidney injury)  N17.9 584.9   5. Thrombocytopenia  D69.6 287.5   6. Dysphagia, unspecified type  R13.10 787.20     Patient Active Problem List   Diagnosis    Mixed hyperlipidemia    Abnormal glucose level    Adult BMI 37.0-37.9 kg/sq m    Primary hypertension    Chronic kidney disease, stage 3    Morbid obesity    Obstructive sleep apnea    Old myocardial infarction    Vitamin D deficiency    Venous insufficiency of both lower extremities    Transaminitis    CAD (coronary artery disease)    Bradycardia, drug induced    Sepsis associated hypotension    Pressure injury of sacral region, stage 4    Acute on chronic kidney failure    Sepsis    Other cirrhosis of liver     Past Medical History:   Diagnosis Date    Allergic ?    Cataract     Chronic kidney disease     Coronary artery disease     Hyperlipidemia     Hypertension     Myocardial infarction     Pneumonia of both lower lobes due to infectious organism 10/03/2019    Positive PPD     Respiratory failure 2005    requiring tracheostomy     Past Surgical History:   Procedure Laterality Date    CARDIAC CATHETERIZATION      CARDIAC SURGERY      HERNIA REPAIR         SLP Recommendation and Plan  SLP Swallowing Diagnosis: oral dysphagia, suspected pharyngeal dysphagia (24 1030)  SLP Diet Recommendation: NPO, comfort diet, per physician discretion (24 1030)  Recommended Precautions and Strategies: upright posture during/after eating, general aspiration precautions (24 1030)  SLP Rec.  for Method of Medication Administration: meds whole, with puree, as tolerated (05/22/24 1030)     Monitor for Signs of Aspiration: notify SLP if any concerns (05/22/24 1030)  Recommended Diagnostics: reassess via clinical swallow evaluation, other (see comments) (? fees) (05/22/24 1030)  Swallow Criteria for Skilled Therapeutic Interventions Met: demonstrates skilled criteria (05/22/24 1030)  Anticipated Discharge Disposition (SLP): skilled nursing facility (05/22/24 1030)  Rehab Potential/Prognosis, Swallowing: re-evaluate goals as necessary (05/22/24 1030)     Predicted Duration Therapy Intervention (Days): 1 week (05/22/24 1030)  Oral Care Recommendations: Oral Care BID/PRN, Suction toothbrush (05/22/24 1030)                                        Plan of Care Reviewed With: patient, daughter  Progress: no change (eval)      SWALLOW EVALUATION (Last 72 Hours)       SLP Adult Swallow Evaluation       Row Name 05/22/24 1030                   General Information    Current Method of Nutrition NPO  -EN        Prior Level of Function-Communication WFL  -EN        Prior Level of Function-Swallowing safe, efficient swallowing in all situations;other (see comments)  has had FEES during a prior admission -- rec'd reg/thin, small cup/straw sips  -EN           Oral Motor Structure and Function    Secretion Management dried secretions in oral cavity  -EN           General Eating/Swallowing Observations    Respiratory Support Currently in Use nasal cannula  -EN        Eating/Swallowing Skills fed by SLP  -EN        Positioning During Eating upright in bed  -EN        Utensils Used spoon;cup;straw  -EN        Consistencies Trialed pureed;thin liquids;nectar/syrup-thick liquids  -EN           Respiratory    Respiratory Status WFL  -EN           Clinical Swallow Eval    Oral Prep Phase impaired  -EN        Pharyngeal Phase suspected pharyngeal impairment  -EN        Clinical Swallow Evaluation Summary Limited assessment today as  pt w/ complaints of nausea. Did trial a few bites of ice cream and pt showed no s/s of aspiration as well as 2 straw sips of nectar-thick liquids w/o signs of aspiration. Immediate wet vocal quality present w/ thin liquids. Pt currently making decisions regarding POC. If opting for safest diet, would recommend NPO w/ allowance of sips of nectar-thick until re-assessment can be completed d/t fatigue and limited assessment. If opting for comfort diet, thin liquids ok and will reassess for solids as did not trial during eval today d/t pt preference.  -EN           Oral Prep Concerns    Oral Prep Concerns incomplete or weak lip closure around spoon;reduced lip opening  -EN        Reduced Lip Opening all consistencies  -EN        Incomplete or Weak Lip Closure Around Spoon all consistencies  -EN           Pharyngeal Phase Concerns    Pharyngeal Phase Concerns wet vocal quality  -EN        Wet Vocal Quality thin  -EN           SLP Evaluation Clinical Impression    SLP Swallowing Diagnosis oral dysphagia;suspected pharyngeal dysphagia  -EN        Functional Impact risk of aspiration/pneumonia  -EN        Rehab Potential/Prognosis, Swallowing re-evaluate goals as necessary  -EN        Swallow Criteria for Skilled Therapeutic Interventions Met demonstrates skilled criteria  -EN           Recommendations    SLP Diet Recommendation NPO;comfort diet, per physician discretion  -EN        Recommended Diagnostics reassess via clinical swallow evaluation;other (see comments)  ? fees  -EN        Recommended Precautions and Strategies upright posture during/after eating;general aspiration precautions  -EN        Oral Care Recommendations Oral Care BID/PRN;Suction toothbrush  -EN        SLP Rec. for Method of Medication Administration meds whole;with puree;as tolerated  -EN        Monitor for Signs of Aspiration notify SLP if any concerns  -EN                  User Key  (r) = Recorded By, (t) = Taken By, (c) = Cosigned By       Initials Name Effective Dates    EN Zandra Stern MS CCC-SLP 06/22/22 -                     EDUCATION  The patient has been educated in the following areas:   Dysphagia (Swallowing Impairment).        SLP GOALS       Row Name 05/22/24 1030             Patient will demonstrate functional speech skills for return to discharge environment    [place] with minimal cues  -EN      Time frame 1 week  -EN      Barriers medically complex  -EN      Progress/Outcomes new goal  -EN         Patient will demonstrate functional language skills for return to discharge environment     [place] with minimal cues  -EN      Time frame 1 week  -EN      Barriers medically complex  -EN      Progress/Outcomes new goal  -EN         Ability to Construct Phrase and Sentence Level Response Goal 1 (SLP)    Improve Ability to Construct Phrase and Sentence Level Responses By Goal 1 (SLP) answering question with phrase;80%;with minimal cues (75-90%)  -EN      Time Frame (Phrase and Sentence Level Response Goal 1, SLP) 1 week  -EN      Progress/Outcomes (Phrase and Sentence Level Response Goal 1, SLP) new goal  -EN         Articulation Goal 1 (SLP)    Improve Articulation Goal 1 (SLP) by over-articulating at word level;by over-articulating at phrase level;80%;with minimal cues (75-90%)  -EN      Time Frame (Articulation Goal 1, SLP) 1 week  -EN      Progress/Outcomes (Articulation Goal 1, SLP) new goal  -EN                User Key  (r) = Recorded By, (t) = Taken By, (c) = Cosigned By      Initials Name Provider Type    Zandra Mullen, MS CCC-SLP Speech and Language Pathologist                         Time Calculation:    Time Calculation- SLP       Row Name 05/22/24 1546             Time Calculation- SLP    SLP Start Time 1030  -EN      SLP Received On 05/22/24  -EN         Untimed Charges    SLP Eval/Re-eval  ST Eval Oral Pharyng Swallow - 19504;ST Eval Speech and Production w/ Language - 21122  -EN      34446-GJ Eval Speech and  Production w/ Language Minutes 30  -EN      13955-RN Eval Oral Pharyng Swallow Minutes 38  -EN         Total Minutes    Untimed Charges Total Minutes 68  -EN       Total Minutes 68  -EN                User Key  (r) = Recorded By, (t) = Taken By, (c) = Cosigned By      Initials Name Provider Type    EN Zandra Stern, MS CCC-SLP Speech and Language Pathologist                    Therapy Charges for Today       Code Description Service Date Service Provider Modifiers Qty    32593117425 HC ST EVAL ORAL PHARYNG SWALLOW 3 2024 Zandra Stern, MS CCC-SLP GN 1    [id number] HC ST EVAL SPEECH AND PROD W LANG  2 2024 Zandra Stern, MS SEN-SLP GN 1                 Zandra Stern MS CCC-SLP  2024   and Acute Care - Speech Language Pathology Initial Evaluation  [facility]     Patient Name: Tho Vasquez  : 1940  MRN: [id number]  Today's Date: 2024               Admit Date: 2024     Visit Dx:    ICD-10-CM ICD-9-CM   1. Septic shock  A41.9 038.9    R65.21 785.52     995.92   2. Multifocal pneumonia  J18.9 486   3. Leukocytosis, unspecified type  D72.829 288.60   4. FARIHA (acute kidney injury)  N17.9 584.9   5. Thrombocytopenia  D69.6 287.5   6. Dysphagia, unspecified type  R13.10 787.20     Patient Active Problem List   Diagnosis    Mixed hyperlipidemia    Abnormal glucose level    Adult BMI 37.0-37.9 kg/sq m    Primary hypertension    Chronic kidney disease, stage 3    Morbid obesity    Obstructive sleep apnea    Old myocardial infarction    Vitamin D deficiency    Venous insufficiency of both lower extremities    Transaminitis    CAD (coronary artery disease)    Bradycardia, drug induced    Sepsis associated hypotension    Pressure injury of sacral region, stage 4    Acute on chronic kidney failure    Sepsis    Other cirrhosis of liver     Past Medical History:   Diagnosis Date    Allergic ?    Cataract     Chronic kidney disease     Coronary artery disease     Hyperlipidemia      Hypertension     Myocardial infarction     Pneumonia of both lower lobes due to infectious organism 10/03/2019    Positive PPD     Respiratory failure 2005    requiring tracheostomy     Past Surgical History:   Procedure Laterality Date    CARDIAC CATHETERIZATION      CARDIAC SURGERY      HERNIA REPAIR         SLP Recommendation and Plan  SLP Diagnosis: mild-moderate, communication disorder (05/22/24 1030)  SLP Diagnosis Comments: daughter states that pt not at baseline but near where he has been the past 2 months when decline started; nausea and fatigue impacting completeness of eval, needs further assess during therapy. (05/22/24 1030)     Monitor for Signs of Aspiration: notify SLP if any concerns (05/22/24 1030)  Swallow Criteria for Skilled Therapeutic Interventions Met: demonstrates skilled criteria (05/22/24 1030)  SLC Criteria for Skilled Therapy Interventions Met: yes (05/22/24 1030)  Anticipated Discharge Disposition (SLP): skilled nursing facility (05/22/24 1030)        Therapy Frequency (SLP SLC): 5 days per week (05/22/24 1030)  Predicted Duration Therapy Intervention (Days): 1 week (05/22/24 1030)  Oral Care Recommendations: Oral Care BID/PRN, Suction toothbrush (05/22/24 1030)                          Plan of Care Reviewed With: patient, daughter (05/22/24 1547)  Progress: no change (eval) (05/22/24 1547)      SLP EVALUATION (Last 72 Hours)       SLP SLC Evaluation       Row Name 05/22/24 1030                   Communication Assessment/Intervention    Document Type evaluation  -EN        Subjective Information no complaints  -EN        Patient Observations cooperative;agree to therapy  -EN        Patient/Family/Caregiver Comments/Observations daughter present  -EN        Patient Effort adequate  -EN        Symptoms Noted During/After Treatment other (see comments)  nausea  -EN           General Information    Patient Profile Reviewed yes  -EN        Pertinent History Of Current Problem Pt adm w/  sepsis. Hx of recent adm for Covid pneumonia -- transferred to [facility] and is now adm again for hypotension. Pt w/ 2 month hx of decreased nutrition and PO intake. Other hx of CAD, HTN, HLD.  -EN        Precautions/Limitations, Vision vision impairment, right  -EN        Precautions/Limitations, Hearing WFL;for purposes of eval  -EN        Prior Level of Function-Communication WFL  -EN        Plans/Goals Discussed with patient and family;agreed upon  -EN        Barriers to Rehab none identified  -EN        Patient's Goals for Discharge patient did not state  -EN           Pain    Additional Documentation Pain Scale: FACES Pre/Post-Treatment (Group)  -EN           Pain Scale: FACES Pre/Post-Treatment    Pain: FACES Scale, Pretreatment 2-->hurts little bit  -EN        Posttreatment Pain Rating 2-->hurts little bit  -EN        Pain Location generalized  -EN        Pre/Posttreatment Pain Comment RN aware  -EN           Comprehension Assessment/Intervention    Comprehension Assessment/Intervention Auditory Comprehension  -EN           Auditory Comprehension Assessment/Intervention    Auditory Comprehension (Communication) WFL  -EN           Expression Assessment/Intervention    Expression Assessment/Intervention verbal expression  -EN           Verbal Expression Assessment/Intervention    Verbal Expression mild impairment;moderate impairment  -EN        Sentence Formulation simple;mild impairment;moderate impairment  -EN        Conversational Discourse/Fluency mild impairment;moderate impairment  -EN           Oral Motor Structure and Function    Dentition Assessment teeth are in poor condition;poor oral hygiene  -EN        Mucosal Quality sticky  -EN           Oral Musculature and Cranial Nerve Assessment    Oral Motor General Assessment generalized oral motor weakness  -EN           Motor Speech Assessment/Intervention    Motor Speech Function mild impairment;moderate impairment  -EN        Characteristics Consistent with  Dysarthria slurred speech  -EN           Cursory Voice Assessment/Intervention    Quality and Resonance (Voice) WFL  -EN           Cognitive Assessment Intervention- SLP    Cognitive Function (Cognition) unable/difficult to assess  -EN           SLP Evaluation Clinical Impressions    SLP Diagnosis mild-moderate;communication disorder  -EN        SLP Diagnosis Comments daughter states that pt not at baseline but near where he has been the past 2 months when decline started; nausea and fatigue impacting completeness of eval, needs further assess during therapy.  -EN        Rehab Potential/Prognosis fair  -EN        SLC Criteria for Skilled Therapy Interventions Met yes  -EN        Functional Impact functional impact in social situations;functional impact in ADLs  -EN           Recommendations    Therapy Frequency (SLP SLC) 5 days per week  -EN        Predicted Duration Therapy Intervention (Days) 1 week  -EN        Anticipated Discharge Disposition (SLP) skilled nursing facility  -EN                  User Key  (r) = Recorded By, (t) = Taken By, (c) = Cosigned By      Initials Name Effective Dates    EN Zandra Stern MS CCC-SLP 06/22/22 -                        EDUCATION  The patient has been educated in the following areas:     Cognitive Impairment Communication Impairment.           SLP GOALS       Row Name 05/22/24 1030             Patient will demonstrate functional speech skills for return to discharge environment    [place] with minimal cues  -EN      Time frame 1 week  -EN      Barriers medically complex  -EN      Progress/Outcomes new goal  -EN         Patient will demonstrate functional language skills for return to discharge environment     [place] with minimal cues  -EN      Time frame 1 week  -EN      Barriers medically complex  -EN      Progress/Outcomes new goal  -EN         Ability to Construct Phrase and Sentence Level Response Goal 1 (SLP)    Improve Ability to Construct Phrase and  Sentence Level Responses By Goal 1 (SLP) answering question with phrase;80%;with minimal cues (75-90%)  -EN      Time Frame (Phrase and Sentence Level Response Goal 1, SLP) 1 week  -EN      Progress/Outcomes (Phrase and Sentence Level Response Goal 1, SLP) new goal  -EN         Articulation Goal 1 (SLP)    Improve Articulation Goal 1 (SLP) by over-articulating at word level;by over-articulating at phrase level;80%;with minimal cues (75-90%)  -EN      Time Frame (Articulation Goal 1, SLP) 1 week  -EN      Progress/Outcomes (Articulation Goal 1, SLP) new goal  -EN                User Key  (r) = Recorded By, (t) = Taken By, (c) = Cosigned By      Initials Name Provider Type    Zandra Mullen MS CCC-SLP Speech and Language Pathologist                              Time Calculation:      Time Calculation- SLP       Row Name 05/22/24 1546             Time Calculation- SLP    SLP Start Time 1030  -EN      SLP Received On 05/22/24  -EN         Untimed Charges    SLP Eval/Re-eval  ST Eval Oral Pharyng Swallow - 80999;ST Eval Speech and Production w/ Language - 82572  -EN      02666-HB Eval Speech and Production w/ Language Minutes 30  -EN      30528-UB Eval Oral Pharyng Swallow Minutes 38  -EN         Total Minutes    Untimed Charges Total Minutes 68  -EN       Total Minutes 68  -EN                User Key  (r) = Recorded By, (t) = Taken By, (c) = Cosigned By      Initials Name Provider Type    Zandra Mullen MS CCC-SLP Speech and Language Pathologist                    Therapy Charges for Today       Code Description Service Date Service Provider Modifiers Qty    [id number] HC ST EVAL ORAL PHARYNG SWALLOW 3 5/22/2024 Zandra Stern MS CCC-SLP GN 1    [id number] HC ST EVAL SPEECH AND PROD W LANG  2 5/22/2024 Zandra Stern MS CCC-CHERIE GN 1                       MS KALIE Boyle  5/22/2024

## 2024-05-22 NOTE — PROGRESS NOTES
Stroke Progress Note       Chief Complaint: AMS    Subjective    Subjective     Subjective: Family at bedside.  Patient resting, opens eyes to verbal.  Follows commands with encouragement.        Review of Systems   Cardiovascular:  Negative for chest pain.   Neurological:  Positive for weakness. Negative for speech difficulty and headaches.            Objective    Objective      Temp:  [94.2 °F (34.6 °C)-98.3 °F (36.8 °C)] 98 °F (36.7 °C)  Heart Rate:  [] 65  Resp:  [18-22] 18  BP: ()/() 137/90        Neurological Exam  Mental Status  Drowsy. Oriented only to person. Mild dysarthria present. Language is fluent with no aphasia.    Cranial Nerves  CN II: Right monocular defect.  CN III, IV, VI: Extraocular movements intact bilaterally. Normal lids and orbits bilaterally. Pupils equal round and reactive to light bilaterally.  CN V: Facial sensation is normal.  CN VII: Full and symmetric facial movement.  CN XII: Tongue midline without atrophy or fasciculations.    Motor  Decreased muscle bulk throughout. No fasciculations present. The following abnormal movements were seen: Tremors.   Strength is 5/5 in all four extremities except as noted.  BUE 3/5,  =  BLE 1/5.    Sensory  Light touch is normal in upper and lower extremities.     Gait    Not tested.      Physical Exam  Vitals and nursing note reviewed.   Constitutional:       Appearance: Normal appearance.   HENT:      Head: Normocephalic and atraumatic.      Mouth/Throat:      Mouth: Mucous membranes are dry.   Eyes:      General: Lids are normal.      Extraocular Movements: Extraocular movements intact.      Pupils: Pupils are equal, round, and reactive to light.   Cardiovascular:      Rate and Rhythm: Normal rate.   Pulmonary:      Effort: Pulmonary effort is normal. No respiratory distress.   Abdominal:      General: There is distension.   Musculoskeletal:      Cervical back: Normal range of motion.   Neurological:      General: No focal  deficit present.      Mental Status: He is easily aroused. He is disoriented.      Cranial Nerves: Dysarthria present. No cranial nerve deficit.      Sensory: No sensory deficit.      Motor: Weakness present.   Psychiatric:         Mood and Affect: Mood normal.         Behavior: Behavior normal.         Results Review:    I reviewed the patient's new clinical results.  WBC   Date Value Ref Range Status   05/22/2024 13.89 (H) 3.40 - 10.80 10*3/mm3 Final     RBC   Date Value Ref Range Status   05/22/2024 3.76 (L) 4.14 - 5.80 10*6/mm3 Final     Hemoglobin   Date Value Ref Range Status   05/22/2024 11.7 (L) 13.0 - 17.7 g/dL Final     Hematocrit   Date Value Ref Range Status   05/22/2024 35.2 (L) 37.5 - 51.0 % Final     MCV   Date Value Ref Range Status   05/22/2024 93.6 79.0 - 97.0 fL Final     MCH   Date Value Ref Range Status   05/22/2024 31.1 26.6 - 33.0 pg Final     MCHC   Date Value Ref Range Status   05/22/2024 33.2 31.5 - 35.7 g/dL Final     RDW   Date Value Ref Range Status   05/22/2024 19.2 (H) 12.3 - 15.4 % Final     RDW-SD   Date Value Ref Range Status   05/22/2024 65.3 (H) 37.0 - 54.0 fl Final     MPV   Date Value Ref Range Status   05/22/2024 11.9 6.0 - 12.0 fL Final     Platelets   Date Value Ref Range Status   05/22/2024 80 (L) 140 - 450 10*3/mm3 Final     Neutrophil %   Date Value Ref Range Status   05/22/2024 77.4 (H) 42.7 - 76.0 % Final     Lymphocyte %   Date Value Ref Range Status   05/22/2024 7.4 (L) 19.6 - 45.3 % Final     Monocyte %   Date Value Ref Range Status   05/22/2024 13.2 (H) 5.0 - 12.0 % Final     Eosinophil %   Date Value Ref Range Status   05/22/2024 0.1 (L) 0.3 - 6.2 % Final     Basophil %   Date Value Ref Range Status   05/22/2024 0.2 0.0 - 1.5 % Final     Immature Grans %   Date Value Ref Range Status   05/22/2024 1.7 (H) 0.0 - 0.5 % Final     Neutrophils, Absolute   Date Value Ref Range Status   05/22/2024 10.73 (H) 1.70 - 7.00 10*3/mm3 Final     Lymphocytes, Absolute   Date Value  Ref Range Status   05/22/2024 1.03 0.70 - 3.10 10*3/mm3 Final     Monocytes, Absolute   Date Value Ref Range Status   05/22/2024 1.84 (H) 0.10 - 0.90 10*3/mm3 Final     Eosinophils, Absolute   Date Value Ref Range Status   05/22/2024 0.02 0.00 - 0.40 10*3/mm3 Final     Basophils, Absolute   Date Value Ref Range Status   05/22/2024 0.03 0.00 - 0.20 10*3/mm3 Final     Immature Grans, Absolute   Date Value Ref Range Status   05/22/2024 0.24 (H) 0.00 - 0.05 10*3/mm3 Final     nRBC   Date Value Ref Range Status   05/22/2024 0.2 0.0 - 0.2 /100 WBC Final     Lab Results   Component Value Date    GLUCOSE 110 (H) 05/22/2024    BUN 60 (H) 05/22/2024    CREATININE 1.86 (H) 05/22/2024    EGFR 35.2 (L) 05/22/2024    BCR 32.3 (H) 05/22/2024    K 5.0 05/22/2024    CO2 24.0 05/22/2024    CALCIUM 7.9 (L) 05/22/2024    ALBUMIN 2.0 (L) 05/20/2024    BILITOT 1.8 (H) 05/20/2024    AST 64 (H) 05/20/2024    ALT 49 (H) 05/20/2024     A1c 4.5  LDL 22    CT Chest Without Contrast Diagnostic    Result Date: 5/20/2024  Multifocal bilateral pneumonia as described. Very small partially loculated left basilar pleural effusion. CT SCAN OF THE ABDOMEN PELVIS WITHOUT CONTRAST: There is new relatively mild ascites not present on 3/29/2024 chest CT scan, with a moderate amount of fluid adjacent the liver and spleen but only minimal ascites elsewhere. Liver morphology is consistent with cirrhosis. Gallbladder is again noted to be contracted around numerous densely calcified gallstones. There appear to be gallstones in the cystic duct but no visible gallbladder inflammation. No common duct stone or biliary is appreciated. Spleen is normal in size and contains dense benign calcification. Pancreas and adrenal glands appear within normal limits for age. There are multiple bilateral renal lesions which are sharply defined, rounded, and water or fluid density, most likely incidental renal cysts. No upper abdominal free air or adenopathy is seen. Bowel loops  are normal in caliber. Stomach and duodenum appear grossly normal. Regarding the lower abdomen/pelvis, bladder is decompressed and normal in appearance. There are numerous sigmoid diverticuli without evidence of diverticulitis. There is a small amount of intrapelvic ascites. Terminal ileum and cecum appear normal. Appendix is not identified. Bony structures appear to be intact. There is moderate to advanced multilevel lumbar degenerative disc disease. . Impression: 1. Liver morphology consistent with cirrhosis, and relatively mild upper abdominal ascites. 2. Sigmoid diverticulosis without evidence of diverticulitis. 3. Contracted gallbladder with numerous gallstones, including several small stones that appear to be in the cystic duct. No visible gallbladder inflammation or biliary ductal dilatation. 4. No evidence of an acute inflammatory focus is seen in the abdomen or pelvis. Electronically Signed: Cristóbal Knight MD  5/20/2024 4:05 PM EDT  Workstation ID: [id number]    CT Abdomen Pelvis Without Contrast    Result Date: 5/20/2024  Multifocal bilateral pneumonia as described. Very small partially loculated left basilar pleural effusion. CT SCAN OF THE ABDOMEN PELVIS WITHOUT CONTRAST: There is new relatively mild ascites not present on 3/29/2024 chest CT scan, with a moderate amount of fluid adjacent the liver and spleen but only minimal ascites elsewhere. Liver morphology is consistent with cirrhosis. Gallbladder is again noted to be contracted around numerous densely calcified gallstones. There appear to be gallstones in the cystic duct but no visible gallbladder inflammation. No common duct stone or biliary is appreciated. Spleen is normal in size and contains dense benign calcification. Pancreas and adrenal glands appear within normal limits for age. There are multiple bilateral renal lesions which are sharply defined, rounded, and water or fluid density, most likely incidental renal cysts. No upper abdominal free air  or adenopathy is seen. Bowel loops are normal in caliber. Stomach and duodenum appear grossly normal. Regarding the lower abdomen/pelvis, bladder is decompressed and normal in appearance. There are numerous sigmoid diverticuli without evidence of diverticulitis. There is a small amount of intrapelvic ascites. Terminal ileum and cecum appear normal. Appendix is not identified. Bony structures appear to be intact. There is moderate to advanced multilevel lumbar degenerative disc disease. . Impression: 1. Liver morphology consistent with cirrhosis, and relatively mild upper abdominal ascites. 2. Sigmoid diverticulosis without evidence of diverticulitis. 3. Contracted gallbladder with numerous gallstones, including several small stones that appear to be in the cystic duct. No visible gallbladder inflammation or biliary ductal dilatation. 4. No evidence of an acute inflammatory focus is seen in the abdomen or pelvis. Electronically Signed: Cristóbal Knight MD  5/20/2024 4:05 PM EDT  Workstation ID: [id number]    CT Head Without Contrast Stroke Protocol    Result Date: 5/20/2024  Impression: No evidence of acute intracranial abnormality, within the confines of motion degradation. Similar chronic/age-related findings findings, as above. Electronically Signed: Dale Darby MD  5/20/2024 11:02 AM EDT  Workstation ID: [id number]     Results for orders placed during the hospital encounter of 03/29/24    Adult Transthoracic Echo Complete W/ Cont if Necessary Per Protocol    Interpretation Summary    Left ventricular ejection fraction appears to be 56 - 60%.    The right ventricular cavity is mildly dilated.    The left atrial cavity is mildly dilated.    Estimated right ventricular systolic pressure from tricuspid regurgitation is normal (<35 mmHg). Calculated right ventricular systolic pressure from tricuspid regurgitation is 9 mmHg.            Assessment/Plan     Assessment/Plan: 84-year-old male with known diagnosis of HTN, HLD, CKD,  CAD s/p stents, and recent admission for COVID-pneumonia who presented from his nursing facility with 4 days of worsening dysarthria.  Initial NIHSS 6 including moderate dysarthria and generalized weakness.  Nonfocal exam.  Hypotensive on arrival with elevated creatinine and white count.  CT head negative for acute abnormality.  Deferred vessel imaging secondary to elevated creatinine and last known well > 24 hours.    PTA antiplatelet: Aspirin  PTA anticoagulant: None      Dysarthria        Generalized weakness  -Non-focal exam; we would expect that if his symptoms had been due to stroke there would be evidence of ischemia on CT head after 4 days of progressive symptoms.  -Clinical picture is more consistent with sepsis than stroke.  Possible source multifocal pneumonia v sacral decub.  ID consulted  -Patient bradycardic yesterday with heart rate in the 30s; currently on dopamine and norepinephrine  -Defer advanced neuroimaging, no MRI needed  -Dc'd stroke orderset -continue home aspirin and atorvastatin  -Recommend repeat CT head should he develop any worsening neuro symptoms and is hemodynamically stable and appropriate for transport  -Palliative following       No further stroke workup is needed at this time, neurology will sign off.  Please call with questions or concerns.        Tessy Benites, ADALBERTO  05/22/24  09:52 EDT

## 2024-05-23 NOTE — THERAPY RE-EVALUATION
Acute Care - Wound/Debridement Initial Evaluation  [facility]     Patient Name: Tho Vasquez  : 1940  MRN: [id number]  Today's Date: 2024                Admit Date: 2024    Visit Dx:    ICD-10-CM ICD-9-CM   1. Septic shock  A41.9 038.9    R65.21 785.52     995.92   2. Multifocal pneumonia  J18.9 486   3. Leukocytosis, unspecified type  D72.829 288.60   4. FARIHA (acute kidney injury)  N17.9 584.9   5. Thrombocytopenia  D69.6 287.5   6. Dysphagia, unspecified type  R13.10 787.20       Patient Active Problem List   Diagnosis    Mixed hyperlipidemia    Abnormal glucose level    Adult BMI 37.0-37.9 kg/sq m    Primary hypertension    Chronic kidney disease, stage 3    Morbid obesity    Obstructive sleep apnea    Old myocardial infarction    Vitamin D deficiency    Venous insufficiency of both lower extremities    Transaminitis    CAD (coronary artery disease)    Bradycardia, drug induced    Sepsis associated hypotension    Pressure injury of sacral region, stage 4    Acute on chronic kidney failure    Sepsis    Other cirrhosis of liver        Past Medical History:   Diagnosis Date    Allergic ?    Cataract     Chronic kidney disease     Coronary artery disease     Hyperlipidemia     Hypertension     Myocardial infarction     Pneumonia of both lower lobes due to infectious organism 10/03/2019    Positive PPD     Respiratory failure 2005    requiring tracheostomy        Past Surgical History:   Procedure Laterality Date    CARDIAC CATHETERIZATION      CARDIAC SURGERY      HERNIA REPAIR             Wound 24 0800 Right lower arm Skin Tear (Active)   Base moist;pink 24 1200   Periwound moist;pink 24 1200   Care, Wound cleansed with;soap and water 24 0400   Dressing Care dressing changed 24 0400   Periwound Care dry periwound area maintained 24 0400       Wound 24 1730 Right proximal arm (Active)   Base moist;pink 24 1200   Periwound moist;pink;swelling  05/23/24 1200   Care, Wound cleansed with;soap and water 05/23/24 0400   Dressing Care dressing changed 05/23/24 0400   Periwound Care dry periwound area maintained 05/23/24 0400       Wound 05/20/24 1800 Right anterior ankle (Active)   Base pink;moist 05/23/24 1200   Periwound pink;moist 05/23/24 1200   Care, Wound cleansed with;soap and water 05/23/24 0400   Dressing Care dressing changed 05/23/24 0400   Periwound Care dry periwound area maintained 05/23/24 0400       Wound 05/20/24 1730 Bilateral sacral spine Other (comment) (Active)   Wound Image   05/23/24 1345   Dressing Appearance intact;moist drainage 05/23/24 1345   Base moist;pink;red;slough;subcutaneous;yellow;black eschar 05/23/24 1345   Periwound intact;dry 05/23/24 1345   Periwound Temperature warm 05/23/24 1345   Periwound Skin Turgor soft 05/23/24 1345   Edges irregular 05/23/24 1345   Wound Length (cm) 5 cm 05/23/24 1345   Wound Width (cm) 3 cm 05/23/24 1345   Wound Depth (cm) 3 cm 05/23/24 1345   Wound Surface Area (cm^2) 15 cm^2 05/23/24 1345   Wound Volume (cm^3) 45 cm^3 05/23/24 1345   Undermining [Depth (cm)/Location] 2cm circumferentially 05/23/24 1345   Drainage Characteristics/Odor serosanguineous;sanguineous 05/23/24 1345   Drainage Amount moderate 05/23/24 1345   Care, Wound irrigated with;sterile normal saline;negative pressure wound therapy 05/23/24 1345   Dressing Care dressing changed 05/23/24 1345   Periwound Care cleansed with pH balanced cleanser;barrier film applied 05/23/24 1345       NPWT (Negative Pressure Wound Therapy) 05/23/24 1345 sacrum (Active)   Therapy Setting continuous therapy 05/23/24 1345   Dressing foam, black 05/23/24 1345   Pressure Setting 125 mmHg 05/23/24 1345   Sponges Inserted 1 05/23/24 1345   Sponges Removed 1 05/23/24 1345   Finger sweep complete Yes 05/23/24 1345         WOUND DEBRIDEMENT                     PT Assessment (Last 12 Hours)       PT Evaluation and Treatment       Row Name 05/23/24 4140           Physical Therapy Time and Intention    Subjective Information no complaints  -     Document Type evaluation;therapy note (daily note);wound care  -     Mode of Treatment individual therapy;physical therapy  -       Row Name 05/23/24 1345          General Information    Patient Profile Reviewed yes  -MF     Patient Observations alert;cooperative;agree to therapy  -     Pertinent History of Current Functional Problem pt presents with sacral PI  -MF     Risks Reviewed patient:;increased discomfort  -     Benefits Reviewed patient:;improve skin integrity  -     Barriers to Rehab medically complex;previous functional deficit;physical barrier  -       Row Name 05/23/24 1345          Pain    Additional Documentation Pain Scale: FACES Pre/Post-Treatment (Group)  -       Row Name 05/23/24 1345          Pain Scale: FACES Pre/Post-Treatment    Pain: FACES Scale, Pretreatment 0-->no hurt  -     Posttreatment Pain Rating 0-->no hurt  -     Pain Location - --  4-5 / 10 pain with rolling and repositioning  -       Row Name 05/23/24 1345          Cognition    Affect/Mental Status (Cognition) WFL  -       Row Name 05/23/24 1345          Health Promotion    Additional Documentation NPWT (Negative Pressure Wound Therapy) (LDA)  -       Row Name             Wound 05/20/24 1730 Right proximal arm    Wound - Properties Group Placement Date: 05/20/24  -OS Placement Time: 1730  -OS Present on Original Admission: Y  -OS Side: Right  -OS Orientation: proximal  -OS Location: arm  -OS    Retired Wound - Properties Group Placement Date: 05/20/24  -OS Placement Time: 1730  -OS Present on Original Admission: Y  -OS Side: Right  -OS Orientation: proximal  -OS Location: arm  -OS    Retired Wound - Properties Group Date first assessed: 05/20/24  -OS Time first assessed: 1730  -OS Present on Original Admission: Y  -OS Side: Right  -OS Location: arm  -OS      Row Name             Wound 05/20/24 1800 Right anterior ankle     Wound - Properties Group Placement Date: 05/20/24  -OS Placement Time: 1800  -OS Side: Right  -OS Orientation: anterior  -OS Location: ankle  -OS    Retired Wound - Properties Group Placement Date: 05/20/24  -OS Placement Time: 1800  -OS Side: Right  -OS Orientation: anterior  -OS Location: ankle  -OS    Retired Wound - Properties Group Date first assessed: 05/20/24  -OS Time first assessed: 1800  -OS Side: Right  -OS Location: ankle  -OS      Row Name 05/23/24 1345          Wound 05/20/24 1730 Bilateral sacral spine Other (comment)    Wound - Properties Group Placement Date: 05/20/24  -OS Placement Time: 1730  -OS Present on Original Admission: Y  -OS Side: Bilateral  -OS Location: sacral spine  -OS Primary Wound Type: Other  -OS, stage 4     Wound Image Images linked: 1  -MF     Dressing Appearance intact;moist drainage  -MF     Base moist;pink;red;slough;subcutaneous;yellow;black eschar  -MF     Periwound intact;dry  -MF     Periwound Temperature warm  -MF     Periwound Skin Turgor soft  -MF     Edges irregular  -MF     Wound Length (cm) 5 cm  -MF     Wound Width (cm) 3 cm  -MF     Wound Depth (cm) 3 cm  -MF     Wound Surface Area (cm^2) 15 cm^2  -MF     Wound Volume (cm^3) 45 cm^3  -MF     Undermining [Depth (cm)/Location] 2cm circumferentially  -MF     Drainage Characteristics/Odor serosanguineous;sanguineous  -MF     Drainage Amount moderate  -MF     Care, Wound irrigated with;sterile normal saline;negative pressure wound therapy  -MF     Dressing Care dressing changed  -MF     Periwound Care cleansed with pH balanced cleanser;barrier film applied  -MF     Retired Wound - Properties Group Placement Date: 05/20/24  -OS Placement Time: 1730  -OS Present on Original Admission: Y  -OS Side: Bilateral  -OS Location: sacral spine  -OS Primary Wound Type: Other  -OS, stage 4     Retired Wound - Properties Group Date first assessed: 05/20/24  -OS Time first assessed: 1730  -OS Present on Original Admission: Y  -OS  Side: Bilateral  -OS Location: sacral spine  -OS Primary Wound Type: Other  -OS, stage 4       Row Name             Wound 04/01/24 0800 Right lower arm Skin Tear    Wound - Properties Group Placement Date: 04/01/24  -AC Placement Time: 0800  -AC Present on Original Admission: Y  -AC Side: Right  -AC Orientation: lower  -AC Location: arm  -AC Primary Wound Type: Skin tear  -AC    Retired Wound - Properties Group Placement Date: 04/01/24  -AC Placement Time: 0800  -AC Present on Original Admission: Y  -AC Side: Right  -AC Orientation: lower  -AC Location: arm  -AC Primary Wound Type: Skin tear  -AC    Retired Wound - Properties Group Date first assessed: 04/01/24  -AC Time first assessed: 0800  -AC Present on Original Admission: Y  -AC Side: Right  -AC Location: arm  -AC Primary Wound Type: Skin tear  -AC      Row Name 05/23/24 1345          NPWT (Negative Pressure Wound Therapy) 05/23/24 1345 sacrum    NPWT (Negative Pressure Wound Therapy) - Properties Group Placement Date: 05/23/24  -MF Placement Time: 1345  -MF Location: sacrum  -MF    Therapy Setting continuous therapy  -MF     Dressing foam, black  -MF     Pressure Setting 125 mmHg  -MF     Sponges Inserted 1  -MF     Sponges Removed 1  -MF     Finger sweep complete Yes  -MF     Retired NPWT (Negative Pressure Wound Therapy) - Properties Group Placement Date: 05/23/24  -MF Placement Time: 1345  -MF Location: sacrum  -MF    Retired NPWT (Negative Pressure Wound Therapy) - Properties Group Placement Date: 05/23/24  -MF Placement Time: 1345  -MF Location: sacrum  -MF      Row Name 05/23/24 1345          Coping    Observed Emotional State calm  -MF     Verbalized Emotional State other (see comments)  STEPHANE  -MF     Trust Relationship/Rapport care explained  -MF       Row Name 05/23/24 1345          Plan of Care Review    Plan of Care Reviewed With patient  -MF     Outcome Evaluation Pt presents with complex sacral wound and limited mobility.  PT applied a wound vac to  the sacral wound to help manage exudate, decrease bioburden, and allow for decreased dressing change freq.  PT will f/u with wound vac change in 3-4 days.  -       Row Name 05/23/24 1345          Positioning and Restraints    Pre-Treatment Position in bed  -     Post Treatment Position bed  -     In Bed supine;call light within reach;with nsg  -       Row Name 05/23/24 1345          Therapy Assessment/Plan (PT)    Patient/Family Therapy Goals Statement (PT) Pt not able to verb  -     PT Diagnosis (PT) stage IV sacral pressure injury  -     Rehab Potential (PT) other (see comments)  -     Criteria for Skilled Interventions Met (PT) yes;meets criteria;skilled treatment is necessary  -     Predicted Duration of Therapy Intervention (PT) 10  -       Row Name 05/23/24 6800          Therapy Plan Review/Discharge Plan (PT)    Therapy Plan Review (PT) evaluation/treatment results reviewed;care plan/treatment goals reviewed;current/potential barriers reviewed;risks/benefits reviewed;participants voiced agreement with care plan;participants included;patient  -       Row Name 05/23/24 5372          Physical Therapy Goals    Wound Care Goal Selection (PT) wound care, PT goal 1  -       Row Name 05/23/24 1345          Wound Care Goal 1 (PT)    Wound Care Goal 1 (PT) Pt to have no c/o pain with dressing changes  -     Time Frame (Wound Care Goal 1, PT) 10 days  -               User Key  (r) = Recorded By, (t) = Taken By, (c) = Cosigned By      Initials Name Provider Type     Abhijit Barcenas, PT Physical Therapist    Butch Mccann RN Registered Nurse    Cristel Zaidi RN Registered Nurse                  Physical Therapy Education       Title: PT OT SLP Therapies (In Progress)       Topic: Physical Therapy (In Progress)       Point: Mobility training (In Progress)       Learning Progress Summary             Patient Acceptance, E,TB, NR by ES at 5/22/2024 1354   Family Acceptance, E,TB, NR by  ES at 5/22/2024 1354    Acceptance, E, VU by RL at 5/21/2024 0503                         Point: Home exercise program (Done)       Learning Progress Summary             Family Acceptance, E, VU by RL at 5/21/2024 0503                         Point: Body mechanics (In Progress)       Learning Progress Summary             Patient Acceptance, E,TB, NR by ES at 5/22/2024 1354   Family Acceptance, E,TB, NR by ES at 5/22/2024 1354    Acceptance, E, VU by RL at 5/21/2024 0503                         Point: Precautions (In Progress)       Learning Progress Summary             Patient Acceptance, E,TB, NR by ES at 5/22/2024 1354   Family Acceptance, E,TB, NR by ES at 5/22/2024 1354    Acceptance, E, VU by RL at 5/21/2024 0503                                         User Key       Initials Effective Dates Name Provider Type Discipline    RL 06/16/21 -  Christy Prasad RN Registered Nurse Nurse     08/11/22 -  Pema Martin, NASIR Physical Therapist PT                    Recommendation and Plan  Anticipated Discharge Disposition (PT): skilled nursing facility  Planned Therapy Interventions (PT): wound care  Therapy Frequency (PT): daily  Plan of Care Reviewed With: patient           Outcome Evaluation: Pt presents with complex sacral wound and limited mobility.  PT applied a wound vac to the sacral wound to help manage exudate, decrease bioburden, and allow for decreased dressing change freq.  PT will f/u with wound vac change in 3-4 days.  Plan of Care Reviewed With: patient            Time Calculation   PT Charges       Row Name 05/23/24 1345             Time Calculation    Start Time 1345  -MF      PT Goal Re-Cert Due Date 06/01/24  -MF         Untimed Charges    PT Eval/Re-eval Minutes 35  -MF      Wound Care 76741 Neg Press (DME) wound TO 50 sqcm  -MF      74528-Pdo Pressure wound to 50 sqcm 25  -MF         Total Minutes    Untimed Charges Total Minutes 60  -MF       Total Minutes 60  -MF                User Key  (r)  = Recorded By, (t) = Taken By, (c) = Cosigned By      Initials Name Provider Type    Abhijit Matthews, PT Physical Therapist                            PT G-Codes  Outcome Measure Options: AM-PAC 6 Clicks Daily Activity (OT)  AM-PAC 6 Clicks Score (PT): 7  AM-PAC 6 Clicks Score (OT): 6  Modified Yung Scale: 4 - Moderately severe disability.  Unable to walk without assistance, and unable to attend to own bodily needs without assistance.       Abhijit Barcenas, PT  5/23/2024

## 2024-05-23 NOTE — PROGRESS NOTES
INFECTIOUS DISEASE Progress Note    Tho Vasquez  1940  [id number]        Admission Date: 5/20/2024      Requesting Provider: No ref. provider found  Evaluating Physician: Grant Black MD    Reason for Consultation:  Septic shock/Proteus bacteremia    History of present illness:     5/22/2024:Patient is a 84 y.o. male  with a history of coronary artery disease, hypertension, hyperlipidemia, and COVID-19 infection requiring admission here on 3/29/2024 who is seen today for evaluation of septic shock with Proteus bacteremia.  After his admission here, he was transferred to [facility] on 4/5/2024.  He was subsequently  disposition to [facility].  He has developed increasing weakness, confusion, and dysarthria and [facility], prompting an evaluation in the emergency room here on 5/205/20/2024.  He was found to have lactic acidosis, and elevated procalcitonin, bibasilar pulmonary infiltrates, leukocytosis/neutrophilia with a white blood cell count of 17.8, mild pyuria, acute kidney injury with a creatinine of 2 point2.35, and elevated total bilirubin at 1.8 with an alkaline phosphatase of 142,  and hypotension with a blood pressure of 68/49.  Blood cultures have grown Proteus.  He is on intravenous Unasyn.  Chest CT scan and chest x-rays have revealed bibasilar pulmonary infiltrates.  Abdominal/pelvic CT scan reveals cholelithiasis.  Liver morphology is consistent with cirrhosis. He is requiring vasopressor support.    5/23/24:  He has been afebrile over the last 24 hours.  Coccygeal culture is growing scant gram-negative bacilli.  Urine culture grew 25,000 colonies of Proteus mirabilis.  Blood cultures grew Proteus resistant to Levaquin but sensitive to other tested antibiotics, including Unasyn,   Creatinine is 1.62.  white blood cell count is 13.9.  Platelet count is 47,000.  He is severely confused and somnolent.  He is unable to provide any reliable history.  Dr. Kelly  thinks he is too unstable to undergo an MRCP.    Past Medical History:   Diagnosis Date    Allergic ?    Cataract     Chronic kidney disease     Coronary artery disease     Hyperlipidemia     Hypertension     Myocardial infarction     Pneumonia of both lower lobes due to infectious organism 10/03/2019    Positive PPD     Respiratory failure 2005    requiring tracheostomy       Past Surgical History:   Procedure Laterality Date    CARDIAC CATHETERIZATION      CARDIAC SURGERY      HERNIA REPAIR         Family History   Problem Relation Age of Onset    Arthritis Mother     Heart attack Father     Stroke Father     Diabetes Father     Heart attack Brother     Cancer Paternal Uncle     Obesity Other        Social History     Socioeconomic History    Marital status:    Tobacco Use    Smoking status: Former     Types: Cigars, Cigarettes     Start date:      Quit date:      Years since quittin.     Passive exposure: Past    Smokeless tobacco: Never   Vaping Use    Vaping status: Never Used   Substance and Sexual Activity    Alcohol use: No    Drug use: Never    Sexual activity: Not Currently       Allergies   Allergen Reactions    Dexamethasone Unknown - High Severity    Prednisone Rash         Medication:    Current Facility-Administered Medications:     acetaminophen (TYLENOL) tablet 650 mg, 650 mg, Oral, Q4H PRN **OR** acetaminophen (TYLENOL) 160 MG/5ML oral solution 650 mg, 650 mg, Oral, Q4H PRN **OR** acetaminophen (TYLENOL) suppository 650 mg, 650 mg, Rectal, Q4H PRN, Sena Hartley DO, 650 mg at 24 1401    ascorbic acid (VITAMIN C) tablet 500 mg, 500 mg, Oral, QAM, Sena Hartley DO    aspirin chewable tablet 81 mg, 81 mg, Oral, Daily, 81 mg at 24 1354 **OR** aspirin suppository 300 mg, 300 mg, Rectal, Daily, Prince Manrique PA-C, 300 mg at 24 0849    atorvastatin (LIPITOR) tablet 40 mg, 40 mg, Oral, Nightly, Tessy Benites APRN    atropine sulfate  injection 0.5 mg, 0.5 mg, Intravenous, Once PRN, Tu Caban APRN    sennosides-docusate (PERICOLACE) 8.6-50 MG per tablet 2 tablet, 2 tablet, Oral, BID PRN **AND** polyethylene glycol (MIRALAX) packet 17 g, 17 g, Oral, Daily PRN **AND** bisacodyl (DULCOLAX) EC tablet 5 mg, 5 mg, Oral, Daily PRN **AND** bisacodyl (DULCOLAX) suppository 10 mg, 10 mg, Rectal, Daily PRN, Sena Hartley DO    cycloSPORINE (RESTASIS) 0.05 % ophthalmic emulsion 1 drop, 1 drop, Both Eyes, Q12H, Sena Hartley DO, 1 drop at 05/23/24 0532    DOPamine 400 mg in 250 mL D5W infusion, 2-20 mcg/kg/min, Intravenous, Titrated, Cleopatra Kelly MD, Last Rate: 15.6 mL/hr at 05/22/24 2000, 4 mcg/kg/min at 05/22/24 2000    erythromycin (ROMYCIN) ophthalmic ointment, , Both Eyes, Nightly, Syeda Rothman APRN, 1 Application at 05/22/24 2052    famotidine (PEPCID) tablet 20 mg, 20 mg, Oral, BID, Sena Hartley DO    ferrous sulfate tablet 325 mg, 325 mg, Oral, Daily, Sena Hartley DO    [Held by provider] heparin (porcine) 5000 UNIT/ML injection 5,000 Units, 5,000 Units, Subcutaneous, Q12H, Cleopatra Kelly MD, 5,000 Units at 05/22/24 2051    iopamidol (ISOVUE-370) 76 % injection 100 mL, 100 mL, Intravenous, Once in imaging, Don Roche MD    ipratropium-albuterol (DUO-NEB) nebulizer solution 3 mL, 3 mL, Nebulization, Q4H PRN, Syeda Rothman APRN    lactated ringers infusion, 100 mL/hr, Intravenous, Continuous, Cleopatra Kelly MD, Last Rate: 100 mL/hr at 05/22/24 1833, 100 mL/hr at 05/22/24 1833    midodrine (PROAMATINE) tablet 2.5 mg, 2.5 mg, Oral, TID AC, Sena Hartley,     nitroglycerin (NITROSTAT) SL tablet 0.4 mg, 0.4 mg, Sublingual, Q5 Min PRN, Sena Hartley,     norepinephrine (LEVOPHED) 8 mg in 250 mL NS infusion (premix), 0.02-0.3 mcg/kg/min, Intravenous, Titrated, Don Roche MD, Last Rate: 3.9 mL/hr at 05/23/24 0046, 0.02 mcg/kg/min at 05/23/24 0046     ondansetron ODT (ZOFRAN-ODT) disintegrating tablet 4 mg, 4 mg, Oral, Q6H PRN **OR** ondansetron (ZOFRAN) injection 4 mg, 4 mg, Intravenous, Q6H PRN, Sena Hartley DO, 4 mg at 24 1109    piperacillin-tazobactam (ZOSYN) 3.375 g IVPB in 100 mL NS MBP (CD), 3.375 g, Intravenous, Q8H, eSna Hartley DO, 3.375 g at 24 0330    Polyvinyl Alcohol-Povidone PF (HYPOTEARS) 1.4-0.6 % ophthalmic solution 1 drop, 1 drop, Both Eyes, Q1H PRN, Tu Caban APRN    sertraline (ZOLOFT) tablet 50 mg, 50 mg, Oral, Daily, Sena Hartley DO    sodium chloride 0.9 % flush 10 mL, 10 mL, Intravenous, Q12H, Cleopatra Kelly MD, 10 mL at 24    sodium chloride 0.9 % flush 10 mL, 10 mL, Intravenous, PRN, Cleopatra Kelly MD    sodium chloride 0.9 % infusion 40 mL, 40 mL, Intravenous, PRN, Sena Hartley DO    Antibiotics:  Anti-Infectives (From admission, onward)      Ordered     Dose/Rate Route Frequency Start Stop    24 1730  piperacillin-tazobactam (ZOSYN) 3.375 g IVPB in 100 mL NS MBP ()        Ordering Provider: Sena Hartley DO    3.375 g  over 4 Hours Intravenous Every 8 Hours 24 1129  piperacillin-tazobactam (ZOSYN) 3.375 g IVPB in 100 mL NS MBP (CD)        Ordering Provider: Don Roche MD    3.375 g  over 30 Minutes Intravenous Once 24 1145 24 1353              Review of Systems:    is confused and cannot provide a reliable review of systems    Physical Exam:   Vital Signs  Temp (24hrs), Av.2 °F (36.2 °C), Min:96.4 °F (35.8 °C), Max:97.5 °F (36.4 °C)    Temp  Min: 96.4 °F (35.8 °C)  Max: 97.5 °F (36.4 °C)  BP  Min: 84/54  Max: 148/87  Pulse  Min: 41  Max: 101  Resp  Min: 17  Max: 19  SpO2  Min: 85 %  Max: 100 %    GENERAL:  profoundly debilitated appearing but in no acute distress  HEENT: Normocephalic, atraumatic.  PERRL. EOMI. No conjunctival injection. No icterus.  NECK: Supple   HEART: No  "murmur  LUNGS:  few scattered rhonchi  ABDOMEN: Soft, nontender, nondistended  :   Ambrosio catheter   MSK: No joint effusions or erythema  SKIN: Warm and dry without cutaneous eruptions on Inspection/palpation.    NEURO:  drowsy but arousable.  He is severely confused and cannot provide a reliable review of systems.  He moves all of his extremities.    Sacrum: He has stage IV sacral decubitus ulcer down to  bone.  The ulcer is approximately 6 cm in diameter and 3.5-4 cm deep with some undermining and surrounding necrosis with mild erythema.   I visualized the wound care photo today.            Laboratory Data    Results from last 7 days   Lab Units 05/23/24  0532 05/22/24  0842 05/21/24  0631   WBC 10*3/mm3 13.90* 13.89* 26.46*   HEMOGLOBIN g/dL 9.5* 11.7* 12.0*   HEMATOCRIT % 28.8* 35.2* 35.4*   PLATELETS 10*3/mm3 47* 80* 92*     Results from last 7 days   Lab Units 05/23/24  0641   SODIUM mmol/L 140   POTASSIUM mmol/L 3.2*   CHLORIDE mmol/L 109*   CO2 mmol/L 23.0   BUN mg/dL 53*   CREATININE mg/dL 1.62*   GLUCOSE mg/dL 288*   CALCIUM mg/dL 7.2*     Results from last 7 days   Lab Units 05/23/24  0641   ALK PHOS U/L 97   BILIRUBIN mg/dL 0.9   ALT (SGPT) U/L 36   AST (SGOT) U/L 44*             Results from last 7 days   Lab Units 05/20/24  1352   LACTATE mmol/L 2.0     Results from last 7 days   Lab Units 05/20/24  1105   CK TOTAL U/L 167         Estimated Creatinine Clearance: 41 mL/min (A) (by C-G formula based on SCr of 1.62 mg/dL (H)).      Microbiology:  Blood Culture   Date Value Ref Range Status   05/20/2024 Proteus species (C)  Preliminary   05/20/2024 Proteus species (C)  Preliminary     BCID, PCR   Date Value Ref Range Status   05/20/2024 Proteus spp. Identification by BCID2 PCR. (A) Negative by BCID PCR. Culture to Follow. Final     No results found for: \"CULTURES\", \"HSVCX\", \"URCX\"  No results found for: \"EYECULTURE\", \"GCCX\", \"HSVCULTURE\", \"LABHSV\"  No results found for: \"LEGIONELLA\", \"MRSACX\", \"MUMPSCX\", " "\"MYCOPLASCX\"  No results found for: \"NOCARDIACX\", \"STOOLCX\"  Urine Culture   Date Value Ref Range Status   05/20/2024 25,000 CFU/mL Proteus mirabilis (A)  Final     Wound Culture   Date Value Ref Range Status   05/21/2024 Scant growth (1+) Gram Negative Bacilli (A)  Preliminary           Radiology:  Imaging Results (Last 72 Hours)       Procedure Component Value Units Date/Time    CT Chest Without Contrast Diagnostic [[id number]] Collected: 05/20/24 1536     Updated: 05/20/24 1608    Narrative:      CT CHEST WO CONTRAST DIAGNOSTIC, CT ABDOMEN PELVIS WO CONTRAST    Date of Exam: 5/20/2024 2:11 PM EDT    Indication: Sepsis, poss PNA on CXR.    Comparison: Angiographic chest CT scan 3/21/2024. No previous abdominal CT scan    Technique: Axial CT images were obtained of the chest without contrast administration.  Reconstructed coronal and sagittal images were also obtained. Automated exposure control and iterative construction methods were used.      Findings:  CT SCAN OF THE CHEST WITHOUT CONTRAST: No significant mediastinal, hilar, axillary, or lower cervical lymphadenopathy is seen. There is no evidence of pericardial effusion. There is a very small partially loculated left pleural effusion, new from   3/29/2024. There is extensive, dense coronary calcification. Patchy multifocal groundglass disease has changed in pattern from 3/29/2024. There are several new areas of peribronchial thickening and focal atelectasis in the lung bases, a little more   extensive than on the prior study. Findings would be consistent with a bronchopneumonia. Bony structures appear to be intact. Advanced lower cervical spine degenerative disc disease appears stable.      Impression:      Multifocal bilateral pneumonia as described. Very small partially loculated left basilar pleural effusion.        CT SCAN OF THE ABDOMEN PELVIS WITHOUT CONTRAST: There is new relatively mild ascites not present on 3/29/2024 chest CT scan, with a moderate " amount of fluid adjacent the liver and spleen but only minimal ascites elsewhere. Liver morphology is consistent   with cirrhosis. Gallbladder is again noted to be contracted around numerous densely calcified gallstones. There appear to be gallstones in the cystic duct but no visible gallbladder inflammation. No common duct stone or biliary is appreciated.    Spleen is normal in size and contains dense benign calcification. Pancreas and adrenal glands appear within normal limits for age. There are multiple bilateral renal lesions which are sharply defined, rounded, and water or fluid density, most likely   incidental renal cysts. No upper abdominal free air or adenopathy is seen. Bowel loops are normal in caliber. Stomach and duodenum appear grossly normal.    Regarding the lower abdomen/pelvis, bladder is decompressed and normal in appearance. There are numerous sigmoid diverticuli without evidence of diverticulitis. There is a small amount of intrapelvic ascites. Terminal ileum and cecum appear normal.   Appendix is not identified. Bony structures appear to be intact. There is moderate to advanced multilevel lumbar degenerative disc disease.    .    Impression:  1. Liver morphology consistent with cirrhosis, and relatively mild upper abdominal ascites.    2. Sigmoid diverticulosis without evidence of diverticulitis.    3. Contracted gallbladder with numerous gallstones, including several small stones that appear to be in the cystic duct. No visible gallbladder inflammation or biliary ductal dilatation.    4. No evidence of an acute inflammatory focus is seen in the abdomen or pelvis.        Electronically Signed: Cristóbal Knight MD    5/20/2024 4:05 PM EDT    Workstation ID: [id number]    CT Abdomen Pelvis Without Contrast [[id number]] Collected: 05/20/24 1536     Updated: 05/20/24 1608    Narrative:      CT CHEST WO CONTRAST DIAGNOSTIC, CT ABDOMEN PELVIS WO CONTRAST    Date of Exam: 5/20/2024 2:11 PM EDT    Indication:  Sepsis, poss PNA on CXR.    Comparison: Angiographic chest CT scan 3/21/2024. No previous abdominal CT scan    Technique: Axial CT images were obtained of the chest without contrast administration.  Reconstructed coronal and sagittal images were also obtained. Automated exposure control and iterative construction methods were used.      Findings:  CT SCAN OF THE CHEST WITHOUT CONTRAST: No significant mediastinal, hilar, axillary, or lower cervical lymphadenopathy is seen. There is no evidence of pericardial effusion. There is a very small partially loculated left pleural effusion, new from   3/29/2024. There is extensive, dense coronary calcification. Patchy multifocal groundglass disease has changed in pattern from 3/29/2024. There are several new areas of peribronchial thickening and focal atelectasis in the lung bases, a little more   extensive than on the prior study. Findings would be consistent with a bronchopneumonia. Bony structures appear to be intact. Advanced lower cervical spine degenerative disc disease appears stable.      Impression:      Multifocal bilateral pneumonia as described. Very small partially loculated left basilar pleural effusion.        CT SCAN OF THE ABDOMEN PELVIS WITHOUT CONTRAST: There is new relatively mild ascites not present on 3/29/2024 chest CT scan, with a moderate amount of fluid adjacent the liver and spleen but only minimal ascites elsewhere. Liver morphology is consistent   with cirrhosis. Gallbladder is again noted to be contracted around numerous densely calcified gallstones. There appear to be gallstones in the cystic duct but no visible gallbladder inflammation. No common duct stone or biliary is appreciated.    Spleen is normal in size and contains dense benign calcification. Pancreas and adrenal glands appear within normal limits for age. There are multiple bilateral renal lesions which are sharply defined, rounded, and water or fluid density, most likely   incidental  renal cysts. No upper abdominal free air or adenopathy is seen. Bowel loops are normal in caliber. Stomach and duodenum appear grossly normal.    Regarding the lower abdomen/pelvis, bladder is decompressed and normal in appearance. There are numerous sigmoid diverticuli without evidence of diverticulitis. There is a small amount of intrapelvic ascites. Terminal ileum and cecum appear normal.   Appendix is not identified. Bony structures appear to be intact. There is moderate to advanced multilevel lumbar degenerative disc disease.    .    Impression:  1. Liver morphology consistent with cirrhosis, and relatively mild upper abdominal ascites.    2. Sigmoid diverticulosis without evidence of diverticulitis.    3. Contracted gallbladder with numerous gallstones, including several small stones that appear to be in the cystic duct. No visible gallbladder inflammation or biliary ductal dilatation.    4. No evidence of an acute inflammatory focus is seen in the abdomen or pelvis.        Electronically Signed: Cristóbal nKight MD    5/20/2024 4:05 PM EDT    Workstation ID: [id number]    XR Chest 1 View [[id number]] Collected: 05/20/24 1206     Updated: 05/20/24 1214    Narrative:      XR CHEST 1 VW    Date of Exam: 5/20/2024 11:52 AM EDT    Indication: Acute Stroke Protocol (onset < 12 hrs)    Comparison: Chest radiograph dated 3/29/2024    Findings:  The cardiomediastinal silhouette is within normal limits for there is aortic arch atherosclerotic calcification. There are low lung volumes with streaky left basilar airspace opacity representing atelectasis or mild pneumonia. There is no pleural   effusion or pneumothorax. There are degenerative changes of the thoracic spine.      Impression:      Impression:  1. Low lung volumes with streaky left basilar airspace opacity representing atelectasis or early pneumonia.      Electronically Signed: Kendrick Souza    5/20/2024 12:11 PM EDT    Workstation ID: [id number]    CT Head Without  Contrast Stroke Protocol [[id number]] Collected: 05/20/24 1058     Updated: 05/20/24 1105    Narrative:      CT HEAD WO CONTRAST STROKE PROTOCOL    Date of Exam: 5/20/2024 10:51 AM EDT    Indication: Neuro deficit, acute, stroke suspected  Neuro Deficit, acute, Stroke suspected.    Comparison: Head CT 3/29/2024.    Technique: Axial CT images were obtained of the head without contrast administration.  Reconstructed coronal images were also obtained. Automated exposure control and iterative construction methods were used.    Scan Time: 10:52 a.m.  Results discussed with the stroke team via telephone at 10:58 a.m.    Findings:    Please note this is a motion-degraded examination.    No evidence of acute intracranial hemorrhage or mass effect. No extra-axial collection. The gray white matter differentiation is preserved. Global parenchymal atrophy. Periventricular and subcortical white matter hypodensity, nonspecific, but likely   sequela of chronic small vessel ischemic disease.    The mastoid air cells are well aerated. Mucosal thickening in the right maxillary sinus. Globes and extraocular muscles are unremarkable. No acute or suspicious osseous abnormality. Soft tissues within normal limits.      Impression:      Impression:    No evidence of acute intracranial abnormality, within the confines of motion degradation.    Similar chronic/age-related findings findings, as above.      Electronically Signed: Dale Darby MD    5/20/2024 11:02 AM EDT    Workstation ID: [id number]          I read his radiographic images.      Impression:   1.  Proteus bacteremia-with secondary severe sepsis.  The etiology of his Proteus bacteremia is not entirely clear.  He has multiple potential sources.  He does have Proteus bacteriuria but does not have impressive pyuria.  He has bibasilar pulmonary infiltrates but not a consolidative lobar infiltrate that would usually be seen with a Proteus bacteremia.  He also has an extensive sacral  decubitus ulcer with tissue necrosis but no extensive cellulitis or purulent drainage.  On presentation he had hyperbilirubinemia with an elevated alkaline phosphatase and elevated transaminases suggestive of possible biliary disease.  A common bile duct stone/cholangitis is certainly a potential etiology of his Proteus bacteremia.  I will plan to continue him on intravenous  Zosyn for the time being.    We may obtain a MRCP  when he is more stable.  2.  Proteus bacteriuria/UTI  3.  Septic shock-manifested by tachycardia, hypotension, acute kidney injury, leukocytosis/neutrophilia, lactic acidosis, and elevated procalcitonin, toxic/metabolic encephalopathy, and Proteus bacteremia.  4.  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  This will be extremely difficult to heal and is down to bone.  He has some residual tissue necrosis.  5.  Acute renal failure  6.  Lactic acidosis  7.  Leukocytosis/neutrophilia  8.  Toxic/metabolic encephalopathy  9.  Status post COVID-19 infection-3/24  10.  Cirrhosis  11.  Cholelithiasis      PLAN/RECOMMENDATIONS:   1.  Continue intravenous Zosyn  2.  Continue sacral decubitus ulcer wound care with offloading  3.  Consider MRCP  4.  Palliative care     He is critically ill with a high mortality risk.  I discussed his complex situation again with the nursing staff and Dr. Kelly today.  I spent over 30 minutes on his complex care today.      Grant Black MD  5/23/2024  07:29 EDT

## 2024-05-23 NOTE — PROGRESS NOTES
"Palliative Care Daily Progress Note     Referring: Faustino Rashid  C/C: pt unable    S: Medical record reviewed.  Events noted.  Dtr/HCS remains bedside.  Expressed understanding of resp decline and now on BIPAP.  Notes pt does not seem comfortable with it.  Still unable to place keofeed.  Remains without enteral nutrition.     ROS: pt unable    O: Code Status:   Code Status and Medical Interventions:   Ordered at: 05/20/24 1730     Level Of Support Discussed With:    Health Care Surrogate     Code Status (Patient has no pulse and is not breathing):    CPR (Attempt to Resuscitate)     Medical Interventions (Patient has pulse or is breathing):    Full Support      Advanced Directives: Advance Directive Status: Patient has advance directive, copy in chart   Goals of Care: Ongoing.   Palliative Performance Scale Score: 20%     /61   Pulse 82   Temp 96.4 °F (35.8 °C) (Axillary)   Resp 19   Ht 177.8 cm (70\")   Wt 104 kg (230 lb)   SpO2 100%   BMI 33.00 kg/m²     Intake/Output Summary (Last 24 hours) at 5/23/2024 1146  Last data filed at 5/23/2024 1000  Gross per 24 hour   Intake 2731.87 ml   Output 555 ml   Net 2176.87 ml       Physical Exam:    General Appearance:    Limited responsiveness on BiPAP   HEENT:    anicteric, sl dry MM, face relaxed   Neck:   supple, trachea midline, no JVD   Lungs:     CTA bilat, diminished in bases; respirations regular, even     and unlabored    Heart:    RRR, normal S1 and S2, no M/R/G   Abdomen:     Normal bowel sounds, soft, nontender, nondistended   G/U:   Deferred   MSK/Extremities:   No clubbing , cyanosis or edema, No wasting   Pulses:   Pulses palpable and equal bilaterally   Skin:   Warm, dry, no mottling   Neurologic:   Limited responsiveness to exam       Current Medications:      Current Facility-Administered Medications:     acetaminophen (TYLENOL) tablet 650 mg, 650 mg, Oral, Q4H PRN **OR** acetaminophen (TYLENOL) 160 MG/5ML oral solution 650 mg, 650 mg, Oral, " Q4H PRN **OR** acetaminophen (TYLENOL) suppository 650 mg, 650 mg, Rectal, Q4H PRN, Sena Hartley DO, 650 mg at 05/22/24 1401    ascorbic acid (VITAMIN C) tablet 500 mg, 500 mg, Oral, QAM, Sena Hartley DO    aspirin chewable tablet 81 mg, 81 mg, Oral, Daily, 81 mg at 05/20/24 1354 **OR** aspirin suppository 300 mg, 300 mg, Rectal, Daily, Prince Manrique PA-C, 300 mg at 05/22/24 0849    atorvastatin (LIPITOR) tablet 40 mg, 40 mg, Oral, Nightly, Tessy Benites APRN    atropine sulfate injection 0.5 mg, 0.5 mg, Intravenous, Once PRN, Tu Caban APRN    sennosides-docusate (PERICOLACE) 8.6-50 MG per tablet 2 tablet, 2 tablet, Oral, BID PRN **AND** polyethylene glycol (MIRALAX) packet 17 g, 17 g, Oral, Daily PRN **AND** bisacodyl (DULCOLAX) EC tablet 5 mg, 5 mg, Oral, Daily PRN **AND** bisacodyl (DULCOLAX) suppository 10 mg, 10 mg, Rectal, Daily PRN, Sena Hartley DO    cycloSPORINE (RESTASIS) 0.05 % ophthalmic emulsion 1 drop, 1 drop, Both Eyes, Q12H, Sena Hartley DO, 1 drop at 05/23/24 0532    DOPamine 400 mg in 250 mL D5W infusion, 2-20 mcg/kg/min, Intravenous, Titrated, Cleopatra Kelly MD, Last Rate: 15.6 mL/hr at 05/22/24 2000, 4 mcg/kg/min at 05/22/24 2000    erythromycin (ROMYCIN) ophthalmic ointment, , Both Eyes, Nightly, Syeda Rothman APRN, 1 Application at 05/22/24 2052    famotidine (PEPCID) tablet 20 mg, 20 mg, Oral, BID, Sena Hartley DO    ferrous sulfate tablet 325 mg, 325 mg, Oral, Daily, Sena Hartley DO    [Held by provider] heparin (porcine) 5000 UNIT/ML injection 5,000 Units, 5,000 Units, Subcutaneous, Q12H, Cleopatra Kelly MD, 5,000 Units at 05/22/24 2051    iopamidol (ISOVUE-370) 76 % injection 100 mL, 100 mL, Intravenous, Once in imaging, Don Roche MD    ipratropium-albuterol (DUO-NEB) nebulizer solution 3 mL, 3 mL, Nebulization, Q4H PRN, Syeda Rothman APRN    lactated ringers infusion, 100 mL/hr,  Intravenous, Continuous, Cleopatra Kelly MD, Last Rate: 100 mL/hr at 05/23/24 0911, 100 mL/hr at 05/23/24 0911    Magnesium Standard Dose Replacement - Follow Nurse / BPA Driven Protocol, , Does not apply, PRN, Cleopatra Kelly MD    midodrine (PROAMATINE) tablet 2.5 mg, 2.5 mg, Oral, TID AC, Sena Hartley DO    nitroglycerin (NITROSTAT) SL tablet 0.4 mg, 0.4 mg, Sublingual, Q5 Min PRN, Sena Hartley DO    norepinephrine (LEVOPHED) 8 mg in 250 mL NS infusion (premix), 0.02-0.3 mcg/kg/min, Intravenous, Titrated, Don Roche MD, Last Rate: 3.9 mL/hr at 05/23/24 0046, 0.02 mcg/kg/min at 05/23/24 0046    ondansetron ODT (ZOFRAN-ODT) disintegrating tablet 4 mg, 4 mg, Oral, Q6H PRN **OR** ondansetron (ZOFRAN) injection 4 mg, 4 mg, Intravenous, Q6H PRN, Sena Hartley DO, 4 mg at 05/22/24 1109    Phosphorus Replacement - Follow Nurse / BPA Driven Protocol, , Does not apply, PRN, Cleopatra Kelly MD    piperacillin-tazobactam (ZOSYN) 3.375 g IVPB in 100 mL NS MBP (CD), 3.375 g, Intravenous, Q8H, Sena Hartley, DO, 3.375 g at 05/23/24 0330    Polyvinyl Alcohol-Povidone PF (HYPOTEARS) 1.4-0.6 % ophthalmic solution 1 drop, 1 drop, Both Eyes, Q1H PRN, Tu Caban, ADALBERTO    potassium chloride 20 mEq in 50 mL IVPB, 20 mEq, Intravenous, Q1H, Cleopatra Kelly MD    Potassium Replacement - Follow Nurse / BPA Driven Protocol, , Does not apply, PRN, Cleopatra Kelly MD    sertraline (ZOLOFT) tablet 50 mg, 50 mg, Oral, Daily, Sena Hartley DO    sodium chloride 0.9 % flush 10 mL, 10 mL, Intravenous, Q12H, Cleopatra Kelly MD, 10 mL at 05/23/24 0912    sodium chloride 0.9 % flush 10 mL, 10 mL, Intravenous, PRN, Cleopatra Kelly MD    sodium chloride 0.9 % infusion 40 mL, 40 mL, Intravenous, PRN, Sena Hartley DO     Labs:   Results from last 7 days   Lab Units 05/23/24  1027   WBC 10*3/mm3 15.54*   HEMOGLOBIN g/dL 10.5*   HEMATOCRIT % 31.0*    PLATELETS 10*3/mm3 47*     Results from last 7 days   Lab Units 05/23/24  0641   SODIUM mmol/L 140   POTASSIUM mmol/L 3.2*   CHLORIDE mmol/L 109*   CO2 mmol/L 23.0   BUN mg/dL 53*   CREATININE mg/dL 1.62*   CALCIUM mg/dL 7.2*   BILIRUBIN mg/dL 0.9   ALK PHOS U/L 97   ALT (SGPT) U/L 36   AST (SGOT) U/L 44*   GLUCOSE mg/dL 288*     Imaging Results (Last 72 Hours)       Procedure Component Value Units Date/Time    CT Chest Without Contrast Diagnostic [[id number]] Collected: 05/20/24 1536     Updated: 05/20/24 1608    Narrative:      CT CHEST WO CONTRAST DIAGNOSTIC, CT ABDOMEN PELVIS WO CONTRAST    Date of Exam: 5/20/2024 2:11 PM EDT    Indication: Sepsis, poss PNA on CXR.    Comparison: Angiographic chest CT scan 3/21/2024. No previous abdominal CT scan    Technique: Axial CT images were obtained of the chest without contrast administration.  Reconstructed coronal and sagittal images were also obtained. Automated exposure control and iterative construction methods were used.      Findings:  CT SCAN OF THE CHEST WITHOUT CONTRAST: No significant mediastinal, hilar, axillary, or lower cervical lymphadenopathy is seen. There is no evidence of pericardial effusion. There is a very small partially loculated left pleural effusion, new from   3/29/2024. There is extensive, dense coronary calcification. Patchy multifocal groundglass disease has changed in pattern from 3/29/2024. There are several new areas of peribronchial thickening and focal atelectasis in the lung bases, a little more   extensive than on the prior study. Findings would be consistent with a bronchopneumonia. Bony structures appear to be intact. Advanced lower cervical spine degenerative disc disease appears stable.      Impression:      Multifocal bilateral pneumonia as described. Very small partially loculated left basilar pleural effusion.        CT SCAN OF THE ABDOMEN PELVIS WITHOUT CONTRAST: There is new relatively mild ascites not present on 3/29/2024  chest CT scan, with a moderate amount of fluid adjacent the liver and spleen but only minimal ascites elsewhere. Liver morphology is consistent   with cirrhosis. Gallbladder is again noted to be contracted around numerous densely calcified gallstones. There appear to be gallstones in the cystic duct but no visible gallbladder inflammation. No common duct stone or biliary is appreciated.    Spleen is normal in size and contains dense benign calcification. Pancreas and adrenal glands appear within normal limits for age. There are multiple bilateral renal lesions which are sharply defined, rounded, and water or fluid density, most likely   incidental renal cysts. No upper abdominal free air or adenopathy is seen. Bowel loops are normal in caliber. Stomach and duodenum appear grossly normal.    Regarding the lower abdomen/pelvis, bladder is decompressed and normal in appearance. There are numerous sigmoid diverticuli without evidence of diverticulitis. There is a small amount of intrapelvic ascites. Terminal ileum and cecum appear normal.   Appendix is not identified. Bony structures appear to be intact. There is moderate to advanced multilevel lumbar degenerative disc disease.    .    Impression:  1. Liver morphology consistent with cirrhosis, and relatively mild upper abdominal ascites.    2. Sigmoid diverticulosis without evidence of diverticulitis.    3. Contracted gallbladder with numerous gallstones, including several small stones that appear to be in the cystic duct. No visible gallbladder inflammation or biliary ductal dilatation.    4. No evidence of an acute inflammatory focus is seen in the abdomen or pelvis.        Electronically Signed: Cristóbal Knight MD    5/20/2024 4:05 PM EDT    Workstation ID: [id number]    CT Abdomen Pelvis Without Contrast [[id number]] Collected: 05/20/24 1536     Updated: 05/20/24 1608    Narrative:      CT CHEST WO CONTRAST DIAGNOSTIC, CT ABDOMEN PELVIS WO CONTRAST    Date of Exam:  5/20/2024 2:11 PM EDT    Indication: Sepsis, poss PNA on CXR.    Comparison: Angiographic chest CT scan 3/21/2024. No previous abdominal CT scan    Technique: Axial CT images were obtained of the chest without contrast administration.  Reconstructed coronal and sagittal images were also obtained. Automated exposure control and iterative construction methods were used.      Findings:  CT SCAN OF THE CHEST WITHOUT CONTRAST: No significant mediastinal, hilar, axillary, or lower cervical lymphadenopathy is seen. There is no evidence of pericardial effusion. There is a very small partially loculated left pleural effusion, new from   3/29/2024. There is extensive, dense coronary calcification. Patchy multifocal groundglass disease has changed in pattern from 3/29/2024. There are several new areas of peribronchial thickening and focal atelectasis in the lung bases, a little more   extensive than on the prior study. Findings would be consistent with a bronchopneumonia. Bony structures appear to be intact. Advanced lower cervical spine degenerative disc disease appears stable.      Impression:      Multifocal bilateral pneumonia as described. Very small partially loculated left basilar pleural effusion.        CT SCAN OF THE ABDOMEN PELVIS WITHOUT CONTRAST: There is new relatively mild ascites not present on 3/29/2024 chest CT scan, with a moderate amount of fluid adjacent the liver and spleen but only minimal ascites elsewhere. Liver morphology is consistent   with cirrhosis. Gallbladder is again noted to be contracted around numerous densely calcified gallstones. There appear to be gallstones in the cystic duct but no visible gallbladder inflammation. No common duct stone or biliary is appreciated.    Spleen is normal in size and contains dense benign calcification. Pancreas and adrenal glands appear within normal limits for age. There are multiple bilateral renal lesions which are sharply defined, rounded, and water or  fluid density, most likely   incidental renal cysts. No upper abdominal free air or adenopathy is seen. Bowel loops are normal in caliber. Stomach and duodenum appear grossly normal.    Regarding the lower abdomen/pelvis, bladder is decompressed and normal in appearance. There are numerous sigmoid diverticuli without evidence of diverticulitis. There is a small amount of intrapelvic ascites. Terminal ileum and cecum appear normal.   Appendix is not identified. Bony structures appear to be intact. There is moderate to advanced multilevel lumbar degenerative disc disease.    .    Impression:  1. Liver morphology consistent with cirrhosis, and relatively mild upper abdominal ascites.    2. Sigmoid diverticulosis without evidence of diverticulitis.    3. Contracted gallbladder with numerous gallstones, including several small stones that appear to be in the cystic duct. No visible gallbladder inflammation or biliary ductal dilatation.    4. No evidence of an acute inflammatory focus is seen in the abdomen or pelvis.        Electronically Signed: Cristóbal Knight MD    5/20/2024 4:05 PM EDT    Workstation ID: [id number]    XR Chest 1 View [[id number]] Collected: 05/20/24 1206     Updated: 05/20/24 1214    Narrative:      XR CHEST 1 VW    Date of Exam: 5/20/2024 11:52 AM EDT    Indication: Acute Stroke Protocol (onset < 12 hrs)    Comparison: Chest radiograph dated 3/29/2024    Findings:  The cardiomediastinal silhouette is within normal limits for there is aortic arch atherosclerotic calcification. There are low lung volumes with streaky left basilar airspace opacity representing atelectasis or mild pneumonia. There is no pleural   effusion or pneumothorax. There are degenerative changes of the thoracic spine.      Impression:      Impression:  1. Low lung volumes with streaky left basilar airspace opacity representing atelectasis or early pneumonia.      Electronically Signed: Kendrick Souza    5/20/2024 12:11 PM EDT     Workstation ID: [id number]              Lab 05/21/24  0631   HEMOGLOBIN A1C 4.50*         Diagnostics: Reviewed    A:     Sepsis associated hypotension    Mixed hyperlipidemia    Primary hypertension    Chronic kidney disease, stage 3    Morbid obesity    Obstructive sleep apnea    Transaminitis    CAD (coronary artery disease)    Pressure injury of sacral region, stage 4    Acute on chronic kidney failure    Sepsis    Other cirrhosis of liver       Impression:  Sepsis/shock  A/CKD  Obesity  Transaminitis/cirrhosis  PNA  Stg 4 sacral ulcer      Symptoms:   Debility    P:   Discussed current status with dtr.  Reports plans for intensivist to discuss with additional family today.  Discussed potential transition to comfort meaures and that still was possible to continue support until additional family able to arrive.  Discussed difficulties discerning literal interpretation of pt's wishes and intended desires. Palliative Care Team will continue to follow patient.     Clary Rashid MD, 5/23/2024, 11:46 EDT

## 2024-05-23 NOTE — NURSING NOTE
Assessed PICC for sluggish flush and no blood return.  Retracted line back 3cm now has 38cm in 3 cm out. Brisk flush and blood return noted. PICC is still good to use.   New sterile dressing applied.  Roma Verduzco RN [facility]

## 2024-05-23 NOTE — SIGNIFICANT NOTE
RN reported patient not able to rouse and not appropriate for swallow evaluation. Will attempt at a later time.

## 2024-05-23 NOTE — PLAN OF CARE
Goal Outcome Evaluation:  Plan of Care Reviewed With: patient           Outcome Evaluation: Pt presents with complex sacral wound and limited mobility.  PT applied a wound vac to the sacral wound to help manage exudate, decrease bioburden, and allow for decreased dressing change freq.  PT will f/u with wound vac change in 3-4 days.

## 2024-05-23 NOTE — PROGRESS NOTES
Nutrition Services    Patient Name:  Tho Vasquez  YOB: 1940  MRN: [id number]  Admit Date:  5/20/2024    Pt identified NPO/Clear Liquid Diet >72 hrs. Care team aware. NG unable to be placed and too lethargic for SLP. Discussions GOC ongoing.     Pt meets criteria severe malnutrition, see MSA from 5/21 for full assessment.     RD continues to monitor, please consult if needed.     Electronically signed by:  Ashley Contreras RD  05/23/24 11:41 EDT

## 2024-05-23 NOTE — SIGNIFICANT NOTE
His daughter is present at the bedside.  We did a conference call with her brother and 2 other sisters.  We discussed his poor prognosis and multiorgan dysfunction with severe septic shock.  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  Given his significant decline in functional status since his COVID infection, they are all in agreement that we should not perform CPR.  They are all in agreement that we should not do anything else invasive including intubation, mechanical ventilation, dialysis.  His son is driving here from [place].  When the family gathers they may consider shifting to comfort measures.  They are concerned about his pain as he had a lot of joint pain at baseline and has pain from his stage IV decubitus.  They are requesting that we give him pain medicine.  I have adjusted his CODE STATUS accordingly.

## 2024-05-24 PROBLEM — E43 SEVERE MALNUTRITION: Status: ACTIVE | Noted: 2024-01-01

## 2024-05-24 NOTE — PLAN OF CARE
Son at bedside at this time. Educated son about current care plans and patient status. Pt withdraws to pain in all extremities, no verbal communication noted, tolerating BiPAP, SR/SB with 1AVB. Ambrosio with decreased UOP. Wound vac to coccyx in place. Pt resting in bed with eyes closed at this time.

## 2024-05-24 NOTE — NURSING NOTE
WOC follow up for stage 4 pressure injury to sacrum.    Wound vac has been placed by PT.    WOC will sign off at this time.    Continue pressure injury prevention.  Alleyvn dressings to heels. Keep patient turned.    Sign off.    Roberto Parikh RN, BSN, CCRN, CWOCN  Wound, Ostomy and Continence (WOC) Department  [facility]

## 2024-05-24 NOTE — PROGRESS NOTES
"Palliative Care Daily Progress Note     Referring: Faustino Rashid  C/C: none    S: Medical record reviewed. Events noted. Pt now no CPR and CNI.  Is BiPAP dependent.  Son and dtr bedside.  No new questions or concerns.     ROS: pt unable    O: Code Status:   Code Status and Medical Interventions:   Ordered at: 05/23/24 1354     Medical Intervention Limits:    NO intubation (DNI)    NO dialysis     Level Of Support Discussed With:    Next of Kin (If No Surrogate)     Code Status (Patient has no pulse and is not breathing):    No CPR (Do Not Attempt to Resuscitate)     Medical Interventions (Patient has pulse or is breathing):    Limited Support      Advanced Directives: Advance Directive Status: Patient has advance directive, copy in chart   Goals of Care: Ongoing.   Palliative Performance Scale Score: 20%     /70   Pulse 62   Temp 96.7 °F (35.9 °C) (Axillary)   Resp 19   Ht 177.8 cm (70\")   Wt 104 kg (230 lb)   SpO2 98%   BMI 33.00 kg/m²     Intake/Output Summary (Last 24 hours) at 5/24/2024 1415  Last data filed at 5/24/2024 1200  Gross per 24 hour   Intake 3781.51 ml   Output 455 ml   Net 3326.51 ml       Physical Exam:    General Appearance:    Min responsive, NAD   HEENT:    anicteric, sl dry MM, face relaxed   Neck:   supple, trachea midline, no JVD   Lungs:     CTA bilat, diminished in bases; respirations regular, even     and unlabored    Heart:    RRR, normal S1 and S2, no M/R/G   Abdomen:     Normal bowel sounds, soft, nontender, nondistended   G/U:   Deferred   MSK/Extremities:   No clubbing , cyanosis or edema, No wasting   Pulses:   Pulses palpable and equal bilaterally   Skin:   Warm, dry, no mottling   Neurologic:   Sl startle to tactile stim, nl tome, no commands    Psych:   Calm, appropriate       Current Medications:      Current Facility-Administered Medications:     acetaminophen (TYLENOL) tablet 650 mg, 650 mg, Oral, Q4H PRN **OR** acetaminophen (TYLENOL) 160 MG/5ML oral solution 650 " mg, 650 mg, Oral, Q4H PRN **OR** acetaminophen (TYLENOL) suppository 650 mg, 650 mg, Rectal, Q4H PRN, Sena Hartley DO, 650 mg at 05/22/24 1401    albumin human 25 % IV SOLN  - ADS Override Pull, , , ,     aspirin chewable tablet 81 mg, 81 mg, Oral, Daily, 81 mg at 05/20/24 1354 **OR** aspirin suppository 300 mg, 300 mg, Rectal, Daily, Prince Manrique PA-C, 300 mg at 05/23/24 1411    atorvastatin (LIPITOR) tablet 40 mg, 40 mg, Oral, Nightly, Tessy Benites, ADALBERTO    atropine sulfate injection 0.5 mg, 0.5 mg, Intravenous, Once PRN, Tu Caban, ADALBERTO    sennosides-docusate (PERICOLACE) 8.6-50 MG per tablet 2 tablet, 2 tablet, Oral, BID PRN **AND** polyethylene glycol (MIRALAX) packet 17 g, 17 g, Oral, Daily PRN **AND** bisacodyl (DULCOLAX) EC tablet 5 mg, 5 mg, Oral, Daily PRN **AND** bisacodyl (DULCOLAX) suppository 10 mg, 10 mg, Rectal, Daily PRN, Sena Hartley DO    cycloSPORINE (RESTASIS) 0.05 % ophthalmic emulsion 1 drop, 1 drop, Both Eyes, Q12H, Sena Hartley DO, 1 drop at 05/23/24 0532    dextrose (D50W) (25 g/50 mL) IV injection 25 g, 25 g, Intravenous, Q15 Min PRN, Cleopatra Kelly MD    dextrose (GLUTOSE) oral gel 15 g, 15 g, Oral, Q15 Min PRN, Cleopatra Kelly MD    dextrose 5 % and sodium chloride 0.9 % infusion, 50 mL/hr, Intravenous, Continuous, Cleopatra Kelly MD, Last Rate: 50 mL/hr at 05/24/24 1019, 50 mL/hr at 05/24/24 1019    DOPamine 400 mg in 250 mL D5W infusion, 2-20 mcg/kg/min, Intravenous, Titrated, Cleopatra Kelly MD, Last Rate: 15.6 mL/hr at 05/24/24 0856, 4 mcg/kg/min at 05/24/24 0856    erythromycin (ROMYCIN) ophthalmic ointment, , Both Eyes, Nightly, Syeda Rothman, APRN, 1 Application at 05/23/24 2051    glucagon (GLUCAGEN) injection 1 mg, 1 mg, Subcutaneous, Q15 Min PRN, Cleopatra Kelly MD, 1 mg at 05/24/24 0959    HYDROmorphone (DILAUDID) injection 0.5 mg, 0.5 mg, Intravenous, Q2H PRN, Cleopatra Kelly MD, 0.5  mg at 05/23/24 1410    iopamidol (ISOVUE-370) 76 % injection 100 mL, 100 mL, Intravenous, Once in imaging, Don Roche MD    ipratropium-albuterol (DUO-NEB) nebulizer solution 3 mL, 3 mL, Nebulization, Q4H PRN, Syeda Rothman APRN    Magnesium Standard Dose Replacement - Follow Nurse / BPA Driven Protocol, , Does not apply, PRN, Cleopatra Kelly MD    nitroglycerin (NITROSTAT) SL tablet 0.4 mg, 0.4 mg, Sublingual, Q5 Min PRN, Sena Hartley DO    norepinephrine (LEVOPHED) 8 mg in 250 mL NS infusion (premix), 0.02-0.3 mcg/kg/min, Intravenous, Titrated, Don Roche MD, Last Rate: 11.7 mL/hr at 05/24/24 1018, 0.06 mcg/kg/min at 05/24/24 1018    ondansetron ODT (ZOFRAN-ODT) disintegrating tablet 4 mg, 4 mg, Oral, Q6H PRN **OR** ondansetron (ZOFRAN) injection 4 mg, 4 mg, Intravenous, Q6H PRN, Sena Hartley DO, 4 mg at 05/22/24 1109    phenylephrine (ALISSA-SYNEPHRINE) 0.5 % nasal spray 2 spray, 2 spray, Each Nare, Q6H PRN, Cleopatra Kelly MD    Phosphorus Replacement - Follow Nurse / BPA Driven Protocol, , Does not apply, PRN, Cleopatra Kelly MD    piperacillin-tazobactam (ZOSYN) 3.375 g IVPB in 100 mL NS MBP (CD), 3.375 g, Intravenous, Q8H, Sena Hartley DO, 3.375 g at 05/24/24 1215    Polyvinyl Alcohol-Povidone PF (HYPOTEARS) 1.4-0.6 % ophthalmic solution 1 drop, 1 drop, Both Eyes, Q1H PRN, Tu Caban, ADALBERTO    Potassium Replacement - Follow Nurse / BPA Driven Protocol, , Does not apply, PRN, Cleopatra Kelly MD    sodium chloride 0.9 % flush 10 mL, 10 mL, Intravenous, Q12H, Cleopatra Kelly MD, 10 mL at 05/24/24 0858    sodium chloride 0.9 % flush 10 mL, 10 mL, Intravenous, PRN, Cleopatra Kelly MD    sodium chloride 0.9 % infusion 40 mL, 40 mL, Intravenous, PRN, Sena Hartley DO     Labs:   Results from last 7 days   Lab Units 05/24/24  0544   WBC 10*3/mm3 14.98*   HEMOGLOBIN g/dL 9.8*   HEMATOCRIT % 30.2*   PLATELETS 10*3/mm3 43*      Results from last 7 days   Lab Units 05/24/24  0544 05/23/24  1841 05/23/24  0641   SODIUM mmol/L 138   < > 140   POTASSIUM mmol/L 3.7   < > 3.2*   CHLORIDE mmol/L 109*   < > 109*   CO2 mmol/L 21.0*   < > 23.0   BUN mg/dL 51*   < > 53*   CREATININE mg/dL 1.52*   < > 1.62*   CALCIUM mg/dL 7.1*   < > 7.2*   BILIRUBIN mg/dL  --   --  0.9   ALK PHOS U/L  --   --  97   ALT (SGPT) U/L  --   --  36   AST (SGOT) U/L  --   --  44*   GLUCOSE mg/dL 279*   < > 288*    < > = values in this interval not displayed.     Imaging Results (Last 72 Hours)       ** No results found for the last 72 hours. **              Lab 05/21/24  0631   HEMOGLOBIN A1C 4.50*         Diagnostics: Reviewed    A:     Sepsis associated hypotension    Mixed hyperlipidemia    Primary hypertension    Chronic kidney disease, stage 3    Morbid obesity    Obstructive sleep apnea    Transaminitis    CAD (coronary artery disease)    Pressure injury of sacral region, stage 4    Acute on chronic kidney failure    Sepsis    Other cirrhosis of liver    Severe malnutrition       Impression:  Sepsis/shock  A/CKD  Obesity  Transaminitis/cirrhosis  PNA  Acute hypoxic resp failure - BiPAP dependent  Stg 4 sacral ulcer    Symptoms:   Debility  Dyspnea    P:   Met with son and dtr.  Offered support.  Please contact on call provider over weekend if family decides for transition to comfort measures. Palliative Care Team will continue to follow patient.     Clary Rashid MD, 5/24/2024, 14:15 EDT

## 2024-05-24 NOTE — PROGRESS NOTES
"Critical Care Note     LOS: 4 days   Patient Care Team:  Ja Baldwin MD as PCP - General (Internal Medicine)  Danny Rdz Jr., MD as Consulting Physician (Ophthalmology)    Chief Complaint/Reason for visit:    Chief Complaint   Patient presents with    Stroke   Septic shock  Acute on chronic kidney disease  Pneumonia  Stage IV pressure injury sacrum, present on admission  Gallstones  Morbid obesity  Obstructive sleep apnea  Coronary artery disease  Liver cirrhosis/MARIEE  Thrombocytopenia    Subjective     Interval History:     Requiring increasing amounts of norepinephrine for blood pressure support.  He is now BiPAP dependent, and desaturates when it is removed.  Saturations are adequate on BiPAP  Persistent bradycardia  Oliguria with total urine output 655 mL yesterday, creatinine is 1.52  Oozing from his nose.  Platelet count remains low at 43      Review of Systems:    All systems were reviewed and negative except as noted in subjective.    Medical history, surgical history, social history, family history reviewed    Objective     Intake/Output:    Intake/Output Summary (Last 24 hours) at 5/24/2024 1104  Last data filed at 5/24/2024 1029  Gross per 24 hour   Intake 3821.71 ml   Output 580 ml   Net 3241.71 ml       Nutrition:  NPO Diet NPO Type: Strict NPO    Infusions:  dextrose 5 % and sodium chloride 0.9 %, 50 mL/hr, Last Rate: 50 mL/hr (05/24/24 1019)  DOPamine, 2-20 mcg/kg/min, Last Rate: 4 mcg/kg/min (05/24/24 0856)  norepinephrine, 0.02-0.3 mcg/kg/min, Last Rate: 0.06 mcg/kg/min (05/24/24 1018)            Telemetry: Sinus rhythm with episodes of AV block heart rate of 30             Vital Signs  Blood pressure 108/58, pulse 64, temperature 96.3 °F (35.7 °C), temperature source Axillary, resp. rate 16, height 177.8 cm (70\"), weight 104 kg (230 lb), SpO2 100%.    Physical Exam:  General Appearance:  Obese elderly gentleman supine in bed resting on full face BiPAP   Head:  Alopecia, no visible " trauma   Eyes:          Conjunctiva pink   Ears:  Extremely hard of hearing   Throat: No thrush, some bleeding from his left nostril intermittently   Neck: Trachea midline   Back:      Lungs:   Breath sounds are bilateral, shallow, scattered coarse rhonchi     Heart:   irregular, S1, S2 auscultated   Abdomen:   Obese, hypoactive bowel sounds, nontender   Rectal:   Deferred   Extremities: 2+ pitting edema   Pulses:    Skin: Stage IV decubitus sacrum, currently with wound VAC   Lymph nodes: No cervical adenopathy   Neurologic: Opens his eyes to gentle stimuli, follows simple commands      Results Review:     I reviewed the patient's new clinical results.   Results from last 7 days   Lab Units 05/24/24  0544 05/23/24  1841 05/23/24  0641 05/20/24  1107 05/20/24  1105   SODIUM mmol/L 138 146* 140   < > 140   POTASSIUM mmol/L 3.7 4.1  4.1 3.2*   < > 4.8   CHLORIDE mmol/L 109* 115* 109*   < > 105   CO2 mmol/L 21.0* 25.0 23.0   < > 25.0   BUN mg/dL 51* 55* 53*   < > 55*   CREATININE mg/dL 1.52* 1.72* 1.62*   < > 2.35*   CALCIUM mg/dL 7.1* 8.2* 7.2*   < > 8.2*   BILIRUBIN mg/dL  --   --  0.9  --  1.8*   ALK PHOS U/L  --   --  97  --  142*   ALT (SGPT) U/L  --   --  36  --  49*   AST (SGOT) U/L  --   --  44*  --  64*   GLUCOSE mg/dL 279* 64* 288*   < > 95    < > = values in this interval not displayed.     Results from last 7 days   Lab Units 05/24/24  0544 05/23/24  1027 05/23/24  0532   WBC 10*3/mm3 14.98* 15.54* 13.90*   HEMOGLOBIN g/dL 9.8* 10.5* 9.5*   HEMATOCRIT % 30.2* 31.0* 28.8*   PLATELETS 10*3/mm3 43* 47* 47*     Results from last 7 days   Lab Units 05/23/24  0757   PH, ARTERIAL pH units 7.298*   PO2 ART mm Hg 98.4   PCO2, ARTERIAL mm Hg 50.9*   HCO3 ART mmol/L 24.9     Lab Results   Component Value Date    BLOODCX Proteus mirabilis (C) 05/20/2024    BLOODCX Proteus mirabilis (C) 05/20/2024     Lab Results   Component Value Date    URINECX 25,000 CFU/mL Proteus mirabilis (A) 05/20/2024       I reviewed the  patient's new imaging including images and reports.    CT CHEST WO CONTRAST DIAGNOSTIC, CT ABDOMEN PELVIS WO CONTRAST    Date of Exam: 5/20/2024 2:11 PM EDT    Indication: Sepsis, poss PNA on CXR.    Comparison: Angiographic chest CT scan 3/21/2024. No previous abdominal CT scan    Technique: Axial CT images were obtained of the chest without contrast administration.  Reconstructed coronal and sagittal images were also obtained. Automated exposure control and iterative construction methods were used.      Findings:  CT SCAN OF THE CHEST WITHOUT CONTRAST: No significant mediastinal, hilar, axillary, or lower cervical lymphadenopathy is seen. There is no evidence of pericardial effusion. There is a very small partially loculated left pleural effusion, new from  3/29/2024. There is extensive, dense coronary calcification. Patchy multifocal groundglass disease has changed in pattern from 3/29/2024. There are several new areas of peribronchial thickening and focal atelectasis in the lung bases, a little more  extensive than on the prior study. Findings would be consistent with a bronchopneumonia. Bony structures appear to be intact. Advanced lower cervical spine degenerative disc disease appears stable.   Impression:     Multifocal bilateral pneumonia as described. Very small partially loculated left basilar pleural effusion.        CT SCAN OF THE ABDOMEN PELVIS WITHOUT CONTRAST: There is new relatively mild ascites not present on 3/29/2024 chest CT scan, with a moderate amount of fluid adjacent the liver and spleen but only minimal ascites elsewhere. Liver morphology is consistent  with cirrhosis. Gallbladder is again noted to be contracted around numerous densely calcified gallstones. There appear to be gallstones in the cystic duct but no visible gallbladder inflammation. No common duct stone or biliary is appreciated.    Spleen is normal in size and contains dense benign calcification. Pancreas and adrenal glands  appear within normal limits for age. There are multiple bilateral renal lesions which are sharply defined, rounded, and water or fluid density, most likely  incidental renal cysts. No upper abdominal free air or adenopathy is seen. Bowel loops are normal in caliber. Stomach and duodenum appear grossly normal.    Regarding the lower abdomen/pelvis, bladder is decompressed and normal in appearance. There are numerous sigmoid diverticuli without evidence of diverticulitis. There is a small amount of intrapelvic ascites. Terminal ileum and cecum appear normal.  Appendix is not identified. Bony structures appear to be intact. There is moderate to advanced multilevel lumbar degenerative disc disease.    .    Impression:  1. Liver morphology consistent with cirrhosis, and relatively mild upper abdominal ascites.    2. Sigmoid diverticulosis without evidence of diverticulitis.    3. Contracted gallbladder with numerous gallstones, including several small stones that appear to be in the cystic duct. No visible gallbladder inflammation or biliary ductal dilatation.    4. No evidence of an acute inflammatory focus is seen in the abdomen or pelvis.        Electronically Signed: Cristóbal Knight MD   5/20/2024 4:05 PM EDT     All medications reviewed.   alteplase (CATHFLO/ACTIVASE) injection 4 mg, 4 mg, Intracatheter, Once  aspirin, 81 mg, Oral, Daily   Or  aspirin, 300 mg, Rectal, Daily  atorvastatin, 40 mg, Oral, Nightly  cycloSPORINE, 1 drop, Both Eyes, Q12H  erythromycin, , Both Eyes, Nightly  [Held by provider] heparin (porcine), 5,000 Units, Subcutaneous, Q12H  iopamidol, 100 mL, Intravenous, Once in imaging  piperacillin-tazobactam, 3.375 g, Intravenous, Q8H  sodium chloride, 10 mL, Intravenous, Q12H          Assessment & Plan       Sepsis associated hypotension    Mixed hyperlipidemia    Primary hypertension    Chronic kidney disease, stage 3    Morbid obesity    Obstructive sleep apnea    Transaminitis    CAD (coronary artery  disease)    Pressure injury of sacral region, stage 4    Acute on chronic kidney failure    Sepsis    Other cirrhosis of liver    Severe malnutrition    84-year-old gentleman, remote smoker with a history of hypertension, chronic kidney disease, coronary artery disease, cirrhosis who was hospitalized March 29 through April 5 with COVID pneumonia and discharged to truck to [facility].  He was hypotensive and required chronic midodrine to maintain an adequate blood pressure.  He was discharged to [facility].  Daughter reports that he was then discharged home and was home for 2 weeks.  He was able to ambulate with 1 assist for approximately 8 days and then he became weak and was no longer able to ambulate.  She called the  and he was placed at [facility].  Both at home and at the nursing home he was not eating because he just had no appetite.  He then presented May 20 with worsening weakness confusion and hypotension.  He was evaluated for possible stroke in the emergency room with negative imaging.  Contrast imaging was not performed secondary to renal function.  He had an elevated white count and imaging revealed bilateral patchy pneumonia.  He had evidence of cirrhosis on his abdominal scan with gallstones including some gallstones in the cystic duct but no inflammation of the gallbladder noted.  He was placed on norepinephrine, dopamine for blood pressure support, bradycardia and received the loading dose of vancomycin 2 g and was placed on Zosyn.    Serum creatinine was elevated on admission at 2.35 with a baseline of 0.86-1.04.  Creatinine has improved slightly and is 1.52.  He remains oliguric.     Source of sepsis is presumed to be multifocal pneumonia, urine although it may actually be his stage IV decubitus to the bone which is purulent.  He also has some gallstones.  He has some gram-negative rods in urine but only 25,000 CFU's likely not an acute infection.  Blood cultures are growing a  gram-negative bacilli.  Organism has been identified as a Proteus.  Admission liver function tests were mildly elevated with an alk phos of 142, AST 64, ALT 49, bilirubin 1.8.  However this could of also been from his hypotension and underlying cirrhosis.  Infectious disease assessed and he was continued on Zosyn.  He is developed thrombocytopenia with a platelet count of 43.  Nasogastric tube was attempted several days ago and he has some oozing from the left nostril likely from his low platelet count and nasal trauma.    May 21 he was spontaneously dropping his heart rate to 30.  EKG revealed nonconducted P waves consistent with Mobitz type II.  He was placed on dopamine 5 mics per kilogram per minute with improvement in his heart rate.  Troponin is elevated at 75.  blood pressure still is marginal at 91 systolic.  Cardiology PA assessed and did not feel he was in Mobitz 2.  MD has not assessed.  Official EKG  reading is a second-degree AV block with 2-1 AV conduction.  As long as he is on dopamine he is holding his heart rate.  He does not have a metabolic acidosis.  Carvedilol remains on hold.  Yesterday he converted to atrial fibrillation, with controlled rate.      What is most worrisome to me is his worsening mentation.  He has respiratory insufficiency, renal insufficiency, underlying heart disease and cirrhosis.  Unfortunately he suffered from COVID and required hospitalization and since that time he is really been mostly immobile and has developed a stage IV decubitus ulcer.  This morning he also developed some hypoglycemia.  He has not been receiving insulin.  I suspect this is from poor liver function and his acute illness.  Given his advanced age, stage IV decubitus ulcer, poor nutritional state with minimal oral intake for the last 2 months, underlying chronic renal disease, coronary disease, arrhythmias, cirrhosis, clinically I feel this is too much to overcome.  I had a long conversation with his  very dangelo daughter who is at the bedside and in a conference call with all of his children and they have elected to limit care with no CPR, no intubation, no dialysis.  Each day he is a little worse, today needing more norepinephrine, unable to come off BiPAP.  I have asked him to consider shifting to comfort measures          PLAN:      Infectious disease assessed and continued Zosyn    Support blood pressure with norepinephrine  Support heart rate with dopamine    Will stop all oral medications as he has no NG  access and is now BiPAP dependent    WO evaluated his sacral decubitus     Remains NPO    Aspirin for his known history of coronary disease, when we achieve oral access, can have an aspirin suppository     maintenance fluids, changed to D5 saline    Support with noninvasive ventilation    Dilaudid for pain    I have recommended to the family that he consider shifting to comfort measures as clinically he continues to decline and despite therapy    VTE Prophylaxis: subcutaneous heparin    Stress Ulcer Prophylaxis: Uche Kelly MD  05/24/24  11:04 EDT      Time: Critical care 35 min  I personally provided care to this critically ill patient as documented above.  Critical care time does not include time spent on separately billed procedures.  None of my critical care time was concurrent with other critical care providers.

## 2024-05-24 NOTE — PROGRESS NOTES
INFECTIOUS DISEASE Progress Note    Tho Vasquez  1940  [id number]        Admission Date: 5/20/2024      Requesting Provider: No ref. provider found  Evaluating Physician: Grant Black MD    Reason for Consultation:  Septic shock/Proteus bacteremia    History of present illness:     5/22/2024:Patient is a 84 y.o. male  with a history of coronary artery disease, hypertension, hyperlipidemia, and COVID-19 infection requiring admission here on 3/29/2024 who is seen today for evaluation of septic shock with Proteus bacteremia.  After his admission here, he was transferred to [facility] on 4/5/2024.  He was subsequently  disposition to [facility].  He has developed increasing weakness, confusion, and dysarthria and [facility], prompting an evaluation in the emergency room here on 5/205/20/2024.  He was found to have lactic acidosis, and elevated procalcitonin, bibasilar pulmonary infiltrates, leukocytosis/neutrophilia with a white blood cell count of 17.8, mild pyuria, acute kidney injury with a creatinine of 2 point2.35, and elevated total bilirubin at 1.8 with an alkaline phosphatase of 142,  and hypotension with a blood pressure of 68/49.  Blood cultures have grown Proteus.  He is on intravenous Unasyn.  Chest CT scan and chest x-rays have revealed bibasilar pulmonary infiltrates.  Abdominal/pelvic CT scan reveals cholelithiasis.  Liver morphology is consistent with cirrhosis. He is requiring vasopressor support.    5/23/24:  He has been afebrile over the last 24 hours.  Coccygeal culture is growing scant gram-negative bacilli.  Urine culture grew 25,000 colonies of Proteus mirabilis.  Blood cultures grew Proteus resistant to Levaquin but sensitive to other tested antibiotics, including Unasyn,   Creatinine is 1.62.  white blood cell count is 13.9.  Platelet count is 47,000.  He is severely confused and somnolent.  He is unable to provide any reliable history.  Dr. Kelly  thinks he is too unstable to undergo an MRCP.     2024:  His family has transitioned him to DNI/DNR.   They are considering transitioning him to comfort measures.   His creatinine is 1.52.  White blood cell count is 15.  Platelet count is 43.  he is unresponsive on a BiPAP mask.   He is currently on 28% oxygen with an O2 saturation of 99%.    Past Medical History:   Diagnosis Date    Allergic ?    Cataract     Chronic kidney disease     Coronary artery disease     Hyperlipidemia     Hypertension     Myocardial infarction     Pneumonia of both lower lobes due to infectious organism 10/03/2019    Positive PPD     Respiratory failure 2005    requiring tracheostomy       Past Surgical History:   Procedure Laterality Date    CARDIAC CATHETERIZATION      CARDIAC SURGERY      HERNIA REPAIR         Family History   Problem Relation Age of Onset    Arthritis Mother     Heart attack Father     Stroke Father     Diabetes Father     Heart attack Brother     Cancer Paternal Uncle     Obesity Other        Social History     Socioeconomic History    Marital status:    Tobacco Use    Smoking status: Former     Types: Cigars, Cigarettes     Start date:      Quit date:      Years since quittin.4     Passive exposure: Past    Smokeless tobacco: Never   Vaping Use    Vaping status: Never Used   Substance and Sexual Activity    Alcohol use: No    Drug use: Never    Sexual activity: Not Currently       Allergies   Allergen Reactions    Dexamethasone Unknown - High Severity    Prednisone Rash         Medication:    Current Facility-Administered Medications:     acetaminophen (TYLENOL) tablet 650 mg, 650 mg, Oral, Q4H PRN **OR** acetaminophen (TYLENOL) 160 MG/5ML oral solution 650 mg, 650 mg, Oral, Q4H PRN **OR** acetaminophen (TYLENOL) suppository 650 mg, 650 mg, Rectal, Q4H PRN, Sena Hartley DO, 650 mg at 24 1401    ascorbic acid (VITAMIN C) tablet 500 mg, 500 mg, Oral, QAM, Sena Hartley,      aspirin chewable tablet 81 mg, 81 mg, Oral, Daily, 81 mg at 05/20/24 1354 **OR** aspirin suppository 300 mg, 300 mg, Rectal, Daily, Prince Manrique PA-C, 300 mg at 05/23/24 1411    atorvastatin (LIPITOR) tablet 40 mg, 40 mg, Oral, Nightly, Tessy Benites, APRN    atropine sulfate injection 0.5 mg, 0.5 mg, Intravenous, Once PRN, Tu Caban, APRN    sennosides-docusate (PERICOLACE) 8.6-50 MG per tablet 2 tablet, 2 tablet, Oral, BID PRN **AND** polyethylene glycol (MIRALAX) packet 17 g, 17 g, Oral, Daily PRN **AND** bisacodyl (DULCOLAX) EC tablet 5 mg, 5 mg, Oral, Daily PRN **AND** bisacodyl (DULCOLAX) suppository 10 mg, 10 mg, Rectal, Daily PRN, Sena Hartley DO    cycloSPORINE (RESTASIS) 0.05 % ophthalmic emulsion 1 drop, 1 drop, Both Eyes, Q12H, Sena Hartley DO, 1 drop at 05/23/24 0532    DOPamine 400 mg in 250 mL D5W infusion, 2-20 mcg/kg/min, Intravenous, Titrated, Cleopatra Kelly MD, Last Rate: 15.6 mL/hr at 05/23/24 1640, 4 mcg/kg/min at 05/23/24 1640    erythromycin (ROMYCIN) ophthalmic ointment, , Both Eyes, Nightly, Syeda Rothman, APRN, 1 Application at 05/23/24 2051    famotidine (PEPCID) tablet 20 mg, 20 mg, Oral, BID, Sena Hartley DO    ferrous sulfate tablet 325 mg, 325 mg, Oral, Daily, Sena Hartley DO    [Held by provider] heparin (porcine) 5000 UNIT/ML injection 5,000 Units, 5,000 Units, Subcutaneous, Q12H, Cleopatra Kelly MD, 5,000 Units at 05/22/24 2051    HYDROmorphone (DILAUDID) injection 0.5 mg, 0.5 mg, Intravenous, Q2H PRN, Cleopatra Kelly MD, 0.5 mg at 05/23/24 1410    iopamidol (ISOVUE-370) 76 % injection 100 mL, 100 mL, Intravenous, Once in imaging, Don Roche MD    ipratropium-albuterol (DUO-NEB) nebulizer solution 3 mL, 3 mL, Nebulization, Q4H PRN, Syeda Rothman, APRN    lactated ringers infusion, 100 mL/hr, Intravenous, Continuous, Cleopatra Kelly MD, Last Rate: 100 mL/hr at 05/23/24 2050, 100 mL/hr at  05/23/24 2050    Magnesium Standard Dose Replacement - Follow Nurse / BPA Driven Protocol, , Does not apply, PRN, Cleopatra Kelly MD    midodrine (PROAMATINE) tablet 2.5 mg, 2.5 mg, Oral, TID AC, Sena Hartley DO    nitroglycerin (NITROSTAT) SL tablet 0.4 mg, 0.4 mg, Sublingual, Q5 Min PRN, Sena Hartley DO    norepinephrine (LEVOPHED) 8 mg in 250 mL NS infusion (premix), 0.02-0.3 mcg/kg/min, Intravenous, Titrated, Don Roche MD, Last Rate: 11.7 mL/hr at 05/24/24 0137, 0.06 mcg/kg/min at 05/24/24 0137    ondansetron ODT (ZOFRAN-ODT) disintegrating tablet 4 mg, 4 mg, Oral, Q6H PRN **OR** ondansetron (ZOFRAN) injection 4 mg, 4 mg, Intravenous, Q6H PRN, Sena Hartley, , 4 mg at 05/22/24 1109    Phosphorus Replacement - Follow Nurse / BPA Driven Protocol, , Does not apply, PRN, Cleopatra Kelly MD    piperacillin-tazobactam (ZOSYN) 3.375 g IVPB in 100 mL NS MBP (CD), 3.375 g, Intravenous, Q8H, Sena Hartley, DO, 3.375 g at 05/24/24 0415    Polyvinyl Alcohol-Povidone PF (HYPOTEARS) 1.4-0.6 % ophthalmic solution 1 drop, 1 drop, Both Eyes, Q1H PRN, Tu Caban, APRN    Potassium Replacement - Follow Nurse / BPA Driven Protocol, , Does not apply, PRN, Cleopatra Kelly MD    sertraline (ZOLOFT) tablet 50 mg, 50 mg, Oral, Daily, Sena Hartley DO    sodium chloride 0.9 % flush 10 mL, 10 mL, Intravenous, Q12H, Cleopatra Kelly MD, 10 mL at 05/23/24 2042    sodium chloride 0.9 % flush 10 mL, 10 mL, Intravenous, PRN, Cleopatra Kelly MD    sodium chloride 0.9 % infusion 40 mL, 40 mL, Intravenous, PRN, Sena Hartley DO    Antibiotics:  Anti-Infectives (From admission, onward)      Ordered     Dose/Rate Route Frequency Start Stop    05/20/24 1730  piperacillin-tazobactam (ZOSYN) 3.375 g IVPB in 100 mL NS MBP (CD)        Ordering Provider: Sena Hartley DO    3.375 g  over 4 Hours Intravenous Every 8 Hours 05/20/24 2000 05/27/24 1959     24 1129  piperacillin-tazobactam (ZOSYN) 3.375 g IVPB in 100 mL NS MBP (CD)        Ordering Provider: Don Roche MD    3.375 g  over 30 Minutes Intravenous Once 24 1145 24 1353              Review of Systems:   He is confused and cannot provide a reliable review of systems    Physical Exam:   Vital Signs  Temp (24hrs), Av °F (36.1 °C), Min:96 °F (35.6 °C), Max:98.4 °F (36.9 °C)    Temp  Min: 96 °F (35.6 °C)  Max: 98.4 °F (36.9 °C)  BP  Min: 81/57  Max: 118/69  Pulse  Min: 51  Max: 96  Resp  Min: 17  Max: 21  SpO2  Min: 92 %  Max: 100 %    GENERAL:  Profoundly debilitated and minimally responsive on a BiPAP mask.  HEENT: Normocephalic, atraumatic.  PERRL. EOMI. No conjunctival injection. No icterus.   No labial ulcers.  NECK: Supple   HEART: No murmur  LUNGS:  few scattered rhonchi  ABDOMEN: Soft, nontender, nondistended  :   Ambrosio catheter   MSK: No joint effusions or erythema  SKIN: Warm and dry without cutaneous eruptions on Inspection/palpation.    NEURO:   Minimally responsive.  Sacrum:   Sacral wound ulcer is dressed.        Laboratory Data    Results from last 7 days   Lab Units 24  0544 24  1027 24  0532   WBC 10*3/mm3 14.98* 15.54* 13.90*   HEMOGLOBIN g/dL 9.8* 10.5* 9.5*   HEMATOCRIT % 30.2* 31.0* 28.8*   PLATELETS 10*3/mm3 43* 47* 47*     Results from last 7 days   Lab Units 24  0544   SODIUM mmol/L 138   POTASSIUM mmol/L 3.7   CHLORIDE mmol/L 109*   CO2 mmol/L 21.0*   BUN mg/dL 51*   CREATININE mg/dL 1.52*   GLUCOSE mg/dL 279*   CALCIUM mg/dL 7.1*     Results from last 7 days   Lab Units 24  0641   ALK PHOS U/L 97   BILIRUBIN mg/dL 0.9   ALT (SGPT) U/L 36   AST (SGOT) U/L 44*             Results from last 7 days   Lab Units 24  1352   LACTATE mmol/L 2.0     Results from last 7 days   Lab Units 24  1105   CK TOTAL U/L 167         Estimated Creatinine Clearance: 43.7 mL/min (A) (by C-G formula based on SCr of 1.52 mg/dL  "(H)).      Microbiology:  Blood Culture   Date Value Ref Range Status   05/20/2024 Proteus species (C)  Preliminary   05/20/2024 Proteus species (C)  Preliminary     BCID, PCR   Date Value Ref Range Status   05/20/2024 Proteus spp. Identification by BCID2 PCR. (A) Negative by BCID PCR. Culture to Follow. Final     No results found for: \"CULTURES\", \"HSVCX\", \"URCX\"  No results found for: \"EYECULTURE\", \"GCCX\", \"HSVCULTURE\", \"LABHSV\"  No results found for: \"LEGIONELLA\", \"MRSACX\", \"MUMPSCX\", \"MYCOPLASCX\"  No results found for: \"NOCARDIACX\", \"STOOLCX\"  Urine Culture   Date Value Ref Range Status   05/20/2024 25,000 CFU/mL Proteus mirabilis (A)  Final     Wound Culture   Date Value Ref Range Status   05/21/2024 Scant growth (1+) Gram Negative Bacilli (A)  Preliminary           Radiology:  Imaging Results (Last 72 Hours)       ** No results found for the last 72 hours. **          I read his radiographic images.      Impression:   1.  Proteus bacteremia-with secondary severe sepsis.   secondary to pneumonia versus UTI versus infected sacral decubitus ulcer versus possible cholangitis.  2.  Proteus bacteriuria/UTI  3.  Septic shock-manifested by tachycardia, hypotension, acute kidney injury, leukocytosis/neutrophilia, lactic acidosis, and elevated procalcitonin, toxic/metabolic encephalopathy, and Proteus bacteremia.  4.  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  This will be extremely difficult to heal and is down to bone.   5.  Acute renal failure  6.  Lactic acidosis  7.  Leukocytosis/neutrophilia  8.  Toxic/metabolic encephalopathy  9.  Status post COVID-19 infection-3/24  10.  Cirrhosis  11.  Cholelithiasis  12.  Thrombocytopenia      PLAN/RECOMMENDATIONS:   1.  Continue intravenous Zosyn  2.  Continue sacral decubitus ulcer wound care with offloading  3.  Consider transition to hospice/comfort measures only     He is critically ill with a high mortality risk.  I think he is highly unlikely to " survive this severe illness.  I discussed his complex situation in detail with his son and daughter today.  I have suggested that they switch to   Hospice/comfort measures only.  I discussed his complex situation again with Dr. Kelly today.   She agrees with switching to hospice/comfort measures only.  His family is considering this decision.  I spent over 30 minutes on his care today      Grant Black MD  5/24/2024  06:30 EDT

## 2024-05-24 NOTE — SIGNIFICANT NOTE
05/24/24 1051   SLP Deferred Reason   SLP Deferred Reason Unable to treat, medical status change  (MD notes reviewed. Pt remains minimally responsive and BiPap dependent. Pt is not appropriate for dysphagia re-assessment. Awaiting family to arrive from out of town for further GOC discussion. SLP will continue to follow.)

## 2024-05-24 NOTE — PLAN OF CARE
Problem: Adjustment to Illness (Stroke, Ischemic/Transient Ischemic Attack)  Goal: Optimal Coping  Intervention: Support Psychosocial Response to Stroke  Recent Flowsheet Documentation  Taken 5/24/2024 1432 by Lindsay Maldonado RN  Supportive Measures:   active listening utilized   verbalization of feelings encouraged   decision-making supported   goal-setting facilitated   positive reinforcement provided   problem-solving facilitated  Family/Support System Care:   caregiver stress acknowledged   self-care encouraged   support provided   family care conference arranged   involvement promoted   presence promoted   Goal Outcome Evaluation:  Plan of Care Reviewed With: daughter, son        Progress: declining  Outcome Evaluation: met with Pierre in conference room.  Mary and other dtr joined by phone. Discussed pt's decline and transition to comfort measures including removing bipap and stopping dobutamine and Levo. Family wishes to talk with other family members. Discussed transition to comfort and would recommend premedicating with dilaudid and versed prior to removing bipap and weaning off pressors.  Discussed that would expect decline to death within hours to days.  Family struggling due to pt stating that he wanted all measures done.  Discussed benefit and burden and that despite aggressive medical treatment, pt is declining towards death.  Family to discuss to see if others want to visit and let nursing know if they want to change to comfort.      Palliative IDT: Rn, MD ADALBERTO,   After hours# [phone]

## 2024-05-24 NOTE — CASE MANAGEMENT/SOCIAL WORK
Continued Stay Note  [facility]     Patient Name: Tho Vasquez  MRN: [id number]  Today's Date: 5/24/2024    Admit Date: 5/20/2024    Plan: Ongoing   Discharge Plan       Row Name 05/24/24 1308       Plan    Plan Ongoing    Plan Comments Discussed patient in MDR.  Continuing GOC conversations, family at bedside.  CM will continue to follow.                   Discharge Codes    No documentation.                   Gertrudis Leong RN

## 2024-05-24 NOTE — PLAN OF CARE
Goal Outcome Evaluation:  Plan of Care Reviewed With: patient, daughter, grandchild(juan a), son        Progress: declining     *remains bipap dependent on 28%  *able to shake head yes or no to short questions   *scant amount of bloody/brown nasal drainage  *remains in SA/SB with 1st degree AV block  *dopamine and levophed   *replaced phos  *decreased UOP  *cathflo for sluggish PICC   *started on D5 NS at 50 for low blood sugar  *+4 generalized edema, weeping in some areas  *family remained at bedside, discussed GOC with palliative team, multiple conversations with family on comfort care. Will discuss amongst themselves and let RN know decisions   * plans to go  comfort either today or tomorrow after family members visit

## 2024-05-24 NOTE — PROGRESS NOTES
visit per family request.  Daughter Mallory at bedside, conflicted about decision for her father's care.  Provided empathic listening, validation of feelings, prayer, reflection of their discernment.  Bother from [place] arrived, appreciative of visit.

## 2024-05-25 NOTE — PLAN OF CARE
Goal Outcome Evaluation:  Plan of Care Reviewed With: family        Progress: declining  Outcome Evaluation: Pt is medically declining, now transitioned to comfort measures. Wound vac to the sacral wound is intact and functioning appropriately. Will leave in place up to three more days to decrease need for dressing changes with the goal of increasing patient's overall comfort. Please call [phone] and leave a message if the patient passes so we may  the wound vac unit.

## 2024-05-25 NOTE — PLAN OF CARE
Pt transitioned to comfort care measure during this shift. Family at bedside. Pain and anxiety per orders. All family questions addressed at this time. Pt responsive to pain only at this time.

## 2024-05-25 NOTE — SIGNIFICANT NOTE
Exam confirms with auscultation zero audible heart tones and zero audible respirations. Mr.Robert LUIS MANUEL Vasquez was pronounced dead at 1433.  MD notified by Patient's RN.    Jayne Busby RN  Clinical House Supervisor  5/25/2024 14:44 EDT

## 2024-05-25 NOTE — THERAPY WOUND CARE TREATMENT
Acute Care - Wound/Debridement Treatment Note  [facility]     Patient Name: Tho Vasquez  : 1940  MRN: [id number]  Today's Date: 2024                Admit Date: 2024    Visit Dx:    ICD-10-CM ICD-9-CM   1. Septic shock  A41.9 038.9    R65.21 785.52     995.92   2. Multifocal pneumonia  J18.9 486   3. Leukocytosis, unspecified type  D72.829 288.60   4. FARIHA (acute kidney injury)  N17.9 584.9   5. Thrombocytopenia  D69.6 287.5   6. Dysphagia, unspecified type  R13.10 787.20       Patient Active Problem List   Diagnosis    Mixed hyperlipidemia    Abnormal glucose level    Adult BMI 37.0-37.9 kg/sq m    Primary hypertension    Chronic kidney disease, stage 3    Morbid obesity    Obstructive sleep apnea    Old myocardial infarction    Vitamin D deficiency    Venous insufficiency of both lower extremities    Transaminitis    CAD (coronary artery disease)    Bradycardia, drug induced    Sepsis associated hypotension    Pressure injury of sacral region, stage 4    Acute on chronic kidney failure    Sepsis    Other cirrhosis of liver    Severe malnutrition        Past Medical History:   Diagnosis Date    Allergic ?    Cataract     Chronic kidney disease     Coronary artery disease     Hyperlipidemia     Hypertension     Myocardial infarction     Pneumonia of both lower lobes due to infectious organism 10/03/2019    Positive PPD     Respiratory failure 2005    requiring tracheostomy        Past Surgical History:   Procedure Laterality Date    CARDIAC CATHETERIZATION      CARDIAC SURGERY      HERNIA REPAIR             Wound 24 0800 Right lower arm Skin Tear (Active)   Dressing Appearance moist drainage 24   Closure Unable to assess 24   Base dressing in place, unable to visualize 24   Periwound moist;pink 24 1200   Dressing Care dressing changed 24 1200   Periwound Care absorptive dressing applied 24       Wound 24 1730 Right proximal  arm (Active)   Dressing Appearance moist drainage 05/24/24 2000   Closure Unable to assess 05/24/24 2000   Base dressing in place, unable to visualize 05/24/24 2000   Periwound moist;pink;swelling 05/24/24 1200   Drainage Characteristics/Odor serous 05/24/24 2000   Drainage Amount copious 05/24/24 1200   Dressing Care dressing changed 05/24/24 1200   Periwound Care absorptive dressing applied 05/24/24 2000       Wound 05/20/24 1800 Right anterior ankle (Active)   Dressing Appearance intact;dry 05/24/24 2000   Closure Unable to assess 05/24/24 2000   Base dressing in place, unable to visualize 05/24/24 2000   Periwound Care dry periwound area maintained 05/24/24 2000       Wound 05/20/24 1730 Bilateral sacral spine Other (comment) (Active)   Dressing Appearance dry;intact 05/25/24 0818   Closure Unable to assess 05/24/24 2000   Base dressing in place, unable to visualize 05/25/24 0818   Periwound Care dry periwound area maintained 05/24/24 2000   Wound Output (mL) 25 05/25/24 0818       NPWT (Negative Pressure Wound Therapy) 05/23/24 1345 sacrum (Active)   Therapy Setting continuous therapy 05/25/24 0818   Dressing foam, black 05/25/24 0600   Pressure Setting 100 mmHg 05/25/24 0818         WOUND DEBRIDEMENT                     PT Assessment (Last 12 Hours)       PT Evaluation and Treatment       Row Name 05/25/24 0818          Physical Therapy Time and Intention    Subjective Information no complaints  -     Document Type wound care;therapy note (daily note)  -     Mode of Treatment physical therapy;individual therapy  -       Row Name 05/25/24 0818          General Information    Patient Observations lethargic;decreased LOC  -     Patient/Family/Caregiver Comments/Observations family present  -       Row Name 05/25/24 0818          Pain Scale: FACES Pre/Post-Treatment    Pain: FACES Scale, Pretreatment 0-->no hurt  -     Posttreatment Pain Rating 0-->no hurt  -       Row Name 05/25/24 0818           Cognition    Orientation Status (Cognition) unable/difficult to assess  -MC       Row Name             Wound 05/20/24 1730 Right proximal arm    Wound - Properties Group Placement Date: 05/20/24  -OS Placement Time: 1730  -OS Present on Original Admission: Y  -OS Side: Right  -OS Orientation: proximal  -OS Location: arm  -OS    Retired Wound - Properties Group Placement Date: 05/20/24  -OS Placement Time: 1730  -OS Present on Original Admission: Y  -OS Side: Right  -OS Orientation: proximal  -OS Location: arm  -OS    Retired Wound - Properties Group Date first assessed: 05/20/24  -OS Time first assessed: 1730  -OS Present on Original Admission: Y  -OS Side: Right  -OS Location: arm  -OS      Row Name             Wound 05/20/24 1800 Right anterior ankle    Wound - Properties Group Placement Date: 05/20/24  -OS Placement Time: 1800  -OS Side: Right  -OS Orientation: anterior  -OS Location: ankle  -OS    Retired Wound - Properties Group Placement Date: 05/20/24  -OS Placement Time: 1800  -OS Side: Right  -OS Orientation: anterior  -OS Location: ankle  -OS    Retired Wound - Properties Group Date first assessed: 05/20/24  -OS Time first assessed: 1800  -OS Side: Right  -OS Location: ankle  -OS      Row Name 05/25/24 0818          Wound 05/20/24 1730 Bilateral sacral spine Other (comment)    Wound - Properties Group Placement Date: 05/20/24  -OS Placement Time: 1730  -OS Present on Original Admission: Y  -OS Side: Bilateral  -OS Location: sacral spine  -OS Primary Wound Type: Other  -OS, stage 4     Dressing Appearance dry;intact  -MC     Base dressing in place, unable to visualize  -     Wound Output (mL) 25  -MC     Retired Wound - Properties Group Placement Date: 05/20/24  -OS Placement Time: 1730  -OS Present on Original Admission: Y  -OS Side: Bilateral  -OS Location: sacral spine  -OS Primary Wound Type: Other  -OS, stage 4     Retired Wound - Properties Group Date first assessed: 05/20/24  -OS Time first  assessed: 1730  -OS Present on Original Admission: Y  -OS Side: Bilateral  -OS Location: sacral spine  -OS Primary Wound Type: Other  -OS, stage 4       Row Name             Wound 04/01/24 0800 Right lower arm Skin Tear    Wound - Properties Group Placement Date: 04/01/24  -AC Placement Time: 0800  -AC Present on Original Admission: Y  -AC Side: Right  -AC Orientation: lower  -AC Location: arm  -AC Primary Wound Type: Skin tear  -AC    Retired Wound - Properties Group Placement Date: 04/01/24  -AC Placement Time: 0800  -AC Present on Original Admission: Y  -AC Side: Right  -AC Orientation: lower  -AC Location: arm  -AC Primary Wound Type: Skin tear  -AC    Retired Wound - Properties Group Date first assessed: 04/01/24  -AC Time first assessed: 0800  -AC Present on Original Admission: Y  -AC Side: Right  -AC Location: arm  -AC Primary Wound Type: Skin tear  -AC      Row Name 05/25/24 0818          NPWT (Negative Pressure Wound Therapy) 05/23/24 1345 sacrum    NPWT (Negative Pressure Wound Therapy) - Properties Group Placement Date: 05/23/24  -MF Placement Time: 1345  -MF Location: sacrum  -MF    Therapy Setting continuous therapy  -     Pressure Setting 100 mmHg  -     Retired NPWT (Negative Pressure Wound Therapy) - Properties Group Placement Date: 05/23/24  -MF Placement Time: 1345  -MF Location: sacrum  -MF    Retired NPWT (Negative Pressure Wound Therapy) - Properties Group Placement Date: 05/23/24  -MF Placement Time: 1345  -MF Location: sacrum  -MF      Row Name 05/25/24 0818          Coping    Observed Emotional State flat  -MC     Verbalized Emotional State other (see comments)  [facility]       Row Name 05/25/24 0818          Plan of Care Review    Plan of Care Reviewed With family  -     Progress declining  -     Outcome Evaluation Pt is medically declining, now transitioned to comfort measures. Wound vac to the sacral wound is intact and functioning appropriately. Will leave in place up to three more  days to decrease need for dressing changes with the goal of increasing patient's overall comfort. Please call [phone] and leave a message if the patient passes so we may  the wound vac unit.  -       Row Name 05/25/24 0818          Positioning and Restraints    Pre-Treatment Position in bed  -     Post Treatment Position bed  -MC     In Bed supine;call light within reach;with family/caregiver  -               User Key  (r) = Recorded By, (t) = Taken By, (c) = Cosigned By      Initials Name Provider Type     Abhijit Barcenas, PT Physical Therapist    Butch Mccann RN Registered Nurse    Nabila Galicia, PT Physical Therapist    Cristel Zaidi RN Registered Nurse                  Physical Therapy Education       Title: PT OT SLP Therapies (In Progress)       Topic: Physical Therapy (In Progress)       Point: Mobility training (In Progress)       Learning Progress Summary             Patient Acceptance, E,TB, NR by ES at 5/22/2024 1354   Family Acceptance, E,TB, NR by ES at 5/22/2024 1354    Acceptance, E, VU by RL at 5/21/2024 0503                         Point: Home exercise program (Done)       Learning Progress Summary             Family Acceptance, E, VU by RL at 5/21/2024 0503                         Point: Body mechanics (In Progress)       Learning Progress Summary             Patient Acceptance, E,TB, NR by ES at 5/22/2024 1354   Family Acceptance, E,TB, NR by ES at 5/22/2024 1354    Acceptance, E, VU by RL at 5/21/2024 0503                         Point: Precautions (In Progress)       Learning Progress Summary             Patient Acceptance, E,TB, NR by ES at 5/22/2024 1354   Family Acceptance, E,TB, NR by ES at 5/22/2024 1354    Acceptance, E, VU by RL at 5/21/2024 0503                                         User Key       Initials Effective Dates Name Provider Type Discipline    RL 06/16/21 -  Christy Prasad, RN Registered Nurse Nurse    ES 08/11/22 -  Pema Martin, NASIR  Physical Therapist PT                    Recommendation and Plan  Anticipated Discharge Disposition (PT): skilled nursing facility  Planned Therapy Interventions (PT): wound care  Therapy Frequency (PT): daily  Plan of Care Reviewed With: family   Progress: declining       Progress: declining  Outcome Evaluation: Pt is medically declining, now transitioned to comfort measures. Wound vac to the sacral wound is intact and functioning appropriately. Will leave in place up to three more days to decrease need for dressing changes with the goal of increasing patient's overall comfort. Please call [phone] and leave a message if the patient passes so we may  the wound vac unit.  Plan of Care Reviewed With: family            Time Calculation   PT Charges       Row Name 05/25/24 0822             Time Calculation    Start Time 0801  -MC      PT Goal Re-Cert Due Date 06/01/24  -         Untimed Charges    72831-Pyb Pressure wound to 50 sqcm 5  -MC         Total Minutes    Untimed Charges Total Minutes 5  -MC       Total Minutes 5  -MC                User Key  (r) = Recorded By, (t) = Taken By, (c) = Cosigned By      Initials Name Provider Type    Naibla Galicia, PT Physical Therapist                            PT G-Codes  Outcome Measure Options: AM-PAC 6 Clicks Daily Activity (OT)  AM-PAC 6 Clicks Score (PT): 6  AM-PAC 6 Clicks Score (OT): 6  Modified [place] Scale: 4 - Moderately severe disability.  Unable to walk without assistance, and unable to attend to own bodily needs without assistance.       Nabila Gu, PT  5/25/2024

## 2024-05-25 NOTE — PROGRESS NOTES
CODE STATUS discussion    Contacted by primary RN due to family wishing to discuss code status. Multiple conversations were had over the course of the day regarding patient's continued decline despite optimal medical management. At this time they wish to transition care to comfort measures and proceed with withdrawal of life-supportive therapy. Changes have been made in the chart to reflect their decision and appropriate medications for comfort added to the MAR. We will be available for continued support.     Sunitha Dey, SURYA, APRN, AGACNP-BC  Pulmonary and Critical Care Medicine

## 2024-05-25 NOTE — PROGRESS NOTES
"INTENSIVIST NOTE     Hospital Day: 5    Mr. Tho Vasquez, 84 y.o. male is followed for:   Respiratory failure       SUBJECTIVE     Interval history:    Comfort measures.  Oxygen saturations 86% on room air.  Upper airway secretions prominent.  Family at bedside.    The patient's relevant past medical, surgical and social history were reviewed and updated in Epic as appropriate.       OBJECTIVE     Vital Sign Min/Max for last 24 hours  Temp  Min: 96.7 °F (35.9 °C)  Max: 97.6 °F (36.4 °C)   BP  Min: 43/28  Max: 134/67   Pulse  Min: 47  Max: 101   Resp  Min: 12  Max: 22   SpO2  Min: 67 %  Max: 100 %   No data recorded   No data recorded      Intake/Output Summary (Last 24 hours) at 5/25/2024 1441  Last data filed at 5/25/2024 1400  Gross per 24 hour   Intake 748.2 ml   Output 246 ml   Net 502.2 ml      Flowsheet Rows      Flowsheet Row First Filed Value   Admission Height 177.8 cm (70\") Documented at 05/20/2024 1114   Admission Weight 104 kg (230 lb) Documented at 05/20/2024 1114               05/20/24  1114   Weight: 104 kg (230 lb)            Objective:  General Appearance:  Ill-appearing and not in pain.    Vital signs: (most recent): Blood pressure (!) 56/36, pulse 62, temperature 97.6 °F (36.4 °C), temperature source Axillary, resp. rate 14, height 177.8 cm (70\"), weight 104 kg (230 lb), SpO2 95%.    Lungs:  There are rhonchi.  No rales or wheezes.    Heart: Normal rate.  Regular rhythm.  S1 normal and S2 normal.  No murmur, gallop or friction rub.   Chest: Symmetric chest wall expansion.   Abdomen: Abdomen is soft and non-distended.  Hypoactive bowel sounds.   There is no mass. There is no splenomegaly. There is no hepatomegaly.   Extremities: There is dependent edema.  There is no deformity.    Neurological: (Poorly responsive).    Pupils:  Pupils are equal, round, and reactive to light.    Skin:  Warm and dry.                I reviewed the patient's new clinical results.  I reviewed the patient's new " imaging results/reports including actual images and agree with reports.    No radiology results for the last day     INFUSIONS       Results from last 7 days   Lab Units 05/24/24  0544 05/23/24  1027 05/23/24  0532   WBC 10*3/mm3 14.98* 15.54* 13.90*   HEMOGLOBIN g/dL 9.8* 10.5* 9.5*   HEMATOCRIT % 30.2* 31.0* 28.8*   PLATELETS 10*3/mm3 43* 47* 47*     Results from last 7 days   Lab Units 05/24/24  1553 05/24/24  0544 05/23/24  1841 05/23/24  0641 05/20/24  1107 05/20/24  1105   SODIUM mmol/L  --  138 146* 140   < > 140   POTASSIUM mmol/L  --  3.7 4.1  4.1 3.2*   < > 4.8   CHLORIDE mmol/L  --  109* 115* 109*   < > 105   CO2 mmol/L  --  21.0* 25.0 23.0   < > 25.0   BUN mg/dL  --  51* 55* 53*   < > 55*   GLUCOSE mg/dL  --  279* 64* 288*   < > 95   CREATININE mg/dL  --  1.52* 1.72* 1.62*   < > 2.35*   MAGNESIUM mg/dL  --  1.6 1.8 1.6  --  2.1   PHOSPHORUS mg/dL 3.1 2.2* 2.7  --    < >  --    CALCIUM mg/dL  --  7.1* 8.2* 7.2*   < > 8.2*   ALBUMIN g/dL  --   --   --  1.8*  --  2.0*    < > = values in this interval not displayed.         Results from last 7 days   Lab Units 05/23/24  0757 05/20/24 2009   PH, ARTERIAL pH units 7.298* 7.370   PCO2, ARTERIAL mm Hg 50.9* 43.3   PO2 ART mm Hg 98.4 115.0*   FIO2 % 28 28       Patient isn't on Tube Feeding   /h  Patient doesn't have any tube feeding modular orders    Mechanical Ventilator:   Settings: Observed:                                                I reviewed the patient's medications.    Assessment & Plan   ASSESSMENT/PLAN     Active Hospital Problems    Diagnosis     **Sepsis associated hypotension     Severe malnutrition     Other cirrhosis of liver     Pressure injury of sacral region, stage 4     Acute on chronic kidney failure     Sepsis     CAD (coronary artery disease)     Transaminitis     Mixed hyperlipidemia     Primary hypertension     Chronic kidney disease, stage 3     Morbid obesity     Obstructive sleep apnea        84-year-old male with multiple  chronic medical problems who developed COVID and was hospitalized at the end of March 2024.  He has continued to decline since then with poor p.o. intake and development of decubiti.  Further complications include Proteus bacteremia, renal insufficiency, and carla arrhythmias.  He has continued to decline in the ICU and family has decided on comfort measures.    Plan:    Comfort measures as noted above  Add Robinul for secretion management     I discussed the patient's findings and my recommendations with family and nursing staff     Plan of care and goals reviewed with multidisciplinary team at daily rounds.    .    Ike King MD  Pulmonary and Critical Care Medicine  05/25/24 14:41 EDT

## 2024-05-26 LAB
BACTERIA ISLT: NORMAL
QT INTERVAL: 402 MS
QTC INTERVAL: 440 MS

## 2024-05-26 NOTE — DISCHARGE SUMMARY
Death Summary    Patient name: Tho Vasquez  CSN: [id number]  MRN: [id number]  : 1940    Date of Admission: 2024  Date and Time of Death: 2024 at 1433    Admitting Physician: Sena Hartley DO    Primary Care Provider: Ja Baldwin MD    Consultations:   Alexa Seymour MD Neurology   Clary Rashid MD Palliative Care   Matt Gee DO   Cardiology   Grant Black MD Infectious Disease     Admission Diagnosis: Sepsis associated hypotension      Diagnoses at the Time of Death:     Sepsis associated hypotension    Mixed hyperlipidemia    Primary hypertension    Chronic kidney disease, stage 3    Morbid obesity    Obstructive sleep apnea    Transaminitis    CAD (coronary artery disease)    Pressure injury of sacral region, stage 4    Acute on chronic kidney failure    Sepsis    Other cirrhosis of liver    Severe malnutrition    Procedures:  24: PICC Line Insertion     History of Present Illness (copied from H&P note on 24):  Tho Vasquez is a 84 y.o. male with PMHx CAD, HTN, HLD, Hx COVID-19 requiring admission to [facility] 3/29- and was discharged to [facility] for rehab. Daughter present in room and provides history. States patient went home from rehab and it required her and her sister to take care of him and so they moved him into [facility] and he has been there for the past few weeks.   She tells me he has been increasingly weak. He has also had confusion and dysarthria. Per EMS, last known well was on Thursday. Patient went to see his primary Cardiologist and his Coreg was restarted. In review of that office note, patient was lethargic at that visit as well.   Stroke team saw on arrival to ED. CT head negative for stroke. CTA and CTP deferred due to elevated creatinine.   Patient has not had any specific complaints per daughter but he is not one to complain. BP low but per daughter, this is not new for him. ED staff attempting to obtain UA.  WBC elevated, procal elevated. LFTs elevated but not new. Also new FARIHA.  Hospital Medicine asked to admit (end of copied text).     While awaiting bed assignment in the ED patient continued with persistent hypotension despite sepsis IVF bolus and was initiated on pressors with subsequent admission to ICU.     Of note, patient was noted to have a stage IV coccyx pressure injury on admission.     Hospital Course:  Patient was admitted to ICU 2* issues discussed in the above HPI and continued on empiric antibiotics and pressors.     Further deteriorated on  with development of severe bradycardia / Mobitz type II AVB and EP Cardiology was consulted who recommended holding beta-blockade. Dopamine was initiated with improvement in heart rate.      Blood cultures ultimately grew Proteus and he was continued on Zosyn per Infectious Disease.     Overnight / early morning of  became more obtunded with ABG demonstrating hypercapnia and was placed on BiPAP.     Patient continued with persistent hypotension on multiple pressors, encephalopathy, and worsening respiratory status / ultimately became BiPAP dependent. Palliative care was consulted and following multiple discussions with the family they ultimately made the difficult but appropriate decision to transition care to comfort measures and proceed with withdrawal of life-supportive therapy. Mr. Vasquez ultimately  on 24 at 1433.     Sunitha Dey, DNP, APRN, AGACNP-BC  Pulmonary and Critical Care Medicine     Please note that portions of this note were completed with a voice recognition program.

## 2024-05-30 NOTE — PROGRESS NOTES
Enter Query Response Below      Query Response:     Severe sepsis causing acute organ failure (acute kidney injury).    Electronically signed by Sunitha Dey DNP, APRN, 24, 4:05 PM EDT.      If applicable, please update the problem list.       Patient: Tho Vasquez        : 1940  Account: [id number]           Admit Date: 2024        How to Respond to this query:       a. Click New Note     b. Answer query within the yellow box.                c. Update the Problem List, if applicable.      If you have any questions about this query contact me at: [email or web address]     Sunitha Dey DNP, APRN:     Patient presents to emergency room with slurred speech, worsening weakness. On arrival, afebrile hr 81, bp 81/58, creatinine 2.60, wbc 17.84, lactate 2.7, procal 5.62. History and physical states sepsis, concern for left basilar pneumonia, acute kidney injury, cultures, continue zosyn, ns at 75cc/hr x 24 hours, hold home lasix and Diovan, repeat labs in am, history in chart indicative of CKD 3 but creatinine has been normal on prior admits.   Creatinine 1.86.  Creatinine 1.52.     Please clarify if patient treated/monitored for one or more of the following:     Severe sepsis causing acute organ failure, (acute kidney injury)  Other (please specify) ___________  Unable to clarify.    By submitting this query, we are merely seeking further clarification of documentation to accurately reflect all conditions that you are monitoring, evaluating, treating or that extend the hospitalization or utilize additional resources of care. Please utilize your independent clinical judgment when addressing the question(s) above.     This query and your response, once completed, will be entered into the legal medical record.    Sincerely,  Jan EATON RN BSN   Clinical Documentation Integrity Program   [email or web address]

## 2024-07-24 NOTE — TELEPHONE ENCOUNTER
Please review medication alert            Alert on rx this was discontinued by you 5-7-20.   Patient has valsartan/ hctz as an active med in his list   yes